# Patient Record
Sex: MALE | Race: WHITE | NOT HISPANIC OR LATINO | Employment: OTHER | URBAN - NONMETROPOLITAN AREA
[De-identification: names, ages, dates, MRNs, and addresses within clinical notes are randomized per-mention and may not be internally consistent; named-entity substitution may affect disease eponyms.]

---

## 2020-07-03 ENCOUNTER — APPOINTMENT (EMERGENCY)
Dept: CT IMAGING | Facility: HOSPITAL | Age: 86
End: 2020-07-03
Payer: COMMERCIAL

## 2020-07-03 ENCOUNTER — HOSPITAL ENCOUNTER (EMERGENCY)
Facility: HOSPITAL | Age: 86
Discharge: HOME | End: 2020-07-03
Attending: EMERGENCY MEDICINE | Admitting: EMERGENCY MEDICINE
Payer: COMMERCIAL

## 2020-07-03 VITALS
RESPIRATION RATE: 20 BRPM | DIASTOLIC BLOOD PRESSURE: 77 MMHG | TEMPERATURE: 98.2 F | WEIGHT: 198.85 LBS | SYSTOLIC BLOOD PRESSURE: 151 MMHG | HEART RATE: 68 BPM | OXYGEN SATURATION: 99 %

## 2020-07-03 DIAGNOSIS — J18.9 PNEUMONIA OF BOTH LOWER LOBES DUE TO INFECTIOUS ORGANISM: ICD-10-CM

## 2020-07-03 DIAGNOSIS — G51.0 BELL'S PALSY: Primary | ICD-10-CM

## 2020-07-03 LAB
ALBUMIN SERPL BCP-MCNC: 3.8 G/DL (ref 3.5–5)
ALP SERPL-CCNC: 129 U/L (ref 46–116)
ALT SERPL W P-5'-P-CCNC: 23 U/L (ref 12–78)
ANION GAP SERPL CALCULATED.3IONS-SCNC: 6 MMOL/L (ref 4–13)
APTT PPP: 24 SECONDS (ref 23–37)
AST SERPL W P-5'-P-CCNC: 16 U/L (ref 5–45)
BASOPHILS # BLD AUTO: 0.01 THOUSANDS/ΜL (ref 0–0.1)
BASOPHILS NFR BLD AUTO: 0 % (ref 0–1)
BILIRUB SERPL-MCNC: 0.58 MG/DL (ref 0.2–1)
BUN SERPL-MCNC: 18 MG/DL (ref 5–25)
CALCIUM SERPL-MCNC: 8.9 MG/DL (ref 8.3–10.1)
CHLORIDE SERPL-SCNC: 100 MMOL/L (ref 100–108)
CO2 SERPL-SCNC: 30 MMOL/L (ref 21–32)
CREAT SERPL-MCNC: 1.48 MG/DL (ref 0.6–1.3)
EOSINOPHIL # BLD AUTO: 0.12 THOUSAND/ΜL (ref 0–0.61)
EOSINOPHIL NFR BLD AUTO: 1 % (ref 0–6)
ERYTHROCYTE [DISTWIDTH] IN BLOOD BY AUTOMATED COUNT: 12.5 % (ref 11.6–15.1)
GFR SERPL CREATININE-BSD FRML MDRD: 41 ML/MIN/1.73SQ M
GLUCOSE SERPL-MCNC: 220 MG/DL (ref 65–140)
HCT VFR BLD AUTO: 43 % (ref 36.5–49.3)
HGB BLD-MCNC: 14.3 G/DL (ref 12–17)
IMM GRANULOCYTES # BLD AUTO: 0.02 THOUSAND/UL (ref 0–0.2)
IMM GRANULOCYTES NFR BLD AUTO: 0 % (ref 0–2)
INR PPP: 0.95 (ref 0.84–1.19)
LACTATE SERPL-SCNC: 1.9 MMOL/L (ref 0.5–2)
LIPASE SERPL-CCNC: 132 U/L (ref 73–393)
LYMPHOCYTES # BLD AUTO: 1.44 THOUSANDS/ΜL (ref 0.6–4.47)
LYMPHOCYTES NFR BLD AUTO: 16 % (ref 14–44)
MCH RBC QN AUTO: 33.3 PG (ref 26.8–34.3)
MCHC RBC AUTO-ENTMCNC: 33.3 G/DL (ref 31.4–37.4)
MCV RBC AUTO: 100 FL (ref 82–98)
MONOCYTES # BLD AUTO: 0.88 THOUSAND/ΜL (ref 0.17–1.22)
MONOCYTES NFR BLD AUTO: 10 % (ref 4–12)
NEUTROPHILS # BLD AUTO: 6.36 THOUSANDS/ΜL (ref 1.85–7.62)
NEUTS SEG NFR BLD AUTO: 73 % (ref 43–75)
NRBC BLD AUTO-RTO: 0 /100 WBCS
PLATELET # BLD AUTO: 248 THOUSANDS/UL (ref 149–390)
PMV BLD AUTO: 10.2 FL (ref 8.9–12.7)
POTASSIUM SERPL-SCNC: 4.7 MMOL/L (ref 3.5–5.3)
PROT SERPL-MCNC: 8.2 G/DL (ref 6.4–8.2)
PROTHROMBIN TIME: 12.7 SECONDS (ref 11.6–14.5)
RBC # BLD AUTO: 4.3 MILLION/UL (ref 3.88–5.62)
SARS-COV-2 RNA RESP QL NAA+PROBE: NEGATIVE
SODIUM SERPL-SCNC: 136 MMOL/L (ref 136–145)
TROPONIN I SERPL-MCNC: <0.02 NG/ML
WBC # BLD AUTO: 8.83 THOUSAND/UL (ref 4.31–10.16)

## 2020-07-03 PROCEDURE — 70450 CT HEAD/BRAIN W/O DYE: CPT

## 2020-07-03 PROCEDURE — 93005 ELECTROCARDIOGRAM TRACING: CPT

## 2020-07-03 PROCEDURE — 70490 CT SOFT TISSUE NECK W/O DYE: CPT

## 2020-07-03 PROCEDURE — 99285 EMERGENCY DEPT VISIT HI MDM: CPT | Performed by: EMERGENCY MEDICINE

## 2020-07-03 PROCEDURE — 86618 LYME DISEASE ANTIBODY: CPT | Performed by: EMERGENCY MEDICINE

## 2020-07-03 PROCEDURE — 85025 COMPLETE CBC W/AUTO DIFF WBC: CPT | Performed by: EMERGENCY MEDICINE

## 2020-07-03 PROCEDURE — 87635 SARS-COV-2 COVID-19 AMP PRB: CPT | Performed by: EMERGENCY MEDICINE

## 2020-07-03 PROCEDURE — 36415 COLL VENOUS BLD VENIPUNCTURE: CPT | Performed by: EMERGENCY MEDICINE

## 2020-07-03 PROCEDURE — 71250 CT THORAX DX C-: CPT

## 2020-07-03 PROCEDURE — 84484 ASSAY OF TROPONIN QUANT: CPT | Performed by: EMERGENCY MEDICINE

## 2020-07-03 PROCEDURE — 83605 ASSAY OF LACTIC ACID: CPT | Performed by: EMERGENCY MEDICINE

## 2020-07-03 PROCEDURE — 80053 COMPREHEN METABOLIC PANEL: CPT | Performed by: EMERGENCY MEDICINE

## 2020-07-03 PROCEDURE — 96365 THER/PROPH/DIAG IV INF INIT: CPT

## 2020-07-03 PROCEDURE — 85730 THROMBOPLASTIN TIME PARTIAL: CPT | Performed by: EMERGENCY MEDICINE

## 2020-07-03 PROCEDURE — 99285 EMERGENCY DEPT VISIT HI MDM: CPT

## 2020-07-03 PROCEDURE — 83690 ASSAY OF LIPASE: CPT | Performed by: EMERGENCY MEDICINE

## 2020-07-03 PROCEDURE — 85610 PROTHROMBIN TIME: CPT | Performed by: EMERGENCY MEDICINE

## 2020-07-03 RX ORDER — AMOXICILLIN AND CLAVULANATE POTASSIUM 875; 125 MG/1; MG/1
1 TABLET, FILM COATED ORAL EVERY 12 HOURS
Qty: 20 TABLET | Refills: 0 | Status: SHIPPED | OUTPATIENT
Start: 2020-07-03 | End: 2020-07-04

## 2020-07-03 RX ORDER — PREDNISONE 20 MG/1
40 TABLET ORAL DAILY
Qty: 10 TABLET | Refills: 0 | Status: SHIPPED | OUTPATIENT
Start: 2020-07-03 | End: 2020-07-08

## 2020-07-03 RX ORDER — PREDNISONE 20 MG/1
40 TABLET ORAL ONCE
Status: COMPLETED | OUTPATIENT
Start: 2020-07-03 | End: 2020-07-03

## 2020-07-03 RX ADMIN — PIPERACILLIN SODIUM AND TAZOBACTAM SODIUM 3.38 G: 36; 4.5 INJECTION, POWDER, FOR SOLUTION INTRAVENOUS at 14:11

## 2020-07-03 RX ADMIN — PREDNISONE 40 MG: 20 TABLET ORAL at 14:08

## 2020-07-03 NOTE — ED NOTES
Pt in no acute distress  Ambulates with a steady gait   Granddaughter to come pick pt and wife up     Bev Wooten RN  07/03/20 6964

## 2020-07-03 NOTE — ED PROVIDER NOTES
History  Chief Complaint   Patient presents with    Difficulty Swallowing     pt states ever since eating breakfast yesterday he has been having difficulty swallowing  wife states "lots of mucous yesterday" pt denies painful  voice hoarse per pt  pt denies SOB, CP  pt also reports numbness to L side of face      Patient states that ever since yesterday morning when he is eating breakfast he complains of difficulty swelling  Brings up a lot of mucus  No nausea  Tolerating sips of water  Complains of numbness to the left face  No change in speech  No chest pain  No recent cold symptoms  No fevers or chills  History provided by:  Patient   used: No    Medical Problem   Location:  Difficulty swelling and left facial numbness  Severity:  Mild  Onset quality:  Sudden  Duration:  1 day  Timing:  Constant  Progression:  Unchanged  Relieved by:  Nothing  Worsened by:  Nothing  Ineffective treatments:  Nothing  Associated symptoms: cough    Associated symptoms: no abdominal pain, no chest pain, no congestion, no diarrhea, no ear pain, no fever, no headaches, no loss of consciousness, no nausea, no rash, no rhinorrhea, no shortness of breath, no sore throat, no vomiting and no wheezing        Prior to Admission Medications   Prescriptions Last Dose Informant Patient Reported? Taking? Aspirin Buf,CaCarb-MgCarb-MgO, 81 MG TABS   Yes Yes   Sig: Take by mouth   Multiple Vitamin (MULTIVITAMINS PO)   Yes Yes   Sig: Take by mouth   metFORMIN (GLUCOPHAGE) 500 mg tablet   Yes Yes   Sig: TAKE 1 TABLET DAILY      Facility-Administered Medications: None       Past Medical History:   Diagnosis Date    Diabetes mellitus ([facility] [address] )        History reviewed  No pertinent surgical history  History reviewed  No pertinent family history  I have reviewed and agree with the history as documented      E-Cigarette/Vaping    E-Cigarette Use Never User      E-Cigarette/Vaping Substances     Social History     Tobacco Use    Smoking status: Former Smoker    Smokeless tobacco: Never Used   Substance Use Topics    Alcohol use: Never     Frequency: Never    Drug use: Never       Review of Systems   Constitutional: Negative for chills and fever  HENT: Negative for congestion, ear pain, hearing loss, rhinorrhea, sore throat, trouble swallowing and voice change  Eyes: Negative for pain and discharge  Respiratory: Positive for cough  Negative for shortness of breath and wheezing  Cardiovascular: Negative for chest pain and palpitations  Gastrointestinal: Negative for abdominal pain, blood in stool, constipation, diarrhea, nausea and vomiting  Genitourinary: Negative for dysuria, flank pain, frequency and hematuria  Musculoskeletal: Negative for joint swelling, neck pain and neck stiffness  Skin: Negative for rash and wound  Neurological: Positive for facial asymmetry  Negative for dizziness, seizures, loss of consciousness, syncope and headaches  Psychiatric/Behavioral: Negative for hallucinations, self-injury and suicidal ideas  All other systems reviewed and are negative  Physical Exam  Physical Exam   Constitutional: He appears well-developed and well-nourished  No distress  HENT:   Head: Normocephalic and atraumatic  Right Ear: External ear normal    Left Ear: External ear normal    Eyes: Pupils are equal, round, and reactive to light  Conjunctivae and EOM are normal    Neck: Normal range of motion  Neck supple  Cardiovascular: Normal rate, regular rhythm and normal heart sounds  No murmur heard  Pulmonary/Chest: Effort normal and breath sounds normal  He has no wheezes  He has no rales  Abdominal: Soft  Bowel sounds are normal  He exhibits no distension  There is no tenderness  There is no rebound and no guarding  Musculoskeletal: Normal range of motion  He exhibits no deformity  Neurological: He is alert  Decreased deferring of the left forehead  Left eyelid slightly droopy    Left facial droop but the nasal labial fold  Decreased strength in keeping the left eyelid close compared to the right  Skin: Skin is warm and dry  No rash noted  Psychiatric: He has a normal mood and affect  His behavior is normal    Nursing note and vitals reviewed  Vital Signs  ED Triage Vitals [07/03/20 1208]   Temperature Pulse Respirations Blood Pressure SpO2   98 2 °F (36 8 °C) 82 20 (!) 191/86 98 %      Temp Source Heart Rate Source Patient Position - Orthostatic VS BP Location FiO2 (%)   Temporal Monitor Lying Right arm --      Pain Score       --           Vitals:    07/03/20 1415 07/03/20 1430 07/03/20 1445 07/03/20 1500   BP:  147/73  151/77   Pulse: 73 66 65 68   Patient Position - Orthostatic VS:             Visual Acuity      ED Medications  Medications   iohexol (OMNIPAQUE) 240 MG/ML solution 20 mL (has no administration in time range)   piperacillin-tazobactam (ZOSYN) 3 375 g in sodium chloride 0 9 % 100 mL IVPB (0 g Intravenous Stopped 7/3/20 1449)   predniSONE tablet 40 mg (40 mg Oral Given 7/3/20 1408)       Diagnostic Studies  Results Reviewed     Procedure Component Value Units Date/Time    Novel Coronavirus [facility] [[id number]]  (Normal) Collected:  07/03/20 1409    Lab Status:  Final result Specimen:  Nares from Nose Updated:  07/03/20 1505     SARS-CoV-2 Negative    Narrative: The specimen collection materials, transport medium, and/or testing methodology utilized in the production of these test results have been proven to be reliable in a limited validation with an abbreviated program under the Emergency Utilization Authorization provided by the FDA  Testing reported as "Presumptive positive" will be confirmed with secondary testing with a reference laboratory to ensure result accuracy  Clinical caution and judgement should be used with the interpretation of these results with consideration of the clinical impression and other laboratory testing    Testing reported as "Positive" or "Negative" has been proven to be accurate according to standard laboratory validation requirements  All testing is performed with control materials showing appropriate reactivity at standard intervals  Lactic acid [[id number]]  (Normal) Collected:  07/03/20 1224    Lab Status:  Final result Specimen:  Blood from Arm, Right Updated:  07/03/20 1257     LACTIC ACID 1 9 mmol/L     Narrative:       Result may be elevated if tourniquet was used during collection      Troponin I [[id number]]  (Normal) Collected:  07/03/20 1224    Lab Status:  Final result Specimen:  Blood from Arm, Right Updated:  07/03/20 1256     Troponin I <0 02 ng/mL     Comprehensive metabolic panel [[id number]]  (Abnormal) Collected:  07/03/20 1224    Lab Status:  Final result Specimen:  Blood from Arm, Right Updated:  07/03/20 1253     Sodium 136 mmol/L      Potassium 4 7 mmol/L      Chloride 100 mmol/L      CO2 30 mmol/L      ANION GAP 6 mmol/L      BUN 18 mg/dL      Creatinine 1 48 mg/dL      Glucose 220 mg/dL      Calcium 8 9 mg/dL      AST 16 U/L      ALT 23 U/L      Alkaline Phosphatase 129 U/L      Total Protein 8 2 g/dL      Albumin 3 8 g/dL      Total Bilirubin 0 58 mg/dL      eGFR 41 ml/min/1 73sq m     Narrative:       [place] guidelines for Chronic Kidney Disease (CKD):     Stage 1 with normal or high GFR (GFR > 90 mL/min/1 73 square meters)    Stage 2 Mild CKD (GFR = 60-89 mL/min/1 73 square meters)    Stage 3A Moderate CKD (GFR = 45-59 mL/min/1 73 square meters)    Stage 3B Moderate CKD (GFR = 30-44 mL/min/1 73 square meters)    Stage 4 Severe CKD (GFR = 15-29 mL/min/1 73 square meters)    Stage 5 End Stage CKD (GFR <15 mL/min/1 73 square meters)  Note: GFR calculation is accurate only with a steady state creatinine    Lipase [[id number]]  (Normal) Collected:  07/03/20 1224    Lab Status:  Final result Specimen:  Blood from Arm, Right Updated:  07/03/20 1253     Lipase 132 u/L Protime-INR [[id number]]  (Normal) Collected:  07/03/20 1224    Lab Status:  Final result Specimen:  Blood from Arm, Right Updated:  07/03/20 1249     Protime 12 7 seconds      INR 0 95    APTT [[id number]]  (Normal) Collected:  07/03/20 1224    Lab Status:  Final result Specimen:  Blood from Arm, Right Updated:  07/03/20 1249     PTT 24 seconds     CBC and differential [[id number]]  (Abnormal) Collected:  07/03/20 1224    Lab Status:  Final result Specimen:  Blood from Arm, Right Updated:  07/03/20 1237     WBC 8 83 Thousand/uL      RBC 4 30 Million/uL      Hemoglobin 14 3 g/dL      Hematocrit 43 0 %       fL      MCH 33 3 pg      MCHC 33 3 g/dL      RDW 12 5 %      MPV 10 2 fL      Platelets 370 Thousands/uL      nRBC 0 /100 WBCs      Neutrophils Relative 73 %      Immat GRANS % 0 %      Lymphocytes Relative 16 %      Monocytes Relative 10 %      Eosinophils Relative 1 %      Basophils Relative 0 %      Neutrophils Absolute 6 36 Thousands/µL      Immature Grans Absolute 0 02 Thousand/uL      Lymphocytes Absolute 1 44 Thousands/µL      Monocytes Absolute 0 88 Thousand/µL      Eosinophils Absolute 0 12 Thousand/µL      Basophils Absolute 0 01 Thousands/µL     Lyme Antibody Profile with reflex to [facility] [[id number]] Collected:  07/03/20 1224    Lab Status: In process Specimen:  Blood from Arm, Right Updated:  07/03/20 1234                 CT head without contrast   Final Result by Sarika Prather MD (07/03 1349)      No acute intracranial abnormality  Workstation performed: [id number]         CT chest without contrast   Final Result by Sarika Prather MD (07/03 1339)         1  Few airspace opacities at the lung bases could represent mild aspiration pneumonitis or pneumonia  2   Limited evaluation of the esophagus and absence of complete distention and presence of motion artifact  No clear evidence of esophagitis    If there is persistent concern for stricture or reflux, recommend dedicated barium swallow exam or EGD  Workstation performed: [id number]         CT soft tissue neck wo contrast   Final Result by Clair Bright MD (07/03 1347)            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 on the proximal esophagus and residual contrast in the piriform sinuses, concerning for aspiration  Dedicated barium swallow study may be helpful  Workstation performed: [id number]                    Procedures  ECG 12 Lead Documentation Only  Date/Time: 7/3/2020 12:25 PM  Performed by: Mike Zelaya MD  Authorized by: Mike Zelaya MD     ECG reviewed by me, the ED Provider: yes    Patient location:  ED  Previous ECG:     Previous ECG:  Unavailable  Rate:     ECG rate:  80  Rhythm:     Rhythm: sinus rhythm    Ectopy:     Ectopy: none    Comments:      Right bundle branch block and left anterior fascicular block noted  ED Course  ED Course as of Jul 03 1515   Fri Jul 03, 2020   1258 Upon further questioning, patient states that when he does eat it feels like it gets stuck  The actual process of swelling is not difficult  Just returned from CAT scan and he swallowed the contrast that any difficulty  States that when he ate today healing took small spoon fulls and would bring stuff up  It would not be food but just saliva and mucus  1341 Patient seen  Has tolerated the p o  Contrast   No difficulty breathing  Wife states it just feels like there is a lot of mucus there  1355 Patient is afebrile within normal white count and lactic acid  Given that these symptoms started while he was eating breakfast this could be due to an aspiration pneumonia  However, given the normal white count and normal lactic acid and a normal pulse ox, there is no criteria for admission at this time  1513 Walking in the hallways without any difficulty  No shortness of breath            US AUDIT      Most Recent Value   Initial Alcohol Screen: US AUDIT-C    1  How often do you have a drink containing alcohol?  0 Filed at: 07/03/2020 1211   2  How many drinks containing alcohol do you have on a typical day you are drinking? 0 Filed at: 07/03/2020 1211   3a  Male UNDER 65: How often do you have five or more drinks on one occasion? 0 Filed at: 07/03/2020 1211   3b  FEMALE Any Age, or MALE 65+: How often do you have 4 or more drinks on one occassion? 0 Filed at: 07/03/2020 1211   Audit-C Score  0 Filed at: 07/03/2020 1211                  SHAUN/DAST-10      Most Recent Value   How many times in the past year have you    Used an illegal drug or used a prescription medication for non-medical reasons? Never Filed at: 07/03/2020 1211                                [facility]  Number of Diagnoses or Management Options     Amount and/or Complexity of Data Reviewed  Clinical lab tests: reviewed  Obtain history from someone other than the patient: yes          Disposition  Final diagnoses:   Bell's palsy   Pneumonia of both lower lobes due to infectious organism     Time reflects when diagnosis was documented in both MDM as applicable and the Disposition within this note     Time User Action Codes Description Comment    7/3/2020  1:52 PM Concha Bower Add [G51 0] Bell's palsy     7/3/2020  1:56 PM Concha Bower Add [J18 9] Pneumonia of both lower lobes due to infectious organism       ED Disposition     ED Disposition Condition Date/Time Comment    Discharge Stable Fri Jul 3, 2020  1:52 PM Clifm Purple discharge to home/self care              Follow-up Information     Follow up With Specialties Details Why Jolie Luna MD Family Medicine Call in 2 days  [id number] [place] [address]            Patient's Medications   Discharge Prescriptions    AMOXICILLIN-CLAVULANATE (AUGMENTIN) 875-125 MG PER TABLET    Take 1 tablet by mouth every 12 (twelve) hours for 10 days       Start Date: 7/3/2020  End Date: 7/13/2020 Order Dose: 1 tablet       Quantity: 20 tablet    Refills: 0    PREDNISONE 20 MG TABLET    Take 2 tablets (40 mg total) by mouth daily for 5 days       Start Date: 7/3/2020  End Date: 7/8/2020       Order Dose: 40 mg       Quantity: 10 tablet    Refills: 0     No discharge procedures on file      PDMP Review     None          ED Provider  Electronically Signed by           Kalyn Glass MD  07/03/20 2716

## 2020-07-04 ENCOUNTER — TELEPHONE (OUTPATIENT)
Dept: EMERGENCY DEPT | Facility: HOSPITAL | Age: 86
End: 2020-07-04

## 2020-07-04 DIAGNOSIS — G51.0 BELL'S PALSY: Primary | ICD-10-CM

## 2020-07-04 DIAGNOSIS — J18.9 PNEUMONIA OF BOTH LOWER LOBES DUE TO INFECTIOUS ORGANISM: ICD-10-CM

## 2020-07-04 RX ORDER — AMOXICILLIN AND CLAVULANATE POTASSIUM 400; 57 MG/5ML; MG/5ML
875 POWDER, FOR SUSPENSION ORAL 2 TIMES DAILY
Qty: 218 ML | Refills: 0 | Status: SHIPPED | OUTPATIENT
Start: 2020-07-04 | End: 2020-07-14

## 2020-07-04 RX ORDER — PREDNISOLONE SODIUM PHOSPHATE 15 MG/5ML
40 SOLUTION ORAL DAILY
Qty: 100 ML | Refills: 0 | Status: SHIPPED | OUTPATIENT
Start: 2020-07-04 | End: 2020-07-09

## 2020-07-04 NOTE — TELEPHONE ENCOUNTER
Patient's daughter Azael Elizabeth called requesting a prescription for oral solution medications for Augmentin and prednisone

## 2020-07-06 LAB — B BURGDOR IGG+IGM SER-ACNC: <0.91 ISR (ref 0–0.9)

## 2020-07-07 LAB
ATRIAL RATE: 75 BPM
P AXIS: 69 DEGREES
PR INTERVAL: 158 MS
QRS AXIS: -47 DEGREES
QRSD INTERVAL: 120 MS
QT INTERVAL: 408 MS
QTC INTERVAL: 455 MS
T WAVE AXIS: 60 DEGREES
VENTRICULAR RATE: 75 BPM

## 2020-07-07 PROCEDURE — 93010 ELECTROCARDIOGRAM REPORT: CPT | Performed by: INTERNAL MEDICINE

## 2020-07-16 ENCOUNTER — HOSPITAL ENCOUNTER (OUTPATIENT)
Dept: MRI IMAGING | Facility: HOSPITAL | Age: 86
Discharge: HOME | End: 2020-07-16
Payer: COMMERCIAL

## 2020-07-16 ENCOUNTER — TRANSCRIBE ORDERS (OUTPATIENT)
Dept: ADMINISTRATIVE | Facility: HOSPITAL | Age: 86
End: 2020-07-16

## 2020-07-16 DIAGNOSIS — R13.0 APHAGIA: ICD-10-CM

## 2020-07-16 DIAGNOSIS — H02.402 UNSPECIFIED PTOSIS OF LEFT EYELID: ICD-10-CM

## 2020-07-16 DIAGNOSIS — R13.0 APHAGIA: Primary | ICD-10-CM

## 2020-07-16 PROCEDURE — A9585 GADOBUTROL INJECTION: HCPCS | Performed by: RADIOLOGY

## 2020-07-16 PROCEDURE — 70553 MRI BRAIN STEM W/O & W/DYE: CPT

## 2020-07-16 RX ADMIN — GADOBUTROL 8 ML: 604.72 INJECTION INTRAVENOUS at 15:49

## 2020-07-17 ENCOUNTER — HOSPITAL ENCOUNTER (EMERGENCY)
Facility: HOSPITAL | Age: 86
Discharge: HOME | End: 2020-07-17
Attending: EMERGENCY MEDICINE | Admitting: EMERGENCY MEDICINE
Payer: COMMERCIAL

## 2020-07-17 ENCOUNTER — APPOINTMENT (EMERGENCY)
Dept: RADIOLOGY | Facility: HOSPITAL | Age: 85
DRG: 065 | End: 2020-07-17
Attending: EMERGENCY MEDICINE
Payer: COMMERCIAL

## 2020-07-17 ENCOUNTER — HOSPITAL ENCOUNTER (INPATIENT)
Facility: HOSPITAL | Age: 85
LOS: 7 days | Discharge: HOME HEALTH | DRG: 065 | End: 2020-07-24
Attending: EMERGENCY MEDICINE | Admitting: HOSPITALIST
Payer: COMMERCIAL

## 2020-07-17 ENCOUNTER — HOSPITAL ENCOUNTER (OUTPATIENT)
Dept: RADIOLOGY | Facility: HOSPITAL | Age: 86
Discharge: HOME | End: 2020-07-17
Payer: COMMERCIAL

## 2020-07-17 VITALS
TEMPERATURE: 97.3 F | RESPIRATION RATE: 18 BRPM | HEART RATE: 87 BPM | DIASTOLIC BLOOD PRESSURE: 72 MMHG | SYSTOLIC BLOOD PRESSURE: 147 MMHG | WEIGHT: 184.53 LBS

## 2020-07-17 DIAGNOSIS — T17.900A SILENT ASPIRATION, INITIAL ENCOUNTER: ICD-10-CM

## 2020-07-17 DIAGNOSIS — I63.9 STROKE (CEREBRUM) (HCC): Primary | ICD-10-CM

## 2020-07-17 DIAGNOSIS — I63.9 CEREBROVASCULAR ACCIDENT (CVA), UNSPECIFIED MECHANISM (CMS/HCC): Primary | ICD-10-CM

## 2020-07-17 DIAGNOSIS — R13.0 APHAGIA: ICD-10-CM

## 2020-07-17 DIAGNOSIS — I69.391 DYSPHAGIA AS LATE EFFECT OF CEREBROVASCULAR ACCIDENT (CVA): ICD-10-CM

## 2020-07-17 DIAGNOSIS — R13.10 DIFFICULTY IN SWALLOWING: ICD-10-CM

## 2020-07-17 PROBLEM — E11.9 TYPE 2 DIABETES MELLITUS (CMS/HCC): Status: ACTIVE | Noted: 2020-07-17

## 2020-07-17 LAB
ALBUMIN SERPL-MCNC: 4.2 G/DL (ref 3.4–5)
ALP SERPL-CCNC: 96 IU/L (ref 35–126)
ALT SERPL-CCNC: 20 IU/L (ref 16–63)
ANION GAP SERPL CALC-SCNC: 11 MEQ/L (ref 3–15)
AST SERPL-CCNC: 19 IU/L (ref 15–41)
BASOPHILS # BLD: 0.02 K/UL (ref 0.01–0.1)
BASOPHILS NFR BLD: 0.2 %
BILIRUB SERPL-MCNC: 1.1 MG/DL (ref 0.3–1.2)
BUN SERPL-MCNC: 33 MG/DL (ref 8–20)
CALCIUM SERPL-MCNC: 9.1 MG/DL (ref 8.9–10.3)
CHLORIDE SERPL-SCNC: 101 MEQ/L (ref 98–109)
CO2 SERPL-SCNC: 24 MEQ/L (ref 22–32)
CREAT SERPL-MCNC: 1.5 MG/DL (ref 0.8–1.3)
DIFFERENTIAL METHOD BLD: ABNORMAL
EOSINOPHIL # BLD: 0.15 K/UL (ref 0.04–0.54)
EOSINOPHIL NFR BLD: 1.7 %
ERYTHROCYTE [DISTWIDTH] IN BLOOD BY AUTOMATED COUNT: 12.6 % (ref 11.6–14.4)
GFR SERPL CREATININE-BSD FRML MDRD: 43.9 ML/MIN/1.73M*2
GLUCOSE SERPL-MCNC: 150 MG/DL (ref 70–99)
HCT VFR BLDCO AUTO: 44.3 % (ref 40.1–51)
HGB BLD-MCNC: 14.5 G/DL (ref 13.7–17.5)
IMM GRANULOCYTES # BLD AUTO: 0.02 K/UL (ref 0–0.08)
IMM GRANULOCYTES NFR BLD AUTO: 0.2 %
LYMPHOCYTES # BLD: 1.64 K/UL (ref 1.2–3.5)
LYMPHOCYTES NFR BLD: 18.7 %
MCH RBC QN AUTO: 32.8 PG (ref 28–33.2)
MCHC RBC AUTO-ENTMCNC: 32.7 G/DL (ref 32.2–36.5)
MCV RBC AUTO: 100.2 FL (ref 83–98)
MONOCYTES # BLD: 0.63 K/UL (ref 0.3–1)
MONOCYTES NFR BLD: 7.2 %
NEUTROPHILS # BLD: 6.32 K/UL (ref 1.7–7)
NEUTS SEG NFR BLD: 72 %
NRBC BLD-RTO: 0 %
PDW BLD AUTO: 10.6 FL (ref 9.4–12.4)
PLATELET # BLD AUTO: 246 K/UL (ref 150–350)
POTASSIUM SERPL-SCNC: 4.5 MEQ/L (ref 3.6–5.1)
PROT SERPL-MCNC: 7.7 G/DL (ref 6–8.2)
RBC # BLD AUTO: 4.42 M/UL (ref 4.5–5.8)
SARS-COV-2 RNA RESP QL NAA+PROBE: NEGATIVE
SODIUM SERPL-SCNC: 136 MEQ/L (ref 136–144)
WBC # BLD AUTO: 8.78 K/UL (ref 3.8–10.5)

## 2020-07-17 PROCEDURE — 74230 X-RAY XM SWLNG FUNCJ C+: CPT

## 2020-07-17 PROCEDURE — 99284 EMERGENCY DEPT VISIT MOD MDM: CPT | Performed by: EMERGENCY MEDICINE

## 2020-07-17 PROCEDURE — 71045 X-RAY EXAM CHEST 1 VIEW: CPT

## 2020-07-17 PROCEDURE — 99285 EMERGENCY DEPT VISIT HI MDM: CPT | Mod: 25

## 2020-07-17 PROCEDURE — 36415 COLL VENOUS BLD VENIPUNCTURE: CPT | Performed by: EMERGENCY MEDICINE

## 2020-07-17 PROCEDURE — 85025 COMPLETE CBC W/AUTO DIFF WBC: CPT | Performed by: PHYSICIAN ASSISTANT

## 2020-07-17 PROCEDURE — 82607 VITAMIN B-12: CPT | Performed by: PSYCHIATRY & NEUROLOGY

## 2020-07-17 PROCEDURE — 99223 1ST HOSP IP/OBS HIGH 75: CPT | Performed by: HOSPITALIST

## 2020-07-17 PROCEDURE — 80061 LIPID PANEL: CPT | Performed by: PSYCHIATRY & NEUROLOGY

## 2020-07-17 PROCEDURE — 80053 COMPREHEN METABOLIC PANEL: CPT | Performed by: PHYSICIAN ASSISTANT

## 2020-07-17 PROCEDURE — 93005 ELECTROCARDIOGRAM TRACING: CPT | Performed by: PHYSICIAN ASSISTANT

## 2020-07-17 PROCEDURE — 92611 MOTION FLUOROSCOPY/SWALLOW: CPT

## 2020-07-17 PROCEDURE — U0002 COVID-19 LAB TEST NON-CDC: HCPCS | Performed by: PHYSICIAN ASSISTANT

## 2020-07-17 PROCEDURE — 25800000 HC PHARMACY IV SOLUTIONS: Performed by: PHYSICIAN ASSISTANT

## 2020-07-17 PROCEDURE — 20600000 HC ROOM AND CARE INTERMEDIATE/TELEMETRY

## 2020-07-17 PROCEDURE — 99284 EMERGENCY DEPT VISIT MOD MDM: CPT

## 2020-07-17 RX ORDER — POTASSIUM CHLORIDE 750 MG/1
20 TABLET, FILM COATED, EXTENDED RELEASE ORAL AS NEEDED
Status: DISCONTINUED | OUTPATIENT
Start: 2020-07-17 | End: 2020-07-21 | Stop reason: SDUPTHER

## 2020-07-17 RX ORDER — METFORMIN HYDROCHLORIDE 500 MG/1
500 TABLET ORAL
COMMUNITY
Start: 2020-06-16

## 2020-07-17 RX ORDER — AMOXICILLIN AND CLAVULANATE POTASSIUM 875; 125 MG/1; MG/1
TABLET, FILM COATED ORAL
COMMUNITY
Start: 2020-07-03 | End: 2020-07-17 | Stop reason: ENTERED-IN-ERROR

## 2020-07-17 RX ORDER — ACETAMINOPHEN 325 MG/1
650 TABLET ORAL EVERY 4 HOURS PRN
Status: DISCONTINUED | OUTPATIENT
Start: 2020-07-17 | End: 2020-07-24 | Stop reason: HOSPADM

## 2020-07-17 RX ORDER — ASPIRIN 81 MG/1
81 TABLET ORAL EVERY MORNING
COMMUNITY

## 2020-07-17 RX ORDER — CLOPIDOGREL BISULFATE 75 MG/1
75 TABLET ORAL DAILY
COMMUNITY

## 2020-07-17 RX ORDER — POTASSIUM CHLORIDE 750 MG/1
40 TABLET, FILM COATED, EXTENDED RELEASE ORAL AS NEEDED
Status: DISCONTINUED | OUTPATIENT
Start: 2020-07-17 | End: 2020-07-21 | Stop reason: SDUPTHER

## 2020-07-17 RX ORDER — DEXTROSE 50 % IN WATER (D50W) INTRAVENOUS SYRINGE
25 AS NEEDED
Status: DISCONTINUED | OUTPATIENT
Start: 2020-07-17 | End: 2020-07-24 | Stop reason: HOSPADM

## 2020-07-17 RX ORDER — IBUPROFEN 200 MG
16-32 TABLET ORAL AS NEEDED
Status: DISCONTINUED | OUTPATIENT
Start: 2020-07-17 | End: 2020-07-24 | Stop reason: HOSPADM

## 2020-07-17 RX ORDER — ASPIRIN 81 MG/1
81 TABLET, CHEWABLE ORAL DAILY
COMMUNITY

## 2020-07-17 RX ORDER — POTASSIUM CHLORIDE 14.9 MG/ML
20 INJECTION INTRAVENOUS AS NEEDED
Status: DISCONTINUED | OUTPATIENT
Start: 2020-07-17 | End: 2020-07-21 | Stop reason: SDUPTHER

## 2020-07-17 RX ORDER — DEXTROSE 40 %
15-30 GEL (GRAM) ORAL AS NEEDED
Status: DISCONTINUED | OUTPATIENT
Start: 2020-07-17 | End: 2020-07-24 | Stop reason: HOSPADM

## 2020-07-17 RX ORDER — CLOPIDOGREL BISULFATE 75 MG/1
75 TABLET ORAL EVERY MORNING
COMMUNITY
Start: 2020-07-17 | End: 2020-07-24 | Stop reason: HOSPADM

## 2020-07-17 RX ORDER — SODIUM CHLORIDE 9 MG/ML
125 INJECTION, SOLUTION INTRAVENOUS CONTINUOUS
Status: DISCONTINUED | OUTPATIENT
Start: 2020-07-17 | End: 2020-07-17

## 2020-07-17 RX ADMIN — SODIUM CHLORIDE 500 ML: 9 INJECTION, SOLUTION INTRAVENOUS at 19:03

## 2020-07-17 SDOH — HEALTH STABILITY: MENTAL HEALTH: HOW OFTEN DO YOU HAVE A DRINK CONTAINING ALCOHOL?: NEVER

## 2020-07-17 ASSESSMENT — COGNITIVE AND FUNCTIONAL STATUS - GENERAL
DRESSING REGULAR LOWER BODY CLOTHING: 4 - NONE
EATING MEALS: 4 - NONE
CLIMB 3 TO 5 STEPS WITH RAILING: 3 - A LITTLE
STANDING UP FROM CHAIR USING ARMS: 4 - NONE
WALKING IN HOSPITAL ROOM: 4 - NONE
DRESSING REGULAR UPPER BODY CLOTHING: 4 - NONE
TOILETING: 4 - NONE
MOVING TO AND FROM BED TO CHAIR: 4 - NONE
HELP NEEDED FOR PERSONAL GROOMING: 4 - NONE
HELP NEEDED FOR BATHING: 4 - NONE

## 2020-07-17 NOTE — H&P
Hospital Medicine Service -  History & Physical        CHIEF COMPLAINT     Chief Complaint   Patient presents with   • Abnormal Imaging Scan        HISTORY OF PRESENT ILLNESS      Patient is a 89-year-old male with history of type 2 diabetes mellitus and former smoker presents for evaluation of difficulty swallowing x2 weeks as well an MRI imaging report that showed a CVA completed on 7/16/2020. Initially presented to his local emergency department on 7/3/2020 with trouble swallowing and L-sided facial droop and was diagnosed with his palsy and pneumonia.  He was instructed to follow-up with a neurologist outpatient.  They did have labs and a chest x-ray completed at emergency department as well as noncontrast head CT which were unremarkable.     He continued to have difficulty swallowing and was scheduled to have an MRI of his brain next week, but he followed up with his PCP on 7/16/2020 and had a swallow study as well as a chest x-ray and MRI of his brain. The chest x-ray completed yesterday showed no active disease, pneumonia, pneumothorax, or mediastinal widening.  The MRI showed a punctate focus of diffusion restriction in the posterior lateral aspect of the left medullary with associated enhancement  indicative of small, recent/acute lacunar infarction, likely a few days in age given the associated enhancement.  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  Patient failed swallow study and is aspirating at home. He admits to losing 14 lbs since the last 2 weeks.     Admitting for dysphagia s/p CVA for peg tube placement. NPO. Neuro consult. Stroke orders, in place. Denies chest pain, sob, lightheadedness, dizziness, n/v/d, fever, chills, abdominal pain, urinary symptoms.     PAST MEDICAL AND SURGICAL HISTORY      Past Medical History:   Diagnosis Date   • CVA (cerebral vascular accident) (CMS/HCC)    • Type 2 diabetes mellitus (CMS/HCC)        History reviewed. No pertinent surgical  history.    MEDICATIONS      Prior to Admission medications    Medication Sig Start Date End Date Taking? Authorizing Provider   aspirin-calcium carbonate 81 mg-300 mg calcium(777 mg) tablet Take by mouth. 6/27/05  Yes Christie Quintana MD   clopidogreL (PLAVIX) 75 mg tablet Take 75 mg by mouth. 7/17/20  Yes Christie Quintana MD   amoxicillin-pot clavulanate (AUGMENTIN) 875-125 mg per tablet TAKE 1 TABLET BY MOUTH EVERY 12 HOURS FOR 10 DAYS 7/3/20   Christie Quintana MD   metFORMIN (GLUCOPHAGE) 500 mg tablet  6/16/20   Christie Quintana MD       ALLERGIES      Sulfa (sulfonamide antibiotics) and Sulfamethoxazole-trimethoprim    FAMILY HISTORY      History reviewed. No pertinent family history.    SOCIAL HISTORY      Social History     Socioeconomic History   • Marital status:      Spouse name: None   • Number of children: None   • Years of education: None   • Highest education level: None   Occupational History   • None   Social Needs   • Financial resource strain: None   • Food insecurity:     Worry: None     Inability: None   • Transportation needs:     Medical: None     Non-medical: None   Tobacco Use   • Smoking status: Former Smoker   Substance and Sexual Activity   • Alcohol use: Never     Frequency: Never   • Drug use: Never   • Sexual activity: None   Lifestyle   • Physical activity:     Days per week: None     Minutes per session: None   • Stress: None   Relationships   • Social connections:     Talks on phone: None     Gets together: None     Attends Taoism service: None     Active member of club or organization: None     Attends meetings of clubs or organizations: None     Relationship status: None   • Intimate partner violence:     Fear of current or ex partner: None     Emotionally abused: None     Physically abused: None     Forced sexual activity: None   Other Topics Concern   • None   Social History Narrative   • None       REVIEW OF SYSTEMS        Review of Systems negative  w/exception of HPI     PHYSICAL EXAMINATION      Temp:  [36.4 °C (97.6 °F)-36.9 °C (98.4 °F)] 36.7 °C (98 °F)  Heart Rate:  [71-99] 73  Resp:  [16-18] 18  BP: (121-155)/(59-79) 121/59  Body mass index is 24.64 kg/m².    Physical Exam   Constitutional: He is oriented to person, place, and time. He appears well-developed and well-nourished. No distress.   HENT:   Head: Normocephalic and atraumatic.   Eyes: Pupils are equal, round, and reactive to light. EOM are normal.   Neck: Normal range of motion. Neck supple.   Cardiovascular: Normal rate, regular rhythm, normal heart sounds and intact distal pulses.   No murmur heard.  Pulmonary/Chest: Effort normal and breath sounds normal. No stridor. No respiratory distress. He has no wheezes. He has no rales. He exhibits no tenderness.   Abdominal: Soft. Bowel sounds are normal. He exhibits no distension and no mass. There is no tenderness. There is no rebound and no guarding. No hernia.   Musculoskeletal: Normal range of motion. He exhibits no edema, tenderness or deformity.   Neurological: He is alert and oriented to person, place, and time. He displays normal reflexes. No sensory deficit. He exhibits normal muscle tone. Coordination normal.   Left facial droop  Left eyelid droop  Dysphagia present    Skin: Skin is warm and dry. Capillary refill takes less than 2 seconds. He is not diaphoretic. No erythema.   Psychiatric: He has a normal mood and affect.         LABS / IMAGING / EKG        Labs  CBC Results       07/17/20                          1828           WBC 8.78           RBC 4.42           HGB 14.5           HCT 44.3           .2           MCH 32.8           MCHC 32.7                                    CMP Results       07/17/20                          1828                      K 4.5           Cl 101           CO2 24           Glucose 150           BUN 33           Creatinine 1.5           Calcium 9.1           Anion Gap 11           AST 19            ALT 20           Albumin 4.2           EGFR 43.9           Comment for K at 1828 on 07/17/20:    Results obtained on plasma. Plasma Potassium values may be up to 0.4 mEQ/L less than serum values. The differences may be greater for patients with high platelet or white cell counts.          Troponin I Results     No lab values to display.        Microbiology Results     ** No results found for the last 720 hours. **        UA Results     No lab values to display.          Imaging  ECG 12 lead   ED Interpretation   Reviewed with attending. RH      Rhythm: [NSR]   Rate: 82   P waves: [normal interval]   QRS: [normal QRS]   Axis: [left]   ST Segments: [no obvious ST elevation or ischemia]      Impression: Normal sinus rhythm; left anterior fascicular block; LVH with QRS widening; possible anteroseptal infarct, age undetermined; no prior ECG for compariosn            X-RAY CHEST 1 VIEW   Final Result   IMPRESSION:   No acute cardiopulmonary disease      COMMENT:  AP portable erect view chest was performed.  The lungs are clear.   There is no evidence of consolidation or pleural effusion. The heart and   mediastinal structures are normal in size and contour.               ECG/Telemetry  reviewed by me    ASSESSMENT AND PLAN           * Cerebrovascular accident (CVA) (CMS/[facility])  Assessment & Plan  Admit  -s/p recent CVA   -The MRI showed a punctate focus of diffusion restriction in the posterior lateral aspect of the left medullary with associated enhancement  indicative of small, recent/acute lacunar infarction, likely a few days in age given the associated enhancement.  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 (official MRI read will be brought in by daughter).   -residual left facial droop, left eyelid droop and dysphagia.     Plan: ED discussed w/on-call neuro, recommending admission for peg tube, no anticoagulation, and 2D echo  -2D echo ordered  -neurology consulted  -IR consult for PEG  tube placement  -NPO  -aspiration precautions  -speech consult  -neuro checks         Dysphagia as late effect of cerebrovascular accident (CVA)  Assessment & Plan  -dysphagia x 2 weeks s/p CVA  -lost 14 lbs due to inability to eat or drink    Plan:   -npo  -speech  -IR consult for peg tube  -nutrition consult  -aspiration precautions      Type 2 diabetes mellitus (CMS/HCC)  Assessment & Plan  -NPO  -Accuchecks q6hr  -SSI q 6hr  -hold metformin       VTE Assessment: [place] VTE Score: 2  VTE Prophylaxis Plan: SCD   Code Status: Full Code       WANDA Lara  7/18/2020

## 2020-07-17 NOTE — ED PROVIDER NOTES
"HPI     Chief Complaint   Patient presents with   • Abnormal Imaging Scan       HPI Patient is a 89-year-old male with history of type 2 diabetes mellitus and former smoker presents for evaluation of difficulty swallowing x2 weeks as well an MRI imaging report that showed a CVA completed on 7/16/2020.  The patient initially presented to his local emergency department on 7/3/2020 with trouble swallowing and L-sided facial droop and was diagnosed with his palsy and pneumonia.  He was instructed to follow-up with a neurologist outpatient.  They did have labs and a chest x-ray completed at emergency department as well as noncontrast head CT which were unremarkable.  The patient continued to have difficulty swallowing and was scheduled to have an MRI of his brain next week, but he followed up with his PCP on 7/16/2020 who recommended several tests including a swallow study as well as a chest x-ray and MRI of his brain.  The patient had an MRI and chest x-ray completed yesterday and had a swallow screening completed today.  The chest x-ray completed yesterday showed no active disease, pneumonia, pneumothorax, or mediastinal widening.  The MRI showed a punctate focus of diffusion restriction in the posterior lateral aspect of the left medullary with associated enhancement  indicative of small, recent/acute lacunar infarction, likely a few days in age given the associated enhancement.  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  The patient had his swallow study completed today, he failed the swallow study and was told he was \"aspirating everything.\" Patient admits he has been having difficulty eating and drinking because he feels everything go down the wrong pipe and has severe fits of coughing with p.o. intake.    The patient denies any chest pain, shortness of breath, palpitations, abdominal pain, nausea, vomiting, back pain, difficulty ambulating, visual disturbance, speech disturbance, any " other symptoms.     Patient History     Past Medical History:   Diagnosis Date   • CVA (cerebral vascular accident) (CMS/HCC)    • Type 2 diabetes mellitus (CMS/HCC)        History reviewed. No pertinent surgical history.    History reviewed. No pertinent family history.    Social History     Tobacco Use   • Smoking status: Former Smoker   Substance Use Topics   • Alcohol use: Never     Frequency: Never   • Drug use: Never       Systems Reviewed from Nursing Triage:          Review of Systems     Review of Systems     Physical Exam     ED Triage Vitals [07/17/20 1738]   Temp Heart Rate Resp BP SpO2   36.9 °C (98.4 °F) 99 16 (!) 155/79 96 %      Temp Source Heart Rate Source Patient Position BP Location FiO2 (%) (Set)   Temporal -- -- -- --                     Patient Vitals for the past 24 hrs:   BP Temp Temp src Pulse Resp SpO2 Height Weight   07/17/20 1738 (!) 155/79 36.9 °C (98.4 °F) Temporal 99 16 96 % 1.829 m (6') 83.5 kg (184 lb)                                          Physical Exam   Constitutional: He appears well-developed and well-nourished.   HENT:   Head: Normocephalic and atraumatic.   Eyes: Conjunctivae are normal.   Neck: Neck supple.   Cardiovascular: Normal rate and regular rhythm.   No murmur heard.  Pulmonary/Chest: Effort normal and breath sounds normal. No respiratory distress.   Abdominal: Soft. There is no tenderness.   Musculoskeletal: He exhibits no edema.   Neurological: He is alert.   Patient has a left-sided facial droop without forehead involvement  Mental status: A/Ox3  Sensation intact in all extremities.  Motor: Normal tone and bulk. No abnormal movements appreciated. No pronator drift. Strength tested and 5/5 in all four extremities.  Coordination: Finger to nose and heel to shin testing intact bilaterally.   Skin: Skin is warm and dry.   Psychiatric: He has a normal mood and affect.   Nursing note and vitals reviewed.           Procedures    Labs Reviewed   SARS-COV-2 (COVID 19),  PCR   CBC AND DIFF   COMPREHENSIVE METABOLIC PANEL   RAINBOW DRAW PANEL    Narrative:     The following orders were created for panel order [place] Draw Panel.  Procedure                               Abnormality         Status                     ---------                               -----------         ------                     RAINBOW RED[[id number]]                                                                 RAINBOW LT BLUE[[id number]]                                                             RAINBOW LT GREEN[[id number]]                                                            RAINBOW GOLD[[id number]]                                                                  Please view results for these tests on the individual orders.   RAINBOW RED   RAINBOW LT BLUE   RAINBOW LT GREEN   RAINBOW GOLD       ECG 12 lead    (Results Pending)   X-RAY CHEST 1 VIEW    (Results Pending)               ED Course & MDM     MDM         ED Course as of Jul 17 1902 Fri Jul 17, 2020 1825 Impression: CVA 2 weeks ago; patient did not bring imaging study  Plan: CBC, CMP, COVID test, cxr, ecg, reeval    [RH]   1846 Cbc unremarkable    [RH]   1853 Paged neurology    [RH]   1901 I discussed with  Friday. Swallow precautions. Probably needs PEG tube. Does not recommend additional anticoagulation at this time. Family is going to get copies of imaging to disc and will transport these to hospital by tomorrow AM, although reports are available in care everywhere. Paged [facility] [place] [facility]. Patient should have ECHO. Potentially MRA. Full Code.    [RH]      ED Course User Index  [RH] Michelle Pantoja PA C         Clinical Impressions as of Jul 17 1902   Cerebrovascular accident (CVA), unspecified mechanism (CMS/HCC)        Michelle Pantoja PA C  07/17/20 6365

## 2020-07-17 NOTE — ED ATTESTATION NOTE
The patient was evaluated and managed by the PA/NP.  I have discussed the case with the PA/NP and I have reviewed the labs and imaging results .  I am in agreement with the ED workup and treatment plan.  I will continue to be available for consultation as needed.     Godshall, Duane K, MD  07/17/20 1959

## 2020-07-17 NOTE — PROCEDURES
Speech Pathology Videofluoroscopic Swallow Study    Patient Name: Chano Diez    WGRQY'[id number] Date: 7/17/2020     Problem List  Active Problems:    * No active hospital problems  *      Past Medical History  Past Medical History:   Diagnosis Date    Diabetes mellitus ([address] )        Past Surgical History  No past surgical history on file  General Information;  Pt is a 81 y o  male with a PMH remarkable for Diabetes, chronic kidney disease, and HTN  Pt was seen in the ED 7/3/20 for L facial weakness which was initially diagnosed as bells palsy  Pt had an MRI yesterday which revealed a small brainstem CVA  He was referred for stat VBS due to inability to swallow, and coughing with all intake as well as significant weight loss over the past few weeks  Pt was viewed in lateral position and was given trials of pureed, honey (moderately thick), nectar (mildly thick), and thin liquid  ED note 7/3/2020:  CT chest   1  Few airspace opacities at the lung bases could represent mild aspiration pneumonitis or pneumonia        2   Limited evaluation of the esophagus and absence of complete distention and presence of motion artifact  No clear evidence of esophagitis  If there is persistent concern for stricture or reflux, recommend dedicated barium swallow exam or EGD  CT soft tissue of the neck   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 on the proximal esophagus and residual contrast in the piriform sinuses, concerning for aspiration  Dedicated barium swallow study may be helpful  MRI 7/16/2020   1  Punctate focus of diffusion restriction in the posterior lateral aspect of the left medulla with associated enhancement indicative of a small, recent/acute lacunar infarction, likely a few days in age given the associated enhancement  2   Mild to moderate chronic microangiopathy elsewhere        Oral stage:  Pt presented with mild oral stage dysphagia  Mastication could not be assessed   Only liquids and purees were provided during this study due to concerns for safety with pharyngeal swallow  Bolus formation and transfer were functional  Oral control appeared mildly reduced with premature spillage over the base of tongue  Pharyngeal stage:  Pt presented with severe pharyngeal dysphagia  Swallowing initiation was mildly delayed  Hyolaryngeal rise and anterior displacement were reduced  Airway closure/protection appeared incomplete, and epiglottis does not invert during swallow resulting in chronic penetration and aspiration with all consistencies  Tongue base retraction appeared to be significantly reduced, and pharyngeal stripping wave was diminished to completely absent  Bolus passage through the UES was significantly reduced, resulting in residual pooling in the pyriforms and spillover into the laryngeal vestibule with aspiration after swallow  Esophageal stage:  Esophageal screening was completed  UES opening is reduced resulting in incomplete bolus passage and aspiration of pyriform residual after the swallow  This is felt to be primarily due to impaired swallowing mechanics for adequate pressure to propel the bolus through the UES rather than stricture  Discussed with pt and daughter, EGD may be considered, however may not significantly improve swallowing due to underlying pharyngeal swallow impairment (reduced laryngeal elevation, incomplete airway closure, absent pharyngeal stripping wave)  Lower esophagus was screened with thin liquid trial, some retropulsion observed however pt was also coughing  Strategies and Efficacy: Chin tuck did improve UES relaxation for better bolus passage, however airway protection remains significantly compromised      Aspiration Response and Efficacy: Positive cough in response to aspiration, often able to expel aspirated material       Assessment Summary:    Pt presents with severe pharyngeal dysphagia dysphagia characterized by reduced laryngeal rise, incomplete laryngeal vestibular closure with absent epiglottic inversion, reduced base of tongue retraction and absent pharyngeal stripping wave (base of tongue often unable to make contact with posterior pharyngeal wall during swallow), and reduced relaxation/opening of the UES  This resulted in aspiration during swallow with all consistencies due to incomplete airway closure, as well as aspiration of residual spilling over from the pyriforms after the swallow  Due to the rapid onset of pt's dysphagia, suspect reduced UES opening to be caused by inadequate pressure during swallow to propel the bolus through the UES rather than stricture  EGD may be considered, however suspect severe dysphagia will persist based on significant pharyngeal impairment  Note: Images are available for review in PACS as desired  Recommendations:   Recommended Diet:  NPO with tube feeds   Recommended Form of Medications: non-oral if possible   Aspiration precautions and compensatory swallowing strategies: upright posture and TF precautions, PO trials with SLP only  Consider referral to: Consider inpatient admission for IV hydration and placement of a PEG tube  SLP Dysphagia therapy recommended: Yes  Pt is an otherwise healthy and active 67-year-old who lives independently with his wife and has good family support  Results Reviewed with: patient, MD, family and radiologist   Pt/Family Education: I educated pt and daughter at length re: pt's dysphagia caused by brainstem CVA  They are in agreement with plan for PEG tube and dysphagia therapy  Recommend dysphagia therapy through home care or as an outpatient  Pt may benefit from NMES/VitalStim treatments  He will likely need home health services for support in managing tube feeds initially, as well as nutrition involvement for tube feeding recs

## 2020-07-17 NOTE — ED PROVIDER NOTES
History  Chief Complaint   Patient presents with    Difficulty Swallowing     pt had a swallowing study done today, is unable to swallow without aspiration, was told by PCP that he had a stroke a few days ago, was told to come to ED to be admitted to have a PEG tube placed for feeding     Patient is a 58-year-old male with a history of diabetes recent stroke presents the emergency department due to referral by PCP for silent aspiration which was found on a swallow study today as well as difficulty swallowing for the past 16 days and recent diagnosis of a brainstem stroke found on brain MRI as an outpatient  Patient reports only difficulty swallowing and some dysarthria is noted as well  No other focal numbness or weakness no other symptoms at this time patient and family feel there here in the ER to be admitted and have a feeding tube placed as advised by the primary physician  Patient reports he has been tolerating some soft foods and liquids  No shortness of breath or trouble breathing does occasionally cough after eating  No fevers or chills  History provided by:  Patient      Prior to Admission Medications   Prescriptions Last Dose Informant Patient Reported? Taking? Multiple Vitamin (MULTIVITAMINS PO)   Yes Yes   Sig: Take by mouth   aspirin 81 mg chewable tablet   Yes Yes   Sig: Chew 81 mg daily   clopidogrel (PLAVIX) 75 mg tablet   Yes Yes   Sig: Take 75 mg by mouth daily   metFORMIN (GLUCOPHAGE) 500 mg tablet   Yes Yes   Sig: TAKE 1 TABLET DAILY      Facility-Administered Medications: None       Past Medical History:   Diagnosis Date    Diabetes mellitus ([address] )     Difficulty swallowing     Ptosis of left eyelid     Stroke [facility])        History reviewed  No pertinent surgical history  History reviewed  No pertinent family history  I have reviewed and agree with the history as documented      E-Cigarette/Vaping    E-Cigarette Use Never User      E-Cigarette/Vaping Substances     Social History     Tobacco Use    Smoking status: Former Smoker    Smokeless tobacco: Never Used   Substance Use Topics    Alcohol use: Never     Frequency: Never    Drug use: Never       Review of Systems   Constitutional: Negative for activity change, appetite change, chills, fatigue and fever  HENT: Positive for trouble swallowing  Negative for congestion, ear pain, rhinorrhea and sore throat  Eyes: Negative for discharge, redness and visual disturbance  Respiratory: Negative for cough, chest tightness, shortness of breath and wheezing  Cardiovascular: Negative for chest pain and palpitations  Gastrointestinal: Negative for abdominal pain, constipation, diarrhea, nausea and vomiting  Endocrine: Negative for polydipsia and polyuria  Genitourinary: Negative for difficulty urinating, dysuria, frequency, hematuria and urgency  Musculoskeletal: Negative for arthralgias and myalgias  Skin: Negative for color change, pallor and rash  Neurological: Positive for speech difficulty  Negative for dizziness, weakness, light-headedness, numbness and headaches  Hematological: Negative for adenopathy  Does not bruise/bleed easily  All other systems reviewed and are negative  Physical Exam  Physical Exam   Constitutional: He is oriented to person, place, and time  He appears well-developed and well-nourished  HENT:   Head: Normocephalic and atraumatic  Right Ear: External ear normal    Left Ear: External ear normal    Nose: Nose normal    Mouth/Throat: Oropharynx is clear and moist    Eyes: Pupils are equal, round, and reactive to light  Conjunctivae and EOM are normal    Neck: Normal range of motion  Neck supple  Cardiovascular: Normal rate, regular rhythm, normal heart sounds and intact distal pulses  Pulmonary/Chest: Effort normal and breath sounds normal  No respiratory distress  He has no wheezes  He has no rales  He exhibits no tenderness  Abdominal: Soft   Bowel sounds are normal  He exhibits no distension  There is no tenderness  There is no guarding  Musculoskeletal: Normal range of motion  Neurological: He is alert and oriented to person, place, and time  No sensory deficit  Skin: Skin is warm and dry  Psychiatric: He has a normal mood and affect  Nursing note and vitals reviewed  Vital Signs  ED Triage Vitals [07/17/20 1215]   Temperature Pulse Respirations Blood Pressure SpO2   (!) 97 3 °F (36 3 °C) 87 18 147/72 --      Temp Source Heart Rate Source Patient Position - Orthostatic VS BP Location FiO2 (%)   Temporal Monitor Lying Right arm --      Pain Score       --           Vitals:    07/17/20 1215   BP: 147/72   Pulse: 87   Patient Position - Orthostatic VS: Lying         Visual Acuity      ED Medications  Medications - No data to display    Diagnostic Studies  Results Reviewed     None                 No orders to display              Procedures  Procedures         ED Course  ED Course as of Jul 17 1243   Fri Jul 17, 2020   1234 Spoke with Dr Misael Downey on-call for Neurology reviewed case and findings in the ED she recommends admit for neurology consult and evaluation with speech and feeding tube placement  Spoke with Dr Alison Plummer on-call for general surgery to requested if she would be able to assist with feeding tube placement for the patient she states that she does not do this here and recommends checking with gastroenterology to see if they could do this procedure and if not recommends transferring patient  Spoke with Dr Cinthia Guerrero on-call for Gastroenterology reviewed case and findings as well she states that they also do not do feeding tube placements at this hospital currently and also recommends transferring patient to a facility with this capability        Spoke with patient and family reviewed specialist recommendations and fact that this hospital is not capable of providing definitive treatment with percutaneous endoscopic gastrostomy tube placement at this time the patient and family were offered the option of transfer via EMS and blood work and initial workup in the ED today however they state that it if the feeding tube cannot be done here they would prefer to drive By private vehicle to a facility which can accomplish this at this time the patient is clinically and hemodynamically stable the stroke is completed and there is no indication for tPA as this is an old stroke which occurred likely on July 1st and the patient is otherwise neurologically intact and appears adequately hydrated and clinically stable to Drive By private vehicle patient and family made aware of the risk of driving to outside hospital by private vehicle  They plan [place] for further stroke care and possible feeding tube placement  Patient and family very satisfied with care and arrangements made in the ED  Return precautions and anticipatory guidance discussed  US AUDIT      Most Recent Value   Initial Alcohol Screen: US AUDIT-C    1  How often do you have a drink containing alcohol?  0 Filed at: 07/17/2020 1218   2  How many drinks containing alcohol do you have on a typical day you are drinking? 0 Filed at: 07/17/2020 1218   3a  Male UNDER 65: How often do you have five or more drinks on one occasion? 0 Filed at: 07/17/2020 1218   3b  FEMALE Any Age, or MALE 65+: How often do you have 4 or more drinks on one occassion? 0 Filed at: 07/17/2020 1218   Audit-C Score  0 Filed at: 07/17/2020 1218                  SHAUN/DAST-10      Most Recent Value   How many times in the past year have you    Used an illegal drug or used a prescription medication for non-medical reasons?   Never Filed at: 07/17/2020 1219                                MDM  Number of Diagnoses or Management Options  Difficulty in swallowing: new and requires workup  Silent aspiration, initial encounter: new and requires workup  Stroke (cerebrum) ([facility] [address] ): new and requires workup  Diagnosis management comments: After discussion with neurology GI and surgery it is determined that the patient will likely require services that are not available at this hospital and therefore transfer has been recommended  This was reviewed with the patient and his daughter who wishes to taken by private vehicle to another hospital where they feel services such as percutaneous endoscopic gastrostomy tube placement would be available I advised to present that promptly for further evaluation and treatment  Patient and daughter understand and assume risks of travel and transport by private vehicle  Return precautions and anticipatory guidance discussed  Amount and/or Complexity of Data Reviewed  Decide to obtain previous medical records or to obtain history from someone other than the patient: yes  Review and summarize past medical records: yes    Risk of Complications, Morbidity, and/or Mortality  Presenting problems: moderate  Diagnostic procedures: low  Management options: moderate    Patient Progress  Patient progress: stable        Disposition  Final diagnoses:   St[facility] )   Difficulty in swallowing   Silent aspiration, initial encounter     Time reflects when diagnosis was documented in both MDM as applicable and the Disposition within this note     Time User Action Codes Description Comment    7/17/2020 12:30 PM Ulysess Gross Add [I63 9] Stroke (cerebrum) ([facility] [address] )     7/17/2020 12:30 PM Ulysess Gross Add [R13 10] Difficulty in swallowing     7/17/2020 12:30 PM Ulysess Gross Add [T17 900A] Silent aspiration, initial encounter       ED Disposition     ED Disposition Condition Date/Time Comment    Discharge Stable Fri Jul 17, 2020 12:30 PM Ozzie Linares discharge to home/self care              Follow-up Information     Follow up With Specialties Details Why Kyra Shanks MD Family Medicine Schedule an appointment as soon as possible for a visit in 1 day  [phone] [place] [id number]  [phone]            Patient's Medications   Discharge Prescriptions    No medications on file     No discharge procedures on file      PDMP Review     None          ED Provider  Electronically Signed by           Lorena Wells DO  07/17/20 1241

## 2020-07-18 ENCOUNTER — APPOINTMENT (INPATIENT)
Dept: RADIOLOGY | Facility: HOSPITAL | Age: 85
DRG: 065 | End: 2020-07-18
Attending: PSYCHIATRY & NEUROLOGY
Payer: COMMERCIAL

## 2020-07-18 PROBLEM — N17.9 ARF (ACUTE RENAL FAILURE) (CMS/HCC): Status: ACTIVE | Noted: 2020-07-18

## 2020-07-18 LAB
ATRIAL RATE: 82
CHOLEST SERPL-MCNC: 174 MG/DL
GLUCOSE BLD-MCNC: 128 MG/DL (ref 70–99)
GLUCOSE BLD-MCNC: 138 MG/DL (ref 70–99)
GLUCOSE BLD-MCNC: 140 MG/DL (ref 70–99)
GLUCOSE BLD-MCNC: 158 MG/DL (ref 70–99)
HDLC SERPL-MCNC: 44 MG/DL
HDLC SERPL: 4 {RATIO}
LDLC SERPL CALC-MCNC: 106 MG/DL
NONHDLC SERPL-MCNC: 130 MG/DL
P AXIS: 74
POCT TEST: ABNORMAL
PR INTERVAL: 156
QRS DURATION: 116
QT INTERVAL: 412
QTC CALCULATION(BAZETT): 481
R AXIS: -50
T WAVE AXIS: 76
TRIGL SERPL-MCNC: 122 MG/DL (ref 30–149)
VENTRICULAR RATE: 82
VIT B12 SERPL-MCNC: 198 PG/ML (ref 180–914)

## 2020-07-18 PROCEDURE — 25800000 HC PHARMACY IV SOLUTIONS: Performed by: NURSE PRACTITIONER

## 2020-07-18 PROCEDURE — 99233 SBSQ HOSP IP/OBS HIGH 50: CPT | Performed by: INTERNAL MEDICINE

## 2020-07-18 PROCEDURE — 92610 EVALUATE SWALLOWING FUNCTION: CPT | Mod: GN

## 2020-07-18 PROCEDURE — 63700000 HC SELF-ADMINISTRABLE DRUG: Performed by: NURSE PRACTITIONER

## 2020-07-18 PROCEDURE — 70547 MR ANGIOGRAPHY NECK W/O DYE: CPT

## 2020-07-18 PROCEDURE — 70544 MR ANGIOGRAPHY HEAD W/O DYE: CPT

## 2020-07-18 PROCEDURE — 63600000 HC DRUGS/DETAIL CODE: Performed by: INTERNAL MEDICINE

## 2020-07-18 PROCEDURE — 20600000 HC ROOM AND CARE INTERMEDIATE/TELEMETRY

## 2020-07-18 PROCEDURE — 92526 ORAL FUNCTION THERAPY: CPT | Mod: GN

## 2020-07-18 RX ORDER — INSULIN ASPART 100 [IU]/ML
3-5 INJECTION, SOLUTION INTRAVENOUS; SUBCUTANEOUS EVERY 6 HOURS
Status: DISCONTINUED | OUTPATIENT
Start: 2020-07-18 | End: 2020-07-24 | Stop reason: HOSPADM

## 2020-07-18 RX ORDER — DEXTROSE MONOHYDRATE AND SODIUM CHLORIDE 5; .45 G/100ML; G/100ML
INJECTION, SOLUTION INTRAVENOUS CONTINUOUS
Status: DISCONTINUED | OUTPATIENT
Start: 2020-07-18 | End: 2020-07-23

## 2020-07-18 RX ORDER — INSULIN ASPART 100 [IU]/ML
3-5 INJECTION, SOLUTION INTRAVENOUS; SUBCUTANEOUS
Status: DISCONTINUED | OUTPATIENT
Start: 2020-07-18 | End: 2020-07-18

## 2020-07-18 RX ORDER — HEPARIN SODIUM 5000 [USP'U]/ML
5000 INJECTION, SOLUTION INTRAVENOUS; SUBCUTANEOUS EVERY 8 HOURS
Status: DISCONTINUED | OUTPATIENT
Start: 2020-07-18 | End: 2020-07-24 | Stop reason: HOSPADM

## 2020-07-18 RX ORDER — ASPIRIN 300 MG/1
300 SUPPOSITORY RECTAL DAILY
Status: DISCONTINUED | OUTPATIENT
Start: 2020-07-18 | End: 2020-07-20

## 2020-07-18 RX ADMIN — HEPARIN SODIUM 5000 UNITS: 5000 INJECTION, SOLUTION INTRAVENOUS; SUBCUTANEOUS at 15:10

## 2020-07-18 RX ADMIN — DEXTROSE AND SODIUM CHLORIDE: 5; 450 INJECTION, SOLUTION INTRAVENOUS at 17:57

## 2020-07-18 RX ADMIN — HEPARIN SODIUM 5000 UNITS: 5000 INJECTION, SOLUTION INTRAVENOUS; SUBCUTANEOUS at 21:05

## 2020-07-18 RX ADMIN — DEXTROSE AND SODIUM CHLORIDE: 5; 450 INJECTION, SOLUTION INTRAVENOUS at 01:08

## 2020-07-18 RX ADMIN — ASPIRIN 300 MG: 300 SUPPOSITORY RECTAL at 09:25

## 2020-07-18 ASSESSMENT — COGNITIVE AND FUNCTIONAL STATUS - GENERAL
FOLLOWS FAMILIAR CONVERSATION: 4 - NONE
REMEMBERING 5 ERRANDS WITH NO LIST: 4 - NONE
REMEMBERING TO TAKE MEDICATION: 4 - NONE
UNDERSTANDING 10 TO 15 MIN SPEECH: 4 - NONE
TAKING CARE OF COMPLICATED TASKS: 4 - NONE
AFFECT: WFL
REMEMBERING WHERE THINGS ARE: 4 - NONE

## 2020-07-18 NOTE — ASSESSMENT & PLAN NOTE
-S/p recent CVA   -MRI 7/16 in Care Everywhere: Punctate focus of diffusion restriction post/lateral aspect of the left medulla with associated enhancement indicative of a small, recent/acute lacunar infarction, likely a few days. Mild to moderate chronic microangiopathy elsewhere.  -Residual left facial droop, left eyelid droop and dysphagia.     -Ongoing dysphagia  -Seen by Neuro  -Rectal ASA.  After PEG, start Plavix 75 mg daily and D/C aspirin after 3 days on Plavix.  -Lipid panel noted, start statin after PEG placed  -Speech, PT, OT  Aspiration precautions  -MRA head and neck.  -TTE, consider loop recorder per neuro. Cont tele.  Will consult Cards  -Await scan of EKG  -Check B12 level, high MCV.  -PT/OT  -F/u Dr. Perera after D/C

## 2020-07-18 NOTE — CONSULTS
REASON FOR CONSULT: Dysphasia, left facial droop, dysarthria, possible cardiac etiology for stroke    CONSULT FROM: Jazmyne Dominique MD    ------------------------------------------------------------------------------------------------------------------------------------------  HISTORY OF PRESENTING ILLNESS  ------------------------------------------------------------------------------------------------------------------------------------------  Karl Park is a 89 y.o. male who is admitted with a left facial droop    # cardiac risk factors: Diabetes mellitus  # no CAD/CHF  #    Echo: Ordered  Cath: None      The patient is now admitted with a left facial droop of about 2 weeks duration with difficulty swallowing and a 14 pound weight loss.  The patient denies any previous cardiac history having no chest pain or pressure and no symptoms of congestive failure.  He denies palpitations, syncope or presyncope.  There is no history of atrial fibrillation.  He has a history of type 2 diabetes mellitus..  He is not known to be hyperlipidemic.  Non-smoker and not hypertensive.    ------------------------------------------------------------------------------------------------------------------------------------------  PAST MEDICAL HISTORY  ------------------------------------------------------------------------------------------------------------------------------------------  Past Medical History:   Diagnosis Date   • CVA (cerebral vascular accident) (CMS/HCC)    • Type 2 diabetes mellitus (CMS/HCC)      History reviewed. No pertinent surgical history.    ------------------------------------------------------------------------------------------------------------------------------------------  MEDICATIONS  ------------------------------------------------------------------------------------------------------------------------------------------  Home medications    •  aspirin, Take 81 mg by mouth every morning.  •   clopidogreL, Take 75 mg by mouth every morning.    •  multivitamin-minerals-lutein, Take 1 tablet by mouth every morning.  •  metFORMIN, Take 500 mg by mouth daily with breakfast.      Inpatient Medications    •  acetaminophen, 650 mg, oral, q4h PRN  •  aspirin, 300 mg, rectal, Daily  •  D5 % and 0.45 % sodium chloride, , intravenous, Continuous  •  glucose, 16-32 g of dextrose, oral, PRN **OR** dextrose, 15-30 g of dextrose, oral, PRN **OR** glucagon, 1 mg, intramuscular, PRN **OR** dextrose in water, 25 mL, intravenous, PRN  •  heparin (porcine), 5,000 Units, subcutaneous, q8h KAITLIN  •  insulin aspart U-100, 3-5 Units, subcutaneous, q6h KAITLIN  •  magnesium sulfate, 1 g, intravenous, PRN **OR** magnesium sulfate, 2 g, intravenous, PRN  •  potassium chloride, 20 mEq, oral, PRN **OR** potassium chloride, 40 mEq, oral, PRN **OR** potassium chloride, 20 mEq, intravenous, PRN **OR** potassium chloride, 40 mEq, intravenous, PRN    ------------------------------------------------------------------------------------------------------------------------------------------  ALLERGIES  ------------------------------------------------------------------------------------------------------------------------------------------  Sulfa (sulfonamide antibiotics) and Sulfamethoxazole-trimethoprim    ------------------------------------------------------------------------------------------------------------------------------------------  SOCIAL HISTORY  ------------------------------------------------------------------------------------------------------------------------------------------  No smoking or alcohol    ------------------------------------------------------------------------------------------------------------------------------------------  FAMILY HISTORY  ------------------------------------------------------------------------------------------------------------------------------------------  No premature  CAD    ------------------------------------------------------------------------------------------------------------------------------------------  REVIEW OF SYSTEMS  ------------------------------------------------------------------------------------------------------------------------------------------  Constitutional: - fever, - chills, - weakness, - weight loss  HEENT: - blurred vision, - sore throat, - hoarseness  Respiratory: - dyspnea, - cough, - hemoptysis  Cardiovascular: - chest pain, - dyspnea, - orthopnea, - PND, - edema, - palpitations, - syncope  Gastrointestinal: +nausea, - vomiting, - diarrhea, - hematemesis, - melena  Genitourinary: - dysuria, - frequency  Integument: - rash, - itching  Hematologic/lymphatic:  - bruising, - petechiae  Musculoskeletal: - arthalgias, - myalgias  Neurological: - vertigo, - tremors, - headache, - speech deficit, - focal weakness  Behavioral/Psych: - anxiety, - depression  Endocrine: - cold intolerance, - heat intolerance, - weight change    ------------------------------------------------------------------------------------------------------------------------------------------  PHYSICAL EXAM  ------------------------------------------------------------------------------------------------------------------------------------------  VITAL SIGNS:  Temp:  [36.3 °C (97.3 °F)-36.9 °C (98.4 °F)] 36.3 °C (97.3 °F)  Heart Rate:  [60-99] 66  Resp:  [16-18] 18  BP: (108-155)/(57-79) 108/64  SaO2: 98%  No intake or output data in the 24 hours ending 07/18/20 1215    PHYSICAL EXAM:  General appearance: alert and cooperative  Head: without obvious abnormality  Eyes: PERRLA, extraocular movements intact  Neck: No JVD, carotid bruits, thyromegaly  Lungs: clear to auscultation bilaterally, no crackles or wheezing  Heart: regular rate and rhythm, S1-S2 normal, no murmurs, clicks, rubs or gallops  Abdomen: soft, non-tender, bowel sounds normal  Extremities: no edema, peripheral pulses  present  Skin: Skin color, texture, turgor normal. No rashes or lesions  Neurologic: Alert and oriented X 3, no focal deficits, left facial droop, mild dysarthria    ------------------------------------------------------------------------------------------------------------------------------------------  LABS / IMAGING / EKG / TELEMETRY  ------------------------------------------------------------------------------------------------------------------------------------------  LABS:  Results from last 7 days   Lab Units 07/17/20  1828   SODIUM mEQ/L 136   POTASSIUM mEQ/L 4.5   CHLORIDE mEQ/L 101   CO2 mEQ/L 24   BUN mg/dL 33*   CREATININE mg/dL 1.5*   AST IU/L 19   ALT IU/L 20     Results from last 7 days   Lab Units 07/17/20  1828   WBC K/uL 8.78   HEMOGLOBIN g/dL 14.5   HEMATOCRIT % 44.3   PLATELETS K/uL 246     No results found for: HGBA1C, TSH  Lab Results   Component Value Date    CHOL 174 07/17/2020    LDLCALC 106 (H) 07/17/2020    HDL 44 (L) 07/17/2020    TRIG 122 07/17/2020     No results found for: BNP    IMAGING: Chest x-ray-no acute cardiopulmonary disease  -MRI 7/16 in Care Everywhere: Punctate focus of diffusion restriction post/lateral aspect of the left medulla with associated enhancement indicative of a small, recent/acute lacunar infarction, likely a few days. Mild to moderate chronic microangiopathy elsewhere.  -Residual left facial droop, left eyelid droop and dysphagia.     MRI Angio IMPRESSION:  1.  Mild focal stenoses of the proximal internal carotid arteries bilaterally,  left slightly greater than right.  2.  Intermediate length segment mild stenosis of the anterior cavernous through  ophthalmic segment of the right internal carotid artery.  3.  No evidence of a hemodynamically significant intracranial stenosis or major  vessel occlusion.  No evidence of aneurysm.         ECG: EKG- sinus rhythm, LAFB, RBBB, LVH, possible anteroseptal infarct      TELEMETRY:  NSR      ------------------------------------------------------------------------------------------------------------------------------------------  ASSESSMENT AND PLAN  ------------------------------------------------------------------------------------------------------------------------------------------    Recent CVA.  Please see the above MRI and he subsequently got an MR angiogram showing anterior cavernous section RCA disease.  This alone does not appear critical enough to have crosses stroke however combined with some transient hypotension or an arrhythmia I suspect that might have caused transient hypoperfusion and his stroke.  I have not found him to have atrial fibrillation.  We will check a 2D echo.  Will discuss with  Friday.  We could consider a CHRISTIE.  Will comment further after I see his 2D echo on Monday.    Weight loss.  He has had difficulty swallowing.  I believe a feeding tube would be appropriate.    Diabetes mellitus-per [facility] [place], [place].        Talha Will MD  7/18/2020    Primary Care Doctor: Herb Dailey MD

## 2020-07-18 NOTE — ASSESSMENT & PLAN NOTE
-Dysphagia x 2 weeks s/p CVA.  Lost 14 lbs due to inability to eat or drink  -Barium swallow/Video swallow in Care Everywhere: Severe pharyngeal dysphagia, PEG recommended  -Speech rx  -GI consult for PEG  -Nutrition consult  -Aspiration precautions

## 2020-07-18 NOTE — PROGRESS NOTES
Hospital Medicine Service -  Daily Progress Note       SUBJECTIVE   Interval History: No complaints, acknowledges difficulty swallowing x 2 weeks.  L facial droop.  Denies other weakness.  No CP or SOB.  He thought he had a ministroke, surprised to hear it was a true stroke.  Understands plan for PEG.      OBJECTIVE      Vital signs in last 24 hours:  Temp:  [36.4 °C (97.5 °F)-36.9 °C (98.4 °F)] 36.4 °C (97.5 °F)  Heart Rate:  [60-99] 60  Resp:  [16-18] 18  BP: (115-155)/(57-79) 115/57  No intake or output data in the 24 hours ending 07/18/20 1110    PHYSICAL EXAMINATION      Physical Exam   Constitutional: He is oriented to person, place, and time. He appears well-developed and well-nourished.   HENT:   Head: Normocephalic and atraumatic.   L facial droop   Eyes: Pupils are equal, round, and reactive to light.   Cardiovascular: Normal rate, regular rhythm and normal heart sounds.   Pulmonary/Chest: Effort normal and breath sounds normal. No stridor. No respiratory distress. He has no wheezes.   Abdominal: Soft. Bowel sounds are normal.   Neurological: He is alert and oriented to person, place, and time.   5/5 upper, lower ext strength   Psychiatric: His behavior is normal.   Nursing note and vitals reviewed.        LABS / IMAGING / TELE      Labs  Lab Results   Component Value Date    WBC 8.78 07/17/2020    HGB 14.5 07/17/2020    HCT 44.3 07/17/2020    .2 (H) 07/17/2020     07/17/2020     Lab Results   Component Value Date    GLUCOSE 150 (H) 07/17/2020    CALCIUM 9.1 07/17/2020     07/17/2020    K 4.5 07/17/2020    CO2 24 07/17/2020     07/17/2020    BUN 33 (H) 07/17/2020    CREATININE 1.5 (H) 07/17/2020     Lab Results   Component Value Date    ALT 20 07/17/2020    AST 19 07/17/2020    ALKPHOS 96 07/17/2020    BILITOT 1.1 07/17/2020     No results found for: INR, PROTIME    Imaging  X-ray Chest 1 View    Result Date: 7/17/2020  IMPRESSION: No acute cardiopulmonary disease COMMENT:  AP  "portable erect view chest was performed.  The lungs are clear. There is no evidence of consolidation or pleural effusion. The heart and mediastinal structures are normal in size and contour.        ECG/Telemetry  \"Left anterior fascicular block  Left ventricular hypertrophy with QRS widening  Possible Anteroseptal infarct , age undetermined\"  Awaiting scan    ASSESSMENT AND PLAN      * Cerebrovascular accident (CVA) (CMS/[facility])  Assessment & Plan  -S/p recent CVA   -MRI 7/16 in Care Everywhere: Punctate focus of diffusion restriction post/lateral aspect of the left medulla with associated enhancement indicative of a small, recent/acute lacunar infarction, likely a few days. Mild to moderate chronic microangiopathy elsewhere.  -Residual left facial droop, left eyelid droop and dysphagia.     -Ongoing dysphagia  -Seen by Neuro  -Rectal ASA.  After PEG, start Plavix 75 mg daily and D/C aspirin after 3 days on Plavix.  -Lipid panel noted, start statin after PEG placed  -Speech, PT, OT  Aspiration precautions  -MRA head and neck.  -TTE, consider loop recorder per neuro. Cont tele.  Will consult Cards  -Await scan of EKG  -Check B12 level, high MCV.  -PT/OT  -F/u Dr. 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  Lost 14 lbs due to inability to eat or drink  -Barium swallow/Video swallow in Care Everywhere: Severe pharyngeal dysphagia, PEG recommended  -Speech rx  -GI consult for PEG  -Nutrition consult  -Aspiration precautions      ARF (acute renal failure) (CMS/[facility])  Assessment & Plan  -Prerenal  -Trend labs    Type 2 diabetes mellitus (CMS/[facility])  Assessment & Plan  -NPO  -FS q6hr, SSI  -Hold Metformin        VTE Assessment: Padua VTE Score: 2  VTE Prophylaxis Plan: Heparin  Code Status: Full Code  Estimated Discharge Date:  7/22/2020     Jazmyne Dominique MD  7/18/2020  11:10 AM         "

## 2020-07-18 NOTE — RESULT ENCOUNTER NOTE
Patient is currently an inpatient.  Per protocol, not necessary to call results for negative COVID-19 result.  Result routed to patient's PCP.

## 2020-07-18 NOTE — PLAN OF CARE
Problem: Adult Inpatient Plan of Care  Goal: Plan of Care Review  Outcome: Progressing  Flowsheets (Taken 7/18/2020 1940)  Plan of Care Reviewed With: patient; daughter  Outcome Summary: GI to eval PT tomorrow for peg placement. Pt remains strict NPO with IVF management.

## 2020-07-18 NOTE — CONSULTS
Neurology Consult Note    Subjective     Karl Park is a 89 y.o. man who was admitted 7/17/20 for stroke. Initially presented to his local emergency department on 7/3/2020 with trouble swallowing and L-sided facial droop and was diagnosed with Bell's palsy and pneumonia. MRI was done 7/16 which showed punctate focus of diffusion restriction in the posterior lateral aspect of the left medulla with associated enhancement indicative of a small, recent/acute lacunar infarction.  Patient failed swallow study and is aspirating at home. He admits to losing 14 lbs since the last 2 weeks    Review of Systems  No headache, no diplopia, slight wobbly gait, on EC aspirin 81 mg daily PTA    Allergies: Sulfa (sulfonamide antibiotics) and Sulfamethoxazole-trimethoprim    Current Inpatient Medications   Medication Dose Route Frequency Provider Last Rate Last Dose   • acetaminophen (TYLENOL) tablet 650 mg  650 mg oral q4h PRN Cristy Romero CRNP       • aspirin suppository 300 mg  300 mg rectal Daily Cristy Romero CRNP       • D5 % and 0.45 % sodium chloride infusion   intravenous Continuous Cristy Romero CRNP 80 mL/hr at 07/18/20 0108     • glucose chewable tablet 16-32 g of dextrose  16-32 g of dextrose oral PRN Cristy Romero CRNP        Or   • dextrose 40 % oral gel 15-30 g of dextrose  15-30 g of dextrose oral PRN Cristy Romero CRNP        Or   • glucagon (GLUCAGEN) injection 1 mg  1 mg intramuscular PRN Cristy Romero CRNP        Or   • dextrose in water injection 12.5 g  25 mL intravenous PRN Cristy Romero CRNP       • insulin aspart U-100 (NovoLOG) pen 3-5 Units  3-5 Units subcutaneous q6h KAITLIN Cristy Romero CRNP       • magnesium sulfate 1 g in sodium chloride 0.9 % 100 mL IVPB  1 g intravenous PRN Cristy Romero CRNP        Or   • magnesium sulfate IVPB 2g in 50 mL NSS/D5W/SWFI  2 g intravenous PRN  Cristy Romero CRNP       • potassium chloride (KLOR-CON) tablet extended release 20 mEq  20 mEq oral PRN Cristy Romero CRNP        Or   • potassium chloride (KLOR-CON) tablet extended release 40 mEq  40 mEq oral PRN Cristy Romeor CRNP        Or   • potassium chloride 20 mEq in 100 mL IVPB  (premix)  20 mEq intravenous PRN Cristy Romero CRNP        Or   • potassium chloride (KCL) 40 mEq/250 mL IVPB in NSS 40 mEq  40 mEq intravenous PRN Cristy Romero CRNP         Medical History:   Past Medical History:   Diagnosis Date   • CVA (cerebral vascular accident) (CMS/[facility])    • Type 2 diabetes mellitus (CMS/[facility])      Social History:   Social History     Socioeconomic History   • Marital status:      Spouse name: None   • Number of children: None   • Years of education: None   • Highest education level: None   Occupational History   • None   Social Needs   • Financial resource strain: None   • Food insecurity:     Worry: None     Inability: None   • Transportation needs:     Medical: None     Non-medical: None   Tobacco Use   • Smoking status: Former Smoker   Substance and Sexual Activity   • Alcohol use: Never     Frequency: Never   • Drug use: Never   • Sexual activity: None   Lifestyle   • Physical activity:     Days per week: None     Minutes per session: None   • Stress: None   Relationships   • Social connections:     Talks on phone: None     Gets together: None     Attends [employer] service: None     Active member of club or organization: None     Attends meetings of clubs or organizations: None     Relationship status: None   • Intimate partner violence:     Fear of current or ex partner: None     Emotionally abused: None     Physically abused: None     Forced sexual activity: None   Other Topics Concern   • None   Social History Narrative   • None     Objective   Visit Vitals  BP (!) 115/57 (BP Location: Left upper arm, Patient Position:  Sitting)   Pulse 60   Temp 36.4 °C (97.5 °F) (Oral)   Resp 18   Ht 1.829 m (6')   Wt 82.4 kg (181 lb 11.2 oz)   SpO2 99%   BMI 24.64 kg/m²     Physical Exam  No acute distress,  awake and alert, oriented ×3, speech fluent, follows commands. Cranial nerves-left pupil slightly smaller both reactive to light, visual fields full, extraocular movements full, facial sensation intact, no facial weakness, maybe slight left ptosis, hearing intact, tongue midline, uvula elevates in midline. Motor-5 over 5 strength arms and legs. Reflexes- increased right biceps, no Babinski sign. Sensory-intact pin sensation. Cerebellar-good finger to nose and heel to shin coordination.    Labs  CMP- glucose 150, creatinine 1.5, BUN 33. 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  Consider an aspirin failure possibly due to low dose EC.    Plan   Complete stroke W/U for etiology.  MRA head and neck.  Telemetry.  TTE, consider loop recorder as per cardiology.  Lipid panel.  Check B12 level, high MCV.  Continue aspirin 300 mg suppository.  When able to take PO or via PEG start Plavix 75 mg daily and D/C aspirin after 3 days on Plavix.  Now 2 weeks out from stroke so can control BP to usual goal.  PT evaluation.   F/U with Dr. Perera neurology after D/C.

## 2020-07-18 NOTE — PROGRESS NOTES
GI note:    Went to see patient for consultation for PEG but he is down at MRI. Will visit tomorrow in anticipation of procedure Monday.      Robert Frankel, MD  [facility]

## 2020-07-18 NOTE — PLAN OF CARE
Problem: Adult Inpatient Plan of Care  Goal: Plan of Care Review  Outcome: Progressing  Flowsheets (Taken 7/18/2020 1042)  Progress: no change  Plan of Care Reviewed With: patient  Outcome Summary: Seen for ST eval and tx. Recs for strict NPO and alternate long-term source of nutrition/hydration. Pt. will need ST f/u after discharge (outpatient).     Problem: Swallowing Impairment  Goal: Improved Swallowing Without Aspiration  Outcome: Progressing     Problem: Adult Inpatient Plan of Care  Goal: Patient-Specific Goal (Individualization)  Flowsheets (Taken 7/18/2020 1042)  Individualized Care Needs: aspiration precautions, frequent oral care with strict aspiration precautions

## 2020-07-18 NOTE — PROGRESS NOTES
Patient: Karl Park  Location: [facility] 3A 3025  MRN: [id number]  Today's date: 7/18/2020    SPEECH PATHOLOGY EVALUATION:     SLP Diagnosis: mild oral dysphagia and severe pharyngeal dysphagia per VFSS on 7/17 (at OSH). High aspiration risk and impaired secretion management.     Recommendations:  1. Agree with strict NPO and alternative long-term source of nutrition/hydration  2. Frequent oral care with strict aspiration precautions in place  3. OP speech therapy follow up for implementation and training of intensive pharyngeal/laryngeal strengthening program.  4. Will follow as needed while admitted.     Summary/Impressions:  Pt. seen for ST evaluation and treatment. PO trials were not attempted due to severity of swallow dysfunction noted on recent VFSS. Pt. had VFSS done at OSH on 7/17. This study revealed mild oral dysphagia and severe pharyngeal dysphagia with aspiration of trials. Today's informal assessments revealed minimal to incomplete hyolaryngeal excursion upon palpation and impaired secretion management. Pt. unable to swallow secretions due to severity of pharyngeal dysphagia. Pt. is felt to be at a high risk for aspiration of secretions therefore, thorough and frequent oral care is highly recommended to reduce bacterial load. Educated and trained pt on laryngeal elevation exercises. Pt. able to complete with fading min cues. Pt. instructed to complete these exercises daily. Pharyngeal strengthening exercise training to increase in intensity and frequency once pt receives adequate nutrition via feeding tube and w/ instruction provided by OP Speech Therapy.     Chief Complaint   Patient presents with   • Abnormal Imaging Scan     Clinical Course: This 89 y.o. male was admitted 7/17/2020 with Cerebrovascular accident (CVA), unspecified mechanism (CMS/HCC) [I63.9].     Reason for Consult:  Speech consulted to assess swallow function and safety due to recent L medulla CVA. Pt. Unable to swallow  "for 2-3 weeks. Pt. Had VFSS done at [facility] on 7/17 revealing severe pharyngeal dysphagia. Here was aspiration of all consistencies (see below). Recs included NPO and a feeding tube. Pt. Unable to receive feeding tube at [facility] facility. Pt's daughter lives near  therefore, came here for procedures. Pt. Plans to return to home and receive ST f/u.    Pertinent Radiology Results:   VFSS 7/17/20 (in care everywhere from [facility])  \"Assessment Summary:    Pt presents with severe pharyngeal dysphagia dysphagia   characterized by reduced laryngeal rise, incomplete laryngeal   vestibular closure with absent epiglottic inversion, reduced base   of tongue retraction and absent pharyngeal stripping wave (base   of tongue often unable to make contact with posterior pharyngeal   wall during swallow), and reduced relaxation/opening of the UES.   This resulted in aspiration during swallow with all consistencies   due to incomplete airway closure, as well as aspiration of   residual spilling over from the pyriforms after the swallow. Due   to the rapid onset of pt's dysphagia, suspect reduced UES opening   to be caused by inadequate pressure during swallow to propel the   bolus through the UES rather than stricture. EGD may be   considered, however suspect severe dysphagia will persist based   on significant pharyngeal impairment. Note: Images are available   for review in PACS as desired.    Recommendations:   Recommended Diet:  NPO with tube feeds   Recommended Form of Medications: non-oral if possible   Aspiration precautions and compensatory swallowing strategies:   upright posture and TF precautions, PO trials with SLP only  Consider referral to: Consider inpatient admission for IV   hydration and placement of a PEG tube  SLP Dysphagia therapy recommended: Yes. Pt is an otherwise   healthy and active 89-year-old who lives independently with his   wife and has good family support.     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 They are in agreement   with plan for PEG tube and dysphagia therapy. Recommend dysphagia   therapy through home care or as an outpatient. Pt may benefit   from NMES/VitalStim treatments. He will likely need home health   services for support in managing tube feeds initially, as well as   nutrition involvement for tube feeding recs. \"    MRI 7/3/20 (in care everywhere from [facility])  \"FINDINGS:    BRAIN PARENCHYMA:  There is a small punctate focus of diffusion restriction laterally in the left medulla image 8, series 2.  There is associated ovoid enhancement as demonstrated on image 8, series 5, correlating to finding on image 12, series 4.      Small scattered hyperintensities on T2/FLAIR imaging are noted in the periventricular and subcortical white matter demonstrating an appearance that is statistically most likely to represent moderate microangiopathic change.         No acute intracranial hemorrhage.  No extra-axial fluid collection.  Cerebellar tonsils normally positioned.\"      CXR 7/17:  IMPRESSION:  No acute cardiopulmonary disease     COMMENT:  AP portable erect view chest was performed.  The lungs are clear.  There is no evidence of consolidation or pleural effusion. The heart and  mediastinal structures are normal in size and contour.    Session Notes:  Pt. Seen for eval and treatment. Eval- 8 minutes and treatment- 30 minutes  Past Medical History:   Diagnosis Date   • CVA (cerebral vascular accident) (CMS/HCC)    • Type 2 diabetes mellitus (CMS/HCC)      History reviewed. No pertinent surgical history.    Allergies   Allergen Reactions   • Sulfa (Sulfonamide Antibiotics)    • Sulfamethoxazole-Trimethoprim          Results from last 7 days   Lab Units 07/17/20  1828   WBC K/uL 8.78   HEMOGLOBIN g/dL 14.5   HEMATOCRIT % 44.3   PLATELETS K/uL 246          Baseline " Diet/Method of Nutritional Intake: regular solids, thin liquids  Current Diet: (See below)       Dietary Orders   (From admission, onward)             Start     Ordered    07/17/20 1940  NPO Diet  Diet effective now      07/17/20 1940                Patient left with call bell in reach and alarms as found. D/w RN.  No notes on file     SLP Pain    Date/Time Pain Type Pref Pain Scale Rating: Rest Rating: Activity [facility]   07/18/20 0933 Pain Assessment word (verbal rating pain scale) 0 - no pain 0 - no pain GCP              Prior Level of Function      Most Recent Value   Communication  understands/communicates without difficulty   Swallowing  other (see comments)   Baseline Diet/Method of Nutritional Intake  regular solids, thin liquids   Past History of Dysphagia  difficulty swallowing 2-3 weeks s/p medulla CVA. VFSS from OSH on 7/17 with severe pharyngeal dysphagia and recs for PEG   Equipment Currently Used at Home  cane, straight          SLP Evaluation and Treatment - 07/18/20 0933        Time Calculation    Start Time  0933     Stop Time  1011     Time Calculation (min)  38 min        Session Details    Document Type  initial evaluation     Mode of Treatment  individual therapy;speech language pathology        General Information    Patient Profile Reviewed?  yes     General Observations of Patient  Pt. seen for ST eval and treatment session upright in bed. Pt. awake/alert, pleasant.      Existing Precautions/Restrictions  aspiration;fall;NPO        Cognition/Psychosocial    Affect/Mental Status (Cognitive)  WFL     Orientation Status (Cognition)  oriented x 4     Follows Commands (Cognition)  follows two step commands;WFL     Cognitive Function (Cognitive)  WNL        Oral Motor    Dentition (Oral Motor)  natural dentition        Facial Symmetry (Oral Motor)    Facial Symmetry (Oral Motor)  left side impairment     Left Side Facial Asymmetry  minimal impairment;moderate impairment    lip and eye droop       Lip  "Function (Oral Motor)    Lip Range of Motion (Oral Motor)  WNL        Tongue Function (Oral Motor)    Tongue ROM (Oral Motor)  WNL     Comment, Tongue Function (Oral Motor)  tongue protusion with subtle deviation to the right        Cough/Swallow/Gag Reflex (Oral Motor)    Soft Palate/Velum (Oral Motor)  right side deviated from midline at rest;other (see comments)    slight at rest    Volitional Throat Clear/Cough (Oral Motor)  WNL     Volitional Swallow (Oral Motor)  weak;effortful;other (see comments)    minimal/incomplete hyolaryngeal excursion upon palpation    Comment, Cough/Swallow/Gag Reflex (Oral Motor)  strong expectoration maneuver skills        Vocal Quality/Secretion Management (Oral Motor)    Vocal Quality (Oral Motor)  wet/gurgly;other (see comments)    frequently wet/gurgly 2/2 impaired secretion management    Secretion Management (Oral Motor)  difficulty swallowing secretions;other (see comments);awareness of wet vocal quality;wet/gurgly vocal quality    pt aware and self expectorating        Motor Speech    Speech Intelligibility (Motor Speech)  WFL;conversational level        Auditory Comprehension    Follows Commands (Auditory Comprehension)  2-step commands;WFL     2 Step, Follows Commands (Auditory Comprehension)  intact     Yes/No Questions (Auditory Comprehension)  WFL;complex questions        Verbal Expression    Conversational Speech (Verbal Expression)  connected speech;WFL        Functional Communication Measures    FCM: Swallowing  1-->Level 1        Non-Instrumental Swallowing Eval (NIS)    Unable to Perform/Complete Swallow Evaluation  unsafe for oral intake;other (see comments)    2/2 severity of swallow dysfunction as seen on recent VFSS       Swallowing Intervention    Dysphagia/Swallowing Interventions  pharyngeal therapeutic exercise program     Pharyngeal Therapeutic Exercise Program  other (see comments)    laryngeal elevation didier pitch \"eee\" and pitch glides    Comment, " "Swallowing Interventions  trained on laryngeal elevation exercises (high pitched \"eee\" x10 and pitch glides x10). Pt. able to complete reps with fading min cues and frequent rest breaks. Pt. unable to complete yvonne depsite max cues. Instructed pt to complete laryngeal elevation exercises x10 2-3 times/day until receiving adequate nutrition via feeding tube and provided further instruction provided by OP SLP. Educated and discussed importance of frequent oral hygiene and safe oral hygiene strategies/precautions to reduce aspiration risk. Pt. reported understanding, teach back, and all questions answered.         AM-PAC (TM) - Cognition (Current Function)    Following/understanding a 10-15 minute speech or presentation?  4 - None     Understanding familiar people during ordinary conversations?  4 - None     Remembering to take medications at the appropriate time?  4 - None     Remembering where things were placed or put away?  4 - None     Remembering a list of 3 or 4 errands without writing it down?  4 - None     Taking care of complicated tasks?  4 - None     AM-PAC (TM) Cognition Score  24        Therapy Assessment/Plan (SLP)    SLP Diagnosis  mild oral dysphagia and severe pharyngeal dysphagia per VFSS on 7/17 (at OSH). High aspiration risk and impaired secretion management.      Patient/Family Therapy Goal Statement (SLP)  to eat/drink eventually     Rehab Potential (SLP Eval)  good, to achieve stated therapy goals     Therapy Frequency (SLP)  2-3 times/wk     Problem List (SLP)  swallowing        Daily Progress Summary (SLP)    Daily Outcome Statement (SLP)  Pt. seen for ST evaluation and treatment. PO trials were not attempted due to severity of swallow dysfunction noted on recent VFSS. Pt. had VFSS done at OSH on 7/17. This study revealed mild oral dysphagia and severe pharyngeal dysphagia with aspiration of trials. Today's informal assessments revealed minimal to incomplete hyolaryngeal excursion upon " palpation and impaired secretion management. Pt. unable to swallow secretions due to severity of pharyngeal dysphagia. Pt. is felt to be at a high risk for aspiration of secretions therefore, thorough and frequent oral care is highly recommended to reduce bacterial load. Educated and trained pt on laryngeal elevation exercises. Pt. able to complete with fading min cues. Pt. instructed to complete these exercises daily. Pharyngeal strengthening exercise training to increase in intensity and frequency once pt receives adequate nutrition via feeding tube and w/ OP Speech Therapy. Agree with strict NPO and alternative long-term source of nutrition/hydration. Frequent oral care with strict aspiration precautions in place. OP speech therapy follow up for implementation and intensive pharyngeal/laryngeal strengthening program. Will follow as needed while admitted.         Therapy Plan Review/Discharge Plan (SLP)    Therapy Plan Review (SLP)  evaluation/treatment results reviewed;care plan/treatment goals reviewed;risks/benefits reviewed;current/potential barriers reviewed;patient;participants voiced agreement with care plan;participants included                  Education provided this session. See the Patient Education summary report for full details.    SLP Goals      Most Recent Value   Pharyngeal Exercise Goal 1   Activity  laryngeal elevation/vocal fold adduction, 5-10 times per session at 07/18/2020 0933   [place]  independently (over 90% accuracy) at 07/18/2020 0933   Time Frame  by discharge at 07/18/2020 0933   Time Frame  goal ongoing at 07/18/2020 0933

## 2020-07-19 PROBLEM — E53.8 B12 DEFICIENCY: Status: ACTIVE | Noted: 2020-07-19

## 2020-07-19 LAB
ANION GAP SERPL CALC-SCNC: 9 MEQ/L (ref 3–15)
BUN SERPL-MCNC: 20 MG/DL (ref 8–20)
CALCIUM SERPL-MCNC: 8.7 MG/DL (ref 8.9–10.3)
CHLORIDE SERPL-SCNC: 104 MEQ/L (ref 98–109)
CO2 SERPL-SCNC: 25 MEQ/L (ref 22–32)
CREAT SERPL-MCNC: 1.1 MG/DL (ref 0.8–1.3)
ERYTHROCYTE [DISTWIDTH] IN BLOOD BY AUTOMATED COUNT: 12.5 % (ref 11.6–14.4)
GFR SERPL CREATININE-BSD FRML MDRD: >60 ML/MIN/1.73M*2
GLUCOSE BLD-MCNC: 123 MG/DL (ref 70–99)
GLUCOSE BLD-MCNC: 135 MG/DL (ref 70–99)
GLUCOSE BLD-MCNC: 145 MG/DL (ref 70–99)
GLUCOSE SERPL-MCNC: 143 MG/DL (ref 70–99)
HCT VFR BLDCO AUTO: 43.9 % (ref 40.1–51)
HGB BLD-MCNC: 14.7 G/DL (ref 13.7–17.5)
MCH RBC QN AUTO: 33.6 PG (ref 28–33.2)
MCHC RBC AUTO-ENTMCNC: 33.5 G/DL (ref 32.2–36.5)
MCV RBC AUTO: 100.5 FL (ref 83–98)
PDW BLD AUTO: 10.5 FL (ref 9.4–12.4)
PLATELET # BLD AUTO: 213 K/UL (ref 150–350)
POCT TEST: ABNORMAL
POTASSIUM SERPL-SCNC: 3.8 MEQ/L (ref 3.6–5.1)
RBC # BLD AUTO: 4.37 M/UL (ref 4.5–5.8)
SODIUM SERPL-SCNC: 138 MEQ/L (ref 136–144)
VIT B12 SERPL-MCNC: 210 PG/ML (ref 180–914)
WBC # BLD AUTO: 8.02 K/UL (ref 3.8–10.5)

## 2020-07-19 PROCEDURE — 63700000 HC SELF-ADMINISTRABLE DRUG: Performed by: NURSE PRACTITIONER

## 2020-07-19 PROCEDURE — 25800000 HC PHARMACY IV SOLUTIONS: Performed by: NURSE PRACTITIONER

## 2020-07-19 PROCEDURE — 63600000 HC DRUGS/DETAIL CODE: Performed by: INTERNAL MEDICINE

## 2020-07-19 PROCEDURE — 99232 SBSQ HOSP IP/OBS MODERATE 35: CPT | Performed by: INTERNAL MEDICINE

## 2020-07-19 PROCEDURE — 36415 COLL VENOUS BLD VENIPUNCTURE: CPT | Performed by: NURSE PRACTITIONER

## 2020-07-19 PROCEDURE — 80048 BASIC METABOLIC PNL TOTAL CA: CPT | Performed by: NURSE PRACTITIONER

## 2020-07-19 PROCEDURE — 20600000 HC ROOM AND CARE INTERMEDIATE/TELEMETRY

## 2020-07-19 PROCEDURE — 25800000 HC PHARMACY IV SOLUTIONS: Performed by: INTERNAL MEDICINE

## 2020-07-19 PROCEDURE — 85027 COMPLETE CBC AUTOMATED: CPT | Performed by: NURSE PRACTITIONER

## 2020-07-19 PROCEDURE — 63600000 HC DRUGS/DETAIL CODE: Performed by: NURSE PRACTITIONER

## 2020-07-19 PROCEDURE — 97166 OT EVAL MOD COMPLEX 45 MIN: CPT | Mod: GO

## 2020-07-19 PROCEDURE — 82607 VITAMIN B-12: CPT | Performed by: INTERNAL MEDICINE

## 2020-07-19 RX ORDER — CYANOCOBALAMIN 1000 UG/ML
1000 INJECTION, SOLUTION INTRAMUSCULAR; SUBCUTANEOUS ONCE
Status: COMPLETED | OUTPATIENT
Start: 2020-07-19 | End: 2020-07-19

## 2020-07-19 RX ADMIN — HEPARIN SODIUM 5000 UNITS: 5000 INJECTION, SOLUTION INTRAVENOUS; SUBCUTANEOUS at 14:24

## 2020-07-19 RX ADMIN — HEPARIN SODIUM 5000 UNITS: 5000 INJECTION, SOLUTION INTRAVENOUS; SUBCUTANEOUS at 06:09

## 2020-07-19 RX ADMIN — POTASSIUM CHLORIDE 20 MEQ: 14.9 INJECTION, SOLUTION INTRAVENOUS at 12:25

## 2020-07-19 RX ADMIN — DEXTROSE AND SODIUM CHLORIDE: 5; 450 INJECTION, SOLUTION INTRAVENOUS at 11:43

## 2020-07-19 RX ADMIN — CYANOCOBALAMIN 1000 MCG: 1000 INJECTION, SOLUTION INTRAMUSCULAR; SUBCUTANEOUS at 12:25

## 2020-07-19 RX ADMIN — SODIUM CHLORIDE 500 ML: 9 INJECTION, SOLUTION INTRAVENOUS at 13:49

## 2020-07-19 RX ADMIN — HEPARIN SODIUM 5000 UNITS: 5000 INJECTION, SOLUTION INTRAVENOUS; SUBCUTANEOUS at 21:13

## 2020-07-19 RX ADMIN — ASPIRIN 300 MG: 300 SUPPOSITORY RECTAL at 08:37

## 2020-07-19 ASSESSMENT — COGNITIVE AND FUNCTIONAL STATUS - GENERAL
EATING MEALS: 4 - NONE
AFFECT: WFL
DRESSING REGULAR LOWER BODY CLOTHING: 3 - A LITTLE
DRESSING REGULAR UPPER BODY CLOTHING: 4 - NONE
HELP NEEDED FOR PERSONAL GROOMING: 3 - A LITTLE
HELP NEEDED FOR BATHING: 3 - A LITTLE
TOILETING: 3 - A LITTLE

## 2020-07-19 NOTE — PROGRESS NOTES
Patient: Karl Park  Location: [facility] 3A 3025  MRN: [id number]  Today's date: 7/19/2020     RN cleared for OT session.  End of session pt seated in recliner with chair alarm on/incontinence pad and call bell/needs within reach.    Karl is a 89 y.o. male admitted on 7/17/2020 with Cerebrovascular accident (CVA), unspecified mechanism (CMS/HCC). Principal problem is Cerebrovascular accident (CVA) (CMS/HCC).    Past Medical History  Karl has a past medical history of CVA (cerebral vascular accident) (CMS/HCC) and Type 2 diabetes mellitus (CMS/[facility]).    History of Present Illness  Pt presents for PEG placement and completion of CVA work-up.  MRI was done 7/16 which showed punctate focus of diffusion restriction in the posterior lateral aspect of the left medulla with associated enhancement indicative of a small, recent/acute lacunar infarction.  Patient failed swallow study and is aspirating at home.      OT Vitals    Date/Time Pulse HR Source Resp SpO2 Pt Activity O2 Therapy BP BP Location BP Method Pt Position [facility]   07/19/20 1143 70 -- -- 98 % At rest None (Room air) 116/79 Right upper arm Automatic Sitting KM   07/19/20 1144 65 Right Brachial 18 98 % At rest None (Room air) 116/76 Right upper arm Automatic Lying JMM          Prior Living Environment      Most Recent Value   Living Arrangements  house   Living Environment Comment  Lives in a 1 story house with wife 1+1 DAYA through front door or FF from garage to main level with bilateral handrails.            Prior Level of Function      Most Recent Value   Dominant Hand  right   Ambulation  independent   Transferring  independent   Toileting  independent   Bathing  independent   Dressing  independent   Communication  understands/communicates without difficulty   Swallowing  other (see comments)   Baseline Diet/Method of Nutritional Intake  regular solids, thin liquids   Past History of Dysphagia  difficulty swallowing 2-3 weeks s/p medulla CVA. VFSS  from OSH on 7/17 with severe pharyngeal dysphagia and recs for PEG   Prior Level of Function Comment  Pt reports no AD at baseline,  however SPC in room from home   Equipment Currently Used at Home  cane, straight          OT Evaluation and Treatment - 07/19/20 1143        Time Calculation    Start Time  1143     Stop Time  1201     Time Calculation (min)  18 min        Session Details    Document Type  initial evaluation     Mode of Treatment  occupational therapy        General Information    Patient Profile Reviewed?  yes     General Observations of Patient  rec'd seated in bed     Existing Precautions/Restrictions  aspiration;fall        Occupational Profile    Reason for Services/Referral (Occupational Profile)  Diminished ADL s/p CVA     Successful Occupations (Occupational Profile)  Retired      Occupational History/Life Experiences (Occupational Profile)          Cognition/Psychosocial    Affect/Mental Status (Cognitive)  WFL     Orientation Status (Cognition)  oriented x 3     Follows Commands (Cognition)  follows one step commands     Cognitive Function (Cognitive)  safety deficit     Safety Deficit (Cognitive)  minimal deficit;insight into deficits/self awareness;judgment        Sensory Assessment (Somatosensory)    Sensory Assessment (Somatosensory)  UE sensation intact        Range of Motion (ROM)    Range of Motion  bilateral upper extremities;ROM is WFL        Strength (Manual Muscle Testing)    Strength (Manual Muscle Testing)  strength is WFL;bilateral upper extremities        Bed Mobility    [place], Supine to Sit  close supervision     Comment (Bed Mobility)  flat bed; exit to the L        Transfers    Transfers  toilet transfer        Sit to Stand Transfer    [place], Sit to Stand Transfer  close supervision     Assistive Device  cane, straight        Stand to Sit Transfer    [place], Stand to Sit Transfer  close supervision     Assistive Device  cane,  straight        Toilet Transfer    Transfer Technique  sit-stand;stand-sit;stand pivot     [place], Toilet Transfer  minimum assist (75% or more patient effort)     Assistive Device  cane, straight;grab bars/safety frame     Comment  pt reaching for objects in environment        Safety Issues, Functional Mobility    Impairments Affecting Function (Mobility)  balance;endurance/activity tolerance        Balance    Balance Assessment  sitting static balance;standing static balance     Static Sitting Balance  WFL;unsupported     Static Standing Balance  mild impairment;unsupported     Comment, Balance  min A in bathroom with SPC.  Slight LOB at sink during grooming tasks        Motor Skills    Motor Skills  functional endurance;coordination     Coordination  WFL;bilateral     Functional Endurance  Fair  +        Lower Body Dressing    Self-Performance  don;dons/doffs right sock;dons/doffs left sock     [place]  supervision     Position  supported sitting     Adaptive Equipment  none        Grooming    Self-Performance  oral care (brushing teeth, cleaning dentures)     [place]  minimum assist (75% or more patient effort);touching/steadying assist     Position  unsupported standing     Adaptive Equipment  none     Comment  slight LOB requiring incidential steadying        Toileting    [place]  adjust/manage clothing;perform bladder hygiene;supervision     Position  supported sitting     Adaptive Equipment  none        AM-PAC (TM) - ADL (Current Function)    Putting on and taking off regular lower body clothing?  3 - A Little     Bathing?  3 - A Little     Toileting?  3 - A Little     Putting on/taking off regular upper body clothing?  4 - None     How much help for taking care of personal grooming?  3 - A Little     Eating meals?  4 - None     AM-PAC (TM) ADL Score  20        Therapy Assessment/Plan (OT)    Rehab Potential (OT)  good, to achieve stated therapy goals     Therapy Frequency (OT)  3-5  times/wk        Progress Summary (OT)    Daily Outcome Statement (OT)  OT eval completed.  Pt presents with deficits in balance, endurance and safety awareness limiting return to PLOF.  Pt is S-min A for functional transfers and for ADL tasks.  Pt was a  prior to CVA may benefit from driving assessment prior to return to task.  skilled OT servicves to follow     Symptoms Noted During/After Treatment  none        Therapy Plan Review/Discharge Plan (OT)    OT Recommended Discharge Disposition  home;home with assist     Anticipated Equipment Needs At Discharge (OT)  none                   Education provided this session. See the Patient Education summary report for full details.         OT Goals      Most Recent Value   Bed Mobility Goal 1   Activity/Assistive Device  bed mobility activities, all at 07/19/2020 1143   [place]  independent at 07/19/2020 1143   Time Frame  by discharge at 07/19/2020 1143   Progress/Outcome  goal ongoing at 07/19/2020 1143   Transfer Goal 1   Activity/Assistive Device  toilet at 07/19/2020 1143   [place]  independent at 07/19/2020 1143   Time Frame  by discharge at 07/19/2020 1143   Progress/Outcome  goal ongoing at 07/19/2020 1143   Dressing Goal 1   Activity/Adaptive Equipment  dressing skills, all at 07/19/2020 1143   [place]  independent at 07/19/2020 1143   Time Frame  by discharge at 07/19/2020 1143   Progress/Outcome  goal ongoing at 07/19/2020 1143   Toileting Goal 1   Activity/Assistive Device  toileting skills, all at 07/19/2020 1143   [place]  independent at 07/19/2020 1143   Time Frame  by discharge at 07/19/2020 1143   Progress/Outcome  goal ongoing at 07/19/2020 1143

## 2020-07-19 NOTE — PLAN OF CARE
Problem: Adult Inpatient Plan of Care  Goal: Readiness for Transition of Care  Intervention: Mutually Develop Transition Plan  Flowsheets (Taken 7/19/2020 1904)  Equipment Needed After Discharge: glucometer  Equipment Currently Used at Home: cane, straight  Anticipated Changes Related to Illness: none  Outpatient/Agency/Support Group Needs: homecare agency  Transportation Concerns: car, none  Readmission Within the Last 30 Days: no previous admission in last 30 days  Patient/Family Anticipated Services at Transition: home health care  Patient/Family Anticipates Transition to: home with family  Transportation Anticipated: family or friend will provide  Concerns to be Addressed: adjustment to diagnosis/illness; care coordination/care conferences; discharge planning  Offered/Gave Vendor List: no   Met with pt and explained my role as a cc,pt lives with wife in house with 2 steps to enter,his bed and bath are on first floor,he amb I but has a st cane,he has no poa,wife herminio is  [phone],dr isauro harris is his pcp and he gets is meds from at [facility] in [facility].pt is adm with inability to swallow following a stroke 2 wks ago.he was tx from [facility] to [place] for peg tube placement.I sent a referral to [facility] in [facility] [phone] for a vn.per intake dept they would be able to see pt same day of dc. Their fax is [phone].[facility] may be able to supply his tube feedings and supplies.pt wants to return home.plan is for placement of peg tube on Monday.

## 2020-07-19 NOTE — NURSING NOTE
Called into patients room. Reports pain in L arm, IV K infusing. Infusion slowed.    Called back into patients room. Reports feeling lightheaded and dizzy. Patient appears to be very pale in color. Patient reclined, BP 88/49. IV K stopped. [facility] p[facility]. Orders provided for 500cc NS bolus. Patient BP recovering, now 112/58, HR 58.    Pt lying flat in recliner, reports improvement. Color returned. Continuing to monitor.

## 2020-07-19 NOTE — PROGRESS NOTES
Neurology Daily Progress Note    Subjective    No headache.    Objective    Vital signs in last 24 hours:  Temp:  [35.6 °C (96 °F)-37.1 °C (98.7 °F)] 36.5 °C (97.7 °F)  Heart Rate:  [61-72] 65  Resp:  [18] 18  BP: (116-153)/(65-79) 116/76    Physical Exam:  No acute distress,  awake and alert, speech fluent, follows commands. Cranial nerves-left pupil slightly smaller both reactive to light, visual fields full, no facial weakness, left ptosis, tongue midline. Motor-5 over 5 strength arms and legs.    Labs  7/17 B12- 198. 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  Neurologically stable with left Rajeev's syndrome, dysphagia, report of decreased gait.  Consider an aspirin failure possibly due to low dose EC.  B12 deficiency.    Continue aspirin 300 mg suppository.  When able to take PO or via PEG start Plavix 75 mg daily and D/C aspirin after 3 days on Plavix.  Cardiology following, TTE pending.  B12 replacement started.  Now 2 weeks out from stroke so can control BP to usual goal.  PT evaluation pending.   F/U with Dr. Perera neurology after D/C.      Gelacio Naidu MD  7/19/2020  1:16 PM

## 2020-07-19 NOTE — PLAN OF CARE
Problem: Adult Inpatient Plan of Care  Goal: Plan of Care Review  Outcome: Progressing  Flowsheets (Taken 7/19/2020 1602)  Progress: no change  Plan of Care Reviewed With: patient  Outcome Summary: Plan for PEG placement tomorrow 7/20 and 2D echo. Pts Neuro checks stable and unchanged.

## 2020-07-19 NOTE — CONSULTS
Nutrition Note         Clinical Course: Patient is a 89 y.o. male who was admitted on 7/17/2020 with a diagnosis of Cerebrovascular accident (CVA), unspecified mechanism (CMS/[facility]) [I63.9].   Past Medical History:   Diagnosis Date   • CVA (cerebral vascular accident) (CMS/[facility])    • Type 2 diabetes mellitus (CMS/[facility])      History reviewed. No pertinent surgical history.    Nutrition Interventions/ Recommendations:   1. S/p PEG tube placement, start Diabetisource at 20ml/hr x 24 hours. Advance 20-25ml/hr q 4 hours to goal 73ml/hr x 24 hours. Will provide: 1752mlTV, 2102kcals (25/kg), 105 g PRO (1.2/kg), 1429ml free water. Add 120ml free water flushes q 4 hours to meet hydration needs  -HOB 30 degrees  2. Prior to d/c to home, pt should start bolus feed regimen.   -Each can is 250ml, pt will need 7 cans Diabetisource daily to meet needs  -Sample regimen: 2 cans 8am, 1 can 11 am noon, 1 can 2p, 1 can 5p, 2 cans 7:30pm  -flush with 50ml flushes before and after each feed to meet hydration needs  -pt should not lay flat for 1 hour after each feed  2. B12 low end of normal, recommend add supplement  3. Add folic acid, thiamine supplement   4. Check mg, phos levels   5. Continue accucks with current insulin regimen as BG levels well controlled           Dietary Orders   (From admission, onward)             Start     Ordered    07/17/20 1940  NPO Diet  Diet effective now      07/17/20 1940              Reason for Assessment  Reason For Assessment: identified at risk by screening criteria, per organizational policy  Diagnosis: cardiac disease    MST Nutrition Screen Tool  Has patient lost weight without trying?: 0-->Yes  If yes,how much weight has been lost?: 1-->2-13 pounds  Has patient been eating poorly due to decreased appetite?: 1-->Yes(pt states he cannot swallow )  MST Nutrition Screen Score: 2    Nutrition/Diet History  Typical Food/Fluid Intake: very little in past 3 weeks   Diet Prior to Admission: carb controlled    Appetite Prior to Admission: Fair-25-50%  Intake (%): 0%  Functional Status: ambulatory  Food Allergies: (NKFA)  Factors Affecting Nutritional Intake: difficulty/impaired swallowing    Physical Findings  Overall Physical Appearance: overweight(no sign malnutrition)  Gastrointestinal: (wdl)  Last Bowel Movement: 07/18/20  Skin: other (see comments)(no active wounds )    Last Bowel Movement: 07/18/20    Nutrition Order  Nutrition Order Review: does not meet nutritional requirements  Nutrition Order Comments: NPO    Anthropometrics  Height: 182.9 cm (6')    Weights (last 7 days)     Date/Time   Weight    07/17/20 2100   82.4 kg (181 lb 11.2 oz)    07/17/20 1738   83.5 kg (184 lb)            Current Weight  Weight Method: Bed scale  Weight: 82.4 kg (181 lb 11.2 oz)    Ideal Body Weight (IBW)  Ideal Body Weight (IBW) (kg): 82.07  % Ideal Body Weight: 101.7    Usual Body Weight (UBW)  Usual Body Weight: 90.3 kg (199 lb)  % of Usual Body Weight Assessment: 85-95% - mild deficit  Weight Loss: unintentional(7.5%)  Time Frame: 1 Month    Body Mass Index (BMI)  BMI (Calculated): 24.6  BMI (kg/m2): 25.01  BMI Assessment: BMI 25-29.9: overweight       Labs/Procedures/Meds  Lab Results Reviewed: reviewed, pertinent      Results from last 7 days   Lab Units 07/19/20  0348 07/17/20  1828   SODIUM mEQ/L 138 136   POTASSIUM mEQ/L 3.8 4.5   CHLORIDE mEQ/L 104 101   CO2 mEQ/L 25 24   BUN mg/dL 20 33*   CREATININE mg/dL 1.1 1.5*   GLUCOSE mg/dL 143* 150*   CALCIUM mg/dL 8.7* 9.1      Results from last 7 days   Lab Units 07/17/20  1828   ALK PHOS IU/L 96   BILIRUBIN TOTAL mg/dL 1.1   ALBUMIN g/dL 4.2   ALT IU/L 20   AST IU/L 19          No results found for: HGBA1C  Lab Results   Component Value Date    VAMYMYHR80 198 07/17/2020     Lab Results   Component Value Date    CALCIUM 8.7 (L) 07/19/2020     Results from last 7 days   Lab Units 07/19/20  0348 07/17/20  1828   WBC K/uL 8.02 8.78   HEMOGLOBIN g/dL 14.7 14.5   HEMATOCRIT % 43.9  44.3   PLATELETS K/uL 213 246           Results from last 7 days   Lab Units 07/17/20  1828   CHOLESTEROL mg/dL 174   TRIGLYCERIDES mg/dL 122   HDL mg/dL 44*   LDL CALC mg/dL 106*   Results for SOLO HOLDEN (MRN [id number]) as of 7/19/2020 11:59   Ref. Range 7/18/2020 11:31 7/18/2020 18:26 7/18/2020 23:48 7/19/2020 06:53   POCT Bedside Glucose Latest Ref Range: 70 - 99 mg/dL 138 (H) 140 (H) 128 (H) 145 (H)          Diagnostic Tests/Procedures  Diagnostic Test/Procedure Reviewed: reviewed, pertinent    Medications  Pertinent Medications Reviewed: reviewed, pertinent  • aspirin  300 mg rectal Daily   • cyanocobalamin  1,000 mcg intramuscular Once   • heparin (porcine)  5,000 Units subcutaneous q8h KAITLIN   • insulin aspart U-100  3-5 Units subcutaneous q6h KAITLIN     • D5 % and 0.45 % sodium chloride   80 mL/hr at 07/19/20 1143     Calorie Requirements  Estimated kCal Needs: Actual Body Weight  Estimated Calorie Need Method: kcal/kg  Calorie/kg Recommended: 22-25  Calorie Recommendations: 5616-2712    Protein Requirements  Recommended Dosing Weight (Estimated Protein Needs): Actual Body Weight  Est Protein Requirement Amount (gms/kg): (1.0-1.2)  Protein Recommendations: 84-101g/day     Fluid Requirements  Fluid Recommendation (mL): Other (comments)(1ml/kcals )  Recommended Fluid Needs Dosing Weight: Actual Body Weight  Fluid Requirements (mL/day): (7428-8645)     PES  Statement: PES Statement  Nutrition Diagnosis: Swallowing Difficulty  Related To:: CVA, dysphagia  As Evidenced By:: NPO with need for nutrition support to meet 100% nutrition needs   Nutritional Needs Met?: No  Nutrition Status Classification: Moderate nutritional compromise                                 Clinical Comments:   Pt seen for md consult, planned PEG tube Monday, 7/20. Pt is s/p stroke with residual dysphagia, failed swallow study. Will need nutrition support to meet 100% nutrition needs. Pt visited, seen doing puzzles, good spirits, good  mentation. Discussed tube feeding, what to expect, bolus feeds at home likely. See recommendations above for bolus feeds. If pt to go home on bolus feeds, will need instruction here with demonstrated ability to feed self bolus via PEG. Labs noted, B12 on low side normal. Consider replacing, add thiamine, folic acid supplements. Pt reports poor po intake for past 3 weeks, wt loss 7.5% wt loss d/t new stroke, has been at another hospital during this time. Currently NPO with D5, 1/2 NS infusing. BMI is 25, overwt with no sign malnutrition. Nutrition services following       Goals:  Pt will meet >50% nutiriton needs via nutiriton support within 48 hours      Monitor and Evaluation:   1. Labs/lytes  2. Weights  3. Tube feed initiation, tolerance      Discussed with: patient, nurse     Date: 07/19/20  Signature: JOAQUIN Tom

## 2020-07-19 NOTE — PLAN OF CARE
Problem: Adult Inpatient Plan of Care  Goal: Plan of Care Review  Outcome: Progressing  Flowsheets (Taken 7/19/2020 2221)  Progress: improving  Plan of Care Reviewed With: patient  Outcome Summary: OT eval completed.  Pt is S-min A for functional transfers and self care tasks.  dec safety awareness.  skilled OT services to follow

## 2020-07-19 NOTE — CONSULTS
GI Consult Note          Karl Park was admitted for Cerebrovascular accident (CVA), unspecified mechanism (CMS/HCC) [I63.9]. We have been consulted by Dr. Dominique for PEG tube    Subjective     Karl Park is a 89 y.o. male who developed a stroke in early July and has had resultant dysphagia. He has failed swallow study and needs enteral nutrition access. They tried to get a PEG at [facility] but apparently they did not have the capabilities to do that so he has come here for evaluation and treatment. He is apparently undergoing evaluation to complete his stroke workup as well.    Receiving rectal aspirin here. Says his last dose of plavix was Friday.    No history of surgery on his abdomen. Never had EGD or colonoscopy. No abdominal pain.    Past Medical History:   Diagnosis Date   • CVA (cerebral vascular accident) (CMS/HCC)    • Type 2 diabetes mellitus (CMS/HCC)      History reviewed. No pertinent surgical history.  Social History     Social History Narrative   • Not on file     History reviewed. No pertinent family history.  Allergies   Allergen Reactions   • Sulfa (Sulfonamide Antibiotics)    • Sulfamethoxazole-Trimethoprim        Home Medications:  •  aspirin, Take 81 mg by mouth every morning.  •  clopidogreL, Take 75 mg by mouth every morning.    •  multivitamin-minerals-lutein, Take 1 tablet by mouth every morning.  •  metFORMIN, Take 500 mg by mouth daily with breakfast.    Current Medications:  •  acetaminophen, 650 mg, oral, q4h PRN  •  aspirin, 300 mg, rectal, Daily  •  cyanocobalamin, 1,000 mcg, intramuscular, Once  •  D5 % and 0.45 % sodium chloride, , intravenous, Continuous  •  glucose, 16-32 g of dextrose, oral, PRN **OR** dextrose, 15-30 g of dextrose, oral, PRN **OR** glucagon, 1 mg, intramuscular, PRN **OR** dextrose in water, 25 mL, intravenous, PRN  •  heparin (porcine), 5,000 Units, subcutaneous, q8h KAITLIN  •  insulin aspart U-100, 3-5 Units, subcutaneous, q6h KAITLIN  •  magnesium  sulfate, 1 g, intravenous, PRN **OR** magnesium sulfate, 2 g, intravenous, PRN  •  potassium chloride, 20 mEq, oral, PRN **OR** potassium chloride, 40 mEq, oral, PRN **OR** potassium chloride, 20 mEq, intravenous, PRN **OR** potassium chloride, 40 mEq, intravenous, PRN      Review of Systems  All other systems (10 point) were reviewed and are negative other than as stated in the HPI    Physical Exam  Visit Vitals  /71 (BP Location: Right upper arm, Patient Position: Lying)   Pulse 72   Temp 36.6 °C (97.9 °F) (Oral)   Resp 18   Ht 1.829 m (6')   Wt 82.4 kg (181 lb 11.2 oz)   SpO2 98%   BMI 24.64 kg/m²     Gen: AAOx3  HEENT: NC/AT  Cv: RRR  Pulm: CTAB/L  Abd: s/nt/nd +BS  Ext: No c/c/e  Psych: appropriate     Labs  CBC Results       07/19/20 07/17/20                       0348 1828          WBC 8.02 8.78          RBC 4.37 4.42          HGB 14.7 14.5          HCT 43.9 44.3          .5 100.2          MCH 33.6 32.8          MCHC 33.5 32.7           246                       CMP Results       07/19/20 07/17/20                       0348 1828           136          K 3.8 4.5          Cl 104 101          CO2 25 24          Glucose 143 150          BUN 20 33          Creatinine 1.1 1.5          Calcium 8.7 9.1          Anion Gap 9 11          AST -- 19          ALT -- 20          Albumin -- 4.2          EGFR >60.0 43.9          Comment for K at 1828 on 07/17/20:    Results obtained on plasma. Plasma Potassium values may be up to 0.4 mEQ/L less than serum values. The differences may be greater for patients with high platelet or white cell counts.        PT PTT Results     No lab values to display.          Imaging  Reviewed   Assessment   89M with new stroke and oropharyngeal dysphagia as a result.     -Plan for PEG tomorrow  -Risks/benefits discussed with patient.  -Could be discharged after PEG if he has no other evaluation required afterwards and tolerates procedure well. He will need nutrition  evaluation and follow up prior to discharge.      PEG placement.         Robert A. Frankel, MD  7/19/2020  9:48 AM

## 2020-07-19 NOTE — PLAN OF CARE
Problem: Adult Inpatient Plan of Care  Goal: Plan of Care Review  Outcome: Progressing  Flowsheets (Taken 7/19/2020 1233)  Plan of Care Reviewed With: patient; caregiver   Nutrition Interventions/ Recommendations:   1. S/p PEG tube placement, start Diabetisource at 20ml/hr x 24 hours. Advance 20-25ml/hr q 4 hours to goal 73ml/hr x 24 hours. Will provide: 1752mlTV, 2102kcals (25/kg), 105 g PRO (1.2/kg), 1429ml free water. Add 120ml free water flushes q 4 hours to meet hydration needs in absence of IVF  -HOB 30 degrees  2. Prior to d/c to home, pt should start bolus feed regimen.   -Each can is 250ml, pt will need 7 cans Diabetisource daily to meet needs  -Sample regimen: 2 cans 8am, 1 can 11 am noon, 1 can 2p, 1 can 5p, 2 cans 7:30pm  -flush with 50ml flushes before and after each feed to meet hydration needs  -pt should not lay flat for 1 hour after each feed  2. B12 low end of normal, recommend add supplement  3. Add folic acid, thiamine supplement   4. Check mg, phos levels   5. Continue accucks with current insulin regimen as BG levels well controlled

## 2020-07-19 NOTE — PLAN OF CARE
Problem: Adult Inpatient Plan of Care  Goal: Plan of Care Review  Outcome: Progressing  Flowsheets (Taken 7/19/2020 0410)  Progress: no change  Plan of Care Reviewed With: patient  Outcome Summary: Pt remain NPO, no s/s of hypo/hyperglycemia, continue on IV fluid

## 2020-07-19 NOTE — NURSING NOTE
Called and spoke with pts daughter Corrina to provide her with an update/ plan for tomorrow.  Answered all her questions.

## 2020-07-19 NOTE — PROGRESS NOTES
Hospital Medicine Service -  Daily Progress Note       SUBJECTIVE   Interval History: No complaints.  Says he wants to eat but understands he can't.  No CP or SOB   OBJECTIVE      Vital signs in last 24 hours:  Temp:  [35.6 °C (96 °F)-37.1 °C (98.7 °F)] 36.6 °C (97.9 °F)  Heart Rate:  [61-72] 72  Resp:  [18] 18  BP: (108-153)/(64-73) 131/71  No intake or output data in the 24 hours ending 07/19/20 0944    PHYSICAL EXAMINATION      Physical Exam   Constitutional: He is oriented to person, place, and time. He appears well-developed and well-nourished.   HENT:   Head: Normocephalic and atraumatic.   L facial droop   Eyes: Pupils are equal, round, and reactive to light.   Cardiovascular: Normal rate, regular rhythm and normal heart sounds.   Pulmonary/Chest: Effort normal and breath sounds normal. No stridor. No respiratory distress. He has no wheezes.   Abdominal: Soft. Bowel sounds are normal. He exhibits no distension. There is no tenderness. There is no guarding.   Musculoskeletal: He exhibits no edema.   Neurological: He is alert and oriented to person, place, and time.   Skin: Skin is warm.   Psychiatric: He has a normal mood and affect. His behavior is normal. Judgment and thought content normal.   Nursing note and vitals reviewed.        LABS / IMAGING / TELE      Labs  Lab Results   Component Value Date    WBC 8.02 07/19/2020    HGB 14.7 07/19/2020    HCT 43.9 07/19/2020    .5 (H) 07/19/2020     07/19/2020     Lab Results   Component Value Date    GLUCOSE 143 (H) 07/19/2020    CALCIUM 8.7 (L) 07/19/2020     07/19/2020    K 3.8 07/19/2020    CO2 25 07/19/2020     07/19/2020    BUN 20 07/19/2020    CREATININE 1.1 07/19/2020     Lab Results   Component Value Date    ALT 20 07/17/2020    AST 19 07/17/2020    ALKPHOS 96 07/17/2020    BILITOT 1.1 07/17/2020     No results found for: INR, PROTIME    Imaging  Mri Angiogram Head Without Contrast    Result Date: 7/18/2020  IMPRESSION: 1.   Mild focal stenoses of the proximal internal carotid arteries bilaterally, left slightly greater than right. 2.  Intermediate length segment mild stenosis of the anterior cavernous through ophthalmic segment of the right internal carotid artery. 3.  No evidence of a hemodynamically significant intracranial stenosis or major vessel occlusion.  No evidence of aneurysm.     Mri Angiogram Neck Without Contrast    Result Date: 7/18/2020  IMPRESSION: 1.  Mild focal stenoses of the proximal internal carotid arteries bilaterally, left slightly greater than right. 2.  Intermediate length segment mild stenosis of the anterior cavernous through ophthalmic segment of the right internal carotid artery. 3.  No evidence of a hemodynamically significant intracranial stenosis or major vessel occlusion.  No evidence of aneurysm.     X-ray Chest 1 View    Result Date: 7/17/2020  IMPRESSION: No acute cardiopulmonary disease COMMENT:  AP portable erect view chest was performed.  The lungs are clear. There is no evidence of consolidation or pleural effusion. The heart and mediastinal structures are normal in size and contour.        ECG/Telemetry  LAFB,TWI aVL, V1-V2, ind review  ASSESSMENT AND PLAN      * Cerebrovascular accident (CVA) (CMS/[facility])  Assessment & Plan  -S/p recent CVA with residual L facial droop and dysphagia  -MRI 7/16 in Care Everywhere: Punctate focus of diffusion restriction post/lateral L medulla w/ enhancement, c/w small, recent/acute lacunar infarction, likely a few days. Mild-mod chronic microangiopathy elsewhere.    -Rectal ASA.  After PEG, start Plavix 75 mg daily and D/C aspirin after 3 days on Plavix.  -Lipid panel noted, start statin after PEG placed  -Speech, PT, OT  Aspiration precautions  -MRA head and neck- Mild focal stenoses b/l prox internal carotid arteries, L>R.  Intermediate length segment mild stenosis of the ant cavernous-ophthalmic segment R ICA  -Seen by Cards who will make further recs after  echo  -Neuro following  -Repleting B12  -PT/OT  -F/u Dr. 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  Lost 14 lbs due to inability to eat or drink  -Barium swallow/Video swallow in Care Everywhere: Severe pharyngeal dysphagia, PEG recommended  -Speech rx  -GI consult for PEG  -Nutrition consult  -Aspiration precautions      B12 deficiency  Assessment & Plan  -Replete    ARF (acute renal failure) (CMS/[facility])  Assessment & Plan  -Prerenal, better after IVF      Type 2 diabetes mellitus (CMS/[facility])  Assessment & Plan  -NPO  -FS q6hr, SSI  -Hold Metformin   -Sugars controlled       VTE Assessment: Padua VTE Score: 2  VTE Prophylaxis Plan: Heparin  Code Status: Full Code  Estimated Discharge Date:  7/22/2020     Jazmyne Dominique MD  7/19/2020  9:44 AM

## 2020-07-19 NOTE — PROGRESS NOTES
SUBJECTIVE      Uneventful night.  No atrial fibrillation noted.  Getting PEG tube tomorrow.  Also for echo tomorrow.    OBJECTIVE     VITAL SIGNS:  Temp:  [35.6 °C (96 °F)-37.1 °C (98.7 °F)] 36.6 °C (97.9 °F)  Heart Rate:  [61-72] 72  Resp:  [18] 18  BP: (108-153)/(64-73) 131/71  SPO2 98%  No intake or output data in the 24 hours ending 07/19/20 1003    PHYSICAL EXAM:  General appearance: alert and cooperative  Head: without obvious abnormality  Eyes: PERRLA, extraocular movements intact  Neck: No JVD, carotid bruits, thyromegaly  Lungs: clear to auscultation bilaterally, no crackles or wheezing  Heart: regular rate and rhythm, S1-S2 normal, no murmurs, clicks, rubs or gallops  Abdomen: soft, non-tender, bowel sounds normal  Extremities: no edema, peripheral pulses present  Skin: Skin color, texture, turgor normal. No rashes or lesions  Neurologic: Alert and oriented X 3, no focal deficits, left facial droop appears better, mild dysarthria    LABS / IMAGING / EKG / TELEMETRY     LABS:  Results from last 7 days   Lab Units 07/19/20  0348 07/17/20  1828   SODIUM mEQ/L 138 136   POTASSIUM mEQ/L 3.8 4.5   CHLORIDE mEQ/L 104 101   CO2 mEQ/L 25 24   BUN mg/dL 20 33*   CREATININE mg/dL 1.1 1.5*   AST IU/L  --  19   ALT IU/L  --  20     Results from last 7 days   Lab Units 07/19/20  0348 07/17/20  1828   WBC K/uL 8.02 8.78   HEMOGLOBIN g/dL 14.7 14.5   HEMATOCRIT % 43.9 44.3   PLATELETS K/uL 213 246     No results found for: HGBA1C, TSH  Lab Results   Component Value Date    CHOL 174 07/17/2020    LDLCALC 106 (H) 07/17/2020    HDL 44 (L) 07/17/2020    TRIG 122 07/17/2020     No results found for: BNP    IMAGING: Chest x-ray-no acute cardiopulmonary disease  -MRI 7/16 in Care Everywhere: Punctate focus of diffusion restriction post/lateral aspect of the left medulla with associated enhancement indicative of a small, recent/acute lacunar infarction, likely a few days. Mild to moderate chronic microangiopathy  elsewhere.  -Residual left facial droop, left eyelid droop and dysphagia.      MRI Angio IMPRESSION:  1.  Mild focal stenoses of the proximal internal carotid arteries bilaterally,  left slightly greater than right.  2.  Intermediate length segment mild stenosis of the anterior cavernous through  ophthalmic segment of the right internal carotid artery.  3.  No evidence of a hemodynamically significant intracranial stenosis or major  vessel occlusion.  No evidence of aneurysm.           ECG: EKG- sinus rhythm, LAFB, RBBB, LVH, possible anteroseptal infarct        TELEMETRY: NSR         MEDICATIONS        • aspirin  300 mg rectal Daily   • cyanocobalamin  1,000 mcg intramuscular Once   • heparin (porcine)  5,000 Units subcutaneous q8h KAITLIN   • insulin aspart U-100  3-5 Units subcutaneous q6h KAITLIN       ASSESSMENT AND PLAN     Recent CVA.  His left facial droop appears better.  No new neurologic symptoms.  We have not noted any atrial fibrillation.  He is for a 2D echo tomorrow.  Depending on those results could consider a CHRISTIE though defer for now.  Blood pressure is controlled    Weight loss-feeding tube tomorrow    Diabetes mellitus-per Dr. Dominique    Acute renal failure-resolved with creatinine down to 1.1    Talha Will MD  7/19/2020    Primary Care Doctor: Herb Dailey MD

## 2020-07-19 NOTE — ASSESSMENT & PLAN NOTE
-S/p recent CVA with residual L facial droop and dysphagia  -MRI 7/16 in Care Everywhere: Punctate focus of diffusion restriction post/lateral L medulla w/ enhancement, c/w small, recent/acute lacunar infarction, likely a few days. Mild-mod chronic microangiopathy elsewhere.    -Rectal ASA.  After PEG, start Plavix 75 mg daily and D/C aspirin after 3 days on Plavix.  -Lipid panel noted, start statin after PEG placed  -Speech, PT, OT  Aspiration precautions  -MRA head and neck- Mild focal stenoses b/l prox internal carotid arteries, L>R.  Intermediate length segment mild stenosis of the ant cavernous-ophthalmic segment R ICA  -Seen by Cards who will make further recs after echo  -Neuro following  -Repleting B12  -PT/OT  -F/u Dr. Perera after D/C

## 2020-07-20 ENCOUNTER — ANESTHESIA EVENT (INPATIENT)
Dept: ENDOSCOPY | Facility: HOSPITAL | Age: 85
DRG: 065 | End: 2020-07-20
Payer: COMMERCIAL

## 2020-07-20 ENCOUNTER — ANESTHESIA (INPATIENT)
Dept: ENDOSCOPY | Facility: HOSPITAL | Age: 85
DRG: 065 | End: 2020-07-20
Payer: COMMERCIAL

## 2020-07-20 ENCOUNTER — APPOINTMENT (INPATIENT)
Dept: CARDIOLOGY | Facility: HOSPITAL | Age: 85
DRG: 065 | End: 2020-07-20
Attending: INTERNAL MEDICINE
Payer: COMMERCIAL

## 2020-07-20 PROBLEM — N17.9 ARF (ACUTE RENAL FAILURE) (CMS/HCC): Status: RESOLVED | Noted: 2020-07-18 | Resolved: 2020-07-20

## 2020-07-20 LAB
AORTIC ROOT ANNULUS: 3.1 CM
AORTIC VALVE MEAN VELOCITY: 0.93 M/S
AORTIC VALVE VELOCITY TIME INTEGRAL: 30 CM
ASCENDING AORTA: 3.3 CM
AV MEAN GRADIENT: 4 MMHG
AV PEAK GRADIENT: 6 MMHG
AV PEAK VELOCITY-S: 1.22 M/S
AV VALVE AREA: 1.72 CM2
AV VALVE AREA: 2.24 CM2
BSA FOR ECHO PROCEDURE: 2.04 M2
DOP CALC LVOT STROKE VOLUME: 51.58 ML
E WAVE DECELERATION TIME: 268 MS
E/A RATIO: 0.7
E/E' RATIO: 9.9
FRACTIONAL SHORTENING: 32.4 %
GLUCOSE BLD-MCNC: 100 MG/DL (ref 70–99)
GLUCOSE BLD-MCNC: 116 MG/DL (ref 70–99)
GLUCOSE BLD-MCNC: 123 MG/DL (ref 70–99)
GLUCOSE BLD-MCNC: 131 MG/DL (ref 70–99)
INTERVENTRICULAR SEPTUM: 1.31 CM
LA ESV (BP): 34.6 CM3
LA ESV INDEX (A2C): 16.23 CM3/M2
LA ESV INDEX (BP): 16.96 CM3/M2
LA/AORTA RATIO: 0.87
LAAS-AP2: 13.6 CM2
LAAS-AP4: 14.1 CM2
LAD 2D: 2.7 CM
LALD A4C: 4.28 CM
LALD A4C: 4.54 CM
LAV-S: 33.1 CM3
LEFT ATRIUM VOLUME INDEX: 17.06 CM3/M2
LEFT ATRIUM VOLUME: 34.8 CM3
LEFT INTERNAL DIMENSION IN SYSTOLE: 2.55 CM (ref 3.04–4.6)
LEFT VENTRICULAR INTERNAL DIMENSION IN DIASTOLE: 3.77 CM (ref 5.17–7.18)
LEFT VENTRICULAR POSTERIOR WALL IN END DIASTOLE: 1.18 CM (ref 0.66–1.24)
LVOT 2D: 1.9 CM
LVOT A: 2.84 CM2
LVOT MG: 2 MMHG
LVOT MV: 0.62 M/S
LVOT PEAK VELOCITY: 0.96 M/S
LVOT PG: 4 MMHG
LVOT VTI: 18.2 CM
MV E'TISSUE VEL-MED: 0.05 M/S
MV PEAK A VEL: 0.7 M/S
MV PEAK E VEL: 0.46 M/S
POCT TEST: ABNORMAL
POSTERIOR WALL: 1.15 CM
POSTERIOR WALL: 1.21 CM
TAPSE: 2.03 CM
Z-SCORE OF LEFT VENTRICULAR DIMENSION IN END DIASTOLE: -4.8
Z-SCORE OF LEFT VENTRICULAR DIMENSION IN END SYSTOLE: -3.02
Z-SCORE OF LEFT VENTRICULAR POSTERIOR WALL IN END DIASTOLE: 1.38

## 2020-07-20 PROCEDURE — 25800000 HC PHARMACY IV SOLUTIONS: Performed by: INTERNAL MEDICINE

## 2020-07-20 PROCEDURE — 88305 TISSUE EXAM BY PATHOLOGIST: CPT | Performed by: INTERNAL MEDICINE

## 2020-07-20 PROCEDURE — 0DB68ZX EXCISION OF STOMACH, VIA NATURAL OR ARTIFICIAL OPENING ENDOSCOPIC, DIAGNOSTIC: ICD-10-PCS | Performed by: INTERNAL MEDICINE

## 2020-07-20 PROCEDURE — 63700000 HC SELF-ADMINISTRABLE DRUG: Performed by: NURSE PRACTITIONER

## 2020-07-20 PROCEDURE — 25000000 HC PHARMACY GENERAL: Performed by: ANESTHESIOLOGY

## 2020-07-20 PROCEDURE — 63700000 HC SELF-ADMINISTRABLE DRUG: Performed by: INTERNAL MEDICINE

## 2020-07-20 PROCEDURE — 63600000 HC DRUGS/DETAIL CODE: Performed by: INTERNAL MEDICINE

## 2020-07-20 PROCEDURE — 75000071 HC EGD PEG TUBE PLACE: Performed by: INTERNAL MEDICINE

## 2020-07-20 PROCEDURE — 27800000 HC SUPPLY/IMPLANTS: Performed by: INTERNAL MEDICINE

## 2020-07-20 PROCEDURE — 20600000 HC ROOM AND CARE INTERMEDIATE/TELEMETRY

## 2020-07-20 PROCEDURE — 25800000 HC PHARMACY IV SOLUTIONS: Performed by: NURSE PRACTITIONER

## 2020-07-20 PROCEDURE — 63600000 HC DRUGS/DETAIL CODE: Mod: JW | Performed by: ANESTHESIOLOGY

## 2020-07-20 PROCEDURE — C8929 TTE W OR WO FOL WCON,DOPPLER: HCPCS

## 2020-07-20 PROCEDURE — 25000000 HC PHARMACY GENERAL: Performed by: INTERNAL MEDICINE

## 2020-07-20 PROCEDURE — 37000001 HC ANESTHESIA GENERAL: Performed by: INTERNAL MEDICINE

## 2020-07-20 PROCEDURE — 25500000 HC DRUGS/INCIDENT RAD: Performed by: INTERNAL MEDICINE

## 2020-07-20 PROCEDURE — 0DH63UZ INSERTION OF FEEDING DEVICE INTO STOMACH, PERCUTANEOUS APPROACH: ICD-10-PCS | Performed by: INTERNAL MEDICINE

## 2020-07-20 PROCEDURE — 99232 SBSQ HOSP IP/OBS MODERATE 35: CPT | Performed by: INTERNAL MEDICINE

## 2020-07-20 DEVICE — PERCUTANEOUS ENDOSCOPIC GASTROSTOMY KIT
Type: IMPLANTABLE DEVICE | Site: ESOPHAGUS | Status: FUNCTIONAL
Brand: ENDOVIVE SAFETY PEG KIT

## 2020-07-20 RX ORDER — ASPIRIN 81 MG/1
81 TABLET ORAL DAILY
Status: DISCONTINUED | OUTPATIENT
Start: 2020-07-20 | End: 2020-07-20

## 2020-07-20 RX ORDER — SODIUM CHLORIDE 9 MG/ML
INJECTION, SOLUTION INTRAVENOUS CONTINUOUS
Status: ACTIVE | OUTPATIENT
Start: 2020-07-20 | End: 2020-07-20

## 2020-07-20 RX ORDER — LIDOCAINE HYDROCHLORIDE 10 MG/ML
INJECTION, SOLUTION INFILTRATION; PERINEURAL AS NEEDED
Status: DISCONTINUED | OUTPATIENT
Start: 2020-07-20 | End: 2020-07-20 | Stop reason: SURG

## 2020-07-20 RX ORDER — ATORVASTATIN CALCIUM 80 MG/1
80 TABLET, FILM COATED ORAL
Status: DISCONTINUED | OUTPATIENT
Start: 2020-07-21 | End: 2020-07-20

## 2020-07-20 RX ORDER — CEFAZOLIN SODIUM 1 G/50ML
1 SOLUTION INTRAVENOUS
Status: COMPLETED | OUTPATIENT
Start: 2020-07-20 | End: 2020-07-20

## 2020-07-20 RX ORDER — CLOPIDOGREL BISULFATE 75 MG/1
75 TABLET ORAL DAILY
Status: DISCONTINUED | OUTPATIENT
Start: 2020-07-21 | End: 2020-07-20

## 2020-07-20 RX ORDER — ATORVASTATIN CALCIUM 40 MG/1
40 TABLET, FILM COATED ORAL
Status: DISCONTINUED | OUTPATIENT
Start: 2020-07-21 | End: 2020-07-24 | Stop reason: HOSPADM

## 2020-07-20 RX ORDER — NAPROXEN SODIUM 220 MG/1
81 TABLET, FILM COATED ORAL DAILY
Status: DISCONTINUED | OUTPATIENT
Start: 2020-07-20 | End: 2020-07-24 | Stop reason: HOSPADM

## 2020-07-20 RX ORDER — LANOLIN ALCOHOL/MO/W.PET/CERES
1000 CREAM (GRAM) TOPICAL DAILY
Status: DISCONTINUED | OUTPATIENT
Start: 2020-07-20 | End: 2020-07-20

## 2020-07-20 RX ORDER — LANOLIN ALCOHOL/MO/W.PET/CERES
1000 CREAM (GRAM) TOPICAL DAILY
Status: DISCONTINUED | OUTPATIENT
Start: 2020-07-20 | End: 2020-07-24 | Stop reason: HOSPADM

## 2020-07-20 RX ORDER — PROPOFOL 10 MG/ML
INJECTION, EMULSION INTRAVENOUS CONTINUOUS PRN
Status: DISCONTINUED | OUTPATIENT
Start: 2020-07-20 | End: 2020-07-20 | Stop reason: SURG

## 2020-07-20 RX ORDER — PROPOFOL 10 MG/ML
INJECTION, EMULSION INTRAVENOUS AS NEEDED
Status: DISCONTINUED | OUTPATIENT
Start: 2020-07-20 | End: 2020-07-20 | Stop reason: SURG

## 2020-07-20 RX ADMIN — DEXTROSE AND SODIUM CHLORIDE: 5; 450 INJECTION, SOLUTION INTRAVENOUS at 20:06

## 2020-07-20 RX ADMIN — PROPOFOL 60 MG: 10 INJECTION, EMULSION INTRAVENOUS at 10:56

## 2020-07-20 RX ADMIN — CYANOCOBALAMIN TAB 1000 MCG 1000 MCG: 1000 TAB at 20:11

## 2020-07-20 RX ADMIN — SODIUM CHLORIDE: 9 INJECTION INTRAMUSCULAR; INTRAVENOUS; SUBCUTANEOUS at 11:15

## 2020-07-20 RX ADMIN — SODIUM CHLORIDE: 9 INJECTION, SOLUTION INTRAVENOUS at 14:01

## 2020-07-20 RX ADMIN — ASPIRIN 81 MG 81 MG: 81 TABLET ORAL at 20:10

## 2020-07-20 RX ADMIN — HEPARIN SODIUM 5000 UNITS: 5000 INJECTION, SOLUTION INTRAVENOUS; SUBCUTANEOUS at 21:21

## 2020-07-20 RX ADMIN — HEPARIN SODIUM 5000 UNITS: 5000 INJECTION, SOLUTION INTRAVENOUS; SUBCUTANEOUS at 13:59

## 2020-07-20 RX ADMIN — LIDOCAINE HYDROCHLORIDE 5 ML: 10 INJECTION, SOLUTION INFILTRATION; PERINEURAL at 10:56

## 2020-07-20 RX ADMIN — DEXTROSE AND SODIUM CHLORIDE: 5; 450 INJECTION, SOLUTION INTRAVENOUS at 05:37

## 2020-07-20 RX ADMIN — SODIUM CHLORIDE: 9 INJECTION, SOLUTION INTRAVENOUS at 10:28

## 2020-07-20 RX ADMIN — PROPOFOL 100 MCG/KG/MIN: 10 INJECTION, EMULSION INTRAVENOUS at 10:56

## 2020-07-20 RX ADMIN — CEFAZOLIN SODIUM 1 G: 1 SOLUTION INTRAVENOUS at 10:45

## 2020-07-20 RX ADMIN — ASPIRIN 300 MG: 300 SUPPOSITORY RECTAL at 08:51

## 2020-07-20 RX ADMIN — HEPARIN SODIUM 5000 UNITS: 5000 INJECTION, SOLUTION INTRAVENOUS; SUBCUTANEOUS at 05:35

## 2020-07-20 NOTE — PROGRESS NOTES
Patient: [facility]  Location: [facility] 3A 3025  MRN: [id number]  Today's date: 7/20/2020    Attempted to see patient for therapy. Unable due to patient at test or procedure(ECHO during attempt. Will f/u.)

## 2020-07-20 NOTE — ANESTHESIA POSTPROCEDURE EVALUATION
Patient: Karl Park    Procedure Summary     Date:  07/20/20 Room / Location:   GI 1 / [facility]    Anesthesia Start:  1052 Anesthesia Stop:  1118    Procedure:  UPPER GASTROINTESTINAL ENDOSCOPY WITH DIRECTED PLACEMENT OF PERCUTANEOUS GASTROSTOMY TUBE (N/A Esophagus) Diagnosis:       Cerebrovascular accident (CVA), unspecified mechanism (CMS/HCC)      Dysphagia as late effect of cerebrovascular accident (CVA)      (Cerebrovascular accident (CVA), unspecified mechanism (CMS/HCC) [I63.9])      (Dysphagia as late effect of cerebrovascular accident (CVA) [I69.391])    Provider:  Frankel, Robert A, MD Responsible Provider:  Vitor Tillman MD    Anesthesia Type:  general ASA Status:  3          Anesthesia Type: general  PACU Vitals     No data found in the last 10 encounters.            Anesthesia Post Evaluation    Pain management: adequate  Patient location during evaluation: PACU  Patient participation: complete - patient participated  Level of consciousness: awake and alert  Cardiovascular status: acceptable  Airway Patency: adequate  Respiratory status: acceptable  Hydration status: acceptable  Anesthetic complications: no

## 2020-07-20 NOTE — PROGRESS NOTES
Hospital Medicine Service -  Daily Progress Note       SUBJECTIVE   Interval History: No acute events overnight. S/p PEG tube earlier today. Patient seen and examined. Notes mild abd pain, otherwise no concerns or complaints. Denies any cp, sob, n/v. Denies any new upper or lower extremity weakness, numbness or paraesthesias.      OBJECTIVE      Vital signs in last 24 hours:  Temp:  [36.1 °C (97 °F)-37 °C (98.6 °F)] 37 °C (98.6 °F)  Heart Rate:  [61-89] 68  Resp:  [15-20] 20  BP: (101-160)/(56-87) 160/87    Intake/Output Summary (Last 24 hours) at 7/20/2020 1918  Last data filed at 7/20/2020 1143  Gross per 24 hour   Intake 300 ml   Output --   Net 300 ml       PHYSICAL EXAMINATION      GEN: well-developed and well-nourished; not in acute distress  HEENT: normocephalic; atraumatic  NECK: no JVD; no bruits  CARDIO: regular rate and rhythm; no murmurs, rubs or gallops  RESP: clear to auscultation bilaterally; no rales, rhonchi, or wheezes  ABD: soft, non-distended, non-tender, dec bowel sounds, PEG tube in place  EXT: no cyanosis, clubbing, or edema  SKIN: clean, dry, warm, and intact  MUSCULOSKELETAL: no injury or deformity  NEURO: alert and oriented x 3; left facial droop noted otherwise no major focal deficits noted  BEHAVIOR/EMOTIONAL: appropriate; cooperative     LABS / IMAGING / TELE      Labs  Lab Results   Component Value Date    GLUCOSE 143 (H) 07/19/2020    CALCIUM 8.7 (L) 07/19/2020     07/19/2020    K 3.8 07/19/2020    CO2 25 07/19/2020     07/19/2020    BUN 20 07/19/2020    CREATININE 1.1 07/19/2020     Lab Results   Component Value Date    WBC 8.02 07/19/2020    HGB 14.7 07/19/2020    HCT 43.9 07/19/2020    .5 (H) 07/19/2020     07/19/2020     Lab Results   Component Value Date    ALBUMIN 4.2 07/17/2020    BILITOT 1.1 07/17/2020    ALKPHOS 96 07/17/2020    AST 19 07/17/2020    ALT 20 07/17/2020    PROTEIN 7.7 07/17/2020       Imaging  Mri Angiogram Head Without  Contrast    Result Date: 7/18/2020  IMPRESSION: 1.  Mild focal stenoses of the proximal internal carotid arteries bilaterally, left slightly greater than right. 2.  Intermediate length segment mild stenosis of the anterior cavernous through ophthalmic segment of the right internal carotid artery. 3.  No evidence of a hemodynamically significant intracranial stenosis or major vessel occlusion.  No evidence of aneurysm.     Mri Angiogram Neck Without Contrast    Result Date: 7/18/2020  IMPRESSION: 1.  Mild focal stenoses of the proximal internal carotid arteries bilaterally, left slightly greater than right. 2.  Intermediate length segment mild stenosis of the anterior cavernous through ophthalmic segment of the right internal carotid artery. 3.  No evidence of a hemodynamically significant intracranial stenosis or major vessel occlusion.  No evidence of aneurysm.     X-ray Chest 1 View    Result Date: 7/17/2020  IMPRESSION: No acute cardiopulmonary disease COMMENT:  AP portable erect view chest was performed.  The lungs are clear. There is no evidence of consolidation or pleural effusion. The heart and mediastinal structures are normal in size and contour.       ECG/Telemetry  I have independently reviewed the telemetry. No events for the last 24 hours.    ASSESSMENT AND PLAN      * Cerebrovascular accident (CVA) (CMS/[facility])  Assessment & Plan  -S/p recent CVA with residual L facial droop and dysphagia  -MRI 7/16 in Care Everywhere: Punctate focus of diffusion restriction post/lateral L medulla w/ enhancement, c/w small, recent/acute lacunar infarction, likely a few days. Mild-mod chronic microangiopathy elsewhere.  -Head/neck MRA w/Mild focal stenoses of the proximal internal carotid arteries bilaterally, left slightly greater than right.  Intermediate length segment mild stenosis of the anterior cavernous through ophthalmic segment of the right internal carotid artery. No evidence of a hemodynamically significant  intracranial stenosis or major vessel occlusion.  No evidence of aneurysm.  - TTE w/No obvious cardiac source of embolism identified    Plan:  Cont to monitor on telemetry  Will transition to ASA through PEG tube. [facility] can initiate plavix 75mg daily tomorrow. Per Neuorlogy D/C aspirin after 3 days on Plavix.  -will initiate lipitor 80mg qhs  -Cardiology consulted - will f/u recs re: need for CHRISTIE  -Neuro following - will f/u recs  -Speech, PT, OT  Aspiration precautions  -PT/OT. Family requesting PMR eval - will consult per request    B12 deficiency  Assessment & Plan  B12 low end of normal at 210    Plan:  Will initiate vitamin b12, 1000mcg daily    Type 2 diabetes mellitus (CMS/HCC)  Assessment & Plan  BGs remain stable while NPO    Plan:  -Hold Metformin, cont w/SSI  - NPO for now, can hopefully begin tube feeds in AM  - monitor w/routine accuchecks    Dysphagia as late effect of cerebrovascular accident (CVA)  Assessment & Plan  -Dysphagia x 2 weeks s/p CVA.  Lost 14 lbs due to inability to eat or drink  -Barium swallow/Video swallow in Care Everywhere: Severe pharyngeal dysphagia, PEG recommended  -s/p PEG tube placement on 7/20, Antral erythema and duodenal erosions. H.Pylori biopsies obtained.    Plan:  Per GI  - Can use PEG for flushes and medications today. Can start tube feeding tomorrow. Can resume plavix tomorrow. Continue ASA but switch from rectal to tube feeds  Cont w/PPI  Nutrition consulted for TEN recs  F/u biopsy results  Aspiration precautions, NPO  Speech consulted - cont w/therapy             VTE Assessment: Padua VTE Score: 2    Estimated discharge date: 7/22/2020  Code Status: Full Code     Edel MNadine Keller, DO  7/20/2020

## 2020-07-20 NOTE — OP NOTE
_______________________________________________________________________________  Patient Name: Karl Park          Procedure Date: 7/20/2020 10:39 AM  MRN: [id number]                     Account Number: [id number]  YOB: 1928               Age: 89  Gender: Male                          Note Status: Finalized  Attending MD: ROBERT A FRANKEL , MD~FRANKEL  _______________________________________________________________________________  Procedure:            Upper GI endoscopy  Indications:          Oropharyngeal phase dysphagia  Providers:            ROBERT A. FRANKEL, MD~FRANKEL (Doctor), BENY GODOY DO~JAYNE (Doctor)  Referring MD:  Requesting Provider:  Medicines:            Monitored Anesthesia Care  Complications:        No immediate complications.  _______________________________________________________________________________  Procedure:            After obtaining informed consent, the endoscope was  passed under direct vision. Throughout the procedure,  the patient's blood pressure, pulse, and oxygen  saturations were monitored continuously. The Endoscope  was introduced through the mouth, and advanced to the  second part of duodenum. The upper GI endoscopy was  accomplished without difficulty.  Estimated Blood Loss: Estimated blood loss: none.  Findings:  The examined esophagus was normal.  Diffuse mildly erythematous mucosa was found in the gastric antrum.  Biopsies were taken with a cold forceps for Helicobacter pylori testing.  Placement of an externally removable PEG with no T-fasteners was  successfully completed. The external bumper was at the 3.0 cm marking on  the tube.  A few erosions without bleeding were found in the duodenal bulb and in  the second portion of the duodenum.  Impression:           - Normal esophagus.  - Erythematous mucosa in the antrum. Biopsied.  - Duodenal erosions without bleeding.  - An externally removable PEG placement was  successfully  completed.  Recommendation:       - Patient has a contact number available for  emergencies. The signs and symptoms of potential  delayed complications were discussed with the patient.  Return to normal activities tomorrow. Written discharge  instructions were provided to the patient.  - Resume previous diet.  - Continue present medications.  - Start once daily PPI through PEG. Can use a  preparation with granules that can be inserted via PEG.  - Consider treating H.Pylori if positive although could  simply use daily PPI as there may be cardiovascular  risk with H.Pylori treatment.  - Await pathology results.  - No repeat upper endoscopy.  - Can start medications and flushes in 4 hours, can  start tube feeding in 12 hours.  - Do not adjust bumper.  Procedure Code(s):    --- Professional ---  37199, Esophagogastroduodenoscopy, flexible, transoral;  with directed placement of percutaneous gastrostomy tube  92771, Esophagogastroduodenoscopy, flexible, transoral;  with biopsy, single or multiple  Diagnosis Code(s):    --- Professional ---  K31.89, Other diseases of stomach and duodenum  K26.9, Duodenal ulcer, unspecified as acute or chronic,  without hemorrhage or perforation  R13.12, Dysphagia, oropharyngeal phase  CPT copyright 2018 [employer] Association. All rights reserved.  The codes documented in this report are preliminary and upon  review may  be revised to meet current compliance requirements.  ____________________________  ROBERT A FRANKEL, MD~FRANKEL  7/20/2020 11:23:59 AM  This report has been signed electronically.  ___________________________  BENY GODOY DO~JAYNE  Number of Addenda: 0  Note Initiated On: 7/20/2020 10:39 AM

## 2020-07-20 NOTE — ANESTHESIOLOGIST PRE-PROCEDURE ATTESTATION
Pre-Procedure Patient Identification:  I am the Primary Anesthesiologist and have identified the patient on 07/20/20 at 10:34 AM.   I have confirmed the following procedure(s) UPPER GASTROINTESTINAL ENDOSCOPY WITH DIRECTED PLACEMENT OF PERCUTANEOUS GASTROSTOMY TUBE will be performed by the following surgeon/proceduralist Frankel, Robert A, MD.

## 2020-07-20 NOTE — ANESTHESIA PREPROCEDURE EVALUATION
Relevant Problems   GASTROINTESTINAL   (+) Dysphagia as late effect of cerebrovascular accident (CVA)      NEUROLOGY   (+) Cerebrovascular accident (CVA) (CMS/HCC)      URINARY SYSTEM   (+) ARF (acute renal failure) (CMS/HCC)      Other   (+) Type 2 diabetes mellitus (CMS/HCC)       Anesthesia ROS/MED HX    Anesthesia History - neg  Neuro/Psych    CVA  Cardiovascular   Echocardiogram reviewed and ECG reviewed  Endo/Other   Diabetes  ROS/MED HX Comments:    ROS/Hx: UPPER GASTROINTESTINAL ENDOSCOPY WITH DIRECTED PLACEMENT OF PERCUTANEOUS GASTROSTOMY TUBE (N/A Esophagus)         History reviewed. No pertinent surgical history.    Physical Exam    Airway   Mallampati: II   TM distance: >3 FB   Neck ROM: full  Cardiovascular - normal   Rhythm: regular   Rate: normalPulmonary - normal   clear to auscultation  Dental - normal        Anesthesia Plan    Plan: general    Technique: total IV anesthesia     Airway: natural airway / supplemental oxygen   ASA 3  Anesthetic plan and risks discussed with: patient  Induction:    intravenous

## 2020-07-20 NOTE — PROGRESS NOTES
GI note:    Successful PEG placement. Antral erythema and duodenal erosions. H.Pylori biopsies obtained.    Can use PEG for flushes and medications in 4 hours.   Can start tube feeding in 12 hours (or can simply start tomorrow)  Can resume plavix tomorrow. Continue ASA but switch from rectal to tube feeds  Once daily 40 mg PPI - can use a PPI with granules so that they can be flushed through the PEG  Do not adjust bumper.      Robert Frankel, MD  [facility]

## 2020-07-20 NOTE — PLAN OF CARE
Problem: Adult Inpatient Plan of Care  Goal: Plan of Care Review  Outcome: Progressing  Flowsheets (Taken 7/20/2020 0300)  Progress: no change  Plan of Care Reviewed With: patient  Outcome Summary: PT remain safe pleasant, continue on IV fluid, NPO mantained

## 2020-07-20 NOTE — NURSING NOTE
Yvon gonzalezon [facility] pt's dtr Amanda who is interested in [facility].  Also would like cards and neuro followup regarding cholesterol elevaiton and source or cause of stroke. Message sent to dr Chan to call in am with update and plan.

## 2020-07-20 NOTE — ASSESSMENT & PLAN NOTE
-Dysphagia x 2 weeks s/p CVA.  Lost 14 lbs due to inability to eat or drink  -Barium swallow/Video swallow in Care Everywhere: Severe pharyngeal dysphagia, PEG recommended  -s/p PEG tube placement on 7/20, Antral erythema and duodenal erosions. H.Pylori biopsies obtained. Biopsy results with fragments of antral and oxyntic mucosa with no specific pathological change. no intestinal metaplasia or features of   Helicobacter pylori gastritis seen.  - currently tolerating TEN bolus feeding    Plan:  Cont w/TEN bolus feeds Will require tube feeds greater than 90 days.   Cont w/PPI  Nutrition consulted - will f/u recs  Monitor electrolytes to include K, Mg, Phos and replete as required   F/u biopsy results  Aspiration precautions, NPO for now  Speech consulted - pt to remain NPO, cont w/therapy. Video swallow to be done as outpt  GI signed off.

## 2020-07-20 NOTE — ASSESSMENT & PLAN NOTE
-S/p recent CVA with residual L facial droop and dysphagia  -MRI 7/16 in Care Everywhere: Punctate focus of diffusion restriction post/lateral L medulla w/ enhancement, c/w small, recent/acute lacunar infarction, likely a few days. Mild-mod chronic microangiopathy elsewhere.  -Head/neck MRA w/Mild focal stenoses of the proximal internal carotid arteries bilaterally, left slightly greater than right.  Intermediate length segment mild stenosis of the anterior cavernous through ophthalmic segment of the right internal carotid artery. No evidence of a hemodynamically significant intracranial stenosis or major vessel occlusion.  No evidence of aneurysm.  - TTE w/No obvious cardiac source of embolism identified    Plan:  Cont to monitor on telemetry  Cont w//ASA through PEG tube. Per Neurology plavix not required  -per neurology cont w/lipitor 40mg qhs  - per neurology evaluation - stroke likely lacunar infarct. Low suspicion for embolic infarct noted  -Cardiology consulted - given low suspicion for embolic etiology, no further evaluation felt to be required at this time  -Neuro following - will f/u recs  -Speech, PT, OT  Aspiration precautions. Speech to repeat video swallow tomorrow  -PT/OT. PMR consulted - recommend outpt speech therapy at [facility] for continued management.  Given toleration of TEN will plan to d/c to home later today. 59 mins spent on d/c planning/paperwork.

## 2020-07-20 NOTE — PROGRESS NOTES
GI note:    Discussed risks and benefits of PEG tube with patient. He is agreeable to move forward. Consent form signed. Pre-procedure cefazolin given for skin infection prophylaxis..      Robert Frankel, MD  [facility]

## 2020-07-20 NOTE — PROGRESS NOTES
[facility]  Location: [facility] 3A 3025  MRN: [id number]  Today's date: 7/20/2020    Attempted to see patient for therapy. Unable due to medical hold.   NNO post procedure, MD paged.

## 2020-07-20 NOTE — PROGRESS NOTES
7/20/2020  [facility] can provide skilled nursing for this patient - soc 7/23 pending signed face to face. Please fax clinical information, orders, f2f and progress note or discharge summary to [phone].   It does not appear that the tube feeding and supplies have been ordered from [facility] just yet. ABBY Mckeon Call to Eric [phone] or jason [phone] vs [facility] (can accept) vs [facility] (does not do tube feeds).     Referral for tube feeds and supplies is being worked in by Abbi FOREMAN/[facility].  will fax updated records to [facility] on DOD. ABBY Mckeon'

## 2020-07-20 NOTE — CONSULTS
Clinical Nutrition Note    Clinical Comments:  MD consult received for tube feeding recommendations. Nutrition services already following, please see 7/19 nutrition note for full nutrition assessment. Will see for reassessment per policy. Current TF recs when ready to use PEG are as follows:    Nutrition Interventions/ Recommendations:   1. Diabetisource AC at 20ml/hr x 24 hours.   - after 1st 24 hrs, advance 20-25ml/hr q 4 hours to goal 73ml/hr x 24 hours. Will provide: 1752mlTV, 2102kcals (25/kg), 105 g PRO (1.2/kg), 1429ml free water.   - Add 120ml free water flushes q 4 hours to meet hydration needs in absence of IVF  -keep HOB elevated greater than 30 degrees  2. Prior to d/c to home, pt should start bolus feed regimen.   -Each can is 250ml, pt will need 7 cans Diabetisource AC daily to meet needs  -Sample regimen: 2 cans 8am, 1 can 11 am noon, 1 can 2p, 1 can 5p, 2 cans 7:30pm  -flush with 50ml flushes before and after each feed to meet hydration needs  -pt should not lay flat for 1 hour after each feed          Date: 07/20/20  Signature: Elsa Albarado RD

## 2020-07-20 NOTE — ASSESSMENT & PLAN NOTE
BGs remain stable while on TEN    Plan:  -Hold Metformin, cont w/SSI  - NPO for now, with TEN  - monitor w/routine accuchecks

## 2020-07-21 LAB
ANION GAP SERPL CALC-SCNC: 8 MEQ/L (ref 3–15)
BUN SERPL-MCNC: 13 MG/DL (ref 8–20)
CALCIUM SERPL-MCNC: 8.3 MG/DL (ref 8.9–10.3)
CASE RPRT: NORMAL
CHLORIDE SERPL-SCNC: 107 MEQ/L (ref 98–109)
CLINICAL INFO: NORMAL
CO2 SERPL-SCNC: 23 MEQ/L (ref 22–32)
CREAT SERPL-MCNC: 1.1 MG/DL (ref 0.8–1.3)
ERYTHROCYTE [DISTWIDTH] IN BLOOD BY AUTOMATED COUNT: 12.8 % (ref 11.6–14.4)
GFR SERPL CREATININE-BSD FRML MDRD: >60 ML/MIN/1.73M*2
GLUCOSE BLD-MCNC: 113 MG/DL (ref 70–99)
GLUCOSE BLD-MCNC: 116 MG/DL (ref 70–99)
GLUCOSE BLD-MCNC: 131 MG/DL (ref 70–99)
GLUCOSE BLD-MCNC: 133 MG/DL (ref 70–99)
GLUCOSE BLD-MCNC: 161 MG/DL (ref 70–99)
GLUCOSE SERPL-MCNC: 139 MG/DL (ref 70–99)
HCT VFR BLDCO AUTO: 39.4 % (ref 40.1–51)
HGB BLD-MCNC: 13.1 G/DL (ref 13.7–17.5)
MCH RBC QN AUTO: 33.5 PG (ref 28–33.2)
MCHC RBC AUTO-ENTMCNC: 33.2 G/DL (ref 32.2–36.5)
MCV RBC AUTO: 100.8 FL (ref 83–98)
PATH REPORT.FINAL DX SPEC: NORMAL
PATH REPORT.GROSS SPEC: NORMAL
PDW BLD AUTO: 10.6 FL (ref 9.4–12.4)
PLATELET # BLD AUTO: 186 K/UL (ref 150–350)
POCT TEST: ABNORMAL
POTASSIUM SERPL-SCNC: 3.7 MEQ/L (ref 3.6–5.1)
RBC # BLD AUTO: 3.91 M/UL (ref 4.5–5.8)
SODIUM SERPL-SCNC: 138 MEQ/L (ref 136–144)
WBC # BLD AUTO: 7.02 K/UL (ref 3.8–10.5)

## 2020-07-21 PROCEDURE — 97116 GAIT TRAINING THERAPY: CPT | Mod: GP

## 2020-07-21 PROCEDURE — 97535 SELF CARE MNGMENT TRAINING: CPT | Mod: GO

## 2020-07-21 PROCEDURE — 85027 COMPLETE CBC AUTOMATED: CPT | Performed by: INTERNAL MEDICINE

## 2020-07-21 PROCEDURE — 36415 COLL VENOUS BLD VENIPUNCTURE: CPT | Performed by: INTERNAL MEDICINE

## 2020-07-21 PROCEDURE — 63700000 HC SELF-ADMINISTRABLE DRUG: Performed by: INTERNAL MEDICINE

## 2020-07-21 PROCEDURE — 25800000 HC PHARMACY IV SOLUTIONS: Performed by: NURSE PRACTITIONER

## 2020-07-21 PROCEDURE — 80048 BASIC METABOLIC PNL TOTAL CA: CPT | Performed by: INTERNAL MEDICINE

## 2020-07-21 PROCEDURE — 99232 SBSQ HOSP IP/OBS MODERATE 35: CPT | Performed by: INTERNAL MEDICINE

## 2020-07-21 PROCEDURE — 63700000 HC SELF-ADMINISTRABLE DRUG: Performed by: PSYCHIATRY & NEUROLOGY

## 2020-07-21 PROCEDURE — 20600000 HC ROOM AND CARE INTERMEDIATE/TELEMETRY

## 2020-07-21 PROCEDURE — 63600000 HC DRUGS/DETAIL CODE: Performed by: INTERNAL MEDICINE

## 2020-07-21 PROCEDURE — 92526 ORAL FUNCTION THERAPY: CPT | Mod: GN

## 2020-07-21 PROCEDURE — 97162 PT EVAL MOD COMPLEX 30 MIN: CPT | Mod: GP

## 2020-07-21 RX ORDER — POTASSIUM CHLORIDE 14.9 MG/ML
20 INJECTION INTRAVENOUS AS NEEDED
Status: DISCONTINUED | OUTPATIENT
Start: 2020-07-21 | End: 2020-07-24 | Stop reason: HOSPADM

## 2020-07-21 RX ORDER — RAMIPRIL 2.5 MG/1
2.5 CAPSULE ORAL DAILY
Status: DISCONTINUED | OUTPATIENT
Start: 2020-07-21 | End: 2020-07-24

## 2020-07-21 RX ORDER — POTASSIUM CHLORIDE 750 MG/1
40 TABLET, FILM COATED, EXTENDED RELEASE ORAL AS NEEDED
Status: DISCONTINUED | OUTPATIENT
Start: 2020-07-21 | End: 2020-07-24 | Stop reason: HOSPADM

## 2020-07-21 RX ORDER — POTASSIUM CHLORIDE 750 MG/1
20 TABLET, FILM COATED, EXTENDED RELEASE ORAL AS NEEDED
Status: DISCONTINUED | OUTPATIENT
Start: 2020-07-21 | End: 2020-07-24 | Stop reason: HOSPADM

## 2020-07-21 RX ADMIN — POTASSIUM BICARBONATE 40 MEQ: 782 TABLET, EFFERVESCENT ORAL at 09:45

## 2020-07-21 RX ADMIN — ATORVASTATIN CALCIUM 40 MG: 40 TABLET, FILM COATED ORAL at 18:24

## 2020-07-21 RX ADMIN — CYANOCOBALAMIN TAB 1000 MCG 1000 MCG: 1000 TAB at 09:46

## 2020-07-21 RX ADMIN — HEPARIN SODIUM 5000 UNITS: 5000 INJECTION, SOLUTION INTRAVENOUS; SUBCUTANEOUS at 14:26

## 2020-07-21 RX ADMIN — ASPIRIN 81 MG 81 MG: 81 TABLET ORAL at 09:46

## 2020-07-21 RX ADMIN — LANSOPRAZOLE 30 MG: KIT at 06:44

## 2020-07-21 RX ADMIN — DEXTROSE AND SODIUM CHLORIDE: 5; 450 INJECTION, SOLUTION INTRAVENOUS at 06:52

## 2020-07-21 RX ADMIN — HEPARIN SODIUM 5000 UNITS: 5000 INJECTION, SOLUTION INTRAVENOUS; SUBCUTANEOUS at 22:01

## 2020-07-21 RX ADMIN — HEPARIN SODIUM 5000 UNITS: 5000 INJECTION, SOLUTION INTRAVENOUS; SUBCUTANEOUS at 06:43

## 2020-07-21 RX ADMIN — RAMIPRIL 2.5 MG: 2.5 CAPSULE ORAL at 09:46

## 2020-07-21 ASSESSMENT — COGNITIVE AND FUNCTIONAL STATUS - GENERAL
REMEMBERING TO TAKE MEDICATION: 4 - NONE
AFFECT: WFL
HELP NEEDED FOR BATHING: 3 - A LITTLE
TAKING CARE OF COMPLICATED TASKS: 3 - A LITTLE
REMEMBERING WHERE THINGS ARE: 4 - NONE
CLIMB 3 TO 5 STEPS WITH RAILING: 3 - A LITTLE
HELP NEEDED FOR PERSONAL GROOMING: 4 - NONE
UNDERSTANDING 10 TO 15 MIN SPEECH: 4 - NONE
TOILETING: 3 - A LITTLE
MOVING TO AND FROM BED TO CHAIR: 3 - A LITTLE
DRESSING REGULAR UPPER BODY CLOTHING: 4 - NONE
FOLLOWS FAMILIAR CONVERSATION: 4 - NONE
REMEMBERING 5 ERRANDS WITH NO LIST: 4 - NONE
STANDING UP FROM CHAIR USING ARMS: 3 - A LITTLE
EATING MEALS: 4 - NONE
DRESSING REGULAR LOWER BODY CLOTHING: 3 - A LITTLE
WALKING IN HOSPITAL ROOM: 3 - A LITTLE
AFFECT: WFL

## 2020-07-21 NOTE — PROGRESS NOTES
GI note:    PEG evaluated - looks good. Small amount of laxity to bumper. Do not adjust. Okay for tube feeds. Call if questions.      Robert Frankel, MD  [facility]

## 2020-07-21 NOTE — PLAN OF CARE
Problem: Adult Inpatient Plan of Care  Goal: Plan of Care Review  Outcome: Progressing  Flowsheets  Taken 7/21/2020 1422 by Ciara Velarde, PT  Progress: improving  Plan of Care Reviewed With: patient  Taken 7/21/2020 1534 by Chyna Brower CCC-SLP  Outcome Summary: Ongoing swallow tx; cont  npo w/ peg tf; oral care w/ toothbrush BID including to tongue     Problem: Swallowing Impairment  Goal: Improved Swallowing Without Aspiration  Intervention: Optimize Eating and Swallowing  Flowsheets (Taken 7/21/2020 1534)  Aspiration Precautions: NPO pending swallow screening/evaluation

## 2020-07-21 NOTE — PLAN OF CARE
Problem: Adult Inpatient Plan of Care  Goal: Plan of Care Review  Outcome: Progressing  Flowsheets (Taken 7/21/2020 0314)  Progress: improving  Plan of Care Reviewed With: patient  Outcome Summary: Pt tolerated PEG tube placement. Meds given through PEG and tolerated. Pt to start tube feeding thru PEG today. On D5 1/2 NSS. Denies pain.

## 2020-07-21 NOTE — PROGRESS NOTES
Patient: Karl Park  Location: [facility] 3A 3025  MRN: [id number]  Today's date: 7/21/2020  Speech Pathology: Therapy session    SLP Diagnosis: mild oral dysphagia and severe pharyngeal dysphagia per VFSS on 7/17 (at OSH). High aspiration risk and impaired secretion management s/p CVA    Recommendations:  1. Npo w/ peg tf; meds via peg  2. Oral care BID w/ toothbrush including to tongue  3. Ongoing SLP tx for dysphagia      Summary/Handoff:  Daily Outcome Statement (SLP): Pt seen for swallow tx followup. Educated on aspiration precautions and need for oral care BID to teeth and tongue given NPO status. Worked on exercises and head positioning strategies      Session Notes:            Dietary Orders   (From admission, onward)             Start     Ordered    07/21/20 0928  Tube Feed with NPO Diabetisource AC; Continuous; 20; 73; after 1st 24 hrs, advance 20-25ml/hr q 4 hours to goal 73ml/hr x 24 hours.; Water; 120; Every 4 hours; RD/LDN may adjust order; AM Snack  (Tube Feed with NPO Diet Orders with insertion and maintenance panels)  Diet effective now     Question Answer Comment   Tube Feeding Formula (PH): Diabetisource AC    Administration Type Continuous    Tube Feeding Start rate (mL/hr): 20    Tube Feeding Goal rate (mL/hr): 73    Nursing Instruction after 1st 24 hrs, advance 20-25ml/hr q 4 hours to goal 73ml/hr x 24 hours.    Flush type: Water    Flush amount (mL): 120    Flush frequency: Every 4 hours    Delegation of Authority. Diet orders written by PA/Felecia may not be adjusted by RD/LDNs. RD/LDN may adjust order    Meal period (AM Snack required for CBORD, do not remove: Not clinically relevant) AM Snack        07/21/20 0929                Results from last 7 days   Lab Units 07/21/20  0421 07/19/20  0348 07/17/20  1828   WBC K/uL 7.02 8.02 8.78   HEMOGLOBIN g/dL 13.1* 14.7 14.5   HEMATOCRIT % 39.4* 43.9 44.3   PLATELETS K/uL 186 213 246          Patient left with call bell in reach and alarms  as found.      Karl is a 89 y.o. male admitted on 7/17/2020 with Cerebrovascular accident (CVA), unspecified mechanism (CMS/HCC). Principal problem is Cerebrovascular accident (CVA) (CMS/HCC).    Past Medical History  Karl has a past medical history of CVA (cerebral vascular accident) (CMS/HCC) and Type 2 diabetes mellitus (CMS/HCC).    History of Present Illness  Pt presents for PEG placement and completion of CVA work-up.  MRI was done 7/16 which showed punctate focus of diffusion restriction in the posterior lateral aspect of the left medulla with associated enhancement indicative of a small, recent/acute lacunar infarction.  Patient failed swallow study and is aspirating at home.      Pt underwent PEG placement 7/20/2020, received new PT orders 7/21/2020    SLP Pain    Date/Time Pain Type Pref Pain Scale Rating: Rest [facility]   07/21/20 1134 Pain Assessment word (verbal rating pain scale) 0 - no pain EVM          Prior Living Environment      Most Recent Value   Living Arrangements  house   Living Environment Comment  Pt lives with his wife in a 1 story home 1+1 DAYA without rail through the front door, full flight from garage with BHR          Prior Level of Function      Most Recent Value   Dominant Hand  right   Ambulation  independent   Transferring  independent   Toileting  independent   Bathing  independent   Dressing  independent   Communication  understands/communicates without difficulty   Swallowing  other (see comments)   Baseline Diet/Method of Nutritional Intake  regular solids, thin liquids   Past History of Dysphagia  difficulty swallowing 2-3 weeks s/p medulla CVA. VFSS from OSH on 7/17 with severe pharyngeal dysphagia and recs for PEG   Prior Level of Function Comment  Pt reports being independent with mobility PTA. Pt states his daughter bought him a SPC but has not used it. Independent with ADLs, (+) driving   Equipment Currently Used at Home  none [owns SPC, RW at home]          SLP Evaluation  and Treatment - 07/21/20 1134        Time Calculation    Start Time  1134     Stop Time  1154     Time Calculation (min)  20 min        Session Details    Document Type  daily treatment/progress note     Mode of Treatment  individual therapy;speech language pathology        General Information    Patient Profile Reviewed?  yes     General Observations of Patient  in chair; seen briefly earlier and then after RAML w/ pt     Existing Precautions/Restrictions  aspiration;fall;NPO     Limitations/Impairments  safety/cognitive;swallowing        Cognition/Psychosocial    Comment, Cognition  slight dec insight; reduced recall of details at time        Functional Communication Measures    FCM: Swallowing  2-->Level 2        General Swallowing Observations    Current Diet/Method of Nutritional Intake (General Swallowing Observations, NIS)  NPO;gastrostomy tube (PEG)     Signs/Symptoms of Aspiration (Current Diet)  none     Comment, General Swallowing Observations  pt spitting secretions out on arrival and t/o tx        Food and Liquid Trials (NIS)    Patient Positioning  upright in chair     Liquid Consistencies Evaluated  thin liquids     Comment, Thin Liquids  tiny ice chips. Attempted different head positions w/ effortful swallow as strategy. Pt w/ cough and spit after all trials except for R head turn w/ neck extension. Pt note head positions were not attempted during vfss     Comment  also worked on effortful swallow, and generating double swallow as future strategy w/ po        Swallowing Recommendations    Diet Consistency Recommendations  NPO     Medication Administration Recommendations  via peg     Comment, Swallowing Recommendations  would recommend repeat VFSS w/ strategies as pt progresses through tx        Swallowing Intervention    Dysphagia/Swallowing Interventions  pharyngeal therapeutic exercise program;advanced diet/liquid texture trials        AM-PAC (TM) - Cognition (Current Function)     Following/understanding a 10-15 minute speech or presentation?  4 - None     Understanding familiar people during ordinary conversations?  4 - None     Remembering to take medications at the appropriate time?  4 - None     Remembering where things were placed or put away?  4 - None     Remembering a list of 3 or 4 errands without writing it down?  4 - None     Taking care of complicated tasks?  3 - A little     AM-PAC (TM) Cognition Score  23        Therapy Assessment/Plan (SLP)    SLP Diagnosis  mild oral dysphagia and severe pharyngeal dysphagia per VFSS on 7/17 (at OSH). High aspiration risk and impaired secretion management s/p CVA     Rehab Potential (SLP Eval)  good, to achieve stated therapy goals     Therapy Frequency (SLP)  5 times/wk     Criteria for Skilled Therapeutic Interventions Met (SLP Eval)  skilled criteria for speech language intervention met     Problem List (SLP)  dysphagia     Functional Level at Time of Evaluation (SLP)  alert, oob in chair, spitting secretions     Planned Therapy Interventions (SLP)  education, therex, potential po trials        Daily Progress Summary (SLP)    Daily Outcome Statement (SLP)  Pt seen for swallow tx followup. Educated on aspiration precautions and need for oral care BID to teeth and tongue given NPO status. Worked on exercises and head positioning strategies     Symptoms Noted During/After Treatment  none     Barriers to Overall Progress (SLP)  na        Therapy Plan Review/Discharge Plan (SLP)    Therapy Plan Review (SLP)  care plan/treatment goals reviewed;participants included;patient                  Education provided this session. See the Patient Education summary report for full details.    SLP Goals      Most Recent Value   Pharyngeal Exercise Goal 1   Activity  laryngeal elevation/vocal fold adduction, 5-10 times per session at 07/18/2020 0933   [place]  independently (over 90% accuracy) at 07/18/2020 0933   Time Frame  by discharge at 07/18/2020  0933   Time Frame  goal ongoing at 07/21/2020 1137

## 2020-07-21 NOTE — PATIENT CARE CONFERENCE
Care Progression Rounds Note  Date: 7/21/2020  Time: 10:41 AM     Patient Name: Karl Park     Medical Record Number: [id number]   YOB: 1928  Sex: Male      Room/Bed: 3025    Admitting Diagnosis: Cerebrovascular accident (CVA), unspecified mechanism (CMS/HCC) [I63.9]   Admit Date/Time: 7/17/2020  5:32 PM    Primary Diagnosis: Cerebrovascular accident (CVA) (CMS/HCC)  Principal Problem: Cerebrovascular accident (CVA) (CMS/HCC)    GMLOS: 3  Anticipated Discharge Date: 7/22/2020    AM-PAC  Mobility Score: 23    Discharge Planning:  Living Arrangements: house    Barriers to Discharge:  Barriers to Discharge: Medical issues not resolved    Participants:  social work/services, , nursing, physical therapy

## 2020-07-21 NOTE — PROGRESS NOTES
CARDIOLOGY PROGRESS NOTE          ASSESSMENT AND PLAN:    He is a very pleasant 89 y m c mild HL, DM II reported, HTN p/w cerebellar infarct.    1) CVA: lacunar in nature. Appreciate Neuro input.               ASA, statin. BP persistently high.               I would add ramipril 2.5 mg daily.    No AF noted. No new CV recs.    MDS        PROBLEM LIST:  Principal Problem:    Cerebrovascular accident (CVA) (CMS/[facility])  Active Problems:    Dysphagia as late effect of cerebrovascular accident (CVA)    Type 2 diabetes mellitus (CMS/[facility])    B12 deficiency       SUBJECTIVE:  +dysarthria. NO CP    OBJECTIVE:  Vital signs in last 24 hours:  Temp:  [36.4 °C (97.5 °F)-37 °C (98.6 °F)] 36.7 °C (98.1 °F)  Heart Rate:  [61-89] 68  Resp:  [15-20] 18  BP: (110-160)/(56-87) 137/67    Intake/Output Summary (Last 24 hours) at 7/21/2020 0750  Last data filed at 7/21/2020 0136  Gross per 24 hour   Intake 300 ml   Output 50 ml   Net 250 ml       PHYSICAL EXAMINATION:  General appearance: alert, appears stated age and cooperative  Head: without obvious abnormality  Eyes: PERRLA, extraocular movements intact  Neck: No JVD, carotid bruits, thyromegaly  Lungs: clear to auscultation bilaterally, no crackles or wheezing  Heart: NO M  Abdomen: soft, non-tender; bowel sounds normal; no masses  Extremities: no edema, peripheral pulses present  Skin: Skin color, texture, turgor normal. No rashes or lesions  Neurologic: L facial droop    LABS:  Results from last 7 days   Lab Units 07/21/20  0421 07/19/20  0348 07/17/20  1828   SODIUM mEQ/L 138 138 136   POTASSIUM mEQ/L 3.7 3.8 4.5   CHLORIDE mEQ/L 107 104 101   CO2 mEQ/L 23 25 24   BUN mg/dL 13 20 33*   CREATININE mg/dL 1.1 1.1 1.5*   AST IU/L  --   --  19   ALT IU/L  --   --  20     Results from last 7 days   Lab Units 07/21/20  0421 07/19/20  0348 07/17/20  1828   WBC K/uL 7.02 8.02 8.78   HEMOGLOBIN g/dL 13.1* 14.7 14.5   HEMATOCRIT % 39.4* 43.9 44.3   PLATELETS K/uL 186 213 246     No results  found for: HGBA1C, TSH  Lab Results   Component Value Date    CHOL 174 07/17/2020    HDL 44 (L) 07/17/2020    TRIG 122 07/17/2020     @No results found for: PROBNP    IMAGING:  Echo : no CSE    TELEMETRY:  No AF.    Stephan Bauman MD [facility]  7/21/2020    Primary Care Doctor: Herb Dailey MD

## 2020-07-21 NOTE — CONSULTS
Physical Medicine and Rehabilitation Consult Note    Subjective     Karl Park is a 89 y.o. male who was admitted for Cerebrovascular accident (CVA), unspecified mechanism (CMS/HCC) [I63.9]. Patient was referred by Dr Keller for evaluate rehab needs. Patient is a 89-year-old male with a history of type 2 diabetes who initially presented to his local  with trouble swallowing and a left facial droop.  He was diagnosed with Bell's palsy and pneumonia and was instructed to follow-up with his neurologist as an outpatient.  He underwent a chest x-ray as well as CT scan of the brain which were unremarkable.  Over the next 2 weeks he continued to have difficulty swallowing and then followed up with his PCP.  He underwent a video swallow as well as a chest x-ray and an MRI of the brain.  Chest x-ray did not show any disease.  The MRI demonstrated punctate focus of diffusion restriction in the posterior lateral aspect of the left medulla with enhancement.  This was indicative of a recent/acute lacunar infarct.  He failed a video swallow and was advised to have a PEG tube placed.  From what the patient indicates he was unable to have this performed where he lives and was referred to [facility] to have PEG tube placed.  Apparently his daughter lives locally.  He is now status post PEG tube placement.  There was no significant post procedure complications.  He is started on tube feeds currently at 20 cc/h.  He offers no other complaints at this time and denies any headache or dizziness, chest pain or shortness of breath.  He was seen by neurology who felt that he had an acute to subacute ischemic stroke involving the left medulla oblongata as well as a left-sided Rajeev syndrome.  PT and OT evaluations are pending.  Speech is currently working with the patient.    Pertinent radiology results reviewed. Pertinent lab results reviewed..    Medical History:   Past Medical History:   Diagnosis Date   • CVA (cerebral  vascular accident) (CMS/[facility])    • Type 2 diabetes mellitus (CMS/[facility])        Surgical History:   Past Surgical History:   Procedure Laterality Date   • TESTICLE SURGERY         Social History:   Social History   He lives with his wife in a 1 level home.  Prior to this he was completely independent with ambulation and ADLs.  He was able to drive.  He is still able to ambulate.  Social History Narrative   • Not on file     Lives with:    Prior Function Level: Prior Level of Function  Dominant Hand: right  Ambulation: independent  Transferring: independent  Toileting: independent  Bathing: independent  Dressing: independent  Communication: understands/communicates without difficulty  Swallowing: other (see comments)  Baseline Diet/Method of Nutritional Intake: regular solids, thin liquids  Past History of Dysphagia: difficulty swallowing 2-3 weeks s/p medulla CVA. VFSS from OSH on 7/17 with severe pharyngeal dysphagia and recs for PEG  Prior Level of Function Comment: Pt reports no AD at baseline; however SPC in room from home  Family History: History reviewed. No pertinent family history.  History also provided by:   Allergies: Sulfa (sulfonamide antibiotics) and Sulfamethoxazole-trimethoprim    Current Inpatient Medications   Medication Dose Route Frequency Provider Last Rate Last Dose   • acetaminophen (TYLENOL) tablet 650 mg  650 mg oral q4h PRN Cristy Romero CRNP       • aspirin chewable tablet 81 mg  81 mg peg tube Daily Edel Keller DO   81 mg at 07/21/20 0946   • atorvastatin (LIPITOR) tablet 40 mg  40 mg peg tube Daily (6p) Gerardo Tubbs MD       • cyanocobalamin (VITAMIN B12) tablet 1,000 mcg  1,000 mcg peg tube Daily Edel Keller DO   1,000 mcg at 07/21/20 0946   • D5 % and 0.45 % sodium chloride infusion   intravenous Continuous Cristy Romero CRNP 80 mL/hr at 07/21/20 0652     • glucose chewable tablet 16-32 g of dextrose  16-32 g of dextrose oral PRN  Cristy Romero CRNP        Or   • dextrose 40 % oral gel 15-30 g of dextrose  15-30 g of dextrose oral PRN Cristy Romero CRNP        Or   • glucagon (GLUCAGEN) injection 1 mg  1 mg intramuscular PRN Cristy Romero CRNP        Or   • dextrose in water injection 12.5 g  25 mL intravenous PRN Cristy Romero CRNP       • heparin (porcine) 5,000 unit/mL injection 5,000 Units  5,000 Units subcutaneous q8h [facility] Jazmyne Dominique MD   5,000 Units at 07/21/20 0643   • insulin aspart U-100 (NovoLOG) pen 3-5 Units  3-5 Units subcutaneous q6h [facility] Cristy Romero CRNP       • lansoprazole (PREVACID) 3 mg/mL oral suspension 30 mg  30 mg peg tube Daily before breakfast Edel Keller DO   30 mg at 07/21/20 0644   • magnesium sulfate 1 g in sodium chloride 0.9 % 100 mL IVPB  1 g intravenous PRN Cristy Romero CRNP        Or   • magnesium sulfate IVPB 2g in 50 mL NSS/D5W/SWFI  2 g intravenous PRN Cristy Romero CRNP       • potassium chloride (KLOR-CON) tablet extended release 20 mEq  20 mEq oral PRN Edel Keller DO        Or   • potassium chloride (KLOR-CON) tablet extended release 40 mEq  40 mEq oral PRN Edel Keller, DO        Or   • potassium chloride 20 mEq in 100 mL IVPB  (premix)  20 mEq intravenous PRN Edel Keller, DO        Or   • potassium chloride (KCL) 40 mEq/250 mL IVPB in NSS 40 mEq  40 mEq intravenous PRN Edel Keller, DO        Or   • potassium bicarbonate (EFFER-K) disintegrating tablet 20 mEq  20 mEq oral PRN Edel Keller DO        Or   • potassium bicarbonate (EFFER-K) disintegrating tablet 40 mEq  40 mEq oral PRN Edel Keller DO   40 mEq at 07/21/20 0945   • ramipriL (ALTACE) capsule 2.5 mg  2.5 mg peg tube Daily Stephan Bauman MD   2.5 mg at 07/21/20 0946        Medication List      ASK your doctor about these medications    aspirin 81 mg enteric coated tablet  Take 81 mg by  mouth every morning.  Dose:  81 mg     clopidogreL 75 mg tablet  Commonly known as:  PLAVIX  Take 75 mg by mouth every morning.  Dose:  75 mg     metFORMIN 500 mg tablet  Commonly known as:  GLUCOPHAGE  Take 500 mg by mouth daily with breakfast.  Dose:  500 mg     MULTIVITAMIN 50 PLUS tablet  Take 1 tablet by mouth every morning.  Dose:  1 tablet  Generic drug:  multivitamin-minerals-lutein          Review of Systems  Pertinent items are noted in HPI.    Objective   Labs  I have reviewed the patient's labs.  Current labs are within normal limits.    Imaging  I have reviewed the Imaging from the last 24 hrs.    Telemetry:   I have independently reviewed the patient's telemtry. All telemetry are within normal limits.    Physical Exam  General appearance: well-developed, well-nourished and cooperative  Eyes: negative findings: conjunctivae and sclerae normal and pupils equal, round, reactive to light and accomodation, Slight left eye ptosis  Neck: supple, no carotid bruit and no adenopathy  Lungs: clear to auscultation bilaterally  Heart: regular rate and rhythm, S1, S2 normal, no murmur, click, rub or gallop  Abdomen: soft, non-tender, bowel sounds normal and no masses/no organomegaly  Extremities: extremities normal, warm and well-perfused; no cyanosis, clubbing, or edema  Neurologic: alert, oriented with intact speech and cognition  sensation intact to touch  motor:no focal weakness no ankle clonus present no Babinski reflex elicited.  Sitting balance head and trunk control was good.          Assessment   89 y.o. male being consulted for evaluate rehab needs  Plan of care was discussed with patient and Attending           Plan     Mr. Park is a 89-year-old male with dysphasia secondary to a medullary lacunar infarct.  Fortunately has no other significant neurologic deficits.  He remains ambulatory.  I discussed the plan of care with the patient as well as his attending recommendation will be once medically stable  for discharge that he could discharge home and will be referred to outpatient speech pathology for further swallow therapy, hopefully he will be able to be placed on a modified diet in the near future.  He and his family will also need to be instructed in PEG tube management.   will assist with discharge planning.

## 2020-07-21 NOTE — PLAN OF CARE
Problem: Adult Inpatient Plan of Care  Goal: Plan of Care Review  Outcome: Progressing  Flowsheets (Taken 7/21/2020 2333)  Progress: improving  Plan of Care Reviewed With: patient  Outcome Summary: Pt seen for PT initial eval, requires CS STS transfers, CS-min A for ambulation. Pt trialed ambulation with RW, demonstrates improvements in dynamic balance as opposed to SPC. Recommend continued skilled PT services during acute care stay.

## 2020-07-21 NOTE — PROGRESS NOTES
Patient: Karl Park  Location: [facility] 3A 3025  MRN: [id number]  Today's date: 7/21/2020     RN cleared for OT session.  End of session pt seated in recliner with chair alarm on/incontinence pad and call bell/needs within reach.    Karl is a 89 y.o. male admitted on 7/17/2020 with Cerebrovascular accident (CVA), unspecified mechanism (CMS/HCC). Principal problem is Cerebrovascular accident (CVA) (CMS/HCC).    Past Medical History  Karl has a past medical history of CVA (cerebral vascular accident) (CMS/HCC) and Type 2 diabetes mellitus (CMS/HCC).    History of Present Illness  Pt presents for PEG placement and completion of CVA work-up.  MRI was done 7/16 which showed punctate focus of diffusion restriction in the posterior lateral aspect of the left medulla with associated enhancement indicative of a small, recent/acute lacunar infarction.  Patient failed swallow study and is aspirating at home.      OT Vitals    Date/Time Pulse BP BP Location BP Method Pt Position [facility]   07/21/20 1332 68 131/63 Right upper arm Automatic Sitting KM   07/21/20 1350 67 127/62 Right upper arm Automatic Sitting KM      OT Pain    Date/Time Pain Type Pref Pain Scale Rating: Activity Rating: Rest [facility]   07/21/20 1332 Pain Assessment word (verbal rating pain scale) 0 - no pain 0-->no hurt KM          Prior Living Environment      Most Recent Value   Living Arrangements  house   Living Environment Comment  Lives in a 1 story house with wife 1+1 DAYA through front door or FF from garage to main level with bilateral handrails.            Prior Level of Function      Most Recent Value   Dominant Hand  right   Ambulation  independent   Transferring  independent   Toileting  independent   Bathing  independent   Dressing  independent   Communication  understands/communicates without difficulty   Swallowing  other (see comments)   Baseline Diet/Method of Nutritional Intake  regular solids, thin liquids   Past History of Dysphagia   difficulty swallowing 2-3 weeks s/p medulla CVA. VFSS from OSH on 7/17 with severe pharyngeal dysphagia and recs for PEG   Prior Level of Function Comment  Pt reports no AD at baseline,  however SPC in room from home   Equipment Currently Used at Home  cane, straight          OT Evaluation and Treatment - 07/21/20 1332        Time Calculation    Start Time  1332     Stop Time  1355     Time Calculation (min)  23 min        Session Details    Document Type  re-evaluation     Mode of Treatment  occupational therapy        General Information    Patient Profile Reviewed?  yes     General Observations of Patient  rec'd seated in recliner     Existing Precautions/Restrictions  aspiration;fall;NPO        Occupational Profile    Reason for Services/Referral (Occupational Profile)  Diminished ADL status        Cognition/Psychosocial    Affect/Mental Status (Cognitive)  WFL     Orientation Status (Cognition)  oriented x 3     Follows Commands (Cognition)  follows one step commands     Cognitive Function (Cognitive)  safety deficit     Safety Deficit (Cognitive)  minimal deficit;insight into deficits/self awareness;judgment        Bed Mobility    Comment (Bed Mobility)  rec'd OOB        Transfers    Transfers  toilet transfer        Sit to Stand Transfer    [place], Sit to Stand Transfer  close supervision     Assistive Device  none     Comment  from recliner        Stand to Sit Transfer    [place], Stand to Sit Transfer  close supervision     Assistive Device  none     Comment  to recliner        Toilet Transfer    Transfer Technique  sit-stand;stand-sit;stand pivot     [place], Toilet Transfer  minimum assist (75% or more patient effort);close supervision     Assistive Device  cane, straight;walker, front-wheeled     Comment  initally trialed with SPC requiried min A to toilet and utilized RW from toilet to recliner at a Cl S level        Safety Issues, Functional Mobility    Impairments Affecting Function  (Mobility)  balance;endurance/activity tolerance        Balance    Balance Assessment  standing static balance     Static Standing Balance  mild impairment;unsupported     Comment, Balance  min A to maintain balance during self care tasks        Motor Skills    Motor Skills  functional endurance     Functional Endurance  Fair +        Lower Body Dressing    Self-Performance  dons/doffs right sock;dons/doffs left sock     [place]  supervision     Position  supported sitting     Adaptive Equipment  none        Grooming    Self-Performance  oral care (brushing teeth, cleaning dentures);washes, rinses and dries hands     [place]  close supervision     Position  unsupported standing     Adaptive Equipment  none        Toileting    [place]  adjust/manage clothing;perform bladder hygiene     Position  supported sitting     Adaptive Equipment  none        BADL Safety/Performance    Impairments, BADL Safety/Performance  balance;endurance/activity tolerance        AM-PAC (TM) - ADL (Current Function)    Putting on and taking off regular lower body clothing?  3 - A Little     Bathing?  3 - A Little     Toileting?  3 - A Little     Putting on/taking off regular upper body clothing?  4 - None     How much help for taking care of personal grooming?  4 - None     Eating meals?  4 - None     AM-PAC (TM) ADL Score  21        Therapy Assessment/Plan (OT)    Rehab Potential (OT)  good, to achieve stated therapy goals     Therapy Frequency (OT)  3-5 times/wk        Progress Summary (OT)    Daily Outcome Statement (OT)  OT re-eval completed as pt s/p PEG tube placement.  Goals reviewed and remain appropriate.  Pt presents with dec balance and slight dec endurance and safety awareness limiting return to PLOF.  Pt is Cl S for ADL tasks.  skilled OT services will continue to follow     Symptoms Noted During/After Treatment  none        Therapy Plan Review/Discharge Plan (OT)    OT Recommended Discharge Disposition  home      Anticipated Equipment Needs At Discharge (OT)  none                   Education provided this session. See the Patient Education summary report for full details.         OT Goals      Most Recent Value   Bed Mobility Goal 1   Activity/Assistive Device  bed mobility activities, all at 07/19/2020 1143   [place]  independent at 07/19/2020 1143   Time Frame  by discharge at 07/19/2020 1143   Progress/Outcome  goal ongoing at 07/19/2020 1143   Transfer Goal 1   Activity/Assistive Device  toilet at 07/19/2020 1143   [place]  independent at 07/19/2020 1143   Time Frame  by discharge at 07/19/2020 1143   Progress/Outcome  goal ongoing at 07/19/2020 1143   Dressing Goal 1   Activity/Adaptive Equipment  dressing skills, all at 07/19/2020 1143   [place]  independent at 07/19/2020 1143   Time Frame  by discharge at 07/19/2020 1143   Progress/Outcome  goal ongoing at 07/19/2020 1143   Toileting Goal 1   Activity/Assistive Device  toileting skills, all at 07/19/2020 1143   [place]  independent at 07/19/2020 1143   Time Frame  by discharge at 07/19/2020 1143   Progress/Outcome  goal ongoing at 07/19/2020 1143

## 2020-07-21 NOTE — PROGRESS NOTES
Patient: Karl Vivian  Location: [facility] 3A 3025  MRN: [id number]  Today's date: 7/21/2020    Pt cleared for PT by RN. Pt left in bedside chair, chair alarm on, needs within reach. RN notified post-session. FRANCES Barajas is a 89 y.o. male admitted on 7/17/2020 with Cerebrovascular accident (CVA), unspecified mechanism (CMS/[facility]). Principal problem is Cerebrovascular accident (CVA) (CMS/HCC).    Past Medical History  Karl has a past medical history of CVA (cerebral vascular accident) (CMS/HCC) and Type 2 diabetes mellitus (CMS/[facility]).    History of Present Illness  Pt presents for PEG placement and completion of CVA work-up.  MRI was done 7/16 which showed punctate focus of diffusion restriction in the posterior lateral aspect of the left medulla with associated enhancement indicative of a small, recent/acute lacunar infarction.  Patient failed swallow study and is aspirating at home.      Pt underwent PEG placement 7/20/2020, received new PT orders 7/21/2020    PT Vitals    Date/Time Pulse HR Source SpO2 Pt Activity O2 Therapy BP BP Location BP Method Pt Position Observations [facility]   07/21/20 1332 -- Monitor -- -- -- -- -- -- -- PT/OT [facility]   07/21/20 1332 68 -- -- -- -- 131/63 Right upper arm Automatic Sitting -- KM   07/21/20 1350 -- Monitor 97 % At rest None (Room air) -- -- -- -- PT/OT [facility]   07/21/20 1350 67 -- -- -- -- 127/62 Right upper arm Automatic Sitting -- KM      PT Pain    Date/Time Pain Type Pref Pain Scale Rating: Rest Rating: Activity Rating: Rest [facility]   07/21/20 1332 -- -- 0 - no pain -- -- [facility]   07/21/20 1332 Pain Assessment word (verbal rating pain scale) -- 0 - no pain 0-->no hurt KM          Prior Living Environment      Most Recent Value   Living Arrangements  house   Living Environment Comment  Pt lives with his wife in a 1 story home 1+1 DAYA without rail through the front door, full flight from garage with BHR          Prior Level of Function      Most Recent Value   Dominant Hand  right    Ambulation  independent   Transferring  independent   Toileting  independent   Bathing  independent   Dressing  independent   Communication  understands/communicates without difficulty   Swallowing  other (see comments)   Baseline Diet/Method of Nutritional Intake  regular solids, thin liquids   Past History of Dysphagia  difficulty swallowing 2-3 weeks s/p medulla CVA. VFSS from OSH on 7/17 with severe pharyngeal dysphagia and recs for PEG   Prior Level of Function Comment  Pt reports being independent with mobility PTA. Pt states his daughter bought him a SPC but has not used it. Independent with ADLs, (+) driving   Equipment Currently Used at Home  none [owns SPC, RW at home]          PT Evaluation and Treatment - 07/21/20 1328        Time Calculation    Start Time  1328     Stop Time  1353     Time Calculation (min)  25 min        Session Details    Document Type  initial evaluation     Mode of Treatment  physical therapy        General Information    Patient Profile Reviewed?  yes     Onset of Illness/Injury or Date of Surgery  07/17/20     Referring Physician  Krishna     General Observations of Patient  Pt received in bedside chair, agreeable to therapy, L eye slightly shut     Existing Precautions/Restrictions  aspiration;fall;NPO        Cognition/Psychosocial    Affect/Mental Status (Cognitive)  WFL     Orientation Status (Cognition)  oriented x 3     Follows Commands (Cognition)  follows one step commands;75-90% accuracy     Cognitive Function (Cognitive)  safety deficit     Safety Deficit (Cognitive)  minimal deficit;ability to follow commands;impulsivity;insight into deficits/self awareness;problem solving;safety precautions awareness;safety precautions follow-through/compliance     Comment, Cognition  slight decreased insight into deficits, safety with AD        Sensory    Hearing Status  WFL        Vision Assessment/Intervention    Visual Impairment/Limitations  corrective lenses full time         Sensory Assessment (Somatosensory)    Sensory Assessment (Somatosensory)  bilateral LE;sensation intact     Bilateral LE Sensory Assessment  intact     Sensory Subjective Reports  --    states slight facial numbness on L forehead from prior       Range of Motion (ROM)    Range of Motion  ROM is WFL;bilateral lower extremities        Strength (Manual Muscle Testing)    Strength (Manual Muscle Testing)  strength is WFL;bilateral lower extremities        Bed Mobility    Comment (Bed Mobility)  NT, patient received OOB in chair        Transfers    Transfers  toilet transfer        Sit to Stand Transfer    [place], Sit to Stand Transfer  close supervision     Assistive Device  cane, straight;walker, front-wheeled     Comment  from bedside chair, toilet        Stand to Sit Transfer    [place], Stand to Sit Transfer  close supervision     Assistive Device  cane, straight;walker, front-wheeled     Comment  to bedside chair, toilet        Toilet Transfer    Transfer Technique  sit-stand;stand-sit     [place], Toilet Transfer  minimum assist (75% or more patient effort);close supervision     Assistive Device  cane, straight;walker, front-wheeled     Comment  Min A for stand to sit for safe eccentric controlled decent        Gait Training    [place], Gait  close supervision     Assistive Device  walker, front-wheeled     Distance in Feet  100 feet     Gait Pattern Utilized  step-through     Comment  Pt initially ambulated with SPC for 15 ft, min A for balance. Provided pt with RW for further ambulation trial, progressed to  level. VCs for RW safety throughout session, however noted improvement in balance. VCs for upright posture        Safety Issues, Functional Mobility    Impairments Affecting Function (Mobility)  balance;endurance/activity tolerance        Balance    Balance Assessment  sitting static balance;sitting dynamic balance;sit to stand dynamic balance;standing static balance;standing dynamic  balance     Static Sitting Balance  WFL;supported;sitting in chair     Dynamic Sitting Balance  WFL;supported;sitting in chair     Sit to Stand Dynamic Balance  mild impairment;supported;unsupported;standing     Static Standing Balance  mild impairment;supported;unsupported;standing     Dynamic Standing Balance  mild impairment;supported;unsupported     Comment, Balance  CS for unsupported standing at sink for complete ADLs. Min A with SPC, CS with RW        AM-PAC (TM) - Mobility (Current Function)    Turning from your back to your side while in a flat bed without using bedrails?  4 - None     Moving from lying on your back to sitting on the side of a flat bed without using bedrails?  4 - None     Moving to and from a bed to a chair?  3 - A Little     Standing up from a chair using your arms?  3 - A Little     To walk in a hospital room?  3 - A Little     Climbing 3-5 steps with a railing?  3 - A Little     AM-PAC (TM) Mobility Score  20        Therapy Assessment/Plan (PT)    Rehab Potential (PT)  good, to achieve stated therapy goals     Therapy Frequency (PT)  3-5 times/wk     Problem List  problems related to;balance        Progress Summary (PT)    Daily Outcome Statement (PT)  Pt seen for PT initial eval. Pt requires min A for ambulation with SPC, however progressed to CS with use of RW. Pt required VCs for RW safety, demonstrating appropriate technique with further use. Recommending home with home health, assist from wife, and RW for mobility     Symptoms Noted During/After Treatment  none        Therapy Plan Review/Discharge Plan (PT)    PT Recommended Discharge Disposition  home with assist;home with home health     Anticipated Equipment Needs at Discharge (PT Eval)  --    pt reports owning RW       Plan of Care Review    Plan of Care Reviewed With  patient                       Education provided this session. See the Patient Education summary report for full details.    PT Goals      Most Recent Value    Bed Mobility Goal 1   Activity/Assistive Device  rolling to left, rolling to right, sit to supine/supine to sit at 07/21/2020 1328   [place]  independent at 07/21/2020 1328   Time Frame  by discharge at 07/21/2020 1328   Progress/Outcome  goal ongoing at 07/21/2020 1328   Transfer Goal 1   Activity/Assistive Device  sit-to-stand/stand-to-sit, bed-to-chair/chair-to-bed, walker, front-wheeled at 07/21/2020 1328   [place]  modified independence at 07/21/2020 1328   Time Frame  by discharge at 07/21/2020 1328   Progress/Outcome  goal ongoing at 07/21/2020 1328   Gait Training Goal 1   Activity/Assistive Device  gait (walking locomotion), assistive device use, walker, front-wheeled at 07/21/2020 1328   [place]  modified independence at 07/21/2020 1328   Distance  150 at 07/21/2020 1328   Time Frame  by discharge at 07/21/2020 1328   Progress/Outcome  goal ongoing at 07/21/2020 1328   Stairs Goal 1   Activity/Assistive Device  ascending stairs, descending stairs, using handrail, left, using handrail, right at 07/21/2020 1328   [place]  modified independence at 07/21/2020 1328   Number of Stairs  12 at 07/21/2020 1328   Time Frame  by discharge at 07/21/2020 1328   Progress/Outcome  goal ongoing at 07/21/2020 1328

## 2020-07-21 NOTE — PROGRESS NOTES
Patient seen and examined.  He reports doing better after the PEG tube placement earlier today.  He denies any focal weakness in his extremities, but does complain of residual numbness in the left side of his face.  He reports continued droopiness of the left eye, but denies any difficulty speaking.  He continues to have difficulty swallowing.  He denies blurry vision, headache or dizziness.  He denies any other neurologic complaints today.    I have reviewed his vital signs, labs and medication list in the chart.    Neurologic examination:  The patient is awake, alert and interactive.  He is able to follow commands and answer questions appropriately.  There is no aphasia or dysarthria.  On cranial nerve examination, pupils are 3 mm on the right, but 2 mm on the left, bilateral round and reactive to light.  Extraocular movements are intact, facial sensations are diminished on the left side of the forehead, but are otherwise intact and bilaterally symmetrical. There is no facial asymmetry, hearing is intact to normal conversation volume, tongue protrudes midline and shoulder shrugs are full bilaterally.  Motor strengths are 5/5 in bilateral upper and lower extremities on medical research [place] scale.  There is no drift or involuntary movement noted.  Deep tendon reflexes are 1+ bilateral upper and lower extremities.  Sensation of touch is intact and bilaterally symmetrical.  Coordination is intact by finger-to-nose testing bilaterally.    Neuroimaging:  I have reviewed the images from MRA studies of head and neck performed during this hospitalization.  I have also reviewed the report from an MRI of the brain performed July 16, 2020 at [facility] which was remarkable for a tiny acute stroke in the left medulla.    Impression:  1.  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  3.  Hypertension  4.  Hyperlipidemia  5.  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  Please continue with aspirin 81 mg p.o. daily.  Since the patient has only had a small lacunar infarct, and there is no significant stenosis of intracranial or extracranial arteries, dual antiplatelet therapy is not indicated or recommended.  Therefore discontinue Plavix.  2.  Please continue atorvastatin for hyperlipidemia, but reduce the dose to 40 mg p.o. nightly.  The evidence does not support administration of such high doses of statins to patients older than 75 years of age.  3.  The blood pressure management can continue per the primary team.  The goal blood pressure for this patient would be normotensive.  4.  Please continue with his home medications for management of diabetes mellitus.  5.  Please follow OT, PT, speech-language pathology and rehab recommendations for disposition.  6.  DVT prophylaxis with Lovenox subcutaneous  7.  Rest of the management can continue per the primary team.  8.  He will benefit from outpatient follow-up with neurology (Gerardo Tubbs MD, PhD, [facility], office phone  No. [phone]) 1 month after discharge from the hospital.    Discussed with the patient.    We shall follow the patient as needed.

## 2020-07-21 NOTE — PLAN OF CARE
Problem: Adult Inpatient Plan of Care  Goal: Plan of Care Review  Outcome: Progressing  Flowsheets (Taken 7/21/2020 1400)  Progress: improving  Plan of Care Reviewed With: patient  Outcome Summary: OT re-eval completed.  Pt required Cl S-min A for functional transfers and Cl S for self care tasks.  skilled OT services to follow

## 2020-07-22 LAB
ANION GAP SERPL CALC-SCNC: 8 MEQ/L (ref 3–15)
BUN SERPL-MCNC: 12 MG/DL (ref 8–20)
CALCIUM SERPL-MCNC: 8.7 MG/DL (ref 8.9–10.3)
CHLORIDE SERPL-SCNC: 106 MEQ/L (ref 98–109)
CO2 SERPL-SCNC: 24 MEQ/L (ref 22–32)
CREAT SERPL-MCNC: 1.1 MG/DL (ref 0.8–1.3)
ERYTHROCYTE [DISTWIDTH] IN BLOOD BY AUTOMATED COUNT: 12.8 % (ref 11.6–14.4)
GFR SERPL CREATININE-BSD FRML MDRD: >60 ML/MIN/1.73M*2
GLUCOSE BLD-MCNC: 116 MG/DL (ref 70–99)
GLUCOSE BLD-MCNC: 123 MG/DL (ref 70–99)
GLUCOSE BLD-MCNC: 124 MG/DL (ref 70–99)
GLUCOSE BLD-MCNC: 124 MG/DL (ref 70–99)
GLUCOSE BLD-MCNC: 158 MG/DL (ref 70–99)
GLUCOSE SERPL-MCNC: 128 MG/DL (ref 70–99)
HCT VFR BLDCO AUTO: 40.4 % (ref 40.1–51)
HGB BLD-MCNC: 13.3 G/DL (ref 13.7–17.5)
MAGNESIUM SERPL-MCNC: 1.9 MG/DL (ref 1.8–2.5)
MCH RBC QN AUTO: 33.4 PG (ref 28–33.2)
MCHC RBC AUTO-ENTMCNC: 32.9 G/DL (ref 32.2–36.5)
MCV RBC AUTO: 101.5 FL (ref 83–98)
PDW BLD AUTO: 11 FL (ref 9.4–12.4)
PHOSPHATE SERPL-MCNC: 2.2 MG/DL (ref 2.4–4.7)
PLATELET # BLD AUTO: 189 K/UL (ref 150–350)
POCT TEST: ABNORMAL
POTASSIUM SERPL-SCNC: 4 MEQ/L (ref 3.6–5.1)
RBC # BLD AUTO: 3.98 M/UL (ref 4.5–5.8)
SODIUM SERPL-SCNC: 138 MEQ/L (ref 136–144)
WBC # BLD AUTO: 6.96 K/UL (ref 3.8–10.5)

## 2020-07-22 PROCEDURE — 63700000 HC SELF-ADMINISTRABLE DRUG: Performed by: PSYCHIATRY & NEUROLOGY

## 2020-07-22 PROCEDURE — 80048 BASIC METABOLIC PNL TOTAL CA: CPT | Performed by: INTERNAL MEDICINE

## 2020-07-22 PROCEDURE — 25800000 HC PHARMACY IV SOLUTIONS: Performed by: INTERNAL MEDICINE

## 2020-07-22 PROCEDURE — 84100 ASSAY OF PHOSPHORUS: CPT | Performed by: INTERNAL MEDICINE

## 2020-07-22 PROCEDURE — 25800000 HC PHARMACY IV SOLUTIONS: Performed by: NURSE PRACTITIONER

## 2020-07-22 PROCEDURE — 36415 COLL VENOUS BLD VENIPUNCTURE: CPT | Performed by: INTERNAL MEDICINE

## 2020-07-22 PROCEDURE — 63700000 HC SELF-ADMINISTRABLE DRUG: Performed by: INTERNAL MEDICINE

## 2020-07-22 PROCEDURE — 20600000 HC ROOM AND CARE INTERMEDIATE/TELEMETRY

## 2020-07-22 PROCEDURE — 97116 GAIT TRAINING THERAPY: CPT | Mod: GP

## 2020-07-22 PROCEDURE — 92526 ORAL FUNCTION THERAPY: CPT | Mod: GN

## 2020-07-22 PROCEDURE — 63600000 HC DRUGS/DETAIL CODE: Performed by: INTERNAL MEDICINE

## 2020-07-22 PROCEDURE — 99232 SBSQ HOSP IP/OBS MODERATE 35: CPT | Performed by: INTERNAL MEDICINE

## 2020-07-22 PROCEDURE — 83735 ASSAY OF MAGNESIUM: CPT | Performed by: INTERNAL MEDICINE

## 2020-07-22 PROCEDURE — 63600000 HC DRUGS/DETAIL CODE: Performed by: NURSE PRACTITIONER

## 2020-07-22 PROCEDURE — 25000000 HC PHARMACY GENERAL: Performed by: INTERNAL MEDICINE

## 2020-07-22 PROCEDURE — 85027 COMPLETE CBC AUTOMATED: CPT | Performed by: INTERNAL MEDICINE

## 2020-07-22 RX ADMIN — CYANOCOBALAMIN TAB 1000 MCG 1000 MCG: 1000 TAB at 09:27

## 2020-07-22 RX ADMIN — HEPARIN SODIUM 5000 UNITS: 5000 INJECTION, SOLUTION INTRAVENOUS; SUBCUTANEOUS at 14:26

## 2020-07-22 RX ADMIN — HEPARIN SODIUM 5000 UNITS: 5000 INJECTION, SOLUTION INTRAVENOUS; SUBCUTANEOUS at 06:37

## 2020-07-22 RX ADMIN — ATORVASTATIN CALCIUM 40 MG: 40 TABLET, FILM COATED ORAL at 17:11

## 2020-07-22 RX ADMIN — POTASSIUM PHOSPHATE, MONOBASIC AND POTASSIUM PHOSPHATE, DIBASIC 15 MMOL: 224; 236 INJECTION, SOLUTION INTRAVENOUS at 12:25

## 2020-07-22 RX ADMIN — HEPARIN SODIUM 5000 UNITS: 5000 INJECTION, SOLUTION INTRAVENOUS; SUBCUTANEOUS at 22:53

## 2020-07-22 RX ADMIN — DEXTROSE AND SODIUM CHLORIDE: 5; 450 INJECTION, SOLUTION INTRAVENOUS at 16:13

## 2020-07-22 RX ADMIN — MAGNESIUM SULFATE HEPTAHYDRATE 1 G: 500 INJECTION, SOLUTION INTRAMUSCULAR; INTRAVENOUS at 12:21

## 2020-07-22 RX ADMIN — LANSOPRAZOLE 30 MG: KIT at 09:27

## 2020-07-22 RX ADMIN — RAMIPRIL 2.5 MG: 2.5 CAPSULE ORAL at 09:27

## 2020-07-22 RX ADMIN — ASPIRIN 81 MG 81 MG: 81 TABLET ORAL at 09:27

## 2020-07-22 ASSESSMENT — COGNITIVE AND FUNCTIONAL STATUS - GENERAL
REMEMBERING TO TAKE MEDICATION: 4 - NONE
REMEMBERING 5 ERRANDS WITH NO LIST: 4 - NONE
FOLLOWS FAMILIAR CONVERSATION: 4 - NONE
UNDERSTANDING 10 TO 15 MIN SPEECH: 4 - NONE
REMEMBERING WHERE THINGS ARE: 4 - NONE
TAKING CARE OF COMPLICATED TASKS: 4 - NONE

## 2020-07-22 NOTE — PROGRESS NOTES
[facility] attempted to reach pt's daughter to discuss d/c plan and confirm where patient will be staying at d/c.  [facility] will update [facility] care and home health.  D/c anticipated tomorrow.

## 2020-07-22 NOTE — PLAN OF CARE
Problem: Adult Inpatient Plan of Care  Goal: Plan of Care Review  Outcome: Progressing  Flowsheets (Taken 7/22/2020 0651)  Plan of Care Reviewed With: patient  Outcome Summary: cont w/ npo w/ peg and oral care t/o the day; encourage use of indepedent swallow exercises     Problem: Swallowing Impairment  Goal: Improved Swallowing Without Aspiration  Intervention: Optimize Eating and Swallowing  Flowsheets (Taken 7/22/2020 5733)  Aspiration Precautions: NPO pending swallow screening/evaluation

## 2020-07-22 NOTE — PLAN OF CARE
Problem: Adult Inpatient Plan of Care  Goal: Plan of Care Review  Flowsheets (Taken 7/22/2020 0576)  Progress: improving  Plan of Care Reviewed With: daughter; spouse  Outcome Summary: Discharge anticipated tomorrow.  Pt's daughter and wife considered pt discharging home vs staying with daughter and son-in-law locally for additional support.  Daughter wishes to explore having patient stay with her at [address] [place] [address] in [facility].  She would like her father to benefit from outpatient services at [facility].  Reviewed options and she would like to explore having [facility] at d/c with plan to transition to outpatient rehab at [facility].  [facility] [place] and notified [facility] liaison of referral.  [facility] notified providers of anticipated d/c tomorrow, daughter is also aware.  Patient will need a walker per pt's daughter, she states patient does not have one presently. Support offered.

## 2020-07-22 NOTE — NURSING NOTE
Ambulated around nurses station with walker and myself. Several stops but gait steady. Tolerating  Increase of tube feed to 60cc/hr w/o nausea, vomiting or pain. Only 5cc residual. Instructed on s/s of infection at peg tube insertiion site, no use of ointments or lotions. In addition, taught via return demonstration how to clamp peg tube and insertion sterile water using 60cc syringe. Tolerated well and demonstrated back correctly.

## 2020-07-22 NOTE — NURSING NOTE
Patient pleasant and cooperative. 2cc residual obtained via peg tube. Denied any nausea, vomiting or abdominal pain. Tube feed increased to 40cc/hr from 20cc. Remains NPO. Continue to monitor and observe.

## 2020-07-22 NOTE — PLAN OF CARE
Problem: Adult Inpatient Plan of Care  Goal: Plan of Care Review  Outcome: Progressing  Flowsheets (Taken 7/22/2020 2674)  Progress: improving  Plan of Care Reviewed With: patient  Outcome Summary: Pt AAOx3, co complaints of pain, Pt with 2 BMs overnight, PEG tube intact, tolerating continuous feed

## 2020-07-22 NOTE — PATIENT CARE CONFERENCE
Care Progression Rounds Note  Date: 7/22/2020  Time: 10:34 AM     Patient Name: Karl Park     Medical Record Number: [id number]   YOB: 1928  Sex: Male      Room/Bed: 3025    Admitting Diagnosis: Cerebrovascular accident (CVA), unspecified mechanism (CMS/HCC) [I63.9]   Admit Date/Time: 7/17/2020  5:32 PM    Primary Diagnosis: Cerebrovascular accident (CVA) (CMS/HCC)  Principal Problem: Cerebrovascular accident (CVA) (CMS/HCC)    GMLOS: 3  Anticipated Discharge Date: 7/22/2020    AM-PAC  Mobility Score: 20    Discharge Planning:  Living Arrangements: house    Barriers to Discharge:  Barriers to Discharge: Medical issues not resolved    Participants:  social work/services, , nursing, physical therapy

## 2020-07-22 NOTE — PROGRESS NOTES
Hospital Medicine Service -  Daily Progress Note       SUBJECTIVE   Interval History: No acute events overnight. Patient seen and examined. No new concerns or complaints. Left facial weakness, dysphagia remains unchanged     OBJECTIVE      Vital signs in last 24 hours:  Temp:  [36.7 °C (98 °F)-36.8 °C (98.2 °F)] 36.7 °C (98 °F)  Heart Rate:  [62-77] 70  Resp:  [18] 18  BP: (115-146)/(56-67) 138/65    Intake/Output Summary (Last 24 hours) at 7/21/2020 9789  Last data filed at 7/21/2020 1330  Gross per 24 hour   Intake 100 ml   Output 50 ml   Net 50 ml       PHYSICAL EXAMINATION      GEN: well-developed and well-nourished; not in acute distress  HEENT: normocephalic; atraumatic  NECK: no JVD; no bruits  CARDIO: regular rate and rhythm; no murmurs, rubs or gallops  RESP: clear to auscultation bilaterally; no rales, rhonchi, or wheezes  ABD: soft, non-distended, non-tender, dec bowel sounds, PEG tube in place, c.d.i  EXT: no cyanosis, clubbing, or edema  SKIN: clean, dry, warm, and intact  MUSCULOSKELETAL: no injury or deformity  NEURO: alert and oriented x 3; left facial droop noted otherwise no major focal deficits noted  BEHAVIOR/EMOTIONAL: appropriate; cooperative     LABS / IMAGING / TELE      Labs  Lab Results   Component Value Date    GLUCOSE 139 (H) 07/21/2020    CALCIUM 8.3 (L) 07/21/2020     07/21/2020    K 3.7 07/21/2020    CO2 23 07/21/2020     07/21/2020    BUN 13 07/21/2020    CREATININE 1.1 07/21/2020     Lab Results   Component Value Date    WBC 7.02 07/21/2020    HGB 13.1 (L) 07/21/2020    HCT 39.4 (L) 07/21/2020    .8 (H) 07/21/2020     07/21/2020     Lab Results   Component Value Date    ALBUMIN 4.2 07/17/2020    BILITOT 1.1 07/17/2020    ALKPHOS 96 07/17/2020    AST 19 07/17/2020    ALT 20 07/17/2020    PROTEIN 7.7 07/17/2020       Imaging  Mri Angiogram Head Without Contrast    Result Date: 7/18/2020  IMPRESSION: 1.  Mild focal stenoses of the proximal internal carotid  arteries bilaterally, left slightly greater than right. 2.  Intermediate length segment mild stenosis of the anterior cavernous through ophthalmic segment of the right internal carotid artery. 3.  No evidence of a hemodynamically significant intracranial stenosis or major vessel occlusion.  No evidence of aneurysm.     Mri Angiogram Neck Without Contrast    Result Date: 7/18/2020  IMPRESSION: 1.  Mild focal stenoses of the proximal internal carotid arteries bilaterally, left slightly greater than right. 2.  Intermediate length segment mild stenosis of the anterior cavernous through ophthalmic segment of the right internal carotid artery. 3.  No evidence of a hemodynamically significant intracranial stenosis or major vessel occlusion.  No evidence of aneurysm.     X-ray Chest 1 View    Result Date: 7/17/2020  IMPRESSION: No acute cardiopulmonary disease COMMENT:  AP portable erect view chest was performed.  The lungs are clear. There is no evidence of consolidation or pleural effusion. The heart and mediastinal structures are normal in size and contour.         ASSESSMENT AND PLAN      * Cerebrovascular accident (CVA) (CMS/[facility])  Assessment & Plan  -S/p recent CVA with residual L facial droop and dysphagia  -MRI 7/16 in Care Everywhere: Punctate focus of diffusion restriction post/lateral L medulla w/ enhancement, c/w small, recent/acute lacunar infarction, likely a few days. Mild-mod chronic microangiopathy elsewhere.  -Head/neck MRA w/Mild focal stenoses of the proximal internal carotid arteries bilaterally, left slightly greater than right.  Intermediate length segment mild stenosis of the anterior cavernous through ophthalmic segment of the right internal carotid artery. No evidence of a hemodynamically significant intracranial stenosis or major vessel occlusion.  No evidence of aneurysm.  - TTE w/No obvious cardiac source of embolism identified    Plan:  Cont to monitor on telemetry  Will transition to ASA  through PEG tube. Per Neurology plavix not required  -per neurology cont w/lipitor 40mg qhs  -Cardiology consulted - will f/u recs re: need for CHRISTIE  -Neuro following - will f/u recs  -Speech, PT, OT  Aspiration precautions  -PT/OT. Family requesting PMR eval - will consult per request    B12 deficiency  Assessment & Plan  B12 low end of normal at 210    Plan:  Will initiate vitamin b12, 1000mcg daily    Type 2 diabetes mellitus (CMS/HCC)  Assessment & Plan  BGs remain stable while NPO    Plan:  -Hold Metformin, cont w/SSI  - NPO for now, can hopefully begin tube feeds in AM  - monitor w/routine accuchecks    Dysphagia as late effect of cerebrovascular accident (CVA)  Assessment & Plan  -Dysphagia x 2 weeks s/p CVA.  Lost 14 lbs due to inability to eat or drink  -Barium swallow/Video swallow in Care Everywhere: Severe pharyngeal dysphagia, PEG recommended  -s/p PEG tube placement on 7/20, Antral erythema and duodenal erosions. H.Pylori biopsies obtained.    Plan:  Per GI  - will initiate TEN today  Cont w/PPI  Nutrition consulted for TEN recs  F/u biopsy results  Aspiration precautions, NPO  Speech consulted - cont w/therapy             VTE Assessment: Padua VTE Score: 2    Estimated discharge date: 7/22/2020  Code Status: Full Code     Edel Keller,   7/21/2020

## 2020-07-22 NOTE — PLAN OF CARE
Problem: Adult Inpatient Plan of Care  Goal: Plan of Care Review  Outcome: Progressing  Flowsheets (Taken 7/22/2020 7970)  Progress: improving  Plan of Care Reviewed With: patient  Outcome Summary: Tolerating advancement of tubefeeds via peg tube. Remains NPO

## 2020-07-22 NOTE — PROGRESS NOTES
[facility]….Met w/ pt who is agreeable to home RN, PT , Shana.      Explained that a Homecare RN will be out to visit on same day as d/c and will teach his family the [employer] TF.    Called John from [facility] to notify that [facility] MD reports no D/C planned until Friday if pt is stable then and can tolerate the TF amts.    [facility] will deliver the TF supplies either tomorrow to home or Friday am prior to pt's arrival home.    HC referral to be completed upon d/c.

## 2020-07-22 NOTE — NURSING NOTE
Attempted to call Corrina, Patients daughter .  Message and my contact information left as a message for daughter to contact me re: Tube feeding information.  Corrina cell [phone]

## 2020-07-22 NOTE — NURSING NOTE
"Callback received from pts daughter Corrina.  Reviewed tube feeding instructions and emailed education to daughter.  Corrina stated they \"watched you tube videos and feel like they are going to be fine doing the feedings\".  We agreed to talk tomorrow and if pt is d/c home with her avs will be reviewed with her prior to d/c.    Vikki Rosales aware of above.  Assisted to BR with one, gait steady with walker pt reported a \"Liquid BM\".      "

## 2020-07-22 NOTE — PROGRESS NOTES
Hospital Medicine Service -  Daily Progress Note       SUBJECTIVE   Interval History: No acute events overnight. Patient seen and examined. No new concerns or complaints. Denies any cp, sob, abd pain, n/v. Have loose stools since initiation of tube feeds. Left facial weakness, dysphagia remains unchanged.      OBJECTIVE      Vital signs in last 24 hours:  Temp:  [36.4 °C (97.5 °F)-37.1 °C (98.7 °F)] 36.4 °C (97.6 °F)  Heart Rate:  [65-77] 70  Resp:  [15-18] 16  BP: (112-149)/(54-72) 119/54    Intake/Output Summary (Last 24 hours) at 7/22/2020 1855  Last data filed at 7/22/2020 1613  Gross per 24 hour   Intake 105 ml   Output 1 ml   Net 104 ml       PHYSICAL EXAMINATION      GEN: well-developed and well-nourished; not in acute distress  HEENT: normocephalic; atraumatic  NECK: no JVD; no bruits  CARDIO: regular rate and rhythm; no murmurs, rubs or gallops  RESP: clear to auscultation bilaterally; no rales, rhonchi, or wheezes  ABD: soft, non-distended, non-tender, dec bowel sounds, PEG tube in place, c.d.i  EXT: no cyanosis, clubbing, or edema  SKIN: clean, dry, warm, and intact  MUSCULOSKELETAL: no injury or deformity  NEURO: alert and oriented x 3; left facial droop noted otherwise no major focal deficits noted  BEHAVIOR/EMOTIONAL: appropriate; cooperative     LABS / IMAGING / TELE      Labs  Lab Results   Component Value Date    GLUCOSE 128 (H) 07/22/2020    CALCIUM 8.7 (L) 07/22/2020     07/22/2020    K 4.0 07/22/2020    CO2 24 07/22/2020     07/22/2020    BUN 12 07/22/2020    CREATININE 1.1 07/22/2020     Lab Results   Component Value Date    WBC 6.96 07/22/2020    HGB 13.3 (L) 07/22/2020    HCT 40.4 07/22/2020    .5 (H) 07/22/2020     07/22/2020     Lab Results   Component Value Date    ALBUMIN 4.2 07/17/2020    BILITOT 1.1 07/17/2020    ALKPHOS 96 07/17/2020    AST 19 07/17/2020    ALT 20 07/17/2020    PROTEIN 7.7 07/17/2020       Imaging  Mri Angiogram Head Without  Contrast    Result Date: 7/18/2020  IMPRESSION: 1.  Mild focal stenoses of the proximal internal carotid arteries bilaterally, left slightly greater than right. 2.  Intermediate length segment mild stenosis of the anterior cavernous through ophthalmic segment of the right internal carotid artery. 3.  No evidence of a hemodynamically significant intracranial stenosis or major vessel occlusion.  No evidence of aneurysm.     Mri Angiogram Neck Without Contrast    Result Date: 7/18/2020  IMPRESSION: 1.  Mild focal stenoses of the proximal internal carotid arteries bilaterally, left slightly greater than right. 2.  Intermediate length segment mild stenosis of the anterior cavernous through ophthalmic segment of the right internal carotid artery. 3.  No evidence of a hemodynamically significant intracranial stenosis or major vessel occlusion.  No evidence of aneurysm.     X-ray Chest 1 View    Result Date: 7/17/2020  IMPRESSION: No acute cardiopulmonary disease COMMENT:  AP portable erect view chest was performed.  The lungs are clear. There is no evidence of consolidation or pleural effusion. The heart and mediastinal structures are normal in size and contour.       ECG/Telemetry  I have independently reviewed the telemetry. No events for the last 24 hours.    ASSESSMENT AND PLAN      * Cerebrovascular accident (CVA) (CMS/[facility])  Assessment & Plan  -S/p recent CVA with residual L facial droop and dysphagia  -MRI 7/16 in Care Everywhere: Punctate focus of diffusion restriction post/lateral L medulla w/ enhancement, c/w small, recent/acute lacunar infarction, likely a few days. Mild-mod chronic microangiopathy elsewhere.  -Head/neck MRA w/Mild focal stenoses of the proximal internal carotid arteries bilaterally, left slightly greater than right.  Intermediate length segment mild stenosis of the anterior cavernous through ophthalmic segment of the right internal carotid artery. No evidence of a hemodynamically significant  intracranial stenosis or major vessel occlusion.  No evidence of aneurysm.  - TTE w/No obvious cardiac source of embolism identified    Plan:  Cont to monitor on telemetry  Will transition to ASA through PEG tube. Per Neurology plavix not required  -per neurology cont w/lipitor 40mg qhs  - per neurology evaluation - stroke likely lacunar infarct. Low suspicion for embolic infarct noted  -Cardiology consulted - given low suspicion for embolic etiology, no further evaluation felt to be required at this time  -Neuro following - will f/u recs  -Speech, PT, OT  Aspiration precautions. Speech to repeat video swallow tomorrow  -PT/OT. PMR consulted - recommend outpt speech therapy at [facility] for continued management.    B12 deficiency  Assessment & Plan  B12 low end of normal at 210    Plan:  Cont w/vitamin b12, 1000mcg daily    Type 2 diabetes mellitus (CMS/HCC)  Assessment & Plan  BGs remain stable while on TEN    Plan:  -Hold Metformin, cont w/SSI  - NPO for now, with TEN  - monitor w/routine accuchecks    Dysphagia as late effect of cerebrovascular accident (CVA)  Assessment & Plan  -Dysphagia x 2 weeks s/p CVA.  Lost 14 lbs due to inability to eat or drink  -Barium swallow/Video swallow in Care Everywhere: Severe pharyngeal dysphagia, PEG recommended  -s/p PEG tube placement on 7/20, Antral erythema and duodenal erosions. H.Pylori biopsies obtained.    Plan:  Cont w/gradual advance of TEN today - pending stability t/c switching to bolus regimen tomorrow  Cont w/PPI  Nutrition consulted - will f/u recs  Monitor electrolytes to include K, Mg, Phos and replete as required   F/u biopsy results  Aspiration precautions, NPO for now  Speech consulted - cont w/therapy, plan for video swallow in am  GI signed off.             VTE Assessment: Padua VTE Score: 2  Estimated discharge date: 7/22/2020  Code Status: Full Code     Edel Keller,   7/22/2020

## 2020-07-22 NOTE — PROGRESS NOTES
CARDIOLOGY PROGRESS NOTE          ASSESSMENT AND PLAN:    He is a very pleasant 89 y m c mild HL, DM II reported, HTN p/w cerebellar infarct.     1) CVA: Lacunar in nature. Appreciate Neuro input.               ASA, statin. BP persistently high.               On ramipril.     No AF noted. No new CV recs. Will follow     MDS          PROBLEM LIST:  Principal Problem:    Cerebrovascular accident (CVA) (CMS/HCC)  Active Problems:    Dysphagia as late effect of cerebrovascular accident (CVA)    Type 2 diabetes mellitus (CMS/HCC)    B12 deficiency       SUBJECTIVE: No CP    OBJECTIVE:  Vital signs in last 24 hours:  Temp:  [36.6 °C (97.9 °F)-37.1 °C (98.7 °F)] 36.8 °C (98.2 °F)  Heart Rate:  [62-77] 68  Resp:  [15-18] 16  BP: (115-149)/(56-72) 134/60    Intake/Output Summary (Last 24 hours) at 7/22/2020 0742  Last data filed at 7/21/2020 1330  Gross per 24 hour   Intake 100 ml   Output --   Net 100 ml       PHYSICAL EXAMINATION:  General appearance: alert, appears stated age and cooperative  Head: without obvious abnormality  Eyes: PERRLA, extraocular movements intact  Neck: No JVD, carotid bruits, thyromegaly  Lungs: dec bs  Heart: no m r g  Abdomen: soft, non-tender; bowel sounds normal; no masses  Extremities: no edema, peripheral pulses present  Skin: Skin color, texture, turgor normal. No rashes or lesions  Neurologic: mild dysarthria.  LABS:  Results from last 7 days   Lab Units 07/22/20  0530 07/21/20  0421 07/19/20  0348 07/17/20  1828   SODIUM mEQ/L  --  138 138 136   POTASSIUM mEQ/L  --  3.7 3.8 4.5   MAGNESIUM mg/dL 1.9  --   --   --    CHLORIDE mEQ/L  --  107 104 101   CO2 mEQ/L  --  23 25 24   BUN mg/dL  --  13 20 33*   CREATININE mg/dL  --  1.1 1.1 1.5*   AST IU/L  --   --   --  19   ALT IU/L  --   --   --  20     Results from last 7 days   Lab Units 07/22/20  0631 07/21/20  0421 07/19/20  0348   WBC K/uL 6.96 7.02 8.02   HEMOGLOBIN g/dL 13.3* 13.1* 14.7   HEMATOCRIT % 40.4 39.4* 43.9   PLATELETS K/uL 189  186 213     No results found for: HGBA1C, TSH  Lab Results   Component Value Date    CHOL 174 07/17/2020    HDL 44 (L) 07/17/2020    TRIG 122 07/17/2020     @No results found for: PROBNP    IMAGING:      TELEMETRY:  ST only.    Stephan Bauman MD FAC  7/22/2020    Primary Care Doctor: Herb Dailey MD

## 2020-07-23 LAB
ANION GAP SERPL CALC-SCNC: 8 MEQ/L (ref 3–15)
BUN SERPL-MCNC: 21 MG/DL (ref 8–20)
CALCIUM SERPL-MCNC: 8.2 MG/DL (ref 8.9–10.3)
CHLORIDE SERPL-SCNC: 107 MEQ/L (ref 98–109)
CO2 SERPL-SCNC: 23 MEQ/L (ref 22–32)
CREAT SERPL-MCNC: 1.1 MG/DL (ref 0.8–1.3)
ERYTHROCYTE [DISTWIDTH] IN BLOOD BY AUTOMATED COUNT: 13 % (ref 11.6–14.4)
GFR SERPL CREATININE-BSD FRML MDRD: >60 ML/MIN/1.73M*2
GLUCOSE BLD-MCNC: 137 MG/DL (ref 70–99)
GLUCOSE BLD-MCNC: 145 MG/DL (ref 70–99)
GLUCOSE BLD-MCNC: 159 MG/DL (ref 70–99)
GLUCOSE BLD-MCNC: 164 MG/DL (ref 70–99)
GLUCOSE SERPL-MCNC: 165 MG/DL (ref 70–99)
HCT VFR BLDCO AUTO: 38.8 % (ref 40.1–51)
HGB BLD-MCNC: 12.9 G/DL (ref 13.7–17.5)
MAGNESIUM SERPL-MCNC: 2.1 MG/DL (ref 1.8–2.5)
MCH RBC QN AUTO: 33.6 PG (ref 28–33.2)
MCHC RBC AUTO-ENTMCNC: 33.2 G/DL (ref 32.2–36.5)
MCV RBC AUTO: 101 FL (ref 83–98)
PDW BLD AUTO: 10.9 FL (ref 9.4–12.4)
PHOSPHATE SERPL-MCNC: 2.3 MG/DL (ref 2.4–4.7)
PLATELET # BLD AUTO: 178 K/UL (ref 150–350)
POCT TEST: ABNORMAL
POTASSIUM SERPL-SCNC: 3.5 MEQ/L (ref 3.6–5.1)
RBC # BLD AUTO: 3.84 M/UL (ref 4.5–5.8)
SODIUM SERPL-SCNC: 138 MEQ/L (ref 136–144)
WBC # BLD AUTO: 5.27 K/UL (ref 3.8–10.5)

## 2020-07-23 PROCEDURE — 20600000 HC ROOM AND CARE INTERMEDIATE/TELEMETRY

## 2020-07-23 PROCEDURE — 97110 THERAPEUTIC EXERCISES: CPT | Mod: GO

## 2020-07-23 PROCEDURE — 85027 COMPLETE CBC AUTOMATED: CPT | Performed by: INTERNAL MEDICINE

## 2020-07-23 PROCEDURE — 25800000 HC PHARMACY IV SOLUTIONS: Performed by: NURSE PRACTITIONER

## 2020-07-23 PROCEDURE — 84100 ASSAY OF PHOSPHORUS: CPT | Performed by: INTERNAL MEDICINE

## 2020-07-23 PROCEDURE — 92526 ORAL FUNCTION THERAPY: CPT | Mod: GN

## 2020-07-23 PROCEDURE — 97530 THERAPEUTIC ACTIVITIES: CPT | Mod: GP

## 2020-07-23 PROCEDURE — 80048 BASIC METABOLIC PNL TOTAL CA: CPT | Performed by: INTERNAL MEDICINE

## 2020-07-23 PROCEDURE — 63600000 HC DRUGS/DETAIL CODE: Performed by: INTERNAL MEDICINE

## 2020-07-23 PROCEDURE — 83735 ASSAY OF MAGNESIUM: CPT | Performed by: INTERNAL MEDICINE

## 2020-07-23 PROCEDURE — 63700000 HC SELF-ADMINISTRABLE DRUG: Performed by: PSYCHIATRY & NEUROLOGY

## 2020-07-23 PROCEDURE — 97535 SELF CARE MNGMENT TRAINING: CPT | Mod: GO

## 2020-07-23 PROCEDURE — 99232 SBSQ HOSP IP/OBS MODERATE 35: CPT | Performed by: INTERNAL MEDICINE

## 2020-07-23 PROCEDURE — 63700000 HC SELF-ADMINISTRABLE DRUG: Performed by: INTERNAL MEDICINE

## 2020-07-23 RX ADMIN — LANSOPRAZOLE 30 MG: KIT at 06:30

## 2020-07-23 RX ADMIN — RAMIPRIL 2.5 MG: 2.5 CAPSULE ORAL at 08:31

## 2020-07-23 RX ADMIN — HEPARIN SODIUM 5000 UNITS: 5000 INJECTION, SOLUTION INTRAVENOUS; SUBCUTANEOUS at 06:20

## 2020-07-23 RX ADMIN — ASPIRIN 81 MG 81 MG: 81 TABLET ORAL at 08:31

## 2020-07-23 RX ADMIN — ATORVASTATIN CALCIUM 40 MG: 40 TABLET, FILM COATED ORAL at 17:40

## 2020-07-23 RX ADMIN — DEXTROSE AND SODIUM CHLORIDE: 5; 450 INJECTION, SOLUTION INTRAVENOUS at 07:48

## 2020-07-23 RX ADMIN — CYANOCOBALAMIN TAB 1000 MCG 1000 MCG: 1000 TAB at 08:31

## 2020-07-23 RX ADMIN — POTASSIUM BICARBONATE 20 MEQ: 782 TABLET, EFFERVESCENT ORAL at 06:20

## 2020-07-23 RX ADMIN — HEPARIN SODIUM 5000 UNITS: 5000 INJECTION, SOLUTION INTRAVENOUS; SUBCUTANEOUS at 13:16

## 2020-07-23 RX ADMIN — HEPARIN SODIUM 5000 UNITS: 5000 INJECTION, SOLUTION INTRAVENOUS; SUBCUTANEOUS at 21:04

## 2020-07-23 ASSESSMENT — COGNITIVE AND FUNCTIONAL STATUS - GENERAL
TOILETING: 4 - NONE
DRESSING REGULAR UPPER BODY CLOTHING: 4 - NONE
REMEMBERING 5 ERRANDS WITH NO LIST: 4 - NONE
AFFECT: WFL
REMEMBERING TO TAKE MEDICATION: 4 - NONE
DRESSING REGULAR LOWER BODY CLOTHING: 3 - A LITTLE
FOLLOWS FAMILIAR CONVERSATION: 4 - NONE
UNDERSTANDING 10 TO 15 MIN SPEECH: 4 - NONE
WALKING IN HOSPITAL ROOM: 3 - A LITTLE
REMEMBERING WHERE THINGS ARE: 4 - NONE
MOVING TO AND FROM BED TO CHAIR: 3 - A LITTLE
HELP NEEDED FOR PERSONAL GROOMING: 4 - NONE
EATING MEALS: 4 - NONE
STANDING UP FROM CHAIR USING ARMS: 3 - A LITTLE
TAKING CARE OF COMPLICATED TASKS: 3 - A LITTLE
HELP NEEDED FOR BATHING: 3 - A LITTLE
AFFECT: WFL
CLIMB 3 TO 5 STEPS WITH RAILING: 3 - A LITTLE

## 2020-07-23 NOTE — PLAN OF CARE
Problem: Adult Inpatient Plan of Care  Goal: Plan of Care Review  Outcome: Progressing  Flowsheets (Taken 7/23/2020 1710)  Progress: improving  Plan of Care Reviewed With: patient  Outcome Summary: cont w/ SLP tx     Problem: Swallowing Impairment  Goal: Improved Swallowing Without Aspiration  Intervention: Optimize Eating and Swallowing  Flowsheets (Taken 7/23/2020 0800 by Nicki Hemphill RN)  Aspiration Precautions: NPO pending swallow screening/evaluation

## 2020-07-23 NOTE — PATIENT CARE CONFERENCE
Care Progression Rounds Note  Date: 7/23/2020  Time: 10:39 AM     Patient Name: Karl Park     Medical Record Number: [id number]   YOB: 1928  Sex: Male      Room/Bed: 3025    Admitting Diagnosis: Cerebrovascular accident (CVA), unspecified mechanism (CMS/HCC) [I63.9]   Admit Date/Time: 7/17/2020  5:32 PM    Primary Diagnosis: Cerebrovascular accident (CVA) (CMS/HCC)  Principal Problem: Cerebrovascular accident (CVA) (CMS/HCC)    GMLOS: 3.0  Anticipated Discharge Date: 7/22/2020    AM-PAC  Mobility Score: 20    Discharge Planning:  Living Arrangements: house    Barriers to Discharge:  Barriers to Discharge: Medical issues not resolved  Comment: d/c michael    Participants:  social work/services, , nursing, physical therapy

## 2020-07-23 NOTE — PLAN OF CARE
Problem: Adult Inpatient Plan of Care  Goal: Plan of Care Review  Outcome: Progressing  Flowsheets (Taken 7/23/2020 1041)  Progress: improving  Plan of Care Reviewed With: patient  Outcome Summary: OT treatment completed.

## 2020-07-23 NOTE — PROGRESS NOTES
Patient: Karl Park  Location: [facility] 3A 3025  MRN: [id number]  Today's date: 7/23/2020  Speech Pathology: Therapy session    SLP Diagnosis: mild oral dysphagia and severe pharyngeal dysphagia per VFSS on 7/17 (at OSH). High aspiration risk and impaired secretion management    Recommendations:  1. cont w/ npo w/ peg and followup w/ homecare or OP and then OP vfss when appropriate  2.  3.      Summary/Handoff:  Daily Outcome Statement (SLP): Pt seen for followup. Has no recollection of meeting me and vague recall of exercises. Tolerates exercises for increased pharyngeal strengthening, double swallows for future use w/ po, and scant trials of nectars w/ R head turn. Rec cont w/ npo w/ peg and followup w/ homecare or OP and then OP vfss when appropriate      Session Notes:     Messaged to MD       Dietary Orders   (From admission, onward)             Start     Ordered    07/23/20 1241  Tube Feed with NPO Diabetisource AC; Bolus; 250; 5; Water; 100; With each bolus; RD/LDN may adjust order; AM Snack  (Tube Feed with NPO Diet Orders with insertion and maintenance panels)  Diet effective now     Comments:  Bolus Feed Regimen: 2 cans 8am, 1 can 11 am noon, 1 can 2p, 1 can 5p, 2 cans 7:30pm  -flush with 50ml flushes before and after each feed to meet hydration needs  -pt should not lay flat for 1 hour after each feed   Question Answer Comment   Tube Feeding Formula (PH): Diabetisource AC    Administration Type Bolus    Tube Feeding Bolus (mL): 250    Tube Feeding Bolus (Times/Day): 5    Flush type: Water    Flush amount (mL): 100    Flush frequency: With each bolus    Delegation of Authority. Diet orders written by PA/Felecia may not be adjusted by RD/LDNs. RD/LDN may adjust order    Meal period (AM Snack required for CBORD, do not remove: Not clinically relevant) AM Snack        07/23/20 1240                Results from last 7 days   Lab Units 07/23/20  0418 07/22/20  0631 07/21/20  0421   WBC K/uL 5.27 6.96  7.02   HEMOGLOBIN g/dL 12.9* 13.3* 13.1*   HEMATOCRIT % 38.8* 40.4 39.4*   PLATELETS K/uL 178 189 186          Patient left with call bell in reach and alarms as found.      Karl is a 89 y.o. male admitted on 7/17/2020 with Cerebrovascular accident (CVA), unspecified mechanism (CMS/[facility]). Principal problem is Cerebrovascular accident (CVA) (CMS/HCC).    Past Medical History  Karl has a past medical history of CVA (cerebral vascular accident) (CMS/HCC) and Type 2 diabetes mellitus (CMS/[facility]).    History of Present Illness  Pt presents for PEG placement and completion of CVA work-up.  MRI was done 7/16 which showed punctate focus of diffusion restriction in the posterior lateral aspect of the left medulla with associated enhancement indicative of a small, recent/acute lacunar infarction.  Patient failed swallow study and is aspirating at home.      Pt underwent PEG placement 7/20/2020, received new PT orders 7/21/2020    SLP Pain    Date/Time Pain Type Pref Pain Scale Rating: Rest [facility]   07/23/20 1048 Pain Assessment word (verbal rating pain scale) 0 - no pain EVM          Prior Living Environment      Most Recent Value   Living Arrangements  house   Living Environment Comment  Pt lives with his wife in a 1 story home 1+1 DAYA without rail through the front door, full flight from garage with BHR          Prior Level of Function      Most Recent Value   Dominant Hand  right   Ambulation  independent   Transferring  independent   Toileting  independent   Bathing  independent   Dressing  independent   Communication  understands/communicates without difficulty   Swallowing  other (see comments)   Baseline Diet/Method of Nutritional Intake  regular solids, thin liquids   Past History of Dysphagia  difficulty swallowing 2-3 weeks s/p medulla CVA. VFSS from OSH on 7/17 with severe pharyngeal dysphagia and recs for PEG   Prior Level of Function Comment  Pt reports being independent with mobility PTA. Pt states his daughter  "bought him a SPC but has not used it. Independent with ADLs, (+) driving   Equipment Currently Used at Home  none [owns SPC, RW at home]          SLP Evaluation and Treatment - 07/23/20 1048        Time Calculation    Start Time  1048     Stop Time  1108     Time Calculation (min)  20 min        Session Details    Document Type  daily treatment/progress note     Mode of Treatment  individual therapy;speech language pathology        General Information    Patient Profile Reviewed?  yes     General Observations of Patient  in chair     Existing Precautions/Restrictions  aspiration;fall     Limitations/Impairments  swallowing;safety/cognitive        Cognition/Psychosocial    Follows Commands (Cognition)  WFL     Cognitive Function (Cognitive)  memory deficit;safety deficit     Comment, Cognition  does not recall meeting me. When asked about swallow therex, he shows me PT therex. When I clarify, he says \"she was giving me tiny ice chips and I turn my head\" which is correct but again, does not recall that was me and seems surprised when i tell him. When asked about swabs and double swallows, etc he says he doesnt know what swabs are (there is one in cup on table by him) and when I point it out, he states he didnt realize it was there.         Functional Communication Measures    FCM: Memory  5-->Level 5     FCM: Swallowing  2-->Level 2        Food and Liquid Trials (NIS)    Patient Positioning  upright in chair     Comment, Nectar Thick Liquids  nectar dipped spoon w/ R HT, neck extension, pt w/o s/s of aspiration w/ dipped spoons, 1/8 and 1/4 tsp. In between presentations, we do double swallows; effortful swallows and [place] all of which he requires instruction     Comment  not spitting any saliva during session        Swallowing Recommendations    Diet Consistency Recommendations  NPO     Medication Administration Recommendations  via peg        Swallowing Intervention    Dysphagia/Swallowing Interventions  pharyngeal " therapeutic exercise program     Pharyngeal Therapeutic Exercise Program  Teresa maneuver;effortful swallow;tongue-base retraction        AM-PAC (TM) - Cognition (Current Function)    Following/understanding a 10-15 minute speech or presentation?  4 - None     Understanding familiar people during ordinary conversations?  4 - None     Remembering to take medications at the appropriate time?  4 - None     Remembering where things were placed or put away?  4 - None     Remembering a list of 3 or 4 errands without writing it down?  4 - None     Taking care of complicated tasks?  3 - A little     AM-PAC (TM) Cognition Score  23        Therapy Assessment/Plan (SLP)    SLP Diagnosis  mild oral dysphagia and severe pharyngeal dysphagia per VFSS on 7/17 (at OSH). High aspiration risk and impaired secretion management     Rehab Potential (SLP Eval)  good, to achieve stated therapy goals     Therapy Frequency (SLP)  5 times/wk     Criteria for Skilled Therapeutic Interventions Met (SLP Eval)  skilled criteria for speech language intervention met     Problem List (SLP)  dysphagia     Functional Level at Time of Evaluation (SLP)  in chair     Planned Therapy Interventions (SLP)  education, therex, potential po trials        Daily Progress Summary (SLP)    Daily Outcome Statement (SLP)  Pt seen for followup. Has no recollection of meeting me and vague recall of exercises. Tolerates exercises for increased pharyngeal strengthening, double swallows for future use w/ po, and scant trials of nectars w/ R head turn. Rec cont w/ npo w/ peg and followup w/ homecare or OP and then OP vfss when appropriate     Symptoms Noted During/After Treatment  none        Therapy Plan Review/Discharge Plan (SLP)    Therapy Plan Review (SLP)  care plan/treatment goals reviewed;participants included;patient                  Education provided this session. See the Patient Education summary report for full details.    SLP Goals      Most Recent Value    Pharyngeal Exercise Goal 1   Activity  laryngeal elevation/vocal fold adduction, 5-10 times per session at 07/18/2020 0933   [place]  independently (over 90% accuracy) at 07/18/2020 0933   Time Frame  by discharge at 07/18/2020 0933   Time Frame  goal ongoing at 07/22/2020 1112

## 2020-07-23 NOTE — PROGRESS NOTES
Homecare....Patient must be D/C'd by 11:30 Am on Day of D/C ,  So that the Homecare RN can visit at 1 pm for the NEW Tube Feeding Teaching in home with Family present also. Thank you so much

## 2020-07-23 NOTE — PROGRESS NOTES
Patient: Karl [facility]  Location: [facility] 3A 3025  MRN: [id number]  Today's date: 7/23/2020    Pt returned to upright recliner, fall alarm set, call bell/phone in reach. RN informed.    Karl is a 89 y.o. male admitted on 7/17/2020 with Cerebrovascular accident (CVA), unspecified mechanism (CMS/[facility]). Principal problem is Cerebrovascular accident (CVA) (CMS/HCC).    Past Medical History  Karl has a past medical history of CVA (cerebral vascular accident) (CMS/HCC) and Type 2 diabetes mellitus (CMS/[facility]).    History of Present Illness  Pt presents for PEG placement and completion of CVA work-up.  MRI was done 7/16 which showed punctate focus of diffusion restriction in the posterior lateral aspect of the left medulla with associated enhancement indicative of a small, recent/acute lacunar infarction.  Patient failed swallow study and is aspirating at home.      Pt underwent PEG placement 7/20/2020, received new PT orders 7/21/2020    PT Vitals    Date/Time Pulse HR Source SpO2 Pt Activity O2 Therapy BP BP Location BP Method Pt Position Observations [facility]   07/23/20 1345 76 Monitor 96 % At rest None (Room air) 92/55 Left upper arm Automatic Sitting PT ESL   07/23/20 1355 80 Monitor 97 % At rest None (Room air) 104/56 Left upper arm Automatic Sitting PT following therex ESL   07/23/20 1400 88 Monitor 9 % At rest None (Room air) 119/58 Left upper arm Automatic Sitting PT following ambulation ESL      PT Pain    Date/Time Pain Type Pref Pain Scale Location Rating: Rest Rating: Activity [facility]   07/23/20 1345 Pain Assessment word (verbal rating pain scale) abdomen 2 - mild pain 2 - mild pain ESL          Prior Living Environment      Most Recent Value   Living Arrangements  house   Living Environment Comment  Pt lives with his wife in a 1 story home 1+1 DAYA without rail through the front door, full flight from garage with BHR          Prior Level of Function      Most Recent Value   Dominant Hand  right   Ambulation   independent   Transferring  independent   Toileting  independent   Bathing  independent   Dressing  independent   Communication  understands/communicates without difficulty   Swallowing  other (see comments)   Baseline Diet/Method of Nutritional Intake  regular solids, thin liquids   Past History of Dysphagia  difficulty swallowing 2-3 weeks s/p medulla CVA. VFSS from OSH on 7/17 with severe pharyngeal dysphagia and recs for PEG   Prior Level of Function Comment  Pt reports being independent with mobility PTA. Pt states his daughter bought him a SPC but has not used it. Independent with ADLs, (+) driving   Equipment Currently Used at Home  none [owns SPC, RW at home]          PT Evaluation and Treatment - 07/23/20 1345        Time Calculation    Start Time  1345     Stop Time  1405     Time Calculation (min)  20 min        Session Details    Document Type  daily treatment/progress note     Mode of Treatment  physical therapy        General Information    Patient Profile Reviewed?  yes     Onset of Illness/Injury or Date of Surgery  07/17/20     Referring Physician  Krishna     General Observations of Patient  Pt completing puzzle seated in recliner, willing to participate     Existing Precautions/Restrictions  aspiration;fall     Limitations/Impairments  swallowing        Cognition/Psychosocial    Affect/Mental Status (Cognitive)  WFL     Orientation Status (Cognition)  oriented x 3     Follows Commands (Cognition)  WFL     Cognitive Function (Cognitive)  safety deficit     Safety Deficit (Cognitive)  minimal deficit;awareness of need for assistance;insight into deficits/self awareness     Comment, Cognition  very pleasant, cooperative        Sensory    Hearing Status  WFL        Vision Assessment/Intervention    Visual Impairment/Limitations  corrective lenses full time        Sensory Assessment (Somatosensory)    Sensory Assessment (Somatosensory)  sensation intact;bilateral LE     Bilateral LE Sensory  Assessment  intact        Range of Motion (ROM)    Range of Motion  ROM is WFL;bilateral lower extremities        Strength (Manual Muscle Testing)    Strength (Manual Muscle Testing)  strength is WFL;bilateral lower extremities        Bed Mobility    Comment (Bed Mobility)  Rec'd OOB        Sit to Stand Transfer    [place], Sit to Stand Transfer  supervision;verbal cues     Verbal Cues  hand placement;proper use of assistive device;technique     Assistive Device  walker, front-wheeled     Comment  from recliner        Stand to Sit Transfer    [place], Stand to Sit Transfer  supervision;verbal cues     Verbal Cues  hand placement;proper use of assistive device;technique     Assistive Device  walker, front-wheeled     Comment  to recliner        Gait Training    [place], Gait  close supervision     Assistive Device  walker, front-wheeled     Distance in Feet  125 feet     Gait Pattern Utilized  step-through     Comment  cueing to reduce sima, proximity to AD. Pt with exceptionally wide turn and poor management of AD around obstacles on L side.         Stairs Training    Comment  TBD. Pt has 1+1 DAYA        Safety Issues, Functional Mobility    Safety Issues Affecting Function (Mobility)  impulsivity;insight into deficits/self awareness     Impairments Affecting Function (Mobility)  balance;endurance/activity tolerance        Balance    Balance Assessment  sitting static balance;sitting dynamic balance;sit to stand dynamic balance;standing static balance;standing dynamic balance     Static Sitting Balance  WFL;sitting in chair     Dynamic Sitting Balance  sitting in chair;WFL     Sit to Stand Dynamic Balance  WFL;supported     Static Standing Balance  WFL;supported     Dynamic Standing Balance  mild impairment;supported     Comment, Balance  Unsteadiness on feet noted with turns and unable to negotiate around obstacles on L side seconday to visual deficits        Motor Skills    Motor Skills   functional endurance     Functional Endurance  fair +, VSS        Therapeutic Exercise    Therapeutic Exercise  lower extremity        Lower Extremity (Therapeutic Exercise)    Exercise Position/Type (LE Therapeutic Exercise)  seated     General Exercise (LE Therapeutic Exercise)  bilateral;LAQ (long arc quad);marching while seated    Heel raise/toe Raise    Range of Motion Exercises (LE Therapeutic Exercise)  bilateral     Reps and Sets (LE Therapeutic Exercise)  x10 each leg        Coping    Observed Emotional State  accepting     Verbalized Emotional State  acceptance        AM-PAC (TM) - Mobility (Current Function)    Turning from your back to your side while in a flat bed without using bedrails?  4 - None     Moving from lying on your back to sitting on the side of a flat bed without using bedrails?  4 - None     Moving to and from a bed to a chair?  3 - A Little     Standing up from a chair using your arms?  3 - A Little     To walk in a hospital room?  3 - A Little     Climbing 3-5 steps with a railing?  3 - A Little     AM-PAC (TM) Mobility Score  20        Therapy Assessment/Plan (PT)    Rehab Potential (PT)  good, to achieve stated therapy goals     Therapy Frequency (PT)  3-5 times/wk     Problem List  problems related to;balance    safety awareness       Progress Summary (PT)    Daily Outcome Statement (PT)  PT treatment complete. Tolerated therex well. ClS with cueing for safety and negotiaton of RW around obstacles with ambulation trial. Remains limited by safety awareness, balance deficits and safety with use of AD. [facility] 20     Symptoms Noted During/After Treatment  none        Therapy Plan Review/Discharge Plan (PT)    PT Recommended Discharge Disposition  home with assist;home with home health        Plan of Care Review    Plan of Care Reviewed With  patient                       Education provided this session. See the Patient Education summary report for full details.    PT Goals      Most Recent  Value   Bed Mobility Goal 1   Activity/Assistive Device  rolling to left, rolling to right, sit to supine/supine to sit at 07/21/2020 1328   [place]  independent at 07/21/2020 1328   Time Frame  by discharge at 07/21/2020 1328   Progress/Outcome  goal ongoing at 07/23/2020 1345   Transfer Goal 1   Activity/Assistive Device  sit-to-stand/stand-to-sit, bed-to-chair/chair-to-bed, walker, front-wheeled at 07/21/2020 1328   [place]  modified independence at 07/21/2020 1328   Time Frame  by discharge at 07/21/2020 1328   Progress/Outcome  goal ongoing at 07/23/2020 1345   Gait Training Goal 1   Activity/Assistive Device  gait (walking locomotion), assistive device use, walker, front-wheeled at 07/21/2020 1328   [place]  modified independence at 07/21/2020 1328   Distance  150 at 07/21/2020 1328   Time Frame  by discharge at 07/21/2020 1328   Progress/Outcome  goal ongoing at 07/23/2020 1345   Stairs Goal 1   Activity/Assistive Device  ascending stairs, descending stairs, using handrail, left, using handrail, right at 07/21/2020 1328   [place]  modified independence at 07/21/2020 1328   Number of Stairs  12 at 07/21/2020 1328   Time Frame  by discharge at 07/21/2020 1328   Progress/Outcome  goal ongoing at 07/23/2020 1345

## 2020-07-23 NOTE — PROGRESS NOTES
Patient discussed this morning during care progression rounds, pt is not ready for d/c today.  PCF noted pt's daughter had a question regarding outpatient rehab.  [facility] attempted to reach pt's daughter and left a message.  [facility] is working with [facility] for pt's home care and enteral feeding needs, pt will need to leave the hospital tomorrow morning to be home in time for HH RN visit.

## 2020-07-23 NOTE — PROGRESS NOTES
Hospital Medicine Service -  Daily Progress Note       SUBJECTIVE   Interval History: No acute events overnight. TEN inc to goal rate. Patient seen and examined. Currently no concerns or complaints. Denies any abd pain, n/v      OBJECTIVE      Vital signs in last 24 hours:  Temp:  [36.6 °C (97.8 °F)-36.8 °C (98.2 °F)] 36.6 °C (97.8 °F)  Heart Rate:  [65-88] 84  Resp:  [16-18] 18  BP: ()/(53-73) 108/53    Intake/Output Summary (Last 24 hours) at 7/23/2020 1830  Last data filed at 7/23/2020 0841  Gross per 24 hour   Intake 230 ml   Output --   Net 230 ml       PHYSICAL EXAMINATION      GEN: well-developed and well-nourished; not in acute distress  HEENT: normocephalic; atraumatic  NECK: no JVD; no bruits  CARDIO: regular rate and rhythm; no murmurs, rubs or gallops  RESP: clear to auscultation bilaterally; no rales, rhonchi, or wheezes  ABD: soft, non-distended, non-tender, dec bowel sounds, PEG tube in place, c.d.i  EXT: no cyanosis, clubbing, or edema  SKIN: clean, dry, warm, and intact  MUSCULOSKELETAL: no injury or deformity  NEURO: alert and oriented x 3; left facial droop noted otherwise no major focal deficits noted  BEHAVIOR/EMOTIONAL: appropriate; cooperative     LABS / IMAGING / TELE      Labs  Lab Results   Component Value Date    GLUCOSE 165 (H) 07/23/2020    CALCIUM 8.2 (L) 07/23/2020     07/23/2020    K 3.5 (L) 07/23/2020    CO2 23 07/23/2020     07/23/2020    BUN 21 (H) 07/23/2020    CREATININE 1.1 07/23/2020     Lab Results   Component Value Date    WBC 5.27 07/23/2020    HGB 12.9 (L) 07/23/2020    HCT 38.8 (L) 07/23/2020    .0 (H) 07/23/2020     07/23/2020     Lab Results   Component Value Date    ALBUMIN 4.2 07/17/2020    BILITOT 1.1 07/17/2020    ALKPHOS 96 07/17/2020    AST 19 07/17/2020    ALT 20 07/17/2020    PROTEIN 7.7 07/17/2020       Imaging  Mri Angiogram Head Without Contrast    Result Date: 7/18/2020  IMPRESSION: 1.  Mild focal stenoses of the proximal  internal carotid arteries bilaterally, left slightly greater than right. 2.  Intermediate length segment mild stenosis of the anterior cavernous through ophthalmic segment of the right internal carotid artery. 3.  No evidence of a hemodynamically significant intracranial stenosis or major vessel occlusion.  No evidence of aneurysm.     Mri Angiogram Neck Without Contrast    Result Date: 7/18/2020  IMPRESSION: 1.  Mild focal stenoses of the proximal internal carotid arteries bilaterally, left slightly greater than right. 2.  Intermediate length segment mild stenosis of the anterior cavernous through ophthalmic segment of the right internal carotid artery. 3.  No evidence of a hemodynamically significant intracranial stenosis or major vessel occlusion.  No evidence of aneurysm.     X-ray Chest 1 View    Result Date: 7/17/2020  IMPRESSION: No acute cardiopulmonary disease COMMENT:  AP portable erect view chest was performed.  The lungs are clear. There is no evidence of consolidation or pleural effusion. The heart and mediastinal structures are normal in size and contour.       ECG/Telemetry  I have independently reviewed the telemetry. No events for the last 24 hours.    ASSESSMENT AND PLAN      * Cerebrovascular accident (CVA) (CMS/[facility])  Assessment & Plan  -S/p recent CVA with residual L facial droop and dysphagia  -MRI 7/16 in Care Everywhere: Punctate focus of diffusion restriction post/lateral L medulla w/ enhancement, c/w small, recent/acute lacunar infarction, likely a few days. Mild-mod chronic microangiopathy elsewhere.  -Head/neck MRA w/Mild focal stenoses of the proximal internal carotid arteries bilaterally, left slightly greater than right.  Intermediate length segment mild stenosis of the anterior cavernous through ophthalmic segment of the right internal carotid artery. No evidence of a hemodynamically significant intracranial stenosis or major vessel occlusion.  No evidence of aneurysm.  - TTE w/No  obvious cardiac source of embolism identified    Plan:  Cont to monitor on telemetry  Cont w//ASA through PEG tube. Per Neurology plavix not required  -per neurology cont w/lipitor 40mg qhs  - per neurology evaluation - stroke likely lacunar infarct. Low suspicion for embolic infarct noted  -Cardiology consulted - given low suspicion for embolic etiology, no further evaluation felt to be required at this time  -Neuro following - will f/u recs  -Speech, PT, OT  Aspiration precautions. Speech to repeat video swallow tomorrow  -PT/OT. PMR consulted - recommend outpt speech therapy at [facility] for continued management.  Pending toleration of bolus TEN can hopefully d/c to home tomorrow.     B12 deficiency  Assessment & Plan  B12 low end of normal at 210    Plan:  Cont w/vitamin b12, 1000mcg daily    Type 2 diabetes mellitus (CMS/HCC)  Assessment & Plan  BGs remain stable while on TEN    Plan:  -Hold Metformin, cont w/SSI  - NPO for now, with TEN  - monitor w/routine accuchecks    Dysphagia as late effect of cerebrovascular accident (CVA)  Assessment & Plan  -Dysphagia x 2 weeks s/p CVA.  Lost 14 lbs due to inability to eat or drink  -Barium swallow/Video swallow in Care Everywhere: Severe pharyngeal dysphagia, PEG recommended  -s/p PEG tube placement on 7/20, Antral erythema and duodenal erosions. H.Pylori biopsies obtained. Biopsy results with fragments of antral and oxyntic mucosa with no specific pathological change. no intestinal metaplasia or features of   Helicobacter pylori gastritis seen.  - currently tolerating TEN at goal rate    Plan:  Will transition to bolus feeding today. Will require tube feeds greater than 90 days.   Cont w/PPI  Nutrition consulted - will f/u recs  Monitor electrolytes to include K, Mg, Phos and replete as required   F/u biopsy results  Aspiration precautions, NPO for now  Speech consulted - pt to remain NPO, cont w/therapy. Video swallow to be done as outpt  GI signed off.             VTE  Assessment: [place] VTE Score: 2  Estimated discharge date: 7/22/2020  Code Status: Full Code     Edel Keller,   7/23/2020

## 2020-07-23 NOTE — PLAN OF CARE
Problem: Adult Inpatient Plan of Care  Goal: Plan of Care Review  Outcome: Progressing  Flowsheets (Taken 7/23/2020 0420)  Plan of Care Reviewed With: patient  Outcome Summary: Tubefeed at goal of 73 ml/hr tolerating well. Pt with multiple BMs overnight. No complaint of pain

## 2020-07-23 NOTE — PROGRESS NOTES
Patient: Karl Vivian  Location: [facility] 3A 3025  MRN: [id number]  Today's date: 7/23/2020     Session ended c pt seated in bedside chair, (+) alarmed, all immediate needs within reach, NAD/VSS.    Karl is a 89 y.o. male admitted on 7/17/2020 with Cerebrovascular accident (CVA), unspecified mechanism (CMS/HCC). Principal problem is Cerebrovascular accident (CVA) (CMS/HCC).    Past Medical History  Karl has a past medical history of CVA (cerebral vascular accident) (CMS/HCC) and Type 2 diabetes mellitus (CMS/HCC).    History of Present Illness  Pt presents for PEG placement and completion of CVA work-up.  MRI was done 7/16 which showed punctate focus of diffusion restriction in the posterior lateral aspect of the left medulla with associated enhancement indicative of a small, recent/acute lacunar infarction.  Patient failed swallow study and is aspirating at home.      Pt underwent PEG placement 7/20/2020, received new PT orders 7/21/2020    OT Vitals    Date/Time Pulse HR Source SpO2 Pt Activity O2 Therapy BP BP Location BP Method Pt Position Observations [facility]   07/23/20 1010 81 Monitor 96 % At rest None (Room air) 151/73 Right upper arm Automatic Lying OT NE      OT Pain    Date/Time Pain Type Pref Pain Scale Rating: Rest Rating: Activity [facility]   07/23/20 1010 Pain Assessment word (verbal rating pain scale) 0 - no pain 0 - no pain NE          Prior Living Environment      Most Recent Value   Living Arrangements  house   Living Environment Comment  Pt lives with his wife in a 1 story home 1+1 DAYA without rail through the front door, full flight from garage with BHR          Prior Level of Function      Most Recent Value   Dominant Hand  right   Ambulation  independent   Transferring  independent   Toileting  independent   Bathing  independent   Dressing  independent   Communication  understands/communicates without difficulty   Swallowing  other (see comments)   Baseline Diet/Method of Nutritional Intake   regular solids, thin liquids   Past History of Dysphagia  difficulty swallowing 2-3 weeks s/p medulla CVA. VFSS from OSH on 7/17 with severe pharyngeal dysphagia and recs for PEG   Prior Level of Function Comment  Pt reports being independent with mobility PTA. Pt states his daughter bought him a SPC but has not used it. Independent with ADLs, (+) driving   Equipment Currently Used at Home  none [owns SPC, RW at home]          OT Evaluation and Treatment - 07/23/20 1010        Time Calculation    Start Time  1010     Stop Time  1034     Time Calculation (min)  24 min        Session Details    Document Type  daily treatment/progress note     Mode of Treatment  occupational therapy        General Information    Patient Profile Reviewed?  yes     Onset of Illness/Injury or Date of Surgery  07/17/20     Referring Physician  Krishna     General Observations of Patient  Pt rec'd resting supine in bed     Existing Precautions/Restrictions  aspiration;fall     Limitations/Impairments  swallowing        Occupational Profile    Performance Patterns (Occupational Profile)  Pt enjoys being active      Patient Goals (Occupational Profile)  To go home; to walk more         Cognition/Psychosocial    Affect/Mental Status (Cognitive)  WFL     Orientation Status (Cognition)  oriented x 3     Follows Commands (Cognition)  WFL     Cognitive Function (Cognitive)  safety deficit     Safety Deficit (Cognitive)  minimal deficit;awareness of need for assistance;insight into deficits/self awareness     Comment, Cognition  Very pleasant, cooperative and motivated; impaired insight into balance deficits/fall risk        Bed Mobility    [place], Supine to Sit  modified independence     Assistive Device (Bed Mobility)  bed rails;head of bed elevated     Comment (Bed Mobility)  HOB at 15*        Transfers    Transfers  toilet transfer     Comment  Supervision for functional transfers + mobility without AD in room; CLS SPV for ambulation  in hallway with SPC. Initial RLE buckling without incident        Bed to Chair Transfer    [place], Bed to Chair  close supervision     Assistive Device  cane, straight        Sit to Stand Transfer    [place], Sit to Stand Transfer  supervision     Assistive Device  none     Comment  From EOB + toilet        Stand to Sit Transfer    [place], Stand to Sit Transfer  supervision     Assistive Device  none     Comment  To toilet + recliner        Toilet Transfer    Transfer Technique  stand-sit;sit-stand     [place], Toilet Transfer  modified independence     Assistive Device  grab bars/safety frame;raised toilet seat     Comment  Pt reports GB by toilet at home         Balance    Balance Assessment  sitting static balance;sit to stand dynamic balance;standing static balance;standing dynamic balance     Static Sitting Balance  WFL     Sit to Stand Dynamic Balance  WFL     Static Standing Balance  WFL     Dynamic Standing Balance  mild impairment     Comment, Balance  Unsteady especially with turns in hallway; requires cues for pacing/safety. No overt LOB noted         Therapeutic Exercise    Therapeutic Exercise  upper extremity        Upper Extremity (Therapeutic Exercise)    Exercise Position/Type (UE Therapeutic Exercise)  seated;resistive exercises     General Exercise (Upper Extremity Therapeutic Exercise)  wand exercises     Range of Motion Exercises (Upper Extremity Therapeutic Exercise)  bilateral;shoulder flexion/extension;elbow flexion/extension;other (see comments)    forward chest presses    Weight/Resistance (Upper Extremity Therapeutic Exercise)  manual resistance     Reps and Sets (Upper Extremity Therapeutic Exercise)  1x10     Comment (UE Therapeutic Exercise)  OT provided mod manual resistance; pt used SPC for wand exercises        Lower Body Dressing    Self-Performance  dons/doffs left sock;dons/doffs right sock     [place]  modified independence     Position  edge of bed  sitting     Adaptive Equipment  none        Grooming    Self-Performance  washes, rinses and dries hands     [place]  independent     Position  unsupported standing;sink side     Adaptive Equipment  none        Toileting    [place]  modified independence     Position  unsupported sitting;unsupported standing     Adaptive Equipment  raised toilet seat        BADL Safety/Performance    Impairments, BADL Safety/Performance  balance;cognition     Cognitive Impairments, BADL Safety/Performance  awareness, need for assistance;insight into deficits/self awareness     Skilled BADL Treatment/Intervention  BADL process/adaptation training     Progress in BADL Status  improvement noted        AM-PAC (TM) - ADL (Current Function)    Putting on and taking off regular lower body clothing?  3 - A Little     Bathing?  3 - A Little     Toileting?  4 - None     Putting on/taking off regular upper body clothing?  4 - None     How much help for taking care of personal grooming?  4 - None     Eating meals?  4 - None     AM-PAC (TM) ADL Score  22        Therapy Assessment/Plan (OT)    Rehab Potential (OT)  good, to achieve stated therapy goals     Therapy Frequency (OT)  3-5 times/wk        Progress Summary (OT)    Daily Outcome Statement (OT)  Pt seen for OT follow-up treatment. Progressing well with OT POC; continues to be limited by impaired balance + insight into deficits. Requires mod I for bed mobility; supervision for functional transfers + supervision-CLS SPV for functional mobility in room/hallway with SPC; mod I for LBD; mod I for toileting + grooming. Recommend 1 additional OT session for safety during ADLs prior to d/c home with family + home OT.      Symptoms Noted During/After Treatment  none        Therapy Plan Review/Discharge Plan (OT)    OT Recommended Discharge Disposition  home with home health     Anticipated Equipment Needs At Discharge (OT)  none                   Education provided this session. See the  Patient Education summary report for full details.         OT Goals      Most Recent Value   Bed Mobility Goal 1   Activity/Assistive Device  bed mobility activities, all at 07/19/2020 1143   [place]  independent at 07/19/2020 1143   Time Frame  by discharge at 07/19/2020 1143   Progress/Outcome  goal ongoing at 07/19/2020 1143   Transfer Goal 1   Activity/Assistive Device  toilet at 07/19/2020 1143   [place]  modified independence at 07/23/2020 1010   Time Frame  by discharge at 07/19/2020 1143   Progress/Outcome  goal met, goal revised this date at 07/23/2020 1010   Dressing Goal 1   Activity/Adaptive Equipment  dressing skills, all at 07/19/2020 1143   [place]  independent at 07/19/2020 1143   Time Frame  by discharge at 07/19/2020 1143   Progress/Outcome  goal ongoing at 07/19/2020 1143   Toileting Goal 1   Activity/Assistive Device  toileting skills, all at 07/19/2020 1143   [place]  modified independence at 07/23/2020 1010   Time Frame  by discharge at 07/19/2020 1143   Progress/Outcome  goal met, goal revised this date at 07/23/2020 1010

## 2020-07-23 NOTE — PROGRESS NOTES
CARDIOLOGY PROGRESS NOTE          ASSESSMENT AND PLAN:      He is a very pleasant 89 y m c mild HL, DM II reported, HTN p/w cerebellar infarct.     1) CVA: Lacunar in nature. Appreciate Neuro input.               ASA, statin. Echo without CSE. No sustained AF.    2) HTN: mild. Well controlled on ramipril.    3) HL: moderately intense statin. Reasonable.     25 beats of AT noted. Not clinically significant.  No new CV recs. Will follow     MDS      PROBLEM LIST:  Principal Problem:    Cerebrovascular accident (CVA) (CMS/HCC)  Active Problems:    Dysphagia as late effect of cerebrovascular accident (CVA)    Type 2 diabetes mellitus (CMS/HCC)    B12 deficiency       SUBJECTIVE: NO CP      OBJECTIVE:  Vital signs in last 24 hours:  Temp:  [36.4 °C (97.5 °F)-36.7 °C (98.1 °F)] 36.7 °C (98.1 °F)  Heart Rate:  [67-85] 67  Resp:  [16] 16  BP: (112-119)/(54-58) 112/58    Intake/Output Summary (Last 24 hours) at 7/23/2020 0733  Last data filed at 7/22/2020 2034  Gross per 24 hour   Intake 205 ml   Output 1 ml   Net 204 ml       PHYSICAL EXAMINATION:  General appearance: alert, appears stated age and cooperative  Head: without obvious abnormality  Eyes: PERRLA, extraocular movements intact  Neck: No JVD, carotid bruits, thyromegaly  Lungs: clear to auscultation bilaterally, no crackles or wheezing  Heart: soft m  Abdomen: soft, non-tender; bowel sounds normal; no masses  Extremities: no edema, peripheral pulses present  Skin: Skin color, texture, turgor normal. No rashes or lesions  Neurologic: mild dysarthriua    LABS:  Results from last 7 days   Lab Units 07/23/20  0418 07/22/20  0704 07/22/20  0530 07/21/20  0421  07/17/20  1828   SODIUM mEQ/L 138 138  --  138   < > 136   POTASSIUM mEQ/L 3.5* 4.0  --  3.7   < > 4.5   MAGNESIUM mg/dL 2.1  --  1.9  --   --   --    CHLORIDE mEQ/L 107 106  --  107   < > 101   CO2 mEQ/L 23 24  --  23   < > 24   BUN mg/dL 21* 12  --  13   < > 33*   CREATININE mg/dL 1.1 1.1  --  1.1   < > 1.5*    AST IU/L  --   --   --   --   --  19   ALT IU/L  --   --   --   --   --  20    < > = values in this interval not displayed.     Results from last 7 days   Lab Units 07/23/20  0418 07/22/20  0631 07/21/20  0421   WBC K/uL 5.27 6.96 7.02   HEMOGLOBIN g/dL 12.9* 13.3* 13.1*   HEMATOCRIT % 38.8* 40.4 39.4*   PLATELETS K/uL 178 189 186     No results found for: HGBA1C, TSH  Lab Results   Component Value Date    CHOL 174 07/17/2020    HDL 44 (L) 07/17/2020    TRIG 122 07/17/2020     @No results found for: PROBNP    IMAGING: None new.      TELEMETRY: 25 beats AT, no sustained AF.      Stephan Bauman MD [facility]  7/23/2020    Primary Care Doctor: Herb Dailey MD

## 2020-07-24 ENCOUNTER — TRANSCRIBE ORDERS (OUTPATIENT)
Dept: SCHEDULING | Facility: REHABILITATION | Age: 85
End: 2020-07-24

## 2020-07-24 VITALS
OXYGEN SATURATION: 97 % | DIASTOLIC BLOOD PRESSURE: 52 MMHG | RESPIRATION RATE: 18 BRPM | HEIGHT: 72 IN | WEIGHT: 181 LBS | HEART RATE: 80 BPM | BODY MASS INDEX: 24.52 KG/M2 | SYSTOLIC BLOOD PRESSURE: 98 MMHG | TEMPERATURE: 97.4 F

## 2020-07-24 DIAGNOSIS — I69.321 DYSPHASIA FOLLOWING CEREBROVASCULAR ACCIDENT (CVA): ICD-10-CM

## 2020-07-24 DIAGNOSIS — I63.9 CEREBROVASCULAR ACCIDENT (CVA), UNSPECIFIED MECHANISM (CMS/HCC): Primary | ICD-10-CM

## 2020-07-24 LAB
ANION GAP SERPL CALC-SCNC: 9 MEQ/L (ref 3–15)
BUN SERPL-MCNC: 24 MG/DL (ref 8–20)
CALCIUM SERPL-MCNC: 8.2 MG/DL (ref 8.9–10.3)
CHLORIDE SERPL-SCNC: 101 MEQ/L (ref 98–109)
CO2 SERPL-SCNC: 27 MEQ/L (ref 22–32)
CREAT SERPL-MCNC: 1 MG/DL (ref 0.8–1.3)
GFR SERPL CREATININE-BSD FRML MDRD: >60 ML/MIN/1.73M*2
GLUCOSE BLD-MCNC: 147 MG/DL (ref 70–99)
GLUCOSE BLD-MCNC: 158 MG/DL (ref 70–99)
GLUCOSE BLD-MCNC: 175 MG/DL (ref 70–99)
GLUCOSE SERPL-MCNC: 160 MG/DL (ref 70–99)
MAGNESIUM SERPL-MCNC: 2 MG/DL (ref 1.8–2.5)
PHOSPHATE SERPL-MCNC: 1.7 MG/DL (ref 2.4–4.7)
PHOSPHATE SERPL-MCNC: 2.1 MG/DL (ref 2.4–4.7)
POCT TEST: ABNORMAL
POTASSIUM SERPL-SCNC: 3.7 MEQ/L (ref 3.6–5.1)
SODIUM SERPL-SCNC: 137 MEQ/L (ref 136–144)

## 2020-07-24 PROCEDURE — 83735 ASSAY OF MAGNESIUM: CPT | Performed by: INTERNAL MEDICINE

## 2020-07-24 PROCEDURE — 25000000 HC PHARMACY GENERAL: Performed by: INTERNAL MEDICINE

## 2020-07-24 PROCEDURE — 25800000 HC PHARMACY IV SOLUTIONS: Performed by: INTERNAL MEDICINE

## 2020-07-24 PROCEDURE — 63700000 HC SELF-ADMINISTRABLE DRUG: Performed by: INTERNAL MEDICINE

## 2020-07-24 PROCEDURE — 63600000 HC DRUGS/DETAIL CODE: Performed by: INTERNAL MEDICINE

## 2020-07-24 PROCEDURE — 84100 ASSAY OF PHOSPHORUS: CPT | Performed by: INTERNAL MEDICINE

## 2020-07-24 PROCEDURE — 36415 COLL VENOUS BLD VENIPUNCTURE: CPT | Performed by: INTERNAL MEDICINE

## 2020-07-24 PROCEDURE — 97116 GAIT TRAINING THERAPY: CPT | Mod: GP,CQ

## 2020-07-24 PROCEDURE — 99239 HOSP IP/OBS DSCHRG MGMT >30: CPT | Performed by: INTERNAL MEDICINE

## 2020-07-24 PROCEDURE — 80048 BASIC METABOLIC PNL TOTAL CA: CPT | Performed by: INTERNAL MEDICINE

## 2020-07-24 RX ORDER — PANTOPRAZOLE SODIUM 40 MG/1
40 FOR SUSPENSION ORAL DAILY
Qty: 30 EACH | Refills: 0 | Status: SHIPPED | OUTPATIENT
Start: 2020-07-24 | End: 2020-07-24 | Stop reason: SDUPTHER

## 2020-07-24 RX ORDER — LANOLIN ALCOHOL/MO/W.PET/CERES
1000 CREAM (GRAM) TOPICAL DAILY
Qty: 30 TABLET | Refills: 0 | Status: SHIPPED | OUTPATIENT
Start: 2020-07-25 | End: 2020-08-24

## 2020-07-24 RX ORDER — SODIUM,POTASSIUM PHOSPHATES 280-250MG
1 POWDER IN PACKET (EA) ORAL DAILY
Qty: 3 PACKET | Refills: 0 | Status: SHIPPED | OUTPATIENT
Start: 2020-07-24 | End: 2020-07-24 | Stop reason: SDUPTHER

## 2020-07-24 RX ORDER — RAMIPRIL 1.25 MG/1
1.25 CAPSULE ORAL DAILY
Qty: 30 CAPSULE | Refills: 0 | Status: SHIPPED | OUTPATIENT
Start: 2020-07-25 | End: 2020-07-24

## 2020-07-24 RX ORDER — SODIUM,POTASSIUM PHOSPHATES 280-250MG
1 POWDER IN PACKET (EA) ORAL DAILY
Qty: 3 PACKET | Refills: 0 | Status: SHIPPED | OUTPATIENT
Start: 2020-07-24 | End: 2020-07-27

## 2020-07-24 RX ORDER — ATORVASTATIN CALCIUM 40 MG/1
40 TABLET, FILM COATED ORAL
Qty: 30 TABLET | Refills: 0 | Status: SHIPPED | OUTPATIENT
Start: 2020-07-24 | End: 2020-07-24

## 2020-07-24 RX ORDER — RAMIPRIL 1.25 MG/1
1.25 CAPSULE ORAL DAILY
Status: DISCONTINUED | OUTPATIENT
Start: 2020-07-24 | End: 2020-07-24 | Stop reason: HOSPADM

## 2020-07-24 RX ORDER — ATORVASTATIN CALCIUM 40 MG/1
40 TABLET, FILM COATED ORAL
Qty: 30 TABLET | Refills: 0 | Status: SHIPPED | OUTPATIENT
Start: 2020-07-24 | End: 2020-08-23

## 2020-07-24 RX ORDER — RAMIPRIL 1.25 MG/1
1.25 CAPSULE ORAL DAILY
Qty: 30 CAPSULE | Refills: 0 | Status: SHIPPED | OUTPATIENT
Start: 2020-07-25 | End: 2020-08-24

## 2020-07-24 RX ORDER — PANTOPRAZOLE SODIUM 40 MG/1
40 FOR SUSPENSION ORAL DAILY
Qty: 30 EACH | Refills: 0
Start: 2020-07-24 | End: 2020-07-24 | Stop reason: SDUPTHER

## 2020-07-24 RX ORDER — PANTOPRAZOLE SODIUM 40 MG/1
40 FOR SUSPENSION ORAL DAILY
Qty: 30 EACH | Refills: 0 | Status: SHIPPED | OUTPATIENT
Start: 2020-07-24 | End: 2020-08-23

## 2020-07-24 RX ORDER — LANOLIN ALCOHOL/MO/W.PET/CERES
1000 CREAM (GRAM) TOPICAL DAILY
Qty: 30 TABLET | Refills: 0 | Status: SHIPPED | OUTPATIENT
Start: 2020-07-25 | End: 2020-07-24

## 2020-07-24 RX ADMIN — POTASSIUM PHOSPHATE, MONOBASIC POTASSIUM PHOSPHATE, DIBASIC 10 MMOL: 224; 236 INJECTION, SOLUTION, CONCENTRATE INTRAVENOUS at 09:25

## 2020-07-24 RX ADMIN — ASPIRIN 81 MG 81 MG: 81 TABLET ORAL at 08:46

## 2020-07-24 RX ADMIN — CYANOCOBALAMIN TAB 1000 MCG 1000 MCG: 1000 TAB at 08:46

## 2020-07-24 RX ADMIN — RAMIPRIL 1.25 MG: 1.25 CAPSULE ORAL at 08:46

## 2020-07-24 RX ADMIN — HEPARIN SODIUM 5000 UNITS: 5000 INJECTION, SOLUTION INTRAVENOUS; SUBCUTANEOUS at 06:30

## 2020-07-24 RX ADMIN — LANSOPRAZOLE 30 MG: KIT at 08:46

## 2020-07-24 ASSESSMENT — COGNITIVE AND FUNCTIONAL STATUS - GENERAL
CLIMB 3 TO 5 STEPS WITH RAILING: 3 - A LITTLE
AFFECT: WFL
WALKING IN HOSPITAL ROOM: 3 - A LITTLE
MOVING TO AND FROM BED TO CHAIR: 3 - A LITTLE
STANDING UP FROM CHAIR USING ARMS: 3 - A LITTLE

## 2020-07-24 NOTE — PROGRESS NOTES
Message received from pt's daughter expressing concern re: home care being arranged for a week and potentially delaying start of outpatient rehab at [facility].  [facility] attempted to reach pt's daughter Corrina and left her a message noting pt is not committed to a certain number of visits and he can discontinue HH visits when desired.

## 2020-07-24 NOTE — PROGRESS NOTES
Patient: Karl Park  Location: [facility] 3A 3025  MRN: [id number]  Today's date: 7/24/2020     Pt OOB in chair; alarm set; personal items and call cord in reach; RN notified for hand off.    Karl is a 89 y.o. male admitted on 7/17/2020 with Cerebrovascular accident (CVA), unspecified mechanism (CMS/[facility]). Principal problem is Cerebrovascular accident (CVA) (CMS/HCC).    Past Medical History  Karl has a past medical history of CVA (cerebral vascular accident) (CMS/HCC) and Type 2 diabetes mellitus (CMS/[facility]).    History of Present Illness  Pt presents for PEG placement and completion of CVA work-up.  MRI was done 7/16 which showed punctate focus of diffusion restriction in the posterior lateral aspect of the left medulla with associated enhancement indicative of a small, recent/acute lacunar infarction.  Patient failed swallow study and is aspirating at home.      Pt underwent PEG placement 7/20/2020, received new PT orders 7/21/2020    PT Vitals    Date/Time Pulse HR Source BP BP Location BP Method Pt Position [facility]   07/24/20 1110 72 75 Monitor 112/58 Right upper arm Manual Lying HOB elevated MM      PT Pain    Date/Time Pain Type Pref Pain Scale Rating: Rest Rating: Activity [facility]   07/24/20 1110 Pain Assessment;Post Activity word (verbal rating pain scale) 0 - no pain 0 - no pain MM          Prior Living Environment      Most Recent Value   Living Arrangements  house   Living Environment Comment  Pt lives with his wife in a 1 story home 1+1 DAYA without rail through the front door, full flight from garage with BHR          Prior Level of Function      Most Recent Value   Dominant Hand  right   Ambulation  independent   Transferring  independent   Toileting  independent   Bathing  independent   Dressing  independent   Communication  understands/communicates without difficulty   Swallowing  other (see comments)   Baseline Diet/Method of Nutritional Intake  regular solids, thin liquids   Past History of  Dysphagia  difficulty swallowing 2-3 weeks s/p medulla CVA. VFSS from OSH on 7/17 with severe pharyngeal dysphagia and recs for PEG   Prior Level of Function Comment  Pt reports being independent with mobility PTA. Pt states his daughter bought him a SPC but has not used it. Independent with ADLs, (+) driving   Equipment Currently Used at Home  none [owns SPC, RW at home]          PT Evaluation and Treatment - 07/24/20 1110        Time Calculation    Start Time  1110     Stop Time  1139     Time Calculation (min)  29 min        Session Details    Document Type  daily treatment/progress note     Mode of Treatment  physical therapy        General Information    Patient Profile Reviewed?  yes     Patient/Family/Caregiver Comments/Observations  RN present     General Observations of Patient  rec'd in bed, IV, HOB elevated     Existing Precautions/Restrictions  aspiration;fall     Limitations/Impairments  safety/cognitive;swallowing        Cognition/Psychosocial    Affect/Mental Status (Cognitive)  WFL     Orientation Status (Cognition)  oriented x 3     Follows Commands (Cognition)  follows multi-step commands;over 90% accuracy;verbal cues/prompting required     Comment, Cognition  receptive, appropriate, timely        Bed Mobility    [place], Roll Right  modified independence     [place], Supine to Sit  supervision     [place], Sit to Supine  supervision     Assistive Device (Bed Mobility)  head of bed elevated;other (see comments)    mattress firmned    Comment (Bed Mobility)  OOB/RTB (R); HOB 31deg        Transfers    Transfers  stand pivot transfer     Comment  cue hand plcmt        Sit to Stand Transfer    [place], Sit to Stand Transfer  supervision;verbal cues     Verbal Cues  hand placement     Assistive Device  walker, front-wheeled     Comment  bed and chair        Stand to Sit Transfer    [place], Stand to Sit Transfer  supervision;verbal cues     Verbal Cues  hand placement      Assistive Device  walker, front-wheeled     Comment  bed and chair        Stand Pivot Transfer    [place], Stand Pivot/Stand Step Transfer  supervision     Assistive Device  walker, front-wheeled     Comment  L and R        Gait Training    [place], Gait  supervision     Assistive Device  walker, front-wheeled     Distance in Feet  155 feet     Gait Pattern Utilized  step-through     Deviations/Abnormal Patterns (Gait)  other (see comments)    fwd presented shoulders    Comment  wide turns, caution for IV line        Stairs Training    [place], Stairs  close supervision;verbal cues     Assistive Device  other (see comments)    RW    Handrail Location  none     Number of Stairs  2    1+1    Comment  training step utilised; performed w/ R up, L down and L up, R down; pt is safer leading up w/ R and down w/ L, and acknowledges so.        Safety Issues, Functional Mobility    Safety Issues Affecting Function (Mobility)  insight into deficits/self awareness;positioning of assistive device     Impairments Affecting Function (Mobility)  cognition;balance;strength     Comment, Safety Issues/Impairments (Mobility)  receptive, appropriate, attentive        Balance    Sit to Stand Dynamic Balance  mild impairment;unsupported;WFL;supported     Static Standing Balance  mild impairment;unsupported;WFL;supported     Dynamic Standing Balance  mild impairment;supported     Comment, Balance  RW sppt required for imcr'd balance and safety; pt acknowledges need        AM-PAC (TM) - Mobility (Current Function)    Turning from your back to your side while in a flat bed without using bedrails?  4 - None     Moving from lying on your back to sitting on the side of a flat bed without using bedrails?  3 - A Little     Moving to and from a bed to a chair?  3 - A Little     Standing up from a chair using your arms?  3 - A Little     To walk in a hospital room?  3 - A Little     Climbing 3-5 steps with a railing?  3 - A Little      AM-PAC (TM) Mobility Score  19        Therapy Assessment/Plan (PT)    Rehab Potential (PT)  good, to achieve stated therapy goals     Therapy Frequency (PT)  3-5 times/wk     Problem List  balance    safety       Progress Summary (PT)    Daily Outcome Statement (PT)  PT session completed.  [facility] mob: 19.  Pt requires RW and (S) for trxfs and gait; CS step entry w/ RW sppt; reviewed stairs and use of rail and cane as available.  Progressing w/ PT; he presents w/ deficits in balance, and safety.  Cont PT.       Symptoms Noted During/After Treatment  none     Progress Toward Functional Goals (PT)  progressing toward functional goals as expected        Therapy Plan Review/Discharge Plan (PT)    PT Recommended Discharge Disposition  home with assist;home with home health     Anticipated Equipment Needs at Discharge (PT Eval)  --    RW to be issued       Plan of Care Review    Plan of Care Reviewed With  patient                  Equipment Provided: Walker, with Wheels, Adult   Vendor: [employer]    Education provided this session. See the Patient Education summary report for full details.    PT Goals      Most Recent Value   Bed Mobility Goal 1   Activity/Assistive Device  rolling to left, rolling to right, sit to supine/supine to sit at 07/21/2020 1328   [place]  independent at 07/21/2020 1328   Time Frame  by discharge at 07/21/2020 1328   Progress/Outcome  goal ongoing at 07/23/2020 1345   Transfer Goal 1   Activity/Assistive Device  sit-to-stand/stand-to-sit, bed-to-chair/chair-to-bed, walker, front-wheeled at 07/21/2020 1328   [place]  modified independence at 07/21/2020 1328   Time Frame  by discharge at 07/21/2020 1328   Progress/Outcome  goal ongoing at 07/23/2020 1345   Gait Training Goal 1   Activity/Assistive Device  gait (walking locomotion), assistive device use, walker, front-wheeled at 07/21/2020 1328   [place]  modified independence at 07/21/2020 1328   Distance   150 at 07/21/2020 1328   Time Frame  by discharge at 07/21/2020 1328   Progress/Outcome  goal ongoing at 07/23/2020 1345   Stairs Goal 1   Activity/Assistive Device  ascending stairs, descending stairs, using handrail, left, using handrail, right at 07/21/2020 1328   [place]  modified independence at 07/21/2020 1328   Number of Stairs  12 at 07/21/2020 1328   Time Frame  by discharge at 07/21/2020 1328   Progress/Outcome  goal ongoing at 07/23/2020 1345

## 2020-07-24 NOTE — NURSING NOTE
Reviewed discharge instructions w/patient at bedside. Reviewed new medications, how to take medications and possible side effects. Pt reports that the [facility] pharmacy in [place] is much closer for him, requesting rx be sent there. Dr. Keller made aware. Reinforced follow up appointments and that labs need to be repeated on 7/27. Patient was provided rx for labs and speech therapy. Patient verbalizes understanding of f/u appointments. Patient reports having all belongings, refer to patient belongings flow sheet. Patient in stable condition.

## 2020-07-24 NOTE — DISCHARGE SUMMARY
Hospital Medicine Service -  Inpatient Discharge Summary        BRIEF OVERVIEW   Admission Date: 7/17/2020  Discharge Date: 7/24/2020    Admitting Provider: Rebeca Moore MD  Attending Provider: No att. providers found Attending phys phone: N/A    PCP: Herb Dailey [phone]    Consults During Admission:  IP CONSULT TO NEUROLOGY  IP CONSULT TO CARDIOLOGY  IP CONSULT TO GASTROENTEROLOGY  IP CONSULT TO NUTRITION SERVICES  IP CONSULT TO NUTRITION SERVICES  IP CONSULT TO PHYSICAL MEDICINE REHAB     DISCHARGE DIAGNOSES      Primary Discharge Diagnosis  Cerebrovascular accident (CVA) (CMS/[facility])    Secondary Discharge Diagnoses  Active Hospital Problems    Diagnosis Date Noted   • Cerebrovascular accident (CVA) (CMS/[facility]) 07/17/2020     Priority: High   • B12 deficiency 07/19/2020   • Dysphagia as late effect of cerebrovascular accident (CVA) 07/17/2020   • Type 2 diabetes mellitus (CMS/[facility]) 07/17/2020      Resolved Hospital Problems    Diagnosis Date Noted Date Resolved   • ARF (acute renal failure) (CMS/[facility]) 07/18/2020 07/20/2020       Problem List on Day of Discharge  * Cerebrovascular accident (CVA) (CMS/[facility])  Assessment & Plan  -S/p recent CVA with residual L facial droop and dysphagia  -MRI 7/16 in Care Everywhere: Punctate focus of diffusion restriction post/lateral L medulla w/ enhancement, c/w small, recent/acute lacunar infarction, likely a few days. Mild-mod chronic microangiopathy elsewhere.  -Head/neck MRA w/Mild focal stenoses of the proximal internal carotid arteries bilaterally, left slightly greater than right.  Intermediate length segment mild stenosis of the anterior cavernous through ophthalmic segment of the right internal carotid artery. No evidence of a hemodynamically significant intracranial stenosis or major vessel occlusion.  No evidence of aneurysm.  - TTE w/No obvious cardiac source of embolism identified    Plan:  Cont to monitor on telemetry  Cont w//ASA through PEG  tube. Per Neurology plavix not required  -per neurology cont w/lipitor 40mg qhs  - per neurology evaluation - stroke likely lacunar infarct. Low suspicion for embolic infarct noted  -Cardiology consulted - given low suspicion for embolic etiology, no further evaluation felt to be required at this time  -Neuro following - will f/u recs  -Speech, PT, OT  Aspiration precautions. Speech to repeat video swallow tomorrow  -PT/OT. PMR consulted - recommend outpt speech therapy at [facility] for continued management.  Given toleration of TEN will plan to d/c to home later today. 59 mins spent on d/c planning/paperwork.     B12 deficiency  Assessment & Plan  B12 low end of normal at 210    Plan:  Cont w/vitamin b12, 1000mcg daily    Type 2 diabetes mellitus (CMS/HCC)  Assessment & Plan  BGs remain stable while on TEN    Plan:  -Hold Metformin, cont w/SSI  - NPO for now, with TEN  - monitor w/routine accuchecks    Dysphagia as late effect of cerebrovascular accident (CVA)  Assessment & Plan  -Dysphagia x 2 weeks s/p CVA.  Lost 14 lbs due to inability to eat or drink  -Barium swallow/Video swallow in Care Everywhere: Severe pharyngeal dysphagia, PEG recommended  -s/p PEG tube placement on 7/20, Antral erythema and duodenal erosions. H.Pylori biopsies obtained. Biopsy results with fragments of antral and oxyntic mucosa with no specific pathological change. no intestinal metaplasia or features of   Helicobacter pylori gastritis seen.  - currently tolerating TEN bolus feeding    Plan:  Cont w/TEN bolus feeds Will require tube feeds greater than 90 days.   Cont w/PPI  Nutrition consulted - will f/u recs  Monitor electrolytes to include K, Mg, Phos and replete as required   F/u biopsy results  Aspiration precautions, NPO for now  Speech consulted - pt to remain NPO, cont w/therapy. Video swallow to be done as outpt  GI signed off.        SUMMARY OF HOSPITALIZATION      Presenting Problem/History of Present Illness  89 y.o. year-old male  with past medical history of diabetes mellitus type II, former tobacco use presented to [facility] ER on 7/17/2020 with cereberovascular accident that was discovered on outpatient Brain MRI performed for further evaluation of two week history of dysphagia.  Please see history and physical for complete details on presentation. Upon arrival to the ER labs were notable for BUN 33, Cr 1.5. COVID 19 was negative. EKG with normal sinus rhythm, left anterior fascicular block, and right bundle branch block with left ventricular hypertrophy. Possible Anteroseptal infarct , age undetermined. Chest x-ray with no acute cardiopulmonary disease.     Hospital Course  Patient was ultimately admitted to the telemetry unit to the medicine service for further evaluation and management. The remainder of hospitalization is addressed via problem:    # Cerebrovascular Accident  Upon admission, Neurology and Cardiology were both consulted. Review of brain MRI performed 7/16/20 from outside network revealed punctate focus of diffusion restriction in the posterior lateral aspect of the left medulla with associated enhancement indicative of a small, recent/acute lacunar infarction, likely a few days in age given the associated enhancement. Mild to moderate chronic microangiopathy elsewhere. Per Neurology recommendations Head and Neck MRA were performed results of which revealed mild focal stenoses of the proximal internal carotid arteries bilaterally, left slightly greater than right. Intermediate length segment mild stenosis of the anterior cavernous through ophthalmic segment of the right internal carotid artery. no evidence of a hemodynamically significant intracranial stenosis or major vessel occlusion.  No evidence of aneurysm. TTE was also performed results of which were negative for any acute cardioembolic source and telemetry did not reveal any major events. Per specialists' evaluation acute CVA was most likely secondary to  lacunar infarct. Overall there was low suspicion for an embolic etiology. As a result, no further evaluation to include CHRISTIE was felt to be required. Patient was initiated on aspirin and high intensity statin for further stroke prevention. Ramipril was also eventually initiated given intermittently high blood pressures noted throughout hospitalization. Patient's left sided facial weakness and dysphagia persisted throughout hospitalization with limited improvement, however no additional episodes were noted.       # Oropharyngeal dysphagia  Regarding patient's oropharyngeal dysphagia, symptoms were felt most likely to be secondary to cerebrovascular accident and upon admission speech therapy was consulted. Upon review of outpatient Videofluoroscopic Swallow Study performed on 7/17/20, patient was noted to have mild oral dysphagia and severe pharyngeal dysphagia. NPO diet and alternative long-term source of nutrition/hydration was recommended. As a result, Gastroenterology was consulted and patient ultimately underwent EGD with PEG tube placement for further management. EGD was notable for erythematous mucosa in the antrum and duodenal erosions without bleeding. Biopsies were performed results of which showed no specific pathological change, intestinal metaplasia or features of Helicobacter pylori gastritis. Per Gastroenterology recommendations patient was initiated on Protonix for further management duodenal ulcers. Following placement of PEG tube, TEN was initiated and gradually advanced throughout hospitalization. Electrolytes were monitored given potential for refeeding syndrome and repleted as necessary. Speech therapy also continued to work with patient throughout hospitalization. Limited improvement was noted in dysphagia, however patient tolerated TEN without significant complications. Patient was ultimately transitioned to bolus feeding and no further intervention was required.     The remainder of patient's  hospitalization was uneventful and patient was deemed stable for discharge to home with home health care. Patient is to be continued on aspirin and lipitor 40mg qhs for further stroke prevention. Patient was also continued on ramipril. Protonix is to be continued given duodenal ulcers noted on EGD. Lastly, Vitamin b12 was initiated given low values incidentally noted on lab work. Script was provided for outpatient speech therapy. Patient is to remain NPO and continue with TEN until otherwise instructed by speech therapy and specialists. Patient is to follow-up Neurology within 4 weeks for continued management of stroke. Patient is to follow-up with Cardiology within 2-4 weeks for continued management of hypertension and stroke. Patient is to follow-up with Gastroenterology within two weeks for PEG tube reassessment and management of duodenal ulcer. Lastly patient is to call and schedule an appointment with primary care physician within 5-7 days for hospital follow-up. Repeat labs (CBC, BMP, Mg, Phos) are recommended to be performed on Monday, 7/27/20 for continued monitoring of blood counts and electrolytes. Labs to be faxed to primary care physician for review.     Exam on Day of Discharge  Physical Exam  GEN: well-developed and well-nourished; not in acute distress  HEENT: normocephalic; atraumatic  NECK: no JVD; no bruits  CARDIO: regular rate and rhythm; no murmurs, rubs or gallops  RESP: clear to auscultation bilaterally; no rales, rhonchi, or wheezes  ABD: soft, non-distended, non-tender, dec bowel sounds, PEG tube in place, c.d.i  EXT: no cyanosis, clubbing, or edema  SKIN: clean, dry, warm, and intact  MUSCULOSKELETAL: no injury or deformity  NEURO: alert and oriented x 3; left facial droop noted otherwise no major focal deficits noted  BEHAVIOR/EMOTIONAL: appropriate; cooperative  DISCHARGE MEDICATIONS      Medication List      START taking these medications    atorvastatin 40 mg tablet  Commonly known as:   LIPITOR  1 tablet (40 mg total) by peg tube route daily.  Dose:  40 mg     cyanocobalamin 1,000 mcg tablet  Commonly known as:  VITAMIN B12  1 tablet (1,000 mcg total) by peg tube route daily.  Dose:  1,000 mcg     pantoprazole 40 mg granules DR for susp in packet  40 mg by g-tube route daily.  Dose:  40 mg     potassium, sodium phosphates 280-160-250 mg powder in packet  Commonly known as:  PHOS-NAK  1 packet by peg tube route daily for 3 days.  Dose:  1 packet     ramipriL 1.25 mg capsule  Commonly known as:  ALTACE  1 capsule (1.25 mg total) by peg tube route daily.  Dose:  1.25 mg        CONTINUE taking these medications    aspirin 81 mg enteric coated tablet  Take 81 mg by mouth every morning.  Dose:  81 mg     metFORMIN 500 mg tablet  Commonly known as:  GLUCOPHAGE  Take 500 mg by mouth daily with breakfast.  Dose:  500 mg     MULTIVITAMIN 50 PLUS tablet  Take 1 tablet by mouth every morning.  Dose:  1 tablet  Generic drug:  multivitamin-minerals-lutein        STOP taking these medications    clopidogreL 75 mg tablet  Commonly known as:  PLAVIX           Karl Park   Home Medication Instructions AVTAR:[id number]    Printed on:07/24/20 1929   Medication Information                      aspirin 81 mg enteric coated tablet  Take 81 mg by mouth every morning.             atorvastatin (LIPITOR) 40 mg tablet  1 tablet (40 mg total) by peg tube route daily.             cyanocobalamin (VITAMIN B12) 1,000 mcg tablet  1 tablet (1,000 mcg total) by peg tube route daily.             metFORMIN (GLUCOPHAGE) 500 mg tablet  Take 500 mg by mouth daily with breakfast.               multivitamin-minerals-lutein (MULTIVITAMIN 50 PLUS) tablet  Take 1 tablet by mouth every morning.             pantoprazole 40 mg granules DR for susp in packet  40 mg by g-tube route daily.             potassium, sodium phosphates (PHOS-NAK) 280-160-250 mg powder in packet  1 packet by peg tube route daily for 3 days.             ramipriL (ALTACE)  1.25 mg capsule  1 capsule (1.25 mg total) by peg tube route daily.                    PROCEDURES / LABS / IMAGING      Operative Procedures  None    Other Procedures  EGD with PEG tube placement performed on 7/20/20 by Dr. Robert Frankel    Pertinent Labs  As above.    Pertinent Imaging  Mri Angiogram Head Without Contrast    Result Date: 7/18/2020  IMPRESSION: 1.  Mild focal stenoses of the proximal internal carotid arteries bilaterally, left slightly greater than right. 2.  Intermediate length segment mild stenosis of the anterior cavernous through ophthalmic segment of the right internal carotid artery. 3.  No evidence of a hemodynamically significant intracranial stenosis or major vessel occlusion.  No evidence of aneurysm.     Mri Angiogram Neck Without Contrast    Result Date: 7/18/2020  IMPRESSION: 1.  Mild focal stenoses of the proximal internal carotid arteries bilaterally, left slightly greater than right. 2.  Intermediate length segment mild stenosis of the anterior cavernous through ophthalmic segment of the right internal carotid artery. 3.  No evidence of a hemodynamically significant intracranial stenosis or major vessel occlusion.  No evidence of aneurysm.     X-ray Chest 1 View    Result Date: 7/17/2020  IMPRESSION: No acute cardiopulmonary disease COMMENT:  AP portable erect view chest was performed.  The lungs are clear. There is no evidence of consolidation or pleural effusion. The heart and mediastinal structures are normal in size and contour.       OUTPATIENT  FOLLOW-UP / REFERRALS / PENDING TESTS      Outpatient Follow-Up Appointments            In 4 days [facility]        Please call and schedule an appointment with primary care physician, Dr. Herb Dailey within 5-7 days for hospital follow-up    Please call and schedule follow-up with Cardiology, Dr. Stephan Bauman within 2-4 weeks for continued management of hypertension and stroke.      Please call and schedule follow-up with Gastroenterology, Dr. Robert Frankel within two weeks for PEG tube reassessment and management of duodenal ulcer.    Please follow-up with Neurology, Dr. Gerardo Tubbs within 4 weeks for continued management of stroke.      Test Results Pending at Discharge  Unresulted Labs (From admission, onward)     Start     Ordered    07/24/20 0000  CBC and Differential     Question:  Which Provider would you like to Cc?  Answer:  ZYOA SMITH    07/24/20 0955    07/24/20 0000  Basic metabolic panel     Question:  Which Provider would you like to Cc?  Answer:  ZOYA SMITH    07/24/20 0955    07/24/20 0000  Magnesium     Question:  Which Provider would you like to Cc?  Answer:  ZOYA SMITH    07/24/20 0955    07/24/20 0000  Phosphorus     Question:  Which Provider would you like to Cc?  Answer:  ZOYA SMITH    07/24/20 0955                Important Issues to Address in Follow-Up  Repeat labs (CBC, BMP, Mg, Phos) are recommended to be performed on Monday, 7/27/20 for continued monitoring of blood counts and electrolytes. Labs to be faxed to Dr. Zoya Smith for review.   DISCHARGE DISPOSITION      Disposition: Home     Code Status At Discharge: Prior    Physician Order for Life-Sustaining Treatment Document Status      No documents found

## 2020-07-24 NOTE — PATIENT CARE CONFERENCE
Care Progression Rounds Note  Date: 7/24/2020  Time: 10:28 AM     Patient Name: Karl Park     Medical Record Number: [id number]   YOB: 1928  Sex: Male      Room/Bed: 3025    Admitting Diagnosis: Cerebrovascular accident (CVA), unspecified mechanism (CMS/HCC) [I63.9]   Admit Date/Time: 7/17/2020  5:32 PM    Primary Diagnosis: Cerebrovascular accident (CVA) (CMS/HCC)  Principal Problem: Cerebrovascular accident (CVA) (CMS/HCC)    GMLOS: 3.0  Anticipated Discharge Date: 7/24/2020    AM-PAC  Mobility Score: 20    Discharge Planning:  Living Arrangements: house    Barriers to Discharge:  Barriers to Discharge: None  Comment: d/c    Participants:  social work/services, , nursing, physical therapy

## 2020-07-24 NOTE — PROGRESS NOTES
BP running low at night. Getting PEG tube feed.  Let's decrease ramipril to 1.25 daily.  No AF.  No CP, dizziness.  Comfortable. No edema.    Signing off. Please call with questions.      Jaiden Bauman MD FACC

## 2020-07-24 NOTE — DISCHARGE INSTRUCTIONS
ADDITIONAL INSTRUCTIONS:  Repeat labs (CBC, BMP, Mg, Phos) are recommended to be performed on Monday, 7/27/20 for continued monitoring of blood counts and electrolytes. Labs to be faxed to Dr. Herb Dailey for review.     ACTIVITY:  Activity as tolerated.    MEDICATIONS:  Take all medications as prescribed.    Call your doctor or return to the Emergency Room of you develop any of the following:  Return of your original symptoms  Persistent nausea and vomiting   Chest Pain  Shortness of Breath  Fever above 100.4 degrees or chills  Difficulty or inability to urinate  Change in mental status from baseline          Referral sent to [facility] in [facility] for a visiting nurse.someone from the home care agency will call to schedule a home visit.their phone number is [phone].

## 2020-07-24 NOTE — NURSING NOTE
Received phone call from pts daughter. Answered multiple questions relating to discharge planning. All questions answered to her satisfaction. Daughter requesting to talk to Michelle Pacheco and Dr. Keller, both where made aware.

## 2020-07-24 NOTE — PLAN OF CARE
Problem: Adult Inpatient Plan of Care  Goal: Plan of Care Review  Outcome: Progressing  Flowsheets (Taken 7/24/2020 6755)  Progress: improving  Plan of Care Reviewed With: patient  Outcome Summary: cont with bolus feedings via PEG tube. PEG site cdi, PEG flushed with ease, no residual noted. verbalized understanding of POC. fall and safety precautions maintained

## 2020-07-24 NOTE — PLAN OF CARE
Problem: Adult Inpatient Plan of Care  Goal: Plan of Care Review  7/24/2020 1232 by Shabbir Garcia PTA  Outcome: Progressing  Flowsheets (Taken 7/24/2020 1232)  Progress: improving  Plan of Care Reviewed With: patient  Outcome Summary: PT session completed.  [facility] mob: 19.  Pt requires RW and (S) for trxfs and gait; CS step entry w/ RW sppt; reviewed stairs and use of rail and cane as available.  Progressing w/ PT; he presents w/ deficits in balance, and safety.  Cont PT.

## 2020-07-28 ENCOUNTER — HOSPITAL ENCOUNTER (OUTPATIENT)
Dept: SPEECH THERAPY | Facility: REHABILITATION | Age: 85
Setting detail: THERAPIES SERIES
Discharge: HOME | End: 2020-07-28
Attending: INTERNAL MEDICINE
Payer: COMMERCIAL

## 2020-07-28 DIAGNOSIS — I69.321 DYSPHASIA FOLLOWING CEREBROVASCULAR ACCIDENT (CVA): ICD-10-CM

## 2020-07-28 DIAGNOSIS — I63.9 CEREBROVASCULAR ACCIDENT (CVA), UNSPECIFIED MECHANISM (CMS/HCC): ICD-10-CM

## 2020-07-28 PROCEDURE — 92610 EVALUATE SWALLOWING FUNCTION: CPT | Mod: GN

## 2020-07-28 RX ORDER — OMEPRAZOLE 20 MG/1
20 CAPSULE, DELAYED RELEASE ORAL
COMMUNITY

## 2020-07-28 NOTE — PROGRESS NOTES
Referring Provider: By co-signing this Plan of Care (POC), you agree with the planned services and interventions recommended by the therapist.     NAME: __________________________________ DATE: ___________________    [facility] Fax: [phone]      SLP EVALUATION FOR OUTPATIENT THERAPY    Patient: Karl Park   MRN: [id number]  : 1928 89 y.o.  Referring Physician: Edel Keller DO  Date of Visit: 2020      Certification Dates:  20 through 20    Recommended Frequency & Duration:  2 times/week for up to 8 weeks     Diagnosis:   1. Cerebrovascular accident (CVA), unspecified mechanism (CMS/HCC)    2. Dysphasia following cerebrovascular accident (CVA)        Chief Complaints:   Chief Complaint   Patient presents with   • Swallow/feeding Problem       Precautions:      Past Medical History:   Past Medical History:   Diagnosis Date   • CVA (cerebral vascular accident) (CMS/HCC)    • Type 2 diabetes mellitus (CMS/HCC)        Past Surgical History:   Past Surgical History:   Procedure Laterality Date   • TESTICLE SURGERY           LEARNING ASSESSMENT    Assessment completed: Yes    Learner name:  Karl    Relationship: Patient    Learning Barriers:  Learning barriers: No Barriers    Preferred Language: English     Needed: No    Learning New Concepts: Listening, Reading and Demonstration      CO-LEARNER ASSESSMENT:    Completed: Yes:   Co-Learner name:  Corrina    Relationship: Family    Learning Barriers:  Learning barriers: No Barriers    Preferred Language: English     Needed: No    Learning New Concepts: Listening, Reading and Demonstration            OBJECTIVE MEASUREMENTS/DATA:    Eval Assessment   Evaluation Assessment and Plan - 20 1413        Evaluation Assessment and Plan    Plan of Care reviewed and patient/family in agreement  Yes     Comments  Discussed POC with patient and his daughter. Pt was inquiring about Telehealth options. Need to  discuss that Telehealth is not available for dysphagia.     Speech Pathology Potential/Prognosis  adequate, monitor progress closely;re-evaluate goals as necessary     Problem List  Impaired swallowing;Motor control impaired     Demonstrates Need for Referral to Another Service  physical therapist     Actions taken  Discussed recommendation for PT with patient and his daughter Corrina. They were interested in pursuing a script for physical therapy.     Clinical Assessment  Pt presents with severe pharyngeal dysphagia consistent with the diagnosis provided by [facility] on 7/17/20 and secondary to brainstem stroke. Pt was seen on 7/17/20 to have no epiglottic inversion, reduced laryngeal excursion, delayed swallow initiation, and aspiration on all consistencies administered (honey, nectar, and thin). Pt is currently receiving all medication and nutrition via PEG tube. Pt would likely benefit from therapy targeting laryngeal excursion, pharyngeal strength, and base of tongue strength.      Planned Services  CPT 56841 Swallowing dysfunction and/or Oral Func Feeding (Treatment of swallowing and/or oral feeding function)         General Information   General Information - 07/28/20 1114        Session Details    Document Type  initial evaluation     Mode of Treatment  individual therapy;speech language pathology     Patient/Family/Caregiver Comments/Observations  Pt's daughter Corrina was present for therapy.        Time Calculation    Start Time  1102     Stop Time  1200     Time Calculation (min)  58 min        General Information    Onset of Illness/Injury or Date of Surgery  07/01/20     Pertinent History of Current Functional Problem  Pt is attending speech therapy for help with swallowing. Pt's daughter stated that the onset of the stroke may have been 7/1 or 7/2. Pt went to the ER on 7/3 and he was diagnosed with pneumonia and bells palsy. Pt was given medication for that, which he couldn't swallow. Pt went to see his PCP on  7/7/20 who referred him to a neurologist. Pt saw the PA and he was tested for myasthenia gravis. Pt would cough throughout the whole week every time he ate or drank. Pt was seen by neurologist 7/16 for the MRI and swallow study.  MRI showed that he had a brainstem stroke. Pt had a swallow study which showed asp on all consistancies and absent epiglottic inversion. Pt had a peg tube placed on 7/20. Pt was also admitted overnight for IV fluids.          Pain and Vitals   Pain and Vitals - 07/28/20 1107        Pain Assessment    Currently in pain  No/Denies        Pain Interventions    Intervention  none     Post Intervention Comments  none         Type and Frequency:   SLP - 07/28/20 1348        SLP Frequency and Duration    Frequency of treatment  2 times/week     Speech Duration  8 weeks     SLP Cert From  07/28/20     SLP Cert To  09/26/20     Date SLP POC was sent to provider  07/28/20     Signed SLP Plan of Care received?   No         PLOF   Prior Level of Function - 07/28/20 1349        OTHER    Previous level of function  Pt was living ind with wife, regular/thin diet.         Falls Assessment   Falls Assessment - 07/28/20 1107        Initial Falls Assessment    One or more falls in the last year  No         NIS   NIS Swallowing - 07/28/20 1350        General Swallowing Observations    Current Diet/Method of Nutritional Intake (General Swallowing Observations, NIS)  gastrostomy tube (PEG);other (see comments)    No PO trials except with SLP recommended from [facility] on 7/17/2020    Signs/Symptoms of Aspiration (Current Diet)  cough     Respiratory Support  none        Non-Instrumental Swallowing Eval (NIS)    Non-Instrumental Swallow Eval  NIS Performed (Group)        Food and Liquid Trials (NIS)    Patient Positioning  upright in chair     Oral Intake/Feeding Performance  independent/appropriate self-feeding skills     Thin Liquids  impaired;patient-controlled amounts;use of teaspoon     Comment, Thin Liquids  1  "ice chip presented. Pt with difficulties initiating a swallow, lingual pumping noted. Pt was asked to masticate, but did not. Pt with decreased laryngeal elevation during swallow and delayed cough noted. Pt reported that he spits out his saliva/mucous occasionally.      Oral Preparatory Phase of Swallow  unable/difficult to assess    Only provided trial with ice-chips secondary to results from [facility]    Oral Phase of Swallow  lingual pumping     Comment, Oral Phase  Mastication wasn't assessed at [facility] secondary to only trials of liquids administered for safety purposes. Bolus formation and transfter were stated as \"functional\". Report states, \"Oral control appeared mildly reduced with premature spillage over the base of tongue.\"     Pharyngeal Phase of Swallow  coughing after swallow;decreased laryngeal elevation;uncoordinated swallow;delayed swallow reflex initiation;multiple swallows per bolus     Comment, Pharyngeal Phase  [facility] reports states \"severe pharyngeal dysphagia\", Mild delay with initiation, reduced laryngeal excursion superior and anteriorly, airway closure incomplete, \"epiglottis does not invert during swallow resulting in chronic penetration and aspiration with all consistencies\", \"bolus passage through the UES was significantly reduced resulting in residual pooling in the pyriforms and spillover into the laryngeal vestibule with aspiration after swallow\".     Esophageal Phase of Swallow  --    [facility] stated UES opening is reduced resulting in incomplete bolus passage and aspiration of pyriform residual after the swallow.        OTHER    Liquid Consistencies Evaluated  thin liquids         Functional Comm Measures   Functional Communication Measures - 07/28/20 1400        Functional Communication Measures    FCM: Swallowing  1-->Level 1           GOALS:    Goals     • swallowing      Short Term Goals Time Frame  Result  Comment/Progress    ?Pt. will tolerate trace PO trials without s/s of aspiration and " adequate oral management with min cues over 2 sessions.  4 weeks   New     Pt. will complete pharyngeal strengthening, laryngeal excursion, and base of tongue strengthening exercises to improve airway protection and bolus propulsion with min cues for accurate production with 90% accuracy  4 weeks   New     Pt. will verbalize understanding of current swallow status and risks of aspiration/choking with min cues.  2 weeks   New       Long Term Goals   Time Frame  Result  Comment/Progress    Pt. will tolerate the least restrictive diet level without complications or respiratory compromise with min cues/supervision.    NOMS swallowing goal: 4  8 weeks   New    Baseline swallowing NOMS: 1               EVALUATION AND ASSESSMENT:    Evaluation Assessment and Plan - 07/28/20 1413        Evaluation Assessment and Plan    Plan of Care reviewed and patient/family in agreement  Yes     Comments  Discussed POC with patient and his daughter. Pt was inquiring about Telehealth options. Need to discuss that Telehealth is not available for dysphagia.     Speech Pathology Potential/Prognosis  adequate, monitor progress closely;re-evaluate goals as necessary     Problem List  Impaired swallowing;Motor control impaired     Demonstrates Need for Referral to Another Service  physical therapist     Actions taken  Discussed recommendation for PT with patient and his daughter Corrina. They were interested in pursuing a script for physical therapy.     Clinical Assessment  Pt presents with severe pharyngeal dysphagia consistent with the diagnosis provided by [facility] on 7/17/20 and secondary to brainstem stroke. Pt was seen on 7/17/20 to have no epiglottic inversion, reduced laryngeal excursion, delayed swallow initiation, and aspiration on all consistencies administered (honey, nectar, and thin). Pt is currently receiving all medication and nutrition via PEG tube. Pt would likely benefit from therapy targeting laryngeal excursion, pharyngeal  strength, and base of tongue strength.      Planned Services  CPT 11495 Swallowing dysfunction and/or Oral Func Feeding (Treatment of swallowing and/or oral feeding function)         This 89 y.o. year old male presents to  with above stated diagnosis. Speech Therapy evaluation reveals Impaired swallowing, Motor control impaired resulting in nutrition/dietary limitations. Karl Park will benefit from skilled  services to address limitation, work towards rehab and patient goals and maximize PLOF of chosen ADLs.     Planned Services:  Speech therapies will focus on CPT 73171 Swallowing dysfunction and/or Oral Func Feeding (Treatment of swallowing and/or oral feeding function),

## 2020-07-28 NOTE — OP SLP TREATMENT LOG
Short Term Goals Progress on Goal Current Session   ?Pt. will tolerate trace PO trials without s/s of aspiration and adequate oral management with min cues over 2 sessions.      Pt. will complete pharyngeal strengthening, laryngeal excursion, and base of tongue strengthening exercises to improve airway protection and bolus propulsion with min cues for accurate production with 90% accuracy      Pt. will verbalize understanding of current swallow status and risks of aspiration/choking with min cues.      EDUCATION     Other

## 2020-07-30 ENCOUNTER — TRANSCRIBE ORDERS (OUTPATIENT)
Dept: SCHEDULING | Facility: REHABILITATION | Age: 85
End: 2020-07-30

## 2020-07-30 DIAGNOSIS — I69.320 APHASIA FOLLOWING CEREBRAL INFARCTION: Primary | ICD-10-CM

## 2020-07-30 DIAGNOSIS — I69.393 ATAXIA FOLLOWING CEREBRAL INFARCTION: ICD-10-CM

## 2020-07-31 ENCOUNTER — HOSPITAL ENCOUNTER (OUTPATIENT)
Dept: SPEECH THERAPY | Facility: REHABILITATION | Age: 85
Setting detail: THERAPIES SERIES
Discharge: HOME | End: 2020-07-31
Attending: INTERNAL MEDICINE
Payer: COMMERCIAL

## 2020-07-31 ENCOUNTER — HOSPITAL ENCOUNTER (OUTPATIENT)
Dept: PHYSICAL THERAPY | Facility: REHABILITATION | Age: 85
Setting detail: THERAPIES SERIES
Discharge: HOME | End: 2020-07-31
Attending: FAMILY MEDICINE
Payer: COMMERCIAL

## 2020-07-31 DIAGNOSIS — I69.321 DYSPHASIA FOLLOWING CEREBROVASCULAR ACCIDENT (CVA): ICD-10-CM

## 2020-07-31 DIAGNOSIS — I69.393 ATAXIA FOLLOWING CEREBRAL INFARCTION: Primary | ICD-10-CM

## 2020-07-31 DIAGNOSIS — I63.9 CEREBROVASCULAR ACCIDENT (CVA), UNSPECIFIED MECHANISM (CMS/HCC): Primary | ICD-10-CM

## 2020-07-31 DIAGNOSIS — I69.320 APHASIA FOLLOWING CEREBRAL INFARCTION: ICD-10-CM

## 2020-07-31 PROCEDURE — 97162 PT EVAL MOD COMPLEX 30 MIN: CPT | Mod: GP

## 2020-07-31 PROCEDURE — 92526 ORAL FUNCTION THERAPY: CPT | Mod: GN

## 2020-07-31 ASSESSMENT — BALANCE ASSESSMENTS: TOTAL SCORE: 42

## 2020-07-31 NOTE — Clinical Note
[address] [place] [address]  [phone]      Dear DR. Dailey,      Thank you for this referral. Please review the attached notes and plan of care for your approval.  Please contact our department with any questions.     Sincerely,     Ana Rosa Winston, PT      Referring Provider: By co-signing this Plan of Care (POC), you agree with the planned services and interventions recommended by the therapist.       NAME: __________________________________ DATE: ___________________        [facility] Fax: [phone]        PT EVALUATION FOR OUTPATIENT THERAPY    Patient: Karl Park    MRN: [id number]  : 1928 89 y.o.   Referring Physician: Herb Dailey MD  Date of Visit: 2020      Certification Dates:   20 through 10/29/20    Recommended Frequency & Duration:  2 times/week for up to 3 months     Diagnosis:   1. Ataxia following cerebral infarction    2. Aphasia following cerebral infarction        Chief Complaints:   Chief Complaint   Patient presents with   • Difficulty Walking   • Balance Deficits   • Abnormality Of Gait     Requires a walker since his stroke, when previously amb without AD   • Dec Strength     RLE with evidence of dec ROM and arthritic changes R knee   • Decreased Endurance       Precautions: NPO, fall, other (see comments)(PEG tube)    Past Medical History:   Past Medical History:   Diagnosis Date   • CVA (cerebral vascular accident) (CMS/HCC)    • Type 2 diabetes mellitus (CMS/[facility])        Past Surgical History:   Past Surgical History:   Procedure Laterality Date   • TESTICLE SURGERY           LEARNING ASSESSMENT    Assessment completed: Yes    Learner name:  Karl    Relationship: Patient    Learning Barriers:  Learning barriers: Physical    Preferred Language: English     Needed: No    Learning New Concepts: Listening, Reading, Demonstration and Pictures/Video      CO-LEARNER ASSESSMENT:    Completed: Yes:   Co-Learner name:  Corrina    Relationship:  Family    Learning Barriers:  Learning barriers: No Barriers    Preferred Language: English     Needed: No    Learning New Concepts: Listening, Reading, Demonstration and Pictures/Video            OBJECTIVE MEASUREMENTS/DATA:    Eval Assessment    Evaluation Assessment and Plan - 07/31/20 1766        Evaluation Assessment and Plan    Plan of Care reviewed and patient/family in agreement  Yes     System Pathology/Pathophysiology Noted  musculoskeletal;neuromuscular     Functional Limitations in Following Categories (PT Eval)  home management;community/leisure     Rehab Potential/Prognosis  good, to achieve stated therapy goals     Problem List  decreased endurance;decreased ROM;decreased strength;impaired balance;impaired sensation;other (see comments)    gait dysfunction    Clinical Assessment  This pleasant 89 yo male present today deconditioned s/p recent CVA, temporarily living with daughter locally for support and proximity to therapy.  Impairments also include mild RLE weakness, decreased R knee ext ROM, mild sensory changes anterior R knee and distal thigh (? r/t CVA vs. radicular sx),  all affecting standing posture, gait mechanics, and balance reactions.  JIMENEZ 42/56, gait speed .79 m/s, TUG 17 sec, all reflecting mild to moderate falls risk.  Currently using a rolling walker for both home and community ambulation at mod I level.  Pt denies knee pain o diagnosis of R knee arthritis, however knee shows evidence of arthritic changes that may be contributing to ROM deficits.  Pt will benefit from skilled PT to address these limitations in an effort to restore prior level of function (driving, independent amb without AD, house and yard maintenance, return to living I with wife in [place]).  Pt also receiving speech therapy for dysphagia and is currently NPO with PEG tube.     Planned Services  CPT 52335 Neuromuscular Reeducation;CPT 03157 Gait training;CPT 97929 Manual therapy;CPT 77806 Therapeutic  Massage;CPT 19866 Electrical stimulation ATTENDED;CPT 85511 Electrical stimulation UNATTENDED;CPT 38881 Therapeutic activities;CPT 49027 Therapeutic exercises;CPT 17051 Hot/Cold Packs therapy         General Info   General Information - 07/31/20 1738        Session Details    Document Type  initial evaluation     Mode of Treatment  individual therapy;physical therapy     Patient/Family/Caregiver Comments/Observations  Pt's daughter Corrina present for evaluation.  Pt ambulated in with RW.  No complaints of pain.       OP Specialty  Neuro        Time Calculation    Start Time  1500     Stop Time  1600     Time Calculation (min)  60 min        General Information    Onset of Illness/Injury or Date of Surgery  07/16/20    dx with CVA on 7/16 but daughter suspects initially happened on 7/2-3 (see HPI)    Referring Physician  Herb Dailey     Pertinent History of Current Functional Problem  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  HPI:  Pt's daughter stated that the onset of the stroke may have been 7/1 or 7/2. Pt went to the ER on 7/3 and he was diagnosed with pneumonia and bells palsy. Pt was given medication for that, which he couldn't swallow. Pt went to see his PCP on 7/7/20 who referred him to a neurologist. Pt saw the PA and he was tested for myasthenia gravis. Pt would cough throughout the whole week every time he ate or drank. Pt was seen by neurologist 7/16 for the MRI and swallow study.  MRI showed that he had a brainstem stroke. Pt had a swallow study which showed asp on all consistencies and absent epiglottic inversion. Pt had a peg tube placed on 7/20. Pt was also admitted overnight for IV fluids.       Limitations/Impairments  swallowing     Existing Precautions/Restrictions  NPO;fall;other (see comments)    PEG tube        Pain and Vitals   Pain/Vitals - 07/31/20 1740        Pain Assessment    Currently in pain  No/Denies    no pain, but reports a  "tingling sensation in anterior knee/thigh, onset approx 4 days into hospital stay.  Pt feels it was related to the bed he slept in.  PCP suggests possible sciatica.         Pre Activity Vital Signs    BP  126/66     BP Location  Left upper arm     BP Method  Manual     Patient Position  Sitting        Pain Intervention    Intervention   monitored for any sign of R knee discomfort, which pt denies.  Daughter seems to remember that pt had complained about R knee pain limiting walking tolerance prior to recent CVA  (pt has no recollection of this)         Falls Assessment    Falls - 07/31/20 1747        Initial Falls Assessment    One or more falls in the last year  No         Living Environment    Living Environment - 07/31/20 1745        Living Environment    Living Environment Comment  Lives with wife in [place], but currently stayiing indefinitely with daughter, Corrina, who is local, to be close to therapy services.  Karl and his wife are living in their daughter's in law suite, 1 arvind, 1 step up/down into living area.       Transportation Concerns  car, none         PLOF:   Prior Level of Function - 07/31/20 1746        OTHER    Previous level of function  Living with wife in [place], I without AD, + , actively taking care of home/property;  enjoys [employer], retired from job with [employer] company.           Type and Frequency:   PT - 07/31/20 1743        Physical Therapy    Physical Therapy  Neuro        PT Plan    Frequency of treatment  2 times/week     PT Duration  3 months     PT Cert From  07/31/20     PT Cert To  10/29/20     Date PT POC was sent to provider  07/31/20     Signed PT Plan of Care received?   No         Special Tests    Special Tests - 07/31/20 1756        Outcome measures tracking    Outcome measure used:  ABC confidence scale:  1390/1600 (Pt reports \"100%\" confidence with all mobility except standing on chair to reach for something overhead 0%, and 90% with escalator without UE " support, 0% confidence on icy surfaces.   This may change as pt works in therapy without AD - appears less confident with overall mobility without AD         Sensory Tests   Sensory Testing - 07/31/20 1759        Sensory Assessment (Somatosensory)    Sensory Subjective Reports  (S) tingling;other (see comments)    Sensation intact except tingling anterior distal thigh and knee.          ROM:  R knee ext -15a, -10p;  SLR approx 50-60 B;  Dec hip ext with B bridge, approx +5-10 in sidelying;  Ankles approx +5 B per visual    Edema:  Mild R ankle     MMT   Manual Muscle Tests - 07/31/20 1747        RIGHT: Upper Extremity Manual Muscle Test Assessment    Right UE MMT comments:  grossly WNL 5/5 t/o BUE per quick screen        RIGHT: Lower Extremity Manual Muscle Test Assessment    Right LE MMT comments:  overall slightly weaker RLE vs L.       Hip Flexion gross movement  (4+/5) good plus     Hip Extension gross movement  --    I B bridge 3/4 range limited by tight hip flexers    Hip Abduction gross movement  (4-/5) good minus     Knee Flexion strength  (4/5) good     Knee Extension strength  (4/5) good     Ankle Dorsiflexion gross movement  (5/5) normal     Ankle Inversion gross movement  (5/5) normal     Ankle Eversion gross movement  (5/5) normal        LEFT: Lower Extremity Manual Muscle Test Assessment    Left LE MMT comments:  grossly 5/5 t/o         Transfers   Transfers - 07/31/20 1749        Bed Mobility    Comment (Bed Mobility)  mod I supine to/from sit on mat with extra time.  Reports mod I at home in queen sized bed        Transfers    Comment  STS and SPT with RW mod I;  S without AD.  Uses UEs for pushoff         Gait and Mobility   Gait and Mobility - 07/31/20 1750        Gait Training    [place], Gait  modified independence     Variable surfaces  Flat surface     Gait surface comments  tiled indoor     Assistive Device  walker, front-wheeled;none    Primarily using RW at this time;  assessed without  AD for short distances    Distance in Feet  200 feet     Comment  stooped posture;  wide LEI; dec TKE R at IC/heel strike and t/o stance phase, dec R stance time and apparent dec confidence in stance control R.    Looks antalgic, though pt denies R knee pain, only reports tingly feeling anterior knee and thigh.     6 Minute Walk Test  TBA     Gait Speed (m/sec)  0.79 m/sec    .79 m/s with RW mod I CGS;  .95 m/s without AD CGS with S       Stairs Assessment    Comment  reports mod I with RW up/down 1 step in in law suite.           Neuromuscular/Motor   Neuromuscular/Motor - 07/31/20 1800        Motor Skills    Motor Skills  coordination;muscle tone     Coordination  WNL;finger to nose;heel to shin     Muscle Tone  WNL         Balance/Posture   Balance and Posture - 07/31/20 1542        Balance Assessment    Balance Test Results (Other)  Timed Up and Go Test (TUG)        Postural Deviations    Comment, Postural Deviations  Standing with R knee slightly flexed (lacking terminal ext active and passive        Juarez Balance Scale    Sitting to Standing  Able to stand without using hands and stabilize independently     Standing Unsupported  Able to stand safely for 2 minutes     Sitting with Back Unsupported but Feet Supported on Floor or on a Stool  Able to sit safely and securely for 2 minutes     Standing to Sitting  Sits safely with minimal use of hands     Transfers  Able to transfer safely definite need of hands     Standing Unsupported with Eyes Closed  Able to stand 10 seconds safely     Standing Unsupported with Feet Together  Able to place feet together independently and stand 1 minute safely     Reach Forward with Outstretched Arm While Standing  Can reach forward confidently 25 cm (10 inches)      Object from Floor from a Standing Position  Able to  slipper but needs supervision     Turning to Look Behind Over Left and Right Shoulders While Standing  Turns sideways only but maintains balance     harder to R    Turn 360 Degrees  Needs close supervision or verbal cueing    6 sec B, 10 steps    Place Alternate Foot on Step or Stool While Standing Unsupported  Able to complete greater than 2 steps needs minimal assist    8 steps Rogers     Standing Unsupported One Foot in Front  Able to place foot ahead independently and hold 30 seconds    R front    Standing on One Leg  Tries to lift leg unable to hold 3 seconds but remains standing independently     Jimenez Balance Score  42        Posture    Posture  postural deviations        Timed Up and Go Test    Results, Timed Up and Go Test (Balance)  17 sec with or without RW.  (mod I with RW,  S without AD)             Goals     • PT goals      Short Term Goals Time Frame Result Comment/Progress   Pt will amb mod I in household setting with SPC or no AD, including 1 step up/down in his in-law suite at daughter's home 4 weeks     Improve JIMENEZ score by at least 3 points (45/56) reflecting dec falls risk 4 weeks     TUG </= 14 sec reflecting lower falls risk 4 weeks     Improve CGS by at least .1 m/s without decline in gait quality 4 weeks     FGA >/= 15/30 with least restrictive AD 4 weeks     Consistent STS and SPT transfers without AD mod I 4 weeks         Long Term Goals Time Frame Result Comment/Progress   Pt will amb I at home without AD, in community without AD or with SPC as needed for more challenging terrain/more distracting environments 8-12 weeks       JIMENEZ >/= 50/56 reflecting lower falls risk 8-12 weeks     TUG completed safely in </= 12 sec with no decline in gait quality, balance with pivot turns 8-12 weeks     Gait speed 1.0 m/s maintaining gait quality 8-12 weeks     FGA >/= 20/30 8-12 weeks     Mod I reciprocal stairs with 1 HR, amb up/down curb step and ADA ramps without AD 8-12 weeks     I finalized HEP 8-12 weeks                         TREATMENT PLAN:  PT Neuro Exercises Current Session Time   THER ACT  TOTAL TIME FOR SESSION Not performed    Precautions NPO/PEG tube.  Pt is I with PEG tube feedings.  Working on swallow in speech.  L facial droop and slight L eye ptosis post stroke.   Transfer training Mod I with RW   Patient Education Reviewed POC and intention for 2x/week for at least 8 weeks.    THER EX TOTAL TIME FOR SESSION Not performed   STRETCHING    Stretching by patient GS, HS (lacking TKE R knee approx 10-15 deg ? OA); mod Héctor if needed.   CARDIOVASCULAR    Nu Step Warmup and inc as kaylee   Stationary Bike    Treadmill    Standing Ther-Ex    Supine Ther-Ex    NEURO RE-ED TOTAL TIME FOR SESSION Not performed   COORDINATION    POSTURAL RE-ED    PRE-GAIT ACTIVITIES  STS from mat, chairs, without UE support   BALANCE TRAINING     Sitting balance    Standing balance - static    Standing balance - dynamic Floor, progress to compliant surfaces  With pivot turns without AD (S)   Standing balance - varied surface airex  rocker   GAIT TRAINING TOTAL TIME FOR SESSION Not performed   Ambulation with AD  Complete 2 MWT without AD  Complete FGA    Goal:  Return to amb without AD    Dynamic gait     Stair negotiation Assess curb/ramp stairs.   Curb negotiation    Ramp negotiation    Outdoor ambulation    BWS/vector training    MANUAL TOTAL TIME FOR SESSION Not performed   Stretching by therapist/PROM    Mobilization     MODALITIES TOTAL TIME FOR SESSION Not performed   Ice    Heat    ATTENDED E-STIM TOTAL TIME FOR SESSION Not performed   Attended E-Stim                ASSESSMENT:    This 89 y.o. year old male presents to PT with above stated diagnosis. Physical Therapy evaluation reveals decreased endurance, decreased ROM, decreased strength, impaired balance, impaired sensation, other (see comments)(gait dysfunction) resulting in home management, community/leisure limitations. Karl Park will benefit from skilled PT services to address limitation, work towards rehab and patient goals and maximize PLOF of chosen ADLs.     Planned Services: The  patient's treatment will include CPT 43014 Neuromuscular Reeducation, CPT 69162 Gait training, CPT 51839 Manual therapy, CPT 77880 Therapeutic Massage, CPT 90623 Electrical stimulation ATTENDED, CPT 07867 Electrical stimulation UNATTENDED, CPT 45758 Therapeutic activities, CPT 21667 Therapeutic exercises, CPT 82780 Hot/Cold Packs therapy, .

## 2020-07-31 NOTE — PROGRESS NOTES
SLP DAILY NOTE FOR OUTPATIENT THERAPY    Patient: Karl Park   MRN: [id number]  : 1928 89 y.o.  Referring Physician: Edel Keller DO  Date of Visit: 2020      Certification Dates: 20 through 20    Diagnosis:   1. Cerebrovascular accident (CVA), unspecified mechanism (CMS/HCC)    2. Dysphasia following cerebrovascular accident (CVA)        TODAY'S VISIT:    History/Vitals/Pain/Encounter Info - 20 1405        Injury History/Precautions/Daily Required Info    Document Type  daily treatment     Patient/Family/Caregiver Comments/Observations  Pt's daughter Corrina present for first half of the session (prior to demonstration of exercises). Pt stated that he is having difficulties with the swallowing exercises.      Start Time  1404     Stop Time  1500     Time Calculation (min)  56 min     Patient reported fall since last visit  No        Pain Assessment    Currently in pain  Yes     Preferred Pain Scale  number (Numeric Rating Pain Scale)     Pain: Body location  Leg     Pain Rating (0-10): Pre Activity  1     Pain Rating (0-10): Activity  2     Pain Rating (0-10): Post Activity  1        Pain Interventions    Intervention  none     Post Intervention Comments  none         Daily Treatment Assessment and Plan - 20 1518        Daily Treatment Assessment and Plan    Progress toward goals  Progressing     Daily Outcome Summary  Completed instruction on initial swallowing exercises for improved pharyngeal and base of tongue strength.      Plan and Recommendations  Instruction on Shaker exercises.           Today's Treatment:      Short Term Goals Progress on Goal Current Session   ?Pt. will tolerate trace PO trials without s/s of aspiration and adequate oral management with min cues over 2 sessions.      Pt. will complete pharyngeal strengthening, laryngeal excursion, and base of tongue strengthening exercises to improve airway protection and bolus propulsion with min cues for  accurate production with 90% accuracy   SLP provided instruction on Mendelsohn maneuver, Teresa, high vocal glides up, effortful swallow, and tongue retraction exercises.     Teresa x5 (pt with significant difficulties triggering swallows)    High pitch glides x 10- pt cued for louder production    Mendelsohn x 3 (pt with difficulties holding for 3 seconds)    Effortful Swallow x 5 (lingual pumping noted)    Lingual retraction x 10 -min A after instruction   Pt. will verbalize understanding of current swallow status and risks of aspiration/choking with min cues.      EDUCATION  Discussed plan of care and goals. New exercises-instruction provided and handout provided.   Other  Pt's daughter present for intake and education on exercises, she left the room for completion of swallowing exercises unmasked.

## 2020-07-31 NOTE — OP PT TREATMENT LOG
PT Neuro Exercises Current Session Time   THER ACT  TOTAL TIME FOR SESSION Not performed   Precautions NPO/PEG tube.  Pt is I with PEG tube feedings.  Working on swallow in speech.  L facial droop and slight L eye ptosis post stroke.   Transfer training Mod I with RW   Patient Education Reviewed POC and intention for 2x/week for at least 8 weeks.    THER EX TOTAL TIME FOR SESSION Not performed   STRETCHING    Stretching by patient GS, HS (lacking TKE R knee approx 10-15 deg ? OA); mod Héctor if needed.   CARDIOVASCULAR    Nu Step Warmup and inc as kaylee   Stationary Bike    Treadmill    Standing Ther-Ex    Supine Ther-Ex    NEURO RE-ED TOTAL TIME FOR SESSION Not performed   COORDINATION    POSTURAL RE-ED    PRE-GAIT ACTIVITIES  STS from mat, chairs, without UE support   BALANCE TRAINING     Sitting balance    Standing balance - static    Standing balance - dynamic Floor, progress to compliant surfaces  With pivot turns without AD (S)   Standing balance - varied surface airex  rocker   GAIT TRAINING TOTAL TIME FOR SESSION Not performed   Ambulation with AD  Complete 2 MWT without AD  Complete FGA    Goal:  Return to amb without AD    Dynamic gait     Stair negotiation Assess curb/ramp stairs.   Curb negotiation    Ramp negotiation    Outdoor ambulation    BWS/vector training    MANUAL TOTAL TIME FOR SESSION Not performed   Stretching by therapist/PROM    Mobilization     MODALITIES TOTAL TIME FOR SESSION Not performed   Ice    Heat    ATTENDED E-STIM TOTAL TIME FOR SESSION Not performed   Attended E-Stim

## 2020-07-31 NOTE — PROGRESS NOTES
Referring Provider: By co-signing this Plan of Care (POC), you agree with the planned services and interventions recommended by the therapist.       NAME: __________________________________ DATE: ___________________        [facility] Fax: [phone]        PT EVALUATION FOR OUTPATIENT THERAPY    Patient: Karl Park    MRN: [id number]  : 1928 89 y.o.   Referring Physician: Herb Dailey MD  Date of Visit: 2020      Certification Dates:   20 through 10/29/20    Recommended Frequency & Duration:  2 times/week for up to 3 months     Diagnosis:   1. Ataxia following cerebral infarction    2. Aphasia following cerebral infarction        Chief Complaints:   Chief Complaint   Patient presents with   • Difficulty Walking   • Balance Deficits   • Abnormality Of Gait     Requires a walker since his stroke, when previously amb without AD   • Dec Strength     RLE with evidence of dec ROM and arthritic changes R knee   • Decreased Endurance       Precautions: NPO, fall, other (see comments)(PEG tube)    Past Medical History:   Past Medical History:   Diagnosis Date   • CVA (cerebral vascular accident) (CMS/HCC)    • Type 2 diabetes mellitus (CMS/HCC)        Past Surgical History:   Past Surgical History:   Procedure Laterality Date   • TESTICLE SURGERY           LEARNING ASSESSMENT    Assessment completed: Yes    Learner name:  Karl    Relationship: Patient    Learning Barriers:  Learning barriers: Physical    Preferred Language: English     Needed: No    Learning New Concepts: Listening, Reading, Demonstration and Pictures/Video      CO-LEARNER ASSESSMENT:    Completed: Yes:   Co-Learner name:  Corrina    Relationship: Family    Learning Barriers:  Learning barriers: No Barriers    Preferred Language: English     Needed: No    Learning New Concepts: Listening, Reading, Demonstration and Pictures/Video            OBJECTIVE MEASUREMENTS/DATA:    Eval Assessment     Evaluation Assessment and Plan - 07/31/20 4527        Evaluation Assessment and Plan    Plan of Care reviewed and patient/family in agreement  Yes     System Pathology/Pathophysiology Noted  musculoskeletal;neuromuscular     Functional Limitations in Following Categories (PT Eval)  home management;community/leisure     Rehab Potential/Prognosis  good, to achieve stated therapy goals     Problem List  decreased endurance;decreased ROM;decreased strength;impaired balance;impaired sensation;other (see comments)    gait dysfunction    Clinical Assessment  This pleasant 89 yo male present today deconditioned s/p recent CVA, temporarily living with daughter locally for support and proximity to therapy.  Impairments also include mild RLE weakness, decreased R knee ext ROM, mild sensory changes anterior R knee and distal thigh (? r/t CVA vs. radicular sx),  all affecting standing posture, gait mechanics, and balance reactions.  JIMENEZ 42/56, gait speed .79 m/s, TUG 17 sec, all reflecting mild to moderate falls risk.  Currently using a rolling walker for both home and community ambulation at mod I level.  Pt denies knee pain o diagnosis of R knee arthritis, however knee shows evidence of arthritic changes that may be contributing to ROM deficits.  Pt will benefit from skilled PT to address these limitations in an effort to restore prior level of function (driving, independent amb without AD, house and yard maintenance, return to living I with wife in [place]).  Pt also receiving speech therapy for dysphagia and is currently NPO with PEG tube.     Planned Services  CPT 64521 Neuromuscular Reeducation;CPT 71978 Gait training;CPT 03087 Manual therapy;CPT 52327 Therapeutic Massage;CPT 77460 Electrical stimulation ATTENDED;CPT 52786 Electrical stimulation UNATTENDED;CPT 90264 Therapeutic activities;CPT 29641 Therapeutic exercises;CPT 91034 Hot/Cold Packs therapy         General Info   General Information - 07/31/20 8311         Session Details    Document Type  initial evaluation     Mode of Treatment  individual therapy;physical therapy     Patient/Family/Caregiver Comments/Observations  Pt's daughter Corrina present for evaluation.  Pt ambulated in with RW.  No complaints of pain.       OP Specialty  Neuro        Time Calculation    Start Time  1500     Stop Time  1600     Time Calculation (min)  60 min        General Information    Onset of Illness/Injury or Date of Surgery  07/16/20    dx with CVA on 7/16 but daughter suspects initially happened on 7/2-3 (see HPI)    Referring Physician  Herb Dailey     Pertinent History of Current Functional Problem  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  HPI:  Pt's daughter stated that the onset of the stroke may have been 7/1 or 7/2. Pt went to the ER on 7/3 and he was diagnosed with pneumonia and bells palsy. Pt was given medication for that, which he couldn't swallow. Pt went to see his PCP on 7/7/20 who referred him to a neurologist. Pt saw the PA and he was tested for myasthenia gravis. Pt would cough throughout the whole week every time he ate or drank. Pt was seen by neurologist 7/16 for the MRI and swallow study.  MRI showed that he had a brainstem stroke. Pt had a swallow study which showed asp on all consistencies and absent epiglottic inversion. Pt had a peg tube placed on 7/20. Pt was also admitted overnight for IV fluids.       Limitations/Impairments  swallowing     Existing Precautions/Restrictions  NPO;fall;other (see comments)    PEG tube        Pain and Vitals   Pain/Vitals - 07/31/20 1740        Pain Assessment    Currently in pain  No/Denies    no pain, but reports a tingling sensation in anterior knee/thigh, onset approx 4 days into hospital stay.  Pt feels it was related to the bed he slept in.  PCP suggests possible sciatica.         Pre Activity Vital Signs    BP  126/66     BP Location  Left upper arm     BP Method  " Manual     Patient Position  Sitting        Pain Intervention    Intervention   monitored for any sign of R knee discomfort, which pt denies.  Daughter seems to remember that pt had complained about R knee pain limiting walking tolerance prior to recent CVA  (pt has no recollection of this)         Falls Assessment    Falls - 07/31/20 1747        Initial Falls Assessment    One or more falls in the last year  No         Living Environment    Living Environment - 07/31/20 1745        Living Environment    Living Environment Comment  Lives with wife in [place], but currently stayiing indefinitely with daughter, Corrina, who is local, to be close to therapy services.  Karl and his wife are living in their daughter's in law suite, 1 arvind, 1 step up/down into living area.       Transportation Concerns  car, none         PLOF:   Prior Level of Function - 07/31/20 1746        OTHER    Previous level of function  Living with wife in [place],  without AD, + , actively taking care of home/property;  enjoys carpentry, retired from job with [employer].           Type and Frequency:   PT - 07/31/20 1743        Physical Therapy    Physical Therapy  Neuro        PT Plan    Frequency of treatment  2 times/week     PT Duration  3 months     PT Cert From  07/31/20     PT Cert To  10/29/20     Date PT POC was sent to provider  07/31/20     Signed PT Plan of Care received?   No         Special Tests    Special Tests - 07/31/20 1756        Outcome measures tracking    Outcome measure used:  ABC confidence scale:  1390/1600 (Pt reports \"100%\" confidence with all mobility except standing on chair to reach for something overhead 0%, and 90% with escalator without UE support, 0% confidence on icy surfaces.   This may change as pt works in therapy without AD - appears less confident with overall mobility without AD         Sensory Tests   Sensory Testing - 07/31/20 1759        Sensory Assessment (Somatosensory)    Sensory " Subjective Reports  (S) tingling;other (see comments)    Sensation intact except tingling anterior distal thigh and knee.          ROM:  R knee ext -15a, -10p;  SLR approx 50-60 B;  Dec hip ext with B bridge, approx +5-10 in sidelying;  Ankles approx +5 B per visual    Edema:  Mild R ankle     MMT   Manual Muscle Tests - 07/31/20 1747        RIGHT: Upper Extremity Manual Muscle Test Assessment    Right UE MMT comments:  grossly WNL 5/5 t/o BUE per quick screen        RIGHT: Lower Extremity Manual Muscle Test Assessment    Right LE MMT comments:  overall slightly weaker RLE vs L.       Hip Flexion gross movement  (4+/5) good plus     Hip Extension gross movement  --    I B bridge 3/4 range limited by tight hip flexers    Hip Abduction gross movement  (4-/5) good minus     Knee Flexion strength  (4/5) good     Knee Extension strength  (4/5) good     Ankle Dorsiflexion gross movement  (5/5) normal     Ankle Inversion gross movement  (5/5) normal     Ankle Eversion gross movement  (5/5) normal        LEFT: Lower Extremity Manual Muscle Test Assessment    Left LE MMT comments:  grossly 5/5 t/o         Transfers   Transfers - 07/31/20 1749        Bed Mobility    Comment (Bed Mobility)  mod I supine to/from sit on mat with extra time.  Reports mod I at home in queen sized bed        Transfers    Comment  STS and SPT with RW mod I;  S without AD.  Uses UEs for pushoff         Gait and Mobility   Gait and Mobility - 07/31/20 1750        Gait Training    Ridge, Gait  modified independence     Variable surfaces  Flat surface     Gait surface comments  tiled indoor     Assistive Device  walker, front-wheeled;none    Primarily using RW at this time;  assessed without AD for short distances    Distance in Feet  200 feet     Comment  stooped posture;  wide LEI; dec TKE R at IC/heel strike and t/o stance phase, dec R stance time and apparent dec confidence in stance control R.    Looks antalgic, though pt denies R knee pain,  only reports tingly feeling anterior knee and thigh.     6 Minute Walk Test  TBA     Gait Speed (m/sec)  0.79 m/sec    .79 m/s with RW mod I CGS;  .95 m/s without AD CGS with S       Stairs Assessment    Comment  reports mod I with RW up/down 1 step in in law suite.           Neuromuscular/Motor   Neuromuscular/Motor - 07/31/20 1800        Motor Skills    Motor Skills  coordination;muscle tone     Coordination  WNL;finger to nose;heel to shin     Muscle Tone  WNL         Balance/Posture   Balance and Posture - 07/31/20 1542        Balance Assessment    Balance Test Results (Other)  Timed Up and Go Test (TUG)        Postural Deviations    Comment, Postural Deviations  Standing with R knee slightly flexed (lacking terminal ext active and passive        Juarez Balance Scale    Sitting to Standing  Able to stand without using hands and stabilize independently     Standing Unsupported  Able to stand safely for 2 minutes     Sitting with Back Unsupported but Feet Supported on Floor or on a Stool  Able to sit safely and securely for 2 minutes     Standing to Sitting  Sits safely with minimal use of hands     Transfers  Able to transfer safely definite need of hands     Standing Unsupported with Eyes Closed  Able to stand 10 seconds safely     Standing Unsupported with Feet Together  Able to place feet together independently and stand 1 minute safely     Reach Forward with Outstretched Arm While Standing  Can reach forward confidently 25 cm (10 inches)      Object from Floor from a Standing Position  Able to  slipper but needs supervision     Turning to Look Behind Over Left and Right Shoulders While Standing  Turns sideways only but maintains balance    harder to R    Turn 360 Degrees  Needs close supervision or verbal cueing    6 sec B, 10 steps    Place Alternate Foot on Step or Stool While Standing Unsupported  Able to complete greater than 2 steps needs minimal assist    8 steps Rogers     Standing  Unsupported One Foot in Front  Able to place foot ahead independently and hold 30 seconds    R front    Standing on One Leg  Tries to lift leg unable to hold 3 seconds but remains standing independently     Jimenez Balance Score  42        Posture    Posture  postural deviations        Timed Up and Go Test    Results, Timed Up and Go Test (Balance)  17 sec with or without RW.  (mod I with RW,  S without AD)             Goals     • PT goals      Short Term Goals Time Frame Result Comment/Progress   Pt will amb mod I in household setting with SPC or no AD, including 1 step up/down in his in-law suite at daughter's home 4 weeks     Improve JIMENEZ score by at least 3 points (45/56) reflecting dec falls risk 4 weeks     TUG </= 14 sec reflecting lower falls risk 4 weeks     Improve CGS by at least .1 m/s without decline in gait quality 4 weeks     FGA >/= 15/30 with least restrictive AD 4 weeks     Consistent STS and SPT transfers without AD mod I 4 weeks         Long Term Goals Time Frame Result Comment/Progress   Pt will amb I at home without AD, in community without AD or with SPC as needed for more challenging terrain/more distracting environments 8-12 weeks       JIMENEZ >/= 50/56 reflecting lower falls risk 8-12 weeks     TUG completed safely in </= 12 sec with no decline in gait quality, balance with pivot turns 8-12 weeks     Gait speed 1.0 m/s maintaining gait quality 8-12 weeks     FGA >/= 20/30 8-12 weeks     Mod I reciprocal stairs with 1 HR, amb up/down curb step and ADA ramps without AD 8-12 weeks     I finalized HEP 8-12 weeks                         TREATMENT PLAN:  PT Neuro Exercises Current Session Time   THER ACT  TOTAL TIME FOR SESSION Not performed   Precautions NPO/PEG tube.  Pt is I with PEG tube feedings.  Working on swallow in speech.  L facial droop and slight L eye ptosis post stroke.   Transfer training Mod I with RW   Patient Education Reviewed POC and intention for 2x/week for at least 8 weeks.     THER EX TOTAL TIME FOR SESSION Not performed   STRETCHING    Stretching by patient GS, HS (lacking TKE R knee approx 10-15 deg ? OA); mod Héctor if needed.   CARDIOVASCULAR    Nu Step Warmup and inc as kaylee   Stationary Bike    Treadmill    Standing Ther-Ex    Supine Ther-Ex    NEURO RE-ED TOTAL TIME FOR SESSION Not performed   COORDINATION    POSTURAL RE-ED    PRE-GAIT ACTIVITIES  STS from mat, chairs, without UE support   BALANCE TRAINING     Sitting balance    Standing balance - static    Standing balance - dynamic Floor, progress to compliant surfaces  With pivot turns without AD (S)   Standing balance - varied surface airex  rocker   GAIT TRAINING TOTAL TIME FOR SESSION Not performed   Ambulation with AD  Complete 2 MWT without AD  Complete FGA    Goal:  Return to amb without AD    Dynamic gait     Stair negotiation Assess curb/ramp stairs.   Curb negotiation    Ramp negotiation    Outdoor ambulation    BWS/vector training    MANUAL TOTAL TIME FOR SESSION Not performed   Stretching by therapist/PROM    Mobilization     MODALITIES TOTAL TIME FOR SESSION Not performed   Ice    Heat    ATTENDED E-STIM TOTAL TIME FOR SESSION Not performed   Attended E-Stim                ASSESSMENT:    This 89 y.o. year old male presents to PT with above stated diagnosis. Physical Therapy evaluation reveals decreased endurance, decreased ROM, decreased strength, impaired balance, impaired sensation, other (see comments)(gait dysfunction) resulting in home management, community/leisure limitations. Karl Park will benefit from skilled PT services to address limitation, work towards rehab and patient goals and maximize PLOF of chosen ADLs.     Planned Services: The patient's treatment will include CPT 91179 Neuromuscular Reeducation, CPT 80752 Gait training, CPT 00136 Manual therapy, CPT 73522 Therapeutic Massage, CPT 70950 Electrical stimulation ATTENDED, CPT 47243 Electrical stimulation UNATTENDED, CPT 67244 Therapeutic  activities, CPT 11634 Therapeutic exercises, CPT 95254 Hot/Cold Packs therapy, .

## 2020-07-31 NOTE — OP SLP TREATMENT LOG
Short Term Goals Progress on Goal Current Session   ?Pt. will tolerate trace PO trials without s/s of aspiration and adequate oral management with min cues over 2 sessions.      Pt. will complete pharyngeal strengthening, laryngeal excursion, and base of tongue strengthening exercises to improve airway protection and bolus propulsion with min cues for accurate production with 90% accuracy   SLP provided instruction on Mendelsohn maneuver, Teresa, high vocal glides up, effortful swallow, and tongue retraction exercises.     Teresa x5 (pt with significant difficulties triggering swallows)    High pitch glides x 10- pt cued for louder production    Mendelsohn x 3 (pt with difficulties holding for 3 seconds)    Effortful Swallow x 5 (lingual pumping noted)    Lingual retraction x 10 -min A after instruction   Pt. will verbalize understanding of current swallow status and risks of aspiration/choking with min cues.      EDUCATION  Discussed plan of care and goals. New exercises-instruction provided and handout provided.   Other  Pt's daughter present for intake and education on exercises, she left the room for completion of swallowing exercises unmasked.

## 2020-08-03 ENCOUNTER — HOSPITAL ENCOUNTER (OUTPATIENT)
Dept: SPEECH THERAPY | Facility: REHABILITATION | Age: 85
Setting detail: THERAPIES SERIES
Discharge: HOME | End: 2020-08-03
Attending: INTERNAL MEDICINE
Payer: COMMERCIAL

## 2020-08-03 ENCOUNTER — HOSPITAL ENCOUNTER (OUTPATIENT)
Dept: PHYSICAL THERAPY | Facility: REHABILITATION | Age: 85
Setting detail: THERAPIES SERIES
Discharge: HOME | End: 2020-08-03
Attending: FAMILY MEDICINE
Payer: COMMERCIAL

## 2020-08-03 ENCOUNTER — TRANSCRIBE ORDERS (OUTPATIENT)
Dept: ADMINISTRATIVE | Facility: HOSPITAL | Age: 86
End: 2020-08-03

## 2020-08-03 DIAGNOSIS — I69.393 ATAXIA FOLLOWING CEREBRAL INFARCTION: Primary | ICD-10-CM

## 2020-08-03 DIAGNOSIS — R13.10 PROBLEMS WITH SWALLOWING AND MASTICATION: Primary | ICD-10-CM

## 2020-08-03 DIAGNOSIS — I69.321 DYSPHASIA FOLLOWING CEREBROVASCULAR ACCIDENT (CVA): ICD-10-CM

## 2020-08-03 DIAGNOSIS — I63.9 CEREBROVASCULAR ACCIDENT (CVA), UNSPECIFIED MECHANISM (CMS/HCC): Primary | ICD-10-CM

## 2020-08-03 PROCEDURE — 97112 NEUROMUSCULAR REEDUCATION: CPT | Mod: GP,CQ,59

## 2020-08-03 PROCEDURE — 97110 THERAPEUTIC EXERCISES: CPT | Mod: GP,CQ

## 2020-08-03 PROCEDURE — 97116 GAIT TRAINING THERAPY: CPT | Mod: GP,CQ

## 2020-08-03 PROCEDURE — 92526 ORAL FUNCTION THERAPY: CPT | Mod: GN

## 2020-08-03 NOTE — PROGRESS NOTES
SLP DAILY NOTE FOR OUTPATIENT THERAPY    Patient: Karl Park   MRN: [id number]  : 1928 89 y.o.  Referring Physician: Edel Keller DO  Date of Visit: 8/3/2020      Certification Dates: 20 through 20    Diagnosis:   1. Cerebrovascular accident (CVA), unspecified mechanism (CMS/HCC)    2. Dysphasia following cerebrovascular accident (CVA)        Chief Complaints:  swallowing    Precautions:  Existing Precautions/Restrictions: aspiration, NPO       TODAY'S VISIT:    History/Vitals/Pain/Encounter Info - 20 1348        Injury History/Precautions/Daily Required Info    Primary Therapist  Laura Chávez SLP (primary - [facility])s     Chief Complaint/Reason for Visit   swallowing     Onset of Illness/Injury or Date of Surgery  20     Existing Precautions/Restrictions  aspiration;NPO     Document Type  daily treatment     Patient/Family/Caregiver Comments/Observations  Corrina present for first portion of session; pt denies pain, changes in meds and falls     Start Time  1100     Stop Time  1200     Time Calculation (min)  60 min     Patient reported fall since last visit  No        Pain Assessment    Currently in pain  No/Denies        Pain Interventions    Intervention  n/a     Post Intervention Comments  n/a         Daily Treatment Assessment and Plan - 20 1359        Daily Treatment Assessment and Plan    Progress toward goals  Progressing     Daily Outcome Summary  pt benefited from TG stim to faciliate swallows     Plan and Recommendations  target pharyngeal strengthening              Today's Treatment:      Short Term Goals Progress on Goal Current Session   ?Pt. will tolerate trace PO trials without s/s of aspiration and adequate oral management with min cues over 2 sessions.      Pt. will complete pharyngeal strengthening, laryngeal excursion, and base of tongue strengthening exercises to improve airway protection and bolus propulsion with min cues for accurate  "production with 90% accuracy   SLP provided instruction on CTAR chin tuck against resistance, and effortful \"guh\" for tongue base strengthening    CTAR x 20 with an additional one for 30 second hold    Mendelsohn x 3 (pt with difficulties holding for 3 seconds)    Effortful Swallow x 20 with stim from cold flavored spoon; pt was able to present cold spoon to self after training    Effortful \"guh\" x 10     Ice chip trials x 3 with cued effortful double swallow; no overt s/s aspiration   Pt. will verbalize understanding of current swallow status and risks of aspiration/choking with min cues.      EDUCATION  Plan to update exercise list tomorrow; pt will bring in current sheet for adjustments   Other                               "

## 2020-08-03 NOTE — PROGRESS NOTES
PT DAILY NOTE FOR OUTPATIENT THERAPY    Patient: Karl Park   MRN: [id number]  : 1928 89 y.o.  Referring Physician: Herb Dailey MD  Date of Visit: 8/3/2020    Certification Dates: 20 through 10/29/20    Diagnosis:   1. Ataxia following cerebral infarction        Chief Complaints:  difficulty walking, balance deficits, abnormal gait, decreased strength and endurance    Precautions:   Precautions comments: NPO, PEG tube  Existing Precautions/Restrictions: fall, other (see comments)     TODAY'S VISIT    History/Vitals/Pain/Encounter Info - 20 1500        Injury History/Precautions/Daily Required Info    Primary Therapist  Ana Rosa Winston, JACQUELINE     Chief Complaint/Reason for Visit   difficulty walking, balance deficits, abnormal gait, decreased strength and endurance     Onset of Illness/Injury or Date of Surgery  20     Referring Physician  Dr. KAYLEE Dailey     Existing Precautions/Restrictions  fall;other (see comments)     Precautions comments  NPO, PEG tube     Limitations/Impairments  swallowing     OP Specialty  Neuro     Document Type  daily treatment     Patient/Family/Caregiver Comments/Observations  No falls, no  pain, no changes in meds.       Start Time  1500     Stop Time  1600     Time Calculation (min)  60 min     Patient reported fall since last visit  No        Pain Assessment    Currently in pain  No/Denies        Pain Intervention    Intervention   na     Post Intervention Comments  na        Pre Activity Vital Signs    Pulse  67     SpO2  97 %     BP  112/68     BP Location  Right upper arm     BP Method  Manual     Patient Position  Sitting         Daily Treatment Assessment and Plan - 20 1553        Daily Treatment Assessment and Plan    Progress toward goals  Progressing     Daily Outcome Summary  Pt tolerated session well.  No LOB on airex activities for balance.  Pt able to navigate turns around slalom course of cones w/o any LOB.  Pt did well w 2 MWT w/ Rw and  therefore tested w/o AD as well. Good speed and no toe catch/LOB when tested w/o AD.       Plan and Recommendations  Cont w/ POC as directed by primary PT for functional mobility, balance, coordination, strength and endurance.                    Today's Treatment:      PT Neuro Exercises Current Session Time   THER ACT  TOTAL TIME FOR SESSION Not performed   Precautions NPO/PEG tube.  Pt is I with PEG tube feedings.  Working on swallow in speech.  L facial droop and slight L eye ptosis post stroke.   Transfer training Mod I with RW   Patient Education Reviewed POC and intention for 2x/week for at least 8 weeks.    THER EX TOTAL TIME FOR SESSION 8-22 Minutes   STRETCHING    Stretching by patient Seated HS stretch w/ block 20 sec x 3  B calf stretch on incline #2  1 min x 2   CARDIOVASCULAR    Nu Step Level 1  UE's/LE's 5 min   Stationary Bike    Treadmill    Standing Ther-Ex HR's, Abd, Hs curls, marching and mini squats 10x   Supine Ther-Ex    NEURO RE-ED TOTAL TIME FOR SESSION 8-22 Minutes   COORDINATION    POSTURAL RE-ED    PRE-GAIT ACTIVITIES  STS from mat, chairs, without UE support   BALANCE TRAINING     Sitting balance    Standing balance - static    Standing balance - dynamic Gait around slalom course of cones (w/o AD)   Standing balance - varied surface On Airex:  Ball toss/bounce/catch  FA/FT EO, UE's crossed over chest  60 sec (very slight sway in both positions)   GAIT TRAINING TOTAL TIME FOR SESSION 23-37 Minutes   Ambulation with AD  Complete 2 MWT w/  ft  w/o RW  368 ft 3/10 RPE after second 2 MWT  Complete FGA:  14/30  (1,2,1,2,3,1,0,0,2,2) - completed by Ana Rosa Winston PT (primary PT) - will update goals next visit with primary.      Goal:  Return to amb without AD    Dynamic gait     Stair negotiation Assess curb/ramp stairs.   Curb negotiation    Ramp negotiation    Outdoor ambulation    BWS/vector training    MANUAL TOTAL TIME FOR SESSION Not performed   Stretching by therapist/PROM     Mobilization     MODALITIES TOTAL TIME FOR SESSION Not performed   Ice    Heat    ATTENDED E-STIM TOTAL TIME FOR SESSION Not performed   Attended E-Stim

## 2020-08-03 NOTE — OP SLP TREATMENT LOG
"Short Term Goals Progress on Goal Current Session   ?Pt. will tolerate trace PO trials without s/s of aspiration and adequate oral management with min cues over 2 sessions.      Pt. will complete pharyngeal strengthening, laryngeal excursion, and base of tongue strengthening exercises to improve airway protection and bolus propulsion with min cues for accurate production with 90% accuracy   SLP provided instruction on CTAR chin tuck against resistance, and effortful \"guh\" for tongue base strengthening    CTAR x 20 with an additional one for 30 second hold    Mendelsohn x 3 (pt with difficulties holding for 3 seconds)    Effortful Swallow x 20 with stim from cold flavored spoon; pt was able to present cold spoon to self after training    Effortful \"guh\" x 10     Ice chip trials x 3 with cued effortful double swallow; no overt s/s aspiration   Pt. will verbalize understanding of current swallow status and risks of aspiration/choking with min cues.      EDUCATION  Plan to update exercise list tomorrow; pt will bring in current sheet for adjustments   Other       "

## 2020-08-03 NOTE — OP PT TREATMENT LOG
PT Neuro Exercises Current Session Time   THER ACT  TOTAL TIME FOR SESSION Not performed   Precautions NPO/PEG tube.  Pt is I with PEG tube feedings.  Working on swallow in speech.  L facial droop and slight L eye ptosis post stroke.   Transfer training Mod I with RW   Patient Education Reviewed POC and intention for 2x/week for at least 8 weeks.    THER EX TOTAL TIME FOR SESSION 8-22 Minutes   STRETCHING    Stretching by patient Seated HS stretch w/ block 20 sec x 3  B calf stretch on incline #2  1 min x 2   CARDIOVASCULAR    Nu Step Level 1  UE's/LE's 5 min   Stationary Bike    Treadmill    Standing Ther-Ex HR's, Abd, Hs curls, marching and mini squats 10x   Supine Ther-Ex    NEURO RE-ED TOTAL TIME FOR SESSION 8-22 Minutes   COORDINATION    POSTURAL RE-ED    PRE-GAIT ACTIVITIES  STS from mat, chairs, without UE support   BALANCE TRAINING     Sitting balance    Standing balance - static    Standing balance - dynamic Gait around slalom course of cones (w/o AD)   Standing balance - varied surface On Airex:  Ball toss/bounce/catch  FA/FT EO, UE's crossed over chest  60 sec (very slight sway in both positions)   GAIT TRAINING TOTAL TIME FOR SESSION 23-37 Minutes   Ambulation with AD  Complete 2 MWT w/  ft  w/o RW  368 ft 3/10 RPE after second 2 MWT  Complete FGA:  14/30  (1,2,1,2,3,1,0,0,2,2) - completed by Ana Rosa Winston PT (primary PT) - will update goals next visit with primary.      Goal:  Return to amb without AD    Dynamic gait     Stair negotiation Assess curb/ramp stairs.   Curb negotiation    Ramp negotiation    Outdoor ambulation    BWS/vector training    MANUAL TOTAL TIME FOR SESSION Not performed   Stretching by therapist/PROM    Mobilization     MODALITIES TOTAL TIME FOR SESSION Not performed   Ice    Heat    ATTENDED E-STIM TOTAL TIME FOR SESSION Not performed   Attended E-Stim

## 2020-08-04 ENCOUNTER — HOSPITAL ENCOUNTER (OUTPATIENT)
Dept: SPEECH THERAPY | Facility: REHABILITATION | Age: 85
Setting detail: THERAPIES SERIES
Discharge: HOME | End: 2020-08-04
Attending: INTERNAL MEDICINE
Payer: COMMERCIAL

## 2020-08-04 ENCOUNTER — HOSPITAL ENCOUNTER (OUTPATIENT)
Dept: PHYSICAL THERAPY | Facility: REHABILITATION | Age: 85
Setting detail: THERAPIES SERIES
Discharge: HOME | End: 2020-08-04
Attending: FAMILY MEDICINE
Payer: COMMERCIAL

## 2020-08-04 DIAGNOSIS — I69.321 DYSPHASIA FOLLOWING CEREBROVASCULAR ACCIDENT (CVA): ICD-10-CM

## 2020-08-04 DIAGNOSIS — I69.393 ATAXIA FOLLOWING CEREBRAL INFARCTION: Primary | ICD-10-CM

## 2020-08-04 DIAGNOSIS — I63.9 CEREBROVASCULAR ACCIDENT (CVA), UNSPECIFIED MECHANISM (CMS/HCC): Primary | ICD-10-CM

## 2020-08-04 PROCEDURE — 97116 GAIT TRAINING THERAPY: CPT | Mod: GP,CQ

## 2020-08-04 PROCEDURE — 97112 NEUROMUSCULAR REEDUCATION: CPT | Mod: GP,CQ,59

## 2020-08-04 PROCEDURE — 92526 ORAL FUNCTION THERAPY: CPT | Mod: GN

## 2020-08-04 PROCEDURE — 97530 THERAPEUTIC ACTIVITIES: CPT | Mod: GP,CQ

## 2020-08-04 PROCEDURE — 97110 THERAPEUTIC EXERCISES: CPT | Mod: GP,CQ,59

## 2020-08-04 NOTE — OP PT TREATMENT LOG
"PT Neuro Exercises Current Session Time Performed   THER ACT  TOTAL TIME FOR SESSION 8-22 Minutes    Precautions NPO/PEG tube.  Pt is I with PEG tube feedings.  Working on swallow in speech.  L facial droop and slight L eye ptosis post stroke. Yes   Transfer training Mod I with RW Yes   Patient Education Reviewed POC and intention for 2x/week for at least 8 weeks.     HEP - Issued and reviewed with Patient - instructed to perform at counter or with chair for support:  -LAQ  -Heel raises  -Abduction  -Hamstring curls       Yes      Yes  Yes  Yes  Yes   THER EX TOTAL TIME FOR SESSION 8-22 Minutes    STRETCHING     Stretching by patient Seated HS stretch w/ block 20 sec x 3  B calf stretch on incline #2  1 min x 2 No  yes   CARDIOVASCULAR     Nu Step Level 1  UE's/LE's 6 min Yes   Stationary Bike     Treadmill     Standing Ther-Ex HR's, Abd, Hs curls, marching and mini squats 10x Yes   Supine Ther-Ex     NEURO RE-ED TOTAL TIME FOR SESSION 8-22 Minutes    COORDINATION     POSTURAL RE-ED     PRE-GAIT ACTIVITIES  STS from mat, chairs, without UE support    BALANCE TRAINING      Sitting balance     Standing balance - static     Standing balance - dynamic Gait around slalom course of cones (w/o AD)    Standing balance - varied surface On Airex:  Ball toss/bounce/catch  FA/FT EO with HT/HN, UE's crossed over chest    Airex, Semi-Tandem with HT   Yes  Yes    Yes   GAIT TRAINING TOTAL TIME FOR SESSION 8-22 Minutes    Ambulation with AD  Complete 2 MWT w/  ft  w/o RW  368 ft 3/10 RPE after second 2 MWT  Complete FGA:  14/30  (1,2,1,2,3,1,0,0,2,2) - completed by Ana Rosa Winston PT (primary PT) - will update goals next visit with primary.    Arrived to session ambulating with RW; ambulated without AD during session with Cl S/CG      Goal:  Return to amb without AD              Yes   Dynamic gait      Stair negotiation Ascend/descend reciprocally 4-6\" steps with 2 rails, Cl \"S  Ascend/descend 6\" curb without AD, Min A and " cues  Up/down ramp with Min A and cues to slow down for safety Yes    Yes    Yes   Curb negotiation     Ramp negotiation     Outdoor ambulation     BWS/vector training     MANUAL TOTAL TIME FOR SESSION Not performed    Stretching by therapist/PROM     Mobilization      MODALITIES TOTAL TIME FOR SESSION Not performed    Ice     Heat     ATTENDED E-STIM TOTAL TIME FOR SESSION Not performed    Attended E-Stim

## 2020-08-04 NOTE — PROGRESS NOTES
"SLP DAILY NOTE FOR OUTPATIENT THERAPY    Patient: Karl Park   MRN: [id number]  : 1928 89 y.o.  Referring Physician: Edel Keller DO  Date of Visit: 2020      Certification Dates: 20 through 20    Diagnosis:   1. Cerebrovascular accident (CVA), unspecified mechanism (CMS/HCC)    2. Dysphasia following cerebrovascular accident (CVA)        Chief Complaints:  swallowing    Precautions:  Existing Precautions/Restrictions: aspiration, NPO       TODAY'S VISIT:    History/Vitals/Pain/Encounter Info - 20 1235        Injury History/Precautions/Daily Required Info    Primary Therapist  Laura Chávez SLP (primary - [facility])s     Chief Complaint/Reason for Visit   swallowing     Existing Precautions/Restrictions  aspiration;NPO     Document Type  daily treatment     Patient/Family/Caregiver Comments/Observations  pt is alert and cooperative; denies pain, changes in meds and falls; pt had some mucous that was orange tinged; he states \"that was from my medicine, I put it in my tube\"; recommendation made to patient and daughter to follow up with primary care doctor, daughter and patient in agreement     Start Time  0900     Stop Time  1000     Time Calculation (min)  60 min     Patient reported fall since last visit  No        Pain Assessment    Currently in pain  No/Denies        Pain Interventions    Intervention  n/a     Post Intervention Comments  n/a         Daily Treatment Assessment and Plan - 20 1245        Daily Treatment Assessment and Plan    Progress toward goals  Progressing     Daily Outcome Summary  improving swallow to stim     Plan and Recommendations  target pharyngeal strengthening, conservative ice chip trials/ consider conservative trials of puree/honey               Today's Treatment:      Short Term Goals Progress on Goal Current Session   ?Pt. will tolerate trace PO trials without s/s of aspiration and adequate oral management with min cues over 2 " "sessions.      Pt. will complete pharyngeal strengthening, laryngeal excursion, and base of tongue strengthening exercises to improve airway protection and bolus propulsion with min cues for accurate production with 90% accuracy   SLP provided a new handout for home exercise program     CTAR x 20 with an additional one for 30 second hold    Mendelsohn x 3 (pt with difficulties holding for 3 seconds)    Effortful Swallow x 20 with stim from cold flavored spoon; pt was able to present cold spoon to self after training    Effortful \"guh\" x 10     Ice chip trials x 15 with cued effortful double swallow; no overt s/s aspiration, but mild wet vocal quality noted a couple minutes after trials   Pt. will verbalize understanding of current swallow status and risks of aspiration/choking with min cues.   Pt is able to verbalize NPO status and risks of aspiration   EDUCATION  Training provided to patient and daughter re: home exercise program   Other  Pt provided with this how exercise program:     COMPLETE THESE EXERCISES 3x a DAY   COLD FLAVORED SPOON SWALLOW: Dip a metal spoon in ice with a little bit of flavor added (lemonade, orange juice, gingerale). Take the spoon out, shake it off so that there is no excess liquid on it, and rest the convex side of the spoon on your tongue, applying light even pressure to your tongue surface. Then take the spoon out and SWALLOW STRONG, squeezing your throat muscles and focusing on how your olvin’s apple moves up when you swallow. Repeat x 20.  EFFORTFUL “GUH”: Say “GUH” with effort. Repeat x 20.  CTAR (chin tuck against resistance): hold the ball underneath your chin. [place] your head to squeeze the ball. Hold for 3 seconds, then release. Repeat x 20. Afterwards, complete an extended pose, holding for 30 seconds. You should feel this under your chin/in your throat (not on the back of your neck).  ANA: Stick your tongue out. Keep your tongue out and swallow with your tongue out. Repeat x " 10.  MENDELSOHN:  Swallow, paying attention to how your Leo’s apple elevates. “Freeze” mid-swallow, with your Leo’s apple elevated. Hold for 4 seconds, then release and allow your Leo’s apple to drop back down to resting position. Repeat x 10.   HIGH PITCHED “EEE”: Say “eeee” in a high pitched voice. Repeat x 10.

## 2020-08-04 NOTE — OP SLP TREATMENT LOG
"Short Term Goals Progress on Goal Current Session   ?Pt. will tolerate trace PO trials without s/s of aspiration and adequate oral management with min cues over 2 sessions.      Pt. will complete pharyngeal strengthening, laryngeal excursion, and base of tongue strengthening exercises to improve airway protection and bolus propulsion with min cues for accurate production with 90% accuracy   SLP provided a new handout for home exercise program     CTAR x 20 with an additional one for 30 second hold    Mendelsohn x 3 (pt with difficulties holding for 3 seconds)    Effortful Swallow x 20 with stim from cold flavored spoon; pt was able to present cold spoon to self after training    Effortful \"guh\" x 10     Ice chip trials x 15 with cued effortful double swallow; no overt s/s aspiration, but mild wet vocal quality noted a couple minutes after trials   Pt. will verbalize understanding of current swallow status and risks of aspiration/choking with min cues.   Pt is able to verbalize NPO status and risks of aspiration   EDUCATION  Training provided to patient and daughter re: home exercise program   Other  Pt provided with this how exercise program:     COMPLETE THESE EXERCISES 3x a DAY   COLD FLAVORED SPOON SWALLOW: Dip a metal spoon in ice with a little bit of flavor added (lemonade, orange juice, gingerale). Take the spoon out, shake it off so that there is no excess liquid on it, and rest the convex side of the spoon on your tongue, applying light even pressure to your tongue surface. Then take the spoon out and SWALLOW STRONG, squeezing your throat muscles and focusing on how your olvin’s apple moves up when you swallow. Repeat x 20.  EFFORTFUL “GUH”: Say “GUH” with effort. Repeat x 20.  CTAR (chin tuck against resistance): hold the ball underneath your chin. [place] your head to squeeze the ball. Hold for 3 seconds, then release. Repeat x 20. Afterwards, complete an extended pose, holding for 30 seconds. You should feel " this under your chin/in your throat (not on the back of your neck).  ANA: Stick your tongue out. Keep your tongue out and swallow with your tongue out. Repeat x 10.  MENDELSOHN:  Swallow, paying attention to how your Leo’s apple elevates. “Freeze” mid-swallow, with your Leo’s apple elevated. Hold for 4 seconds, then release and allow your Leo’s apple to drop back down to resting position. Repeat x 10.   HIGH PITCHED “EEE”: Say “eeee” in a high pitched voice. Repeat x 10.

## 2020-08-04 NOTE — PROGRESS NOTES
PT DAILY NOTE FOR OUTPATIENT THERAPY    Patient: Karl Park   MRN: [id number]  : 1928 89 y.o.  Referring Physician: Herb Dailey MD  Date of Visit: 2020    Certification Dates: 20 through 10/29/20    Diagnosis:   1. Ataxia following cerebral infarction        Chief Complaints:  difficulty walking, balance deficits, abnormal gait, decreased strength and endurance    Precautions:   Existing Precautions/Restrictions: fall, other (see comments)     TODAY'S VISIT    History/Vitals/Pain/Encounter Info - 20 1126        Injury History/Precautions/Daily Required Info    Primary Therapist  Ana Rosa Winston, JACQUELINE     Chief Complaint/Reason for Visit   difficulty walking, balance deficits, abnormal gait, decreased strength and endurance     Onset of Illness/Injury or Date of Surgery  20     Referring Physician  Dr. 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  HPI:  Pt's daughter stated that the onset of the stroke may have been  or . Pt went to the ER on 7/3 and he was diagnosed with pneumonia and bells palsy. Pt was given medication for that, which he couldn't swallow. Pt went to see his PCP on 20 who referred him to a neurologist. Pt saw the PA and he was tested for myasthenia gravis. Pt would cough throughout the whole week every time he ate or drank. Pt was seen by neurologist  for the MRI and swallow study.  MRI showed that he had a brainstem stroke. Pt had a swallow study which showed asp on all consistencies and absent epiglottic inversion. Pt had a peg tube placed on . Pt was also admitted overnight for IV fluids.       OP Specialty  Neuro     Document Type  daily treatment     Patient/Family/Caregiver Comments/Observations  Patient not initially  scheduled for therapy today but had speech today and daughter asked if he could be seen today instead of tomorrow - able to accomodate due to cancel.  Arrived to waiting room ambulating with RW - reported feeling good today - no new issues to report and no c/o pain start of session     Start Time  1100     Stop Time  1205     Time Calculation (min)  65 min     Patient reported fall since last visit  No        Pain Assessment    Currently in pain  No/Denies     Preferred Pain Scale  number (Numeric Rating Pain Scale)     Pain: Body location  Knee    right    Pain Rating (0-10): Pre Activity  0     Pain Rating (0-10): Activity  2     Pain Rating (0-10): Post Activity  2     Frequency  intermittent        Pain Intervention    Intervention   monitor, seated breaks, exercises     Post Intervention Comments  reported no pain end of session ambulating with RW        Pre Activity Vital Signs    Pulse  80     SpO2  98 %     BP  126/70     BP Location  Left upper arm     BP Method  Manual     Patient Position  Sitting        Post Activity Vital Signs    Post Activity BP  126/70     Post Activity BP Location  Left upper arm     Post Activity BP Method  Manual     Patient Position  Sitting         Daily Treatment Assessment and Plan - 08/04/20 1137        Daily Treatment Assessment and Plan    Progress toward goals  Progressing     Daily Outcome Summary  Patient tolerated session well with occasional seated breaks.  Performed balance activities on airex in // bars with and without UE support - 3 LOB episodes requiring >Min A - posterior and to the right - balance improved with tactile cueing.  Patient had no reports of right knee pain until end of session performing elevations - he reports that it is minor pain and resolved ambulating with RW.  Daugther reports that she is taking her Dad and mom home for a few days - requested some exercises for him to do while at home.  Issued and reviewed with patient.  Written on HEP and  verbalized in person to perform at a counter or with a chair for support.- verbalized understanding.       Plan and Recommendations  Continue with primary PT's POC           OBJECTIVE DATA TAKEN TODAY:    None taken    Today's Treatment:      PT Neuro Exercises Current Session Time Performed   THER ACT  TOTAL TIME FOR SESSION 8-22 Minutes    Precautions NPO/PEG tube.  Pt is I with PEG tube feedings.  Working on swallow in speech.  L facial droop and slight L eye ptosis post stroke. Yes   Transfer training Mod I with RW Yes   Patient Education Reviewed POC and intention for 2x/week for at least 8 weeks.     HEP - Issued and reviewed with Patient - instructed to perform at counter or with chair for support:  -LAQ  -Heel raises  -Abduction  -Hamstring curls       Yes      Yes  Yes  Yes  Yes   THER EX TOTAL TIME FOR SESSION 8-22 Minutes    STRETCHING     Stretching by patient Seated HS stretch w/ block 20 sec x 3  B calf stretch on incline #2  1 min x 2 No  yes   CARDIOVASCULAR     Nu Step Level 1  UE's/LE's 6 min Yes   Stationary Bike     Treadmill     Standing Ther-Ex HR's, Abd, Hs curls, marching and mini squats 10x Yes   Supine Ther-Ex     NEURO RE-ED TOTAL TIME FOR SESSION 8-22 Minutes    COORDINATION     POSTURAL RE-ED     PRE-GAIT ACTIVITIES  STS from mat, chairs, without UE support    BALANCE TRAINING      Sitting balance     Standing balance - static     Standing balance - dynamic Gait around slalom course of cones (w/o AD)    Standing balance - varied surface On Airex:  Ball toss/bounce/catch  FA/FT EO with HT/HN, UE's crossed over chest    Airex, Semi-Tandem with HT   Yes  Yes    Yes   GAIT TRAINING TOTAL TIME FOR SESSION 8-22 Minutes    Ambulation with AD  Complete 2 MWT w/  ft  w/o RW  368 ft 3/10 RPE after second 2 MWT  Complete FGA:  14/30  (1,2,1,2,3,1,0,0,2,2) - completed by Ana Rosa Winston PT (primary PT) - will update goals next visit with primary.    Arrived to session ambulating with RW; ambulated  "without AD during session with Cl S/CG      Goal:  Return to amb without AD              Yes   Dynamic gait      Stair negotiation Ascend/descend reciprocally 4-6\" steps with 2 rails, Cl \"S  Ascend/descend 6\" curb without AD, Min A and cues  Up/down ramp with Min A and cues to slow down for safety Yes    Yes    Yes   Curb negotiation     Ramp negotiation     Outdoor ambulation     BWS/vector training     MANUAL TOTAL TIME FOR SESSION Not performed    Stretching by therapist/PROM     Mobilization      MODALITIES TOTAL TIME FOR SESSION Not performed    Ice     Heat     ATTENDED E-STIM TOTAL TIME FOR SESSION Not performed    Attended E-Stim                      "

## 2020-08-13 ENCOUNTER — HOSPITAL ENCOUNTER (OUTPATIENT)
Dept: PHYSICAL THERAPY | Facility: REHABILITATION | Age: 85
Setting detail: THERAPIES SERIES
Discharge: HOME | End: 2020-08-13
Attending: FAMILY MEDICINE
Payer: COMMERCIAL

## 2020-08-13 ENCOUNTER — HOSPITAL ENCOUNTER (OUTPATIENT)
Dept: SPEECH THERAPY | Facility: REHABILITATION | Age: 85
Setting detail: THERAPIES SERIES
Discharge: HOME | End: 2020-08-13
Attending: INTERNAL MEDICINE
Payer: COMMERCIAL

## 2020-08-13 DIAGNOSIS — I69.393 ATAXIA FOLLOWING CEREBRAL INFARCTION: Primary | ICD-10-CM

## 2020-08-13 DIAGNOSIS — I69.321 DYSPHASIA FOLLOWING CEREBROVASCULAR ACCIDENT (CVA): ICD-10-CM

## 2020-08-13 DIAGNOSIS — I63.9 CEREBROVASCULAR ACCIDENT (CVA), UNSPECIFIED MECHANISM (CMS/HCC): Primary | ICD-10-CM

## 2020-08-13 PROCEDURE — 97112 NEUROMUSCULAR REEDUCATION: CPT | Mod: GP

## 2020-08-13 PROCEDURE — 92526 ORAL FUNCTION THERAPY: CPT | Mod: GN

## 2020-08-13 PROCEDURE — 97110 THERAPEUTIC EXERCISES: CPT | Mod: GP

## 2020-08-13 PROCEDURE — 97116 GAIT TRAINING THERAPY: CPT | Mod: GP

## 2020-08-13 NOTE — OP SLP TREATMENT LOG
"Short Term Goals Progress on Goal Current Session   ?Pt. will tolerate trace PO trials without s/s of aspiration and adequate oral management with min cues over 2 sessions.   Ice chip trials x 20 with cued effortful double swallow; no overt s/s aspiration, but mild wet vocal quality noted a couple minutes in approx 40% of trials; pt got full strong swallow approx 40% of the trials with cues    Honey coated spoon x 3; pt did not get strong full swallow with these trials   Pt. will complete pharyngeal strengthening, laryngeal excursion, and base of tongue strengthening exercises to improve airway protection and bolus propulsion with min cues for accurate production with 90% accuracy   SLP provided a new handout for home exercise program; recommended 5 reps of CTAR, 5 reps of \"GUH\", 5 reps of cold spoon effortful swallow for home - this pattern seemed to facilitate a more complete and full swallow    CTAR x 20 with an additional one for 30 second hold; pt needed cues to hold the ball there the whole time with his hand and to not count out loud whild doing this exercise    Not completed today - Mendelsohn x 3 (pt with difficulties holding for 3 seconds)    Effortful Swallow x 20 with stim from cold flavored spoon; pt was able to present cold spoon to self after training    Effortful \"guh\" x 20         Pt. will verbalize understanding of current swallow status and risks of aspiration/choking with min cues.   Pt is able to verbalize NPO status and risks of aspiration   EDUCATION  Training provided to patient and daughter re: home exercise program; both verbalized understanding   Other       "

## 2020-08-13 NOTE — PROGRESS NOTES
"PT DAILY NOTE FOR OUTPATIENT THERAPY    Patient: Karl Park   MRN: [id number]  : 1928 89 y.o.  Referring Physician: Herb Dailey MD  Date of Visit: 2020    Certification Dates: 20 through 10/29/20    Diagnosis:   1. Ataxia following cerebral infarction        Chief Complaints:  difficulty walking, balance deficits, abnormal gait, decreased strength and endurance    Precautions:   Existing Precautions/Restrictions: fall, other (see comments)     TODAY'S VISIT    History/Vitals/Pain/Encounter Info - 20        Injury History/Precautions/Daily Required Info    Primary Therapist  Ana Rosa Winston, JACQUELINE     Chief Complaint/Reason for Visit   difficulty walking, balance deficits, abnormal gait, decreased strength and endurance     Onset of Illness/Injury or Date of Surgery  20     Referring Physician  Dr. 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  HPI:  Pt's daughter stated that the onset of the stroke may have been  or . Pt went to the ER on 7/3 and he was diagnosed with pneumonia and bells palsy. Pt was given medication for that, which he couldn't swallow. Pt went to see his PCP on 20 who referred him to a neurologist. Pt saw the PA and he was tested for myasthenia gravis. Pt would cough throughout the whole week every time he ate or drank. Pt was seen by neurologist  for the MRI and swallow study.  MRI showed that he had a brainstem stroke. Pt had a swallow study which showed asp on all consistencies and absent epiglottic inversion. Pt had a peg tube placed on . Pt was also admitted overnight for IV fluids.       Document Type  daily treatment     Patient/Family/Caregiver Comments/Observations  Reports that his time at home was good.  Admits to walking around frequently by \"cruising\" " furniture.  Has had intermittent R LE pain described at the post thigh to the front of the knee - but not recently.  (Dtr present to prompt.)  No c/o dizziness.     Start Time  1805     Stop Time  1900     Time Calculation (min)  55 min     Patient reported fall since last visit  No        Pain Assessment    Currently in pain  No/Denies        Pain Intervention    Intervention   ther ex and gait training     Post Intervention Comments  no complaints of pain        Pre Activity Vital Signs    Pulse  80     BP  110/58     BP Location  Left upper arm        Activity Vital Signs    Patient activity  Therapeutic Exercise     Activity Pulse  88     Activity BP  120/58         Daily Treatment Assessment and Plan - 08/13/20 1914        Daily Treatment Assessment and Plan    Progress toward goals  Progressing     Daily Outcome Summary  Pt demonstrates increased difficulty today when he performs L head turns vs R, miguel in semi tandem on airex.  He was able to maneuver slalom course with ClS and no LOB.  He is wearing less supportive shoes and PT suggested to dtr and patient that when he returns to home this weekend that he consider more solid shoe for therapy due to safety.     Plan and Recommendations  Progress balance and general conditioning as kaylee           OBJECTIVE DATA TAKEN TODAY:    None taken    Today's Treatment:      PT Neuro Exercises Current Session Time Performed   THER ACT  TOTAL TIME FOR SESSION 8-22 Minutes    Precautions NPO/PEG tube.  Pt is I with PEG tube feedings.  Working on swallow in speech.  L facial droop and slight L eye ptosis post stroke. Yes   Transfer training Mod I with RW Yes   Patient Education Reviewed POC and intention for 2x/week for at least 8 weeks.     HEP - Issued and reviewed with Patient - instructed to perform at counter or with chair for support:  -LAQ  -Heel raises  -Abduction  -Hamstring curls       Yes      Yes  Yes  Yes  Yes   THER EX TOTAL TIME FOR SESSION 8-22 Minutes   "  STRETCHING     Stretching by patient Seated HS stretch w/ block 20 sec x 3  B calf stretch on incline #2  30 sec  x 2 No  yes   CARDIOVASCULAR     Nu Step Level 2  UE's/LE's 6 min, 1 min cool down Yes   Stationary Bike     Treadmill     Standing Ther-Ex HR's, Abd, Hs curls, marching and mini squats 10x     March x 10 x 2 Y   Supine Ther-Ex     NEURO RE-ED TOTAL TIME FOR SESSION 23-37 Minutes    COORDINATION     POSTURAL RE-ED     PRE-GAIT ACTIVITIES  STS from mat, chairs, /without UE support x 5 Y   BALANCE TRAINING      Sitting balance     Standing balance - static Tap ups forward to 8 in block w/wo RUE support and cl S 2 x 10, Lat tap ups x 10 ea Y   Standing balance - dynamic Gait around slalom course of cones (w/o AD) Y   Standing balance - varied surface On Airex:  Ball toss/bounce/catch  FA/FT EO with HT/HN, UE's crossed over chest  (worse with head turn L)  Airex, Semi-Tandem with HT   No  Yes    Yes   GAIT TRAINING TOTAL TIME FOR SESSION 8-22 Minutes    Ambulation with AD  Complete 2 MWT w/  ft  w/o RW  368 ft 3/10 RPE after second 2 MWT  Complete FGA:  14/30  (1,2,1,2,3,1,0,0,2,2) - completed by Ana Rosa Winston PT (primary PT) - will update goals next visit with primary.    Arrived to session ambulating with RW; ambulated without AD during session with Cl S/CG x 200 ft x 1, Cl S x 50 ft x 2      Goal:  Return to amb without AD              Yes   Dynamic gait      Stair negotiation Ascend/descend reciprocally 4-6\" steps with 2 rails, Cl \"S  Ascend/descend 6\" curb without AD, Min A and cues  Up/down ramp with Min A and cues to slow down for safety N    N    N   Curb negotiation     Ramp negotiation     Outdoor ambulation     BWS/vector training     MANUAL TOTAL TIME FOR SESSION Not performed    Stretching by therapist/PROM     Mobilization      MODALITIES TOTAL TIME FOR SESSION Not performed    Ice     Heat     ATTENDED E-STIM TOTAL TIME FOR SESSION Not performed    Attended E-Stim                      "

## 2020-08-13 NOTE — PROGRESS NOTES
"SLP DAILY NOTE FOR OUTPATIENT THERAPY    Patient: Karl Park   MRN: [id number]  : 1928 89 y.o.  Referring Physician: Edel Keller DO  Date of Visit: 2020      Certification Dates: 20 through 20    Diagnosis:   1. Cerebrovascular accident (CVA), unspecified mechanism (CMS/HCC)    2. Dysphasia following cerebrovascular accident (CVA)        Chief Complaints:  swallowing    Precautions:          TODAY'S VISIT:    History/Vitals/Pain/Encounter Info - 20 1514        Injury History/Precautions/Daily Required Info    Primary Therapist  Laura Chávez SLP (primary - [facility])s     Chief Complaint/Reason for Visit   swallowing     Document Type  daily treatment     Patient/Family/Caregiver Comments/Observations  pt reports feeling \"very tired\"; he had 3 medical appointments today; denies pain, changes in meds and falls     Start Time  1400     Stop Time  1500     Time Calculation (min)  60 min     Patient reported fall since last visit  No        Pain Assessment    Currently in pain  No/Denies        Pain Interventions    Intervention  n/a     Post Intervention Comments  n/a         Daily Treatment Assessment and Plan - 20 1521        Daily Treatment Assessment and Plan    Progress toward goals  Progressing     Daily Outcome Summary  pt got full complete strong swallow approx 40% of trials with cues     Plan and Recommendations  target pharyngeal strengthening, conservative ice chip trials/ consider conservative trials of puree/honey             Today's Treatment:      Short Term Goals Progress on Goal Current Session   ?Pt. will tolerate trace PO trials without s/s of aspiration and adequate oral management with min cues over 2 sessions.   Ice chip trials x 20 with cued effortful double swallow; no overt s/s aspiration, but mild wet vocal quality noted a couple minutes in approx 40% of trials; pt got full strong swallow approx 40% of the trials with cues    Honey coated " "spoon x 3; pt did not get strong full swallow with these trials   Pt. will complete pharyngeal strengthening, laryngeal excursion, and base of tongue strengthening exercises to improve airway protection and bolus propulsion with min cues for accurate production with 90% accuracy   SLP provided a new handout for home exercise program; recommended 5 reps of CTAR, 5 reps of \"GUH\", 5 reps of cold spoon effortful swallow for home - this pattern seemed to facilitate a more complete and full swallow    CTAR x 20 with an additional one for 30 second hold; pt needed cues to hold the ball there the whole time with his hand and to not count out loud whild doing this exercise    Not completed today - Mendelsohn x 3 (pt with difficulties holding for 3 seconds)    Effortful Swallow x 20 with stim from cold flavored spoon; pt was able to present cold spoon to self after training    Effortful \"guh\" x 20         Pt. will verbalize understanding of current swallow status and risks of aspiration/choking with min cues.   Pt is able to verbalize NPO status and risks of aspiration   EDUCATION  Training provided to patient and daughter re: home exercise program; both verbalized understanding   Other                               "

## 2020-08-13 NOTE — OP PT TREATMENT LOG
PT Neuro Exercises Current Session Time Performed   THER ACT  TOTAL TIME FOR SESSION 8-22 Minutes    Precautions NPO/PEG tube.  Pt is I with PEG tube feedings.  Working on swallow in speech.  L facial droop and slight L eye ptosis post stroke. Yes   Transfer training Mod I with RW Yes   Patient Education Reviewed POC and intention for 2x/week for at least 8 weeks.     HEP - Issued and reviewed with Patient - instructed to perform at counter or with chair for support:  -LAQ  -Heel raises  -Abduction  -Hamstring curls       Yes      Yes  Yes  Yes  Yes   THER EX TOTAL TIME FOR SESSION 8-22 Minutes    STRETCHING     Stretching by patient Seated HS stretch w/ block 20 sec x 3  B calf stretch on incline #2  30 sec  x 2 No  yes   CARDIOVASCULAR     Nu Step Level 2  UE's/LE's 6 min, 1 min cool down Yes   Stationary Bike     Treadmill     Standing Ther-Ex HR's, Abd, Hs curls, marching and mini squats 10x     March x 10 x 2 Y   Supine Ther-Ex     NEURO RE-ED TOTAL TIME FOR SESSION 23-37 Minutes    COORDINATION     POSTURAL RE-ED     PRE-GAIT ACTIVITIES  STS from mat, chairs, /without UE support x 5 Y   BALANCE TRAINING      Sitting balance     Standing balance - static Tap ups forward to 8 in block w/wo RUE support and cl S 2 x 10, Lat tap ups x 10 ea Y   Standing balance - dynamic Gait around slalom course of cones (w/o AD) Y   Standing balance - varied surface On Airex:  Ball toss/bounce/catch  FA/FT EO with HT/HN, UE's crossed over chest  (worse with head turn L)  Airex, Semi-Tandem with HT   No  Yes    Yes   GAIT TRAINING TOTAL TIME FOR SESSION 8-22 Minutes    Ambulation with AD  Complete 2 MWT w/  ft  w/o RW  368 ft 3/10 RPE after second 2 MWT  Complete FGA:  14/30  (1,2,1,2,3,1,0,0,2,2) - completed by Ana Rosa Winston PT (primary PT) - will update goals next visit with primary.    Arrived to session ambulating with RW; ambulated without AD during session with Cl S/CG x 200 ft x 1, Cl S x 50 ft x 2      Goal:  Return to  "amb without AD              Yes   Dynamic gait      Stair negotiation Ascend/descend reciprocally 4-6\" steps with 2 rails, Cl \"S  Ascend/descend 6\" curb without AD, Min A and cues  Up/down ramp with Min A and cues to slow down for safety N    N    N   Curb negotiation     Ramp negotiation     Outdoor ambulation     BWS/vector training     MANUAL TOTAL TIME FOR SESSION Not performed    Stretching by therapist/PROM     Mobilization      MODALITIES TOTAL TIME FOR SESSION Not performed    Ice     Heat     ATTENDED E-STIM TOTAL TIME FOR SESSION Not performed    Attended E-Stim       "

## 2020-08-14 ENCOUNTER — HOSPITAL ENCOUNTER (OUTPATIENT)
Dept: SPEECH THERAPY | Facility: REHABILITATION | Age: 85
Setting detail: THERAPIES SERIES
Discharge: HOME | End: 2020-08-14
Attending: INTERNAL MEDICINE
Payer: COMMERCIAL

## 2020-08-14 ENCOUNTER — HOSPITAL ENCOUNTER (OUTPATIENT)
Dept: PHYSICAL THERAPY | Facility: REHABILITATION | Age: 85
Setting detail: THERAPIES SERIES
Discharge: HOME | End: 2020-08-14
Attending: FAMILY MEDICINE
Payer: COMMERCIAL

## 2020-08-14 DIAGNOSIS — I63.9 CEREBROVASCULAR ACCIDENT (CVA), UNSPECIFIED MECHANISM (CMS/HCC): Primary | ICD-10-CM

## 2020-08-14 DIAGNOSIS — I69.393 ATAXIA FOLLOWING CEREBRAL INFARCTION: Primary | ICD-10-CM

## 2020-08-14 DIAGNOSIS — I69.321 DYSPHASIA FOLLOWING CEREBROVASCULAR ACCIDENT (CVA): ICD-10-CM

## 2020-08-14 PROCEDURE — 92526 ORAL FUNCTION THERAPY: CPT | Mod: GN

## 2020-08-14 PROCEDURE — 97116 GAIT TRAINING THERAPY: CPT | Mod: GP

## 2020-08-14 PROCEDURE — 97112 NEUROMUSCULAR REEDUCATION: CPT | Mod: GP,59

## 2020-08-14 PROCEDURE — 97110 THERAPEUTIC EXERCISES: CPT | Mod: GP

## 2020-08-14 NOTE — OP PT TREATMENT LOG
PT Neuro Exercises Current Session Time Performed   THER ACT  TOTAL TIME FOR SESSION 0-7 Minutes    Precautions NPO/PEG tube.  Pt is I with PEG tube feedings.  Working on swallow in speech.  L facial droop and slight L eye ptosis post stroke. Y   Transfer training Mod I with RW, Mod I with UE support for sit to stand without AD Yes   Patient Education Reviewed POC and intention for 2x/week for at least 8 weeks.     HEP - Issued and reviewed with Patient - instructed to perform at counter or with chair for support:  -LAQ  -Heel raises  -Abduction  -Hamstring curls    Reviewed need for walker and difficulty with clearing R foot consistently                       Yes   THER EX TOTAL TIME FOR SESSION 8-22 Minutes 20    STRETCHING     Stretching by patient Seated HS stretch w/ block 20 sec x 3  B calf stretch on incline #2  30 sec  x 2  Runner stretch No  Yes  Yes   CARDIOVASCULAR     Nu Step Level 2  UE's/LE's 6 min, 1 min cool down Yes   Stationary Bike     Treadmill     Standing Ther-Ex HR's, Abd, Hs curls, marching and mini squats 10x     March x 10 x 2 Y   Supine Ther-Ex     NEURO RE-ED TOTAL TIME FOR SESSION 23-37 Minutes 30    COORDINATION     POSTURAL RE-ED     PRE-GAIT ACTIVITIES  STS from chair with 2 in pad, w/without UE support x 5 Y   BALANCE TRAINING      Sitting balance     Standing balance - static Tap ups forward to 8 in block w/wo RUE support and cl S 2 x 10, Y   Standing balance - dynamic Gait around slalom course of cones (w/o AD)  Gait over 6 in hurdles - stepping, not reciprocal.  No UE support except intermittent touch.  Forward/backward and lateral stepping over 6 in hurdles progressing from unilat support to intermittent touch N    yes    yes   Standing balance - varied surface On Airex:  Ball toss/bounce/catch  FA/FT EO with HT/HN, UE's crossed over chest  (worse with head turn L)  Airex, Semi-Tandem with HT  Rockerboard - tilting forward/back x 20  Rockerboard - maintain balance with head turns  "and head nods with approx 30% success without LOB, close S/CGA   No  N    N  YES  YES     GAIT TRAINING TOTAL TIME FOR SESSION 8-22 Minutes    Ambulation with AD  Complete 2 MWT w/  ft  w/o RW  368 ft 3/10 RPE after second 2 MWT  Complete FGA:  14/30  (1,2,1,2,3,1,0,0,2,2) - completed by Ana Rosa Winston PT (primary PT) - will update goals next visit with primary.      Arrived to session ambulating with RW; ambulated without AD during session with Cl S/CG x 100 ft x 2, Cl S x 50 ft x 1      Goal:  Return to amb without AD              Yes   Dynamic gait      Stair negotiation Ascend/descend reciprocally 4-6\" steps with 2 rails, Cl \"S  Ascend/descend 6\" curb without AD, Min A and cues  Up/down ramp with Min A and cues to slow down for safety N    N    N   Curb negotiation     Ramp negotiation     Outdoor ambulation     BWS/vector training     MANUAL TOTAL TIME FOR SESSION Not performed    Stretching by therapist/PROM     Mobilization      MODALITIES TOTAL TIME FOR SESSION Not performed    Ice     Heat     ATTENDED E-STIM TOTAL TIME FOR SESSION Not performed    Attended E-Stim       "

## 2020-08-14 NOTE — PROGRESS NOTES
PT DAILY NOTE FOR OUTPATIENT THERAPY    Patient: Karl Park   MRN: [id number]  : 1928 89 y.o.  Referring Physician: Herb Dailey MD  Date of Visit: 2020    Certification Dates: 20 through 10/29/20    Diagnosis:   1. Ataxia following cerebral infarction        Chief Complaints:  difficulty walking, balance deficits, abnormal gait, decreased strength and endurance    Precautions:   Existing Precautions/Restrictions: fall, other (see comments)     TODAY'S VISIT    History/Vitals/Pain/Encounter Info - 20 1406        Injury History/Precautions/Daily Required Info    Primary Therapist  Ana Rosa Winston, JACQUELINE     Chief Complaint/Reason for Visit   difficulty walking, balance deficits, abnormal gait, decreased strength and endurance     Onset of Illness/Injury or Date of Surgery  20     Referring Physician  Dr. 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  HPI:  Pt's daughter stated that the onset of the stroke may have been  or . Pt went to the ER on 7/3 and he was diagnosed with pneumonia and bells palsy. Pt was given medication for that, which he couldn't swallow. Pt went to see his PCP on 20 who referred him to a neurologist. Pt saw the PA and he was tested for myasthenia gravis. Pt would cough throughout the whole week every time he ate or drank. Pt was seen by neurologist  for the MRI and swallow study.  MRI showed that he had a brainstem stroke. Pt had a swallow study which showed asp on all consistencies and absent epiglottic inversion. Pt had a peg tube placed on . Pt was also admitted overnight for IV fluids.       Document Type  daily treatment     Patient/Family/Caregiver Comments/Observations  No complaints of discomfort today but was fatigued after a long day yesterday ( 4  appointments)     Start Time  1406     Stop Time  1500     Time Calculation (min)  54 min     Patient reported fall since last visit  No        Pain Assessment    Currently in pain  No/Denies    occ R knee, but no radic signs       Pain Intervention    Intervention   ther ex     Post Intervention Comments  no increase in pain reported        Pre Activity Vital Signs    Pulse  84     BP  110/58     BP Location  Left upper arm     BP Method  Manual     Patient Position  Sitting        Activity Vital Signs    Patient activity  Therapeutic Exercise     Activity Pulse  100     Activity BP  115/58     Activity BP Location  Left upper arm     Activity BP Method  Manual     Patient Position  Sitting         Daily Treatment Assessment and Plan - 08/14/20 1746        Daily Treatment Assessment and Plan    Progress toward goals  Progressing     Daily Outcome Summary  Pushed higher level balance activities including weight shifting with cues (e.g. toe tapping, stepping activities) as well as stretching for gastroc.  His R knee flexion contracture also contributing to balance deficits during ambulation, but mild ataxia remains evident as well.   Discussed use of cane and using it in the left hand - pt indicates his dtr got him a cane but he did not use it.    Discussed possiblity of training with SPC in clinic.     Plan and Recommendations  progress strength, conditioning, stretching, balance and gait training           OBJECTIVE DATA TAKEN TODAY:    None taken    Today's Treatment:      PT Neuro Exercises Current Session Time Performed   THER ACT  TOTAL TIME FOR SESSION 0-7 Minutes    Precautions NPO/PEG tube.  Pt is I with PEG tube feedings.  Working on swallow in speech.  L facial droop and slight L eye ptosis post stroke. Y   Transfer training Mod I with RW, Mod I with UE support for sit to stand without AD Yes   Patient Education Reviewed POC and intention for 2x/week for at least 8 weeks.     HEP - Issued and reviewed with  Patient - instructed to perform at counter or with chair for support:  -LAQ  -Heel raises  -Abduction  -Hamstring curls    Reviewed need for walker and difficulty with clearing R foot consistently                       Yes   THER EX TOTAL TIME FOR SESSION 8-22 Minutes 20    STRETCHING     Stretching by patient Seated HS stretch w/ block 20 sec x 3  B calf stretch on incline #2  30 sec  x 2  Runner stretch No  Yes  Yes   CARDIOVASCULAR     Nu Step Level 2  UE's/LE's 6 min, 1 min cool down Yes   Stationary Bike     Treadmill     Standing Ther-Ex HR's, Abd, Hs curls, marching and mini squats 10x     March x 10 x 2 Y   Supine Ther-Ex     NEURO RE-ED TOTAL TIME FOR SESSION 23-37 Minutes 30    COORDINATION     POSTURAL RE-ED     PRE-GAIT ACTIVITIES  STS from chair with 2 in pad, w/without UE support x 5 Y   BALANCE TRAINING      Sitting balance     Standing balance - static Tap ups forward to 8 in block w/wo RUE support and cl S 2 x 10, Y   Standing balance - dynamic Gait around slalom course of cones (w/o AD)  Gait over 6 in hurdles - stepping, not reciprocal.  No UE support except intermittent touch.  Forward/backward and lateral stepping over 6 in hurdles progressing from unilat support to intermittent touch N    yes    yes   Standing balance - varied surface On Airex:  Ball toss/bounce/catch  FA/FT EO with HT/HN, UE's crossed over chest  (worse with head turn L)  Airex, Semi-Tandem with HT  Rockerboard - tilting forward/back x 20  Rockerboard - maintain balance with head turns and head nods with approx 30% success without LOB, close S/CGA   No  N    N  YES  YES     GAIT TRAINING TOTAL TIME FOR SESSION 8-22 Minutes    Ambulation with AD  Complete 2 MWT w/  ft  w/o RW  368 ft 3/10 RPE after second 2 MWT  Complete FGA:  14/30  (1,2,1,2,3,1,0,0,2,2) - completed by Ana Rosa Winston PT (primary PT) - will update goals next visit with primary.      Arrived to session ambulating with RW; ambulated without AD during session  "with Cl S/CG x 100 ft x 2, Cl S x 50 ft x 1      Goal:  Return to amb without AD              Yes   Dynamic gait      Stair negotiation Ascend/descend reciprocally 4-6\" steps with 2 rails, Cl \"S  Ascend/descend 6\" curb without AD, Min A and cues  Up/down ramp with Min A and cues to slow down for safety N    N    N   Curb negotiation     Ramp negotiation     Outdoor ambulation     BWS/vector training     MANUAL TOTAL TIME FOR SESSION Not performed    Stretching by therapist/PROM     Mobilization      MODALITIES TOTAL TIME FOR SESSION Not performed    Ice     Heat     ATTENDED E-STIM TOTAL TIME FOR SESSION Not performed    Attended E-Stim                      "

## 2020-08-14 NOTE — OP SLP TREATMENT LOG
"Short Term Goals Progress on Goal Current Session   ?Pt. will tolerate trace PO trials without s/s of aspiration and adequate oral management with min cues over 2 sessions.   Ice chip trials x 17 with cued effortful double swallow; no overt s/s aspiration, but mild wet vocal quality noted a couple minutes in approx 30% of trials; pt got full strong swallow in 12/17; in 5 trials he achieved hyolaryngeal elevation but did not achieve full swallow; pt required max cues to swallow \"BIG and strong\"   Pt. will complete pharyngeal strengthening, laryngeal excursion, and base of tongue strengthening exercises to improve airway protection and bolus propulsion with min cues for accurate production with 90% accuracy   SLP provided a new handout for home exercise program; recommended 5 reps of CTAR, 5 reps of \"GUH\", 5 reps of cold spoon effortful swallow for home - this pattern seemed to facilitate a more complete and full swallow    CTAR x 20 with an additional one for 30 second hold; pt needed cues to hold the ball there the whole time with his hand and to not count out loud whild doing this exercise    Not completed today - Mendelsohn x 3 (pt with difficulties holding for 3 seconds)    Effortful Swallow x 20 with stim from cold flavored spoon; pt was able to present cold spoon to self after training    Effortful \"guh\" x 20         Pt. will verbalize understanding of current swallow status and risks of aspiration/choking with min cues.   Pt is able to verbalize NPO status and risks of aspiration   EDUCATION  Training provided to patient and daughter re: home exercise program; both verbalized understanding   Other       "

## 2020-08-14 NOTE — PROGRESS NOTES
"SLP DAILY NOTE FOR OUTPATIENT THERAPY    Patient: Karl Park   MRN: [id number]  : 1928 89 y.o.  Referring Physician: Edel Keller DO  Date of Visit: 2020      Certification Dates: 20 through 20    Diagnosis:   1. Cerebrovascular accident (CVA), unspecified mechanism (CMS/HCC)    2. Dysphasia following cerebrovascular accident (CVA)        Chief Complaints:  swallowing    Precautions:          TODAY'S VISIT:    History/Vitals/Pain/Encounter Info - 20 1540        Injury History/Precautions/Daily Required Info    Primary Therapist  Laura Chávez SLP (primary - [facility])s     Chief Complaint/Reason for Visit   swallowing     Document Type  daily treatment     Patient/Family/Caregiver Comments/Observations  pt is alert and cooperative     Start Time  1300     Stop Time  1400     Time Calculation (min)  60 min     Patient reported fall since last visit  No        Pain Assessment    Currently in pain  No/Denies        Pain Interventions    Intervention  n/a     Post Intervention Comments  n/a         Daily Treatment Assessment and Plan - 20 1543        Daily Treatment Assessment and Plan    Progress toward goals  Progressing     Daily Outcome Summary  pt got full complete strong swallow with ice chips x 12 today     Plan and Recommendations  target pharyngeal strengthening, conservative ice chip trials/ consider conservative trials of puree/honey             Today's Treatment:      Short Term Goals Progress on Goal Current Session   ?Pt. will tolerate trace PO trials without s/s of aspiration and adequate oral management with min cues over 2 sessions.   Ice chip trials x 17 with cued effortful double swallow; no overt s/s aspiration, but mild wet vocal quality noted a couple minutes in approx 30% of trials; pt got full strong swallow in 12/17; in 5 trials he achieved hyolaryngeal elevation but did not achieve full swallow; pt required max cues to swallow \"BIG and strong\" " "  Pt. will complete pharyngeal strengthening, laryngeal excursion, and base of tongue strengthening exercises to improve airway protection and bolus propulsion with min cues for accurate production with 90% accuracy   SLP provided a new handout for home exercise program; recommended 5 reps of CTAR, 5 reps of \"GUH\", 5 reps of cold spoon effortful swallow for home - this pattern seemed to facilitate a more complete and full swallow    CTAR x 20 with an additional one for 30 second hold; pt needed cues to hold the ball there the whole time with his hand and to not count out loud whild doing this exercise    Not completed today - Mendelsohn x 3 (pt with difficulties holding for 3 seconds)    Effortful Swallow x 20 with stim from cold flavored spoon; pt was able to present cold spoon to self after training    Effortful \"guh\" x 20         Pt. will verbalize understanding of current swallow status and risks of aspiration/choking with min cues.   Pt is able to verbalize NPO status and risks of aspiration   EDUCATION  Training provided to patient and daughter re: home exercise program; both verbalized understanding   Other                               "

## 2020-08-17 ENCOUNTER — HOSPITAL ENCOUNTER (OUTPATIENT)
Dept: PHYSICAL THERAPY | Facility: REHABILITATION | Age: 85
Setting detail: THERAPIES SERIES
Discharge: HOME | End: 2020-08-17
Attending: FAMILY MEDICINE
Payer: COMMERCIAL

## 2020-08-17 ENCOUNTER — HOSPITAL ENCOUNTER (OUTPATIENT)
Dept: SPEECH THERAPY | Facility: REHABILITATION | Age: 85
Setting detail: THERAPIES SERIES
Discharge: HOME | End: 2020-08-17
Attending: INTERNAL MEDICINE
Payer: COMMERCIAL

## 2020-08-17 DIAGNOSIS — I69.393 ATAXIA FOLLOWING CEREBRAL INFARCTION: Primary | ICD-10-CM

## 2020-08-17 DIAGNOSIS — I69.321 DYSPHASIA FOLLOWING CEREBROVASCULAR ACCIDENT (CVA): ICD-10-CM

## 2020-08-17 DIAGNOSIS — I63.9 CEREBROVASCULAR ACCIDENT (CVA), UNSPECIFIED MECHANISM (CMS/HCC): Primary | ICD-10-CM

## 2020-08-17 PROCEDURE — 97112 NEUROMUSCULAR REEDUCATION: CPT | Mod: GP,CQ,59

## 2020-08-17 PROCEDURE — 92526 ORAL FUNCTION THERAPY: CPT | Mod: GN

## 2020-08-17 PROCEDURE — 97110 THERAPEUTIC EXERCISES: CPT | Mod: GP,CQ,59

## 2020-08-17 PROCEDURE — 97116 GAIT TRAINING THERAPY: CPT | Mod: GP,CQ

## 2020-08-17 NOTE — PROGRESS NOTES
PT DAILY NOTE FOR OUTPATIENT THERAPY    Patient: Karl Park   MRN: [id number]  : 1928 89 y.o.  Referring Physician: Herb Dailey MD  Date of Visit: 2020    Certification Dates: 20 through 10/29/20    Diagnosis:   1. Ataxia following cerebral infarction        Chief Complaints:  difficulty walking, balance deficits, abnormal gait, decreased strength and endurance    Precautions:   Precautions comments: NPO, PEG Tube  Existing Precautions/Restrictions: fall, other (see comments)     TODAY'S VISIT    History/Vitals/Pain/Encounter Info - 20 1625        Injury History/Precautions/Daily Required Info    Primary Therapist  Ana Rosa Winston PT     Chief Complaint/Reason for Visit   difficulty walking, balance deficits, abnormal gait, decreased strength and endurance     Onset of Illness/Injury or Date of Surgery  20     Existing Precautions/Restrictions  fall;other (see comments)     Precautions comments  NPO, PEG Tube     Limitations/Impairments  swallowing     Pertinent History of Current Functional Problem  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  HPI:  Pt's daughter stated that the onset of the stroke may have been  or . Pt went to the ER on 7/3 and he was diagnosed with pneumonia and bells palsy. Pt was given medication for that, which he couldn't swallow. Pt went to see his PCP on 20 who referred him to a neurologist. Pt saw the PA and he was tested for myasthenia gravis. Pt would cough throughout the whole week every time he ate or drank. Pt was seen by neurologist  for the MRI and swallow study.  MRI showed that he had a brainstem stroke. Pt had a swallow study which showed asp on all consistencies and absent epiglottic inversion. Pt had a peg tube placed on . Pt was also admitted overnight for IV fluids.       OP Specialty  Neuro     Document Type  daily treatment     Patient/Family/Caregiver  Comments/Observations  Daughter reports patient had a good time at his home for a few days.  No new issues to report     Start Time  1500     Stop Time  1600     Time Calculation (min)  60 min     Patient reported fall since last visit  No        Pain Assessment    Currently in pain  No/Denies        Pain Intervention    Intervention   N/a     Post Intervention Comments  N/a        Pre Activity Vital Signs    Pulse  89     SpO2  96 %     BP  126/70     BP Location  Left upper arm     BP Method  Manual     Patient Position  Sitting         Daily Treatment Assessment and Plan - 08/17/20 1519        Daily Treatment Assessment and Plan    Progress toward goals  Progressing     Daily Outcome Summary  Patient tolerated session well with occasional seated breaks.  Patient able to maintain his balance on airex with FT and semi tandem with EO and EC.  Gait over hurdles improved with cues for heel strike - required reminders for heel strike during gait with RW end of session.       Plan and Recommendations  Continue with primary PT's POC           OBJECTIVE DATA TAKEN TODAY:    None taken    Today's Treatment:      PT Neuro Exercises Current Session Time Performed   THER ACT  TOTAL TIME FOR SESSION 0-7 Minutes    Precautions NPO/PEG tube.  Pt is I with PEG tube feedings.  Working on swallow in speech.  L facial droop and slight L eye ptosis post stroke. Yes   Transfer training Mod I with RW, Mod I with UE support for sit to stand without AD Yes   Patient Education Reviewed POC and intention for 2x/week for at least 8 weeks.     HEP - Issued and reviewed with Patient - instructed to perform at counter or with chair for support:  -LAQ  -Heel raises  -Abduction  -Hamstring curls    Reviewed need for walker and difficulty with clearing R foot consistently                       No   THER EX TOTAL TIME FOR SESSION 8-22 Minutes     STRETCHING     Stretching by patient Seated HS stretch w/ block 20 sec x 3  B calf stretch on incline  "#2  30 sec  x 2  Runner stretch No  Yes  Yes   CARDIOVASCULAR     Nu Step Level 2  UE's/LE's 6 min, 1 min cool down No   Stationary Bike Virtual RB (nustep not available) redwood dash, gear 6-7, 10 mins Yes   Treadmill     Standing Ther-Ex HR's, Abd, Hs curls, marching and mini squats 10x     March x 10 x 2 No   Supine Ther-Ex     NEURO RE-ED TOTAL TIME FOR SESSION 23-37 Minutes     COORDINATION     POSTURAL RE-ED     PRE-GAIT ACTIVITIES  STS from chair with 2 in pad, w/without UE support x 5 No   BALANCE TRAINING      Sitting balance     Standing balance - static Tap ups forward to 8 in block w/wo UE support and cl S 2 x 10 floor f/b airex Yes   Standing balance - dynamic Gait around slalom course of cones (w/o AD)  Gait over 6 in hurdles - step to pattern forward, side stepping.   reciprocal.  No UE support except intermittent touch.  Forward/backward and lateral stepping over 6 in hurdles progressing from unilat support to intermittent touch No    Yes    Yes         Standing balance - varied surface On Airex:  Ball  FA/FT EO with HT/HN, UE's crossed over chest  (worse with head turn L)  Airex, Semi-Tandem with HT  Rockerboard - tilting forward/back x 20  Rockerboard - maintain balance with head turns and head nods with approx 30% success without LOB, close S/CGA Yes  Yes    Yes    Yes  Yes  Yes     GAIT TRAINING TOTAL TIME FOR SESSION 8-22 Minutes    Ambulation with AD  Complete 2 MWT w/  ft  w/o RW  368 ft 3/10 RPE after second 2 MWT  Complete FGA:  14/30  (1,2,1,2,3,1,0,0,2,2) - completed by Ana Rosa Winston PT (primary PT) - will update goals next visit with primary.      Arrived to session ambulating with RW; ambulated without AD during session with Cl S/CG x 100 ft x 2, Cl S x 50 ft x 1      Goal:  Return to amb without AD                Yes   Dynamic gait      Stair negotiation Ascend/descend reciprocally 4-6\" steps with 2 rails, Cl \"S  Ascend/descend 6\" curb without AD, Min A and cues  Up/down ramp with " Min A and cues to slow down for safety N    N    N   Curb negotiation     Ramp negotiation     Outdoor ambulation     BWS/vector training     MANUAL TOTAL TIME FOR SESSION Not performed    Stretching by therapist/PROM     Mobilization      MODALITIES TOTAL TIME FOR SESSION Not performed    Ice     Heat     ATTENDED E-STIM TOTAL TIME FOR SESSION Not performed    Attended E-Stim

## 2020-08-17 NOTE — OP SLP TREATMENT LOG
"Short Term Goals Progress on Goal Current Session   ?Pt. will tolerate trace PO trials without s/s of aspiration and adequate oral management with min cues over 2 sessions.   Ice chip trials x 23 with cued effortful double swallow; no overt s/s aspiration, but mild wet vocal quality noted x 4; pt got full strong swallow in; in 5 trials he achieved hyolaryngeal elevation but did not achieve full swallow; pt required max cues to swallow \"BIG and strong\"   Pt. will complete pharyngeal strengthening, laryngeal excursion, and base of tongue strengthening exercises to improve airway protection and bolus propulsion with min cues for accurate production with 90% accuracy   SLP provided a new handout for home exercise program; recommended 5 reps of CTAR, 5 reps of \"GUH\", 5 reps of cold spoon effortful swallow for home - this pattern seemed to facilitate a more complete and full swallow    CTAR x 20 with an additional one for 30 second hold; pt needed cues to hold the ball there the whole time with his hand and to not count out loud whild doing this exercise    Not completed today - Mendelsohn x 3 (pt with difficulties holding for 3 seconds)    Effortful Swallow x 20 with stim from cold flavored spoon; pt was able to present cold spoon to self after training    Effortful \"guh\" x 20         Pt. will verbalize understanding of current swallow status and risks of aspiration/choking with min cues.   Pt is able to verbalize NPO status and risks of aspiration   EDUCATION  Training provided to patient and daughter re: home exercise program; both verbalized understanding   Other       "

## 2020-08-17 NOTE — OP PT TREATMENT LOG
PT Neuro Exercises Current Session Time Performed   THER ACT  TOTAL TIME FOR SESSION 0-7 Minutes    Precautions NPO/PEG tube.  Pt is I with PEG tube feedings.  Working on swallow in speech.  L facial droop and slight L eye ptosis post stroke. Yes   Transfer training Mod I with RW, Mod I with UE support for sit to stand without AD Yes   Patient Education Reviewed POC and intention for 2x/week for at least 8 weeks.     HEP - Issued and reviewed with Patient - instructed to perform at counter or with chair for support:  -LAQ  -Heel raises  -Abduction  -Hamstring curls    Reviewed need for walker and difficulty with clearing R foot consistently                       No   THER EX TOTAL TIME FOR SESSION 8-22 Minutes     STRETCHING     Stretching by patient Seated HS stretch w/ block 20 sec x 3  B calf stretch on incline #2  30 sec  x 2  Runner stretch No  Yes  Yes   CARDIOVASCULAR     Nu Step Level 2  UE's/LE's 6 min, 1 min cool down No   Stationary Bike Virtual RB (nustep not available) redwood dash, gear 6-7, 10 mins Yes   Treadmill     Standing Ther-Ex HR's, Abd, Hs curls, marching and mini squats 10x     March x 10 x 2 No   Supine Ther-Ex     NEURO RE-ED TOTAL TIME FOR SESSION 23-37 Minutes     COORDINATION     POSTURAL RE-ED     PRE-GAIT ACTIVITIES  STS from chair with 2 in pad, w/without UE support x 5 No   BALANCE TRAINING      Sitting balance     Standing balance - static Tap ups forward to 8 in block w/wo UE support and cl S 2 x 10 floor f/b airex Yes   Standing balance - dynamic Gait around slalom course of cones (w/o AD)  Gait over 6 in hurdles - step to pattern forward, side stepping.   reciprocal.  No UE support except intermittent touch.  Forward/backward and lateral stepping over 6 in hurdles progressing from unilat support to intermittent touch No    Yes    Yes         Standing balance - varied surface On Airex:  Ball  FA/FT EO with HT/HN, UE's crossed over chest  (worse with head turn L)  Airex,  "Semi-Tandem with HT  Rockerboard - tilting forward/back x 20  Rockerboard - maintain balance with head turns and head nods with approx 30% success without LOB, close S/CGA Yes  Yes    Yes    Yes  Yes  Yes     GAIT TRAINING TOTAL TIME FOR SESSION 8-22 Minutes    Ambulation with AD  Complete 2 MWT w/  ft  w/o RW  368 ft 3/10 RPE after second 2 MWT  Complete FGA:  14/30  (1,2,1,2,3,1,0,0,2,2) - completed by Ana Rosa Winston PT (primary PT) - will update goals next visit with primary.      Arrived to session ambulating with RW; ambulated without AD during session with Cl S/CG x 100 ft x 2, Cl S x 50 ft x 1      Goal:  Return to amb without AD                Yes   Dynamic gait      Stair negotiation Ascend/descend reciprocally 4-6\" steps with 2 rails, Cl \"S  Ascend/descend 6\" curb without AD, Min A and cues  Up/down ramp with Min A and cues to slow down for safety N    N    N   Curb negotiation     Ramp negotiation     Outdoor ambulation     BWS/vector training     MANUAL TOTAL TIME FOR SESSION Not performed    Stretching by therapist/PROM     Mobilization      MODALITIES TOTAL TIME FOR SESSION Not performed    Ice     Heat     ATTENDED E-STIM TOTAL TIME FOR SESSION Not performed    Attended E-Stim       "

## 2020-08-17 NOTE — PROGRESS NOTES
"SLP DAILY NOTE FOR OUTPATIENT THERAPY    Patient: Karl Park   MRN: [id number]  : 1928 89 y.o.  Referring Physician: Edel Keller DO  Date of Visit: 2020      Certification Dates: 20 through 20    Diagnosis:   1. Cerebrovascular accident (CVA), unspecified mechanism (CMS/HCC)    2. Dysphasia following cerebrovascular accident (CVA)        Chief Complaints:  swallowing    Precautions:          TODAY'S VISIT:    History/Vitals/Pain/Encounter Info - 20 1521        Injury History/Precautions/Daily Required Info    Primary Therapist  Laura Chávez SLP (primary - [facility])s     Chief Complaint/Reason for Visit   swallowing     Document Type  daily treatment     Patient/Family/Caregiver Comments/Observations  pt denies pain, changes in meds, and falls     Start Time  1600     Stop Time  1700     Time Calculation (min)  60 min     Patient reported fall since last visit  No        Pain Assessment    Currently in pain  No/Denies        Pain Interventions    Intervention  n/a     Post Intervention Comments  n/a         Daily Treatment Assessment and Plan - 20 1804        Daily Treatment Assessment and Plan    Progress toward goals  Progressing     Daily Outcome Summary  improving swallow function     Plan and Recommendations  target pharyngeal strengthening, conservative ice chip trials/ consider conservative trials of puree/honey               Today's Treatment:      Short Term Goals Progress on Goal Current Session   ?Pt. will tolerate trace PO trials without s/s of aspiration and adequate oral management with min cues over 2 sessions.   Ice chip trials x 23 with cued effortful double swallow; no overt s/s aspiration, but mild wet vocal quality noted x 4; pt got full strong swallow in; in 5 trials he achieved hyolaryngeal elevation but did not achieve full swallow; pt required max cues to swallow \"BIG and strong\"   Pt. will complete pharyngeal strengthening, laryngeal " "excursion, and base of tongue strengthening exercises to improve airway protection and bolus propulsion with min cues for accurate production with 90% accuracy   SLP provided a new handout for home exercise program; recommended 5 reps of CTAR, 5 reps of \"GUH\", 5 reps of cold spoon effortful swallow for home - this pattern seemed to facilitate a more complete and full swallow    CTAR x 20 with an additional one for 30 second hold; pt needed cues to hold the ball there the whole time with his hand and to not count out loud whild doing this exercise    Not completed today - Mendelsohn x 3 (pt with difficulties holding for 3 seconds)    Effortful Swallow x 20 with stim from cold flavored spoon; pt was able to present cold spoon to self after training    Effortful \"guh\" x 20         Pt. will verbalize understanding of current swallow status and risks of aspiration/choking with min cues.   Pt is able to verbalize NPO status and risks of aspiration   EDUCATION  Training provided to patient and daughter re: home exercise program; both verbalized understanding   Other                               "

## 2020-08-18 ENCOUNTER — HOSPITAL ENCOUNTER (OUTPATIENT)
Dept: SPEECH THERAPY | Facility: REHABILITATION | Age: 85
Setting detail: THERAPIES SERIES
Discharge: HOME | End: 2020-08-18
Attending: INTERNAL MEDICINE
Payer: COMMERCIAL

## 2020-08-18 ENCOUNTER — HOSPITAL ENCOUNTER (OUTPATIENT)
Dept: PHYSICAL THERAPY | Facility: REHABILITATION | Age: 85
Setting detail: THERAPIES SERIES
Discharge: HOME | End: 2020-08-18
Attending: FAMILY MEDICINE
Payer: COMMERCIAL

## 2020-08-18 DIAGNOSIS — I69.393 ATAXIA FOLLOWING CEREBRAL INFARCTION: Primary | ICD-10-CM

## 2020-08-18 DIAGNOSIS — I63.9 CEREBROVASCULAR ACCIDENT (CVA), UNSPECIFIED MECHANISM (CMS/HCC): Primary | ICD-10-CM

## 2020-08-18 DIAGNOSIS — I69.321 DYSPHASIA FOLLOWING CEREBROVASCULAR ACCIDENT (CVA): ICD-10-CM

## 2020-08-18 PROCEDURE — 97116 GAIT TRAINING THERAPY: CPT | Mod: GP

## 2020-08-18 PROCEDURE — 92526 ORAL FUNCTION THERAPY: CPT | Mod: GN

## 2020-08-18 PROCEDURE — 97110 THERAPEUTIC EXERCISES: CPT | Mod: GP

## 2020-08-18 PROCEDURE — 97112 NEUROMUSCULAR REEDUCATION: CPT | Mod: GP

## 2020-08-18 NOTE — PROGRESS NOTES
PT DAILY NOTE FOR OUTPATIENT THERAPY    Patient: Karl Park   MRN: [id number]  : 1928 89 y.o.  Referring Physician: Herb Dailey MD  Date of Visit: 2020    Certification Dates: 20 through 10/29/20    Diagnosis:   1. Ataxia following cerebral infarction        Chief Complaints:  difficulty walking, balance deficits, abnormal gait, decreased strength and endurance    Precautions:   Existing Precautions/Restrictions: fall, other (see comments)     TODAY'S VISIT    History/Vitals/Pain/Encounter Info - 20 1105        Injury History/Precautions/Daily Required Info    Primary Therapist  Ana Rosa Winston PT     Chief Complaint/Reason for Visit   difficulty walking, balance deficits, abnormal gait, decreased strength and endurance     Onset of Illness/Injury or Date of Surgery  20     Existing Precautions/Restrictions  fall;other (see comments)     Pertinent History of Current Functional Problem  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  HPI:  Pt's daughter stated that the onset of the stroke may have been  or . Pt went to the ER on 7/3 and he was diagnosed with pneumonia and bells palsy. Pt was given medication for that, which he couldn't swallow. Pt went to see his PCP on 20 who referred him to a neurologist. Pt saw the PA and he was tested for myasthenia gravis. Pt would cough throughout the whole week every time he ate or drank. Pt was seen by neurologist  for the MRI and swallow study.  MRI showed that he had a brainstem stroke. Pt had a swallow study which showed asp on all consistencies and absent epiglottic inversion. Pt had a peg tube placed on . Pt was also admitted overnight for IV fluids.       Document Type  daily treatment     Patient/Family/Caregiver Comments/Observations  Nothing new from last visit or overnight.     Start Time  1205     Stop Time  1300     Time Calculation (min)  55 min     Patient  "reported fall since last visit  No        Pain Assessment    Currently in pain  No/Denies        Pain Intervention    Intervention   NA     Post Intervention Comments  no increase in pain following therapy        Pre Activity Vital Signs    Pulse  82     BP  117/58     BP Location  Left upper arm     BP Method  Automatic     Patient Position  Sitting        Activity Vital Signs    Patient activity  Therapeutic Exercise     Activity Pulse  100        Post Activity Vital Signs    Post Activity Pulse  82         Daily Treatment Assessment and Plan - 08/18/20 1624        Daily Treatment Assessment and Plan    Progress toward goals  Progressing     Daily Outcome Summary  Pt tolerated exercise and gait progressions easier today - not as fatigued as last visit with this therapist.  Noted that HR increased from 82 at rest to 100 with ambulation episodes. No gross LOB, min instability, minimal loss of R LE appropriate step length and no overt catch of R toe.     Plan and Recommendations  Pt to continue with standing HEP (encouraged to do while he is in his own home for the coming days).  Encourage amb with RW for safety, although he is likely to \"cruise\" at home.           OBJECTIVE DATA TAKEN TODAY:    None taken    Today's Treatment:      PT Neuro Exercises Current Session Time Performed   THER ACT  TOTAL TIME FOR SESSION 0-7 Minutes    Precautions NPO/PEG tube.  Pt is I with PEG tube feedings.  Working on swallow in speech.  L facial droop and slight L eye ptosis post stroke. Yes   Transfer training Mod I with RW, Mod I with UE support for sit to stand without AD Yes   Patient Education Reviewed POC and intention for 2x/week for at least 8 weeks.     HEP - Issued and reviewed with Patient - instructed to perform at counter or with chair for support:  -LAQ  -Heel raises  -Abduction  -Hamstring curls    Reviewed need for walker and difficulty with clearing R foot consistently                       No   THER EX TOTAL TIME " FOR SESSION 8-22 Minutes     STRETCHING     Stretching by patient Seated HS stretch w/ manual assist 20 sec x 3  B calf stretch on incline #2  30 sec  x 2  Runner stretch YES  N  N   CARDIOVASCULAR     Nu Step Level 2  UE's/LE's 6 min, 1 min cool down No   Stationary Bike Virtual RB (nustep not available) ros dash, gear 6-7, 10 mins N   Treadmill     Standing Ther-Ex HR's, Abd, Hs curls, marching and mini squats 10x     March x 10 x 2, HR/TR x 10,  Yes   Supine Ther-Ex     NEURO RE-ED TOTAL TIME FOR SESSION 23-37 Minutes     COORDINATION     POSTURAL RE-ED     PRE-GAIT ACTIVITIES  STS from chair with 2 in pad, w/without UE support x 5 No   BALANCE TRAINING      Sitting balance     Standing balance - static Tap ups forward to 8 in block w/wo UE support and cl S 2 x 10 floor.  Tap ups from floor to 8 in box    Toe taps to cones, single and alternating w progression towards decreased UE support.  Therapist maintaining cl S or CGA        Yes        YES   Standing balance - dynamic Gait around slalom course of cones (w/o AD)  Gait over 6 in hurdles - step to pattern forward, side stepping.   reciprocal.  No UE support except intermittent touch.  Forward/backward  over 6 in hurdles progressing from unilat support to intermittent touch No    No      YES       Standing balance - varied surface On Airex:    FT EO with HT/HN, UE's at sides (worse with head turn L)  Airex, Semi-Tandem with HT  Rockerboard - tilting forward/back x 20  Rockerboard - maintain balance with head turns and head nods with approx 30% success without LOB, close S/CGA Yes    Yes    Yes  N  N  N     GAIT TRAINING TOTAL TIME FOR SESSION 8-22 Minutes    Gait with/ without AD  Complete 2 MWT w/  ft  w/o RW  368 ft 3/10 RPE after second 2 MWT  Complete FGA:  14/30  (1,2,1,2,3,1,0,0,2,2) - completed by Ana Rosa Winston PT (primary PT) - will update goals next visit with primary.      Arrived to session ambulating with RW; Gait training w/o AD x 250 ft x  "3 with brief rest in between, cl S.  Focus on cues for heel strike/appropriate knee extension and hip flexion on RLE.  HR increased from 82 at rest to 100 with ambulation episodes. No gross LOB, min instability, minimal loss of R LE appropriate step length and no overt catch of R toe.      Goal:  Return to amb without AD              YES     Dynamic gait      Stair negotiation Ascend/descend reciprocally 4-6\" steps with 2 rails, Cl \"S  Ascend/descend 6\" curb without AD, Min A and cues  Up/down ramp with Min A and cues to slow down for safety N    N    N   Curb negotiation     Ramp negotiation     Outdoor ambulation     BWS/vector training     MANUAL TOTAL TIME FOR SESSION Not performed    Stretching by therapist/PROM     Mobilization      MODALITIES TOTAL TIME FOR SESSION Not performed    Ice     Heat     ATTENDED E-STIM TOTAL TIME FOR SESSION Not performed    Attended E-Stim                      "

## 2020-08-18 NOTE — OP PT TREATMENT LOG
PT Neuro Exercises Current Session Time Performed   THER ACT  TOTAL TIME FOR SESSION 0-7 Minutes    Precautions NPO/PEG tube.  Pt is I with PEG tube feedings.  Working on swallow in speech.  L facial droop and slight L eye ptosis post stroke. Yes   Transfer training Mod I with RW, Mod I with UE support for sit to stand without AD Yes   Patient Education Reviewed POC and intention for 2x/week for at least 8 weeks.     HEP - Issued and reviewed with Patient - instructed to perform at counter or with chair for support:  -LAQ  -Heel raises  -Abduction  -Hamstring curls    Reviewed need for walker and difficulty with clearing R foot consistently                       No   THER EX TOTAL TIME FOR SESSION 8-22 Minutes     STRETCHING     Stretching by patient Seated HS stretch w/ manual assist 20 sec x 3  B calf stretch on incline #2  30 sec  x 2  Runner stretch YES  N  N   CARDIOVASCULAR     Nu Step Level 2  UE's/LE's 6 min, 1 min cool down No   Stationary Bike Virtual RB (nustep not available) redwood dash, gear 6-7, 10 mins N   Treadmill     Standing Ther-Ex HR's, Abd, Hs curls, marching and mini squats 10x     March x 10 x 2, HR/TR x 10,  Yes   Supine Ther-Ex     NEURO RE-ED TOTAL TIME FOR SESSION 23-37 Minutes     COORDINATION     POSTURAL RE-ED     PRE-GAIT ACTIVITIES  STS from chair with 2 in pad, w/without UE support x 5 No   BALANCE TRAINING      Sitting balance     Standing balance - static Tap ups forward to 8 in block w/wo UE support and cl S 2 x 10 floor.  Tap ups from floor to 8 in box    Toe taps to cones, single and alternating w progression towards decreased UE support.  Therapist maintaining cl S or CGA        Yes        YES   Standing balance - dynamic Gait around slalom course of cones (w/o AD)  Gait over 6 in hurdles - step to pattern forward, side stepping.   reciprocal.  No UE support except intermittent touch.  Forward/backward  over 6 in hurdles progressing from unilat support to intermittent touch  "No    No      YES       Standing balance - varied surface On Airex:    FT EO with HT/HN, UE's at sides (worse with head turn L)  Airex, Semi-Tandem with HT  Rockerboard - tilting forward/back x 20  Rockerboard - maintain balance with head turns and head nods with approx 30% success without LOB, close S/CGA Yes    Yes    Yes  N  N  N     GAIT TRAINING TOTAL TIME FOR SESSION 8-22 Minutes    Gait with/ without AD  Complete 2 MWT w/  ft  w/o RW  368 ft 3/10 RPE after second 2 MWT  Complete FGA:  14/30  (1,2,1,2,3,1,0,0,2,2) - completed by Ana Rosa Winston PT (primary PT) - will update goals next visit with primary.      Arrived to session ambulating with RW; Gait training w/o AD x 250 ft x 3 with brief rest in between, cl S.  Focus on cues for heel strike/appropriate knee extension and hip flexion on RLE.  HR increased from 82 at rest to 100 with ambulation episodes. No gross LOB, min instability, minimal loss of R LE appropriate step length and no overt catch of R toe.      Goal:  Return to amb without AD              YES     Dynamic gait      Stair negotiation Ascend/descend reciprocally 4-6\" steps with 2 rails, Cl \"S  Ascend/descend 6\" curb without AD, Min A and cues  Up/down ramp with Min A and cues to slow down for safety N    N    N   Curb negotiation     Ramp negotiation     Outdoor ambulation     BWS/vector training     MANUAL TOTAL TIME FOR SESSION Not performed    Stretching by therapist/PROM     Mobilization      MODALITIES TOTAL TIME FOR SESSION Not performed    Ice     Heat     ATTENDED E-STIM TOTAL TIME FOR SESSION Not performed    Attended E-Stim       "

## 2020-08-18 NOTE — PROGRESS NOTES
"SLP DAILY NOTE FOR OUTPATIENT THERAPY    Patient: Karl Park   MRN: [id number]  : 1928 89 y.o.  Referring Physician: Edel Keller DO  Date of Visit: 2020      Certification Dates: 20 through 20    Diagnosis:   1. Cerebrovascular accident (CVA), unspecified mechanism (CMS/HCC)    2. Dysphasia following cerebrovascular accident (CVA)        Chief Complaints:       Precautions:          TODAY'S VISIT:    History/Vitals/Pain/Encounter Info - 20 1112        Injury History/Precautions/Daily Required Info    Document Type  daily treatment     Patient/Family/Caregiver Comments/Observations  Pt reported that one of his liquid medications has been changed to dissolveable tablets. He was unable to recall the name of the medication.     Start Time  1103     Stop Time  1200     Time Calculation (min)  57 min     Patient reported fall since last visit  No        Pain Assessment    Currently in pain  No/Denies        Pain Interventions    Intervention  none     Post Intervention Comments  none         Daily Treatment Assessment and Plan - 20 1201        Daily Treatment Assessment and Plan    Progress toward goals  Progressing     Daily Outcome Summary  Pt continues with improvements in swallow.      Plan and Recommendations  Continue pharyngeal strengthening, ice chip trials/ consider conservative trials of puree/honey          Today's Treatment:      Short Term Goals Current Session   ?Pt. will tolerate trace PO trials without s/s of aspiration and adequate oral management with min cues over 2 sessions.  Ice chip trials x 25 with cued effortful double swallow; no overt s/s aspiration, but mild wet vocal quality noted x 2; pt got full strong swallow in; pt required max cues to swallow \"BIG and strong\"   Pt. will complete pharyngeal strengthening, laryngeal excursion, and base of tongue strengthening exercises to improve airway protection and bolus propulsion with min cues for " "accurate production with 90% accuracy  CTAR x 20 with an additional one for 30 second hold; pt needed cues to hold the ball there the whole time with his hand and to not count out loud whild doing this exercise    Not completed today - Mendelsohn x 3 (pt with difficulties holding for 3 seconds)    Effortful Swallow x 20 during ice chips    Effortful \"guh\" x 20    High \"ee\" glides x 20- cues to increase pitch for maximizing laryngeal excursion.         Pt. will verbalize understanding of current swallow status and risks of aspiration/choking with min cues.  Pt is able to verbalize NPO status and risks of aspiration   EDUCATION Training provided to patient and daughter re: home exercise program; both verbalized understanding   Other      "

## 2020-08-18 NOTE — OP SLP TREATMENT LOG
"Short Term Goals Current Session   ?Pt. will tolerate trace PO trials without s/s of aspiration and adequate oral management with min cues over 2 sessions.  Ice chip trials x 25 with cued effortful double swallow; no overt s/s aspiration, but mild wet vocal quality noted x 2; pt got full strong swallow in; pt required max cues to swallow \"BIG and strong\"   Pt. will complete pharyngeal strengthening, laryngeal excursion, and base of tongue strengthening exercises to improve airway protection and bolus propulsion with min cues for accurate production with 90% accuracy  CTAR x 20 with an additional one for 30 second hold; pt needed cues to hold the ball there the whole time with his hand and to not count out loud whild doing this exercise    Not completed today - Mendelsohn x 3 (pt with difficulties holding for 3 seconds)    Effortful Swallow x 20 during ice chips    Effortful \"guh\" x 20    High \"ee\" glides x 20- cues to increase pitch for maximizing laryngeal excursion.         Pt. will verbalize understanding of current swallow status and risks of aspiration/choking with min cues.  Pt is able to verbalize NPO status and risks of aspiration   EDUCATION Training provided to patient and daughter re: home exercise program; both verbalized understanding   Other      "

## 2020-08-27 ENCOUNTER — HOSPITAL ENCOUNTER (OUTPATIENT)
Dept: PHYSICAL THERAPY | Facility: REHABILITATION | Age: 85
Setting detail: THERAPIES SERIES
Discharge: HOME | End: 2020-08-27
Attending: FAMILY MEDICINE
Payer: COMMERCIAL

## 2020-08-27 ENCOUNTER — HOSPITAL ENCOUNTER (OUTPATIENT)
Dept: SPEECH THERAPY | Facility: REHABILITATION | Age: 85
Setting detail: THERAPIES SERIES
Discharge: HOME | End: 2020-08-27
Attending: INTERNAL MEDICINE
Payer: COMMERCIAL

## 2020-08-27 DIAGNOSIS — I69.393 ATAXIA FOLLOWING CEREBRAL INFARCTION: Primary | ICD-10-CM

## 2020-08-27 DIAGNOSIS — I69.321 DYSPHASIA FOLLOWING CEREBROVASCULAR ACCIDENT (CVA): ICD-10-CM

## 2020-08-27 DIAGNOSIS — I63.9 CEREBROVASCULAR ACCIDENT (CVA), UNSPECIFIED MECHANISM (CMS/HCC): Primary | ICD-10-CM

## 2020-08-27 PROCEDURE — 97110 THERAPEUTIC EXERCISES: CPT | Mod: GP

## 2020-08-27 PROCEDURE — 97112 NEUROMUSCULAR REEDUCATION: CPT | Mod: GP

## 2020-08-27 PROCEDURE — 97116 GAIT TRAINING THERAPY: CPT | Mod: GP

## 2020-08-27 PROCEDURE — 92526 ORAL FUNCTION THERAPY: CPT | Mod: GN

## 2020-08-27 PROCEDURE — 97010 HOT OR COLD PACKS THERAPY: CPT | Mod: GP

## 2020-08-27 NOTE — OP SLP TREATMENT LOG
"Short Term Goals Current Session   ?Pt. will tolerate trace PO trials without s/s of aspiration and adequate oral management with min cues over 2 sessions.  Ice chip trials x 25 with cued effortful double swallow; no overt s/s aspiration, but mild wet vocal quality noted x 2; pt got full strong swallow in; pt required max cues to swallow \"BIG and strong\"    Trial applesauce x 1 oz via 1/4 tsp amounts    Trial honey thick water x 2 oz via 1/4-1/2 tsp amounts     Pt occassionally verbalized her could feel residuals at suspected UES, and was able to clear his throat and reswallow; pt expectorated x 4 what looked like mucous during trials; no overt s/s aspiration   Pt. will complete pharyngeal strengthening, laryngeal excursion, and base of tongue strengthening exercises to improve airway protection and bolus propulsion with min cues for accurate production with 90% accuracy  CTAR x 10 completed    Effortful \"guh\" x 10 completed         Pt. will verbalize understanding of current swallow status and risks of aspiration/choking with min cues.  Pt is able to verbalize NPO status and risks of aspiration   EDUCATION Training provided to patient and daughter re: home exercise program; both verbalized understanding   Other      "

## 2020-08-27 NOTE — OP PT TREATMENT LOG
"PT Neuro Exercises Current Session Time Performed   THER ACT  TOTAL TIME FOR SESSION 0-7 Minutes    Precautions NPO/PEG tube.  Pt is I with PEG tube feedings.   Working on swallow in speech.  L facial droop and slight L eye ptosis post stroke. Yes   Transfer training Mod I with RW, Mod I without UE support for sit to stand without AD (at 20\" height) Yes   Patient Education Reviewed POC and intention for 2x/week for at least 8 weeks.     HEP - Issued and reviewed with Patient - instructed to perform at counter or with chair for support:  -LAQ  -Heel raises  -Abduction  -Hamstring curls    Reviewed need for walker and difficulty with clearing R foot consistently                          THER EX TOTAL TIME FOR SESSION 8-22 Minutes     STRETCHING     Stretching by patient Seated HS stretch w/ manual assist 20 sec x 3  B calf stretch on incline #2  30 sec  x 2  Runner stretch N  N  Yes   CARDIOVASCULAR     Nu Step Level 2  UE's/LE's 6 min, 1 min cool down No   Stationary Bike Virtual RB (nustep not available) redwood dash, gear 6-7, 10 mins N   Treadmill     Standing Ther-Ex HR's, Abd, Hs curls, marching and mini squats 10x     March x 10 x 2, HR/TR x 10,  N    STS from mat without UE support x 10 x 2 yes   NEURO RE-ED TOTAL TIME FOR SESSION 23-37 Minutes     COORDINATION     POSTURAL RE-ED     PRE-GAIT ACTIVITIES      BALANCE TRAINING      Sitting balance     Standing balance - static Tap ups forward to 8 in block wo UE/ cga support  2 x 10 floor.      Toe taps to maxine, single and alternating w progression towards decreased UE  support.  Therapist maintaining cl S or CGA    Standing with upper trunk rotation ea direction x 10  Standing with 360 turns        Yes        YES      YES    YES   Standing balance - dynamic Gait around slalom course of cones (w and w/o AD/SPC) and over hurdles  Gait over 6 in hurdles - step to pattern forward, side stepping.   reciprocal.  No UE support except intermittent " "touch.  Forward/backward  over 6 in hurdles progressing from unilat support to intermittent touch    Walking with head turns and nods w and w/o SPC  Walking with base of support within 12 in tiles  Walking with pivot turn  Walking backwards  Stepping over hurdles forwards Yes    No      No        Y    Y    Y  Y  Y   Standing balance - varied surface On Airex:    FT EO with HT/HN, UE's at sides (worse with head turn L)  Airex, Semi-Tandem with HT  Rockerboard - tilting forward/back x 20  Rockerboard - maintain balance with head turns and head nods with approx 30% success without LOB, close S/CGA Yes    Yes    Yes  N  N  N     GAIT TRAINING TOTAL TIME FOR SESSION 8-22 Minutes    Gait with/ without AD  Complete 2 MWT w/  ft  w/o RW  368 ft 3/10 RPE after second 2 MWT  Complete FGA:  14/30  (1,2,1,2,3,1,0,0,2,2) - completed by Ana Rosa Winston PT (primary PT) - will update goals next visit with primary.      Arrived to session ambulating with RW; Gait training w SPC(RUE) x 250 ft x 1 w/  cl S.  Focus on cues for heel strike/appropriate knee extension and hip flexion on RLE.  HR increased from 82 at rest to 104 with ambulation episodes. No gross LOB, min instability, minimal loss of R LE appropriate step length and no overt catch of R toe. Ambulates quicker with SPC than no AD, and has less deviation from midline.      Goal:  Return to amb without AD              YES     Dynamic gait      Stair negotiation Ascend/descend reciprocally 4-6\" steps with 2 rails, Cl \"S  Ascend/descend 6\" curb without AD, Min A and cues  Up/down ramp with Min A and cues to slow down for safety N    N    N   Curb negotiation     Ramp negotiation     Outdoor ambulation     BWS/vector training     MANUAL TOTAL TIME FOR SESSION Not performed    Stretching by therapist/PROM     Mobilization      MODALITIES TOTAL TIME FOR SESSION Not performed    Ice     Heat       "

## 2020-08-27 NOTE — PROGRESS NOTES
PT DAILY NOTE FOR OUTPATIENT THERAPY    Patient: Kalr Park   MRN: [id number]  : 1928 89 y.o.  Referring Physician: Herb Dailey MD  Date of Visit: 2020    Certification Dates: 20 through 10/29/20    Diagnosis:   1. Ataxia following cerebral infarction        Chief Complaints:  difficulty walking, balance deficits, abnormal gait, decreased strength and endurance    Precautions:   Precautions comments: PEG, NPO  Existing Precautions/Restrictions: fall, other (see comments)     TODAY'S VISIT    History/Vitals/Pain/Encounter Info - 20 1307        Injury History/Precautions/Daily Required Info    Primary Therapist  Ana Rosa Winston PT     Chief Complaint/Reason for Visit   difficulty walking, balance deficits, abnormal gait, decreased strength and endurance     Onset of Illness/Injury or Date of Surgery  20     Referring Physician  Asim     Existing Precautions/Restrictions  fall;other (see comments)     Precautions comments  PEG, NPO     Limitations/Impairments  swallowing     Pertinent History of Current Functional Problem  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  HPI:  Pt's daughter stated that the onset of the stroke may have been  or . Pt went to the ER on 7/3 and he was diagnosed with pneumonia and bells palsy. Pt was given medication for that, which he couldn't swallow. Pt went to see his PCP on 20 who referred him to a neurologist. Pt saw the PA and he was tested for myasthenia gravis. Pt would cough throughout the whole week every time he ate or drank. Pt was seen by neurologist  for the MRI and swallow study.  MRI showed that he had a brainstem stroke. Pt had a swallow study which showed asp on all consistencies and absent epiglottic inversion. Pt had a peg tube placed on . Pt was also admitted overnight for IV fluids.       OP Specialty  Neuro     Document Type  daily treatment      "Patient/Family/Caregiver Comments/Observations  States he had a good week at home.  No c/o pain or change in meds.  Notes he has some difficulty getting out of low chairs.     Start Time  1305     Stop Time  1400     Time Calculation (min)  55 min     Patient reported fall since last visit  Yes        Pain Assessment    Currently in pain  No/Denies        Pre Activity Vital Signs    Pulse  82     BP  112/56     BP Location  Left upper arm     BP Method  Manual     Patient Position  Sitting        Activity Vital Signs    Patient activity  Therapeutic Exercise     Activity Pulse  104        Post Activity Vital Signs    Post Activity Pulse  88         Daily Treatment Assessment and Plan - 08/27/20 1823        Daily Treatment Assessment and Plan    Progress toward goals  Progressing     Daily Outcome Summary  Pt continues to amb HHD  during the day w/o consistent use of AD (reports using furniture to help him walk).  He recognizes weakness in LEs and pt was educated in post stroke weakness and decond.  Notes decreased ability to easily rise from lower chairs. Continues to be receptive to therapy.     Plan and Recommendations  Progress as tolerated - progress note in the next visit or two.           OBJECTIVE DATA TAKEN TODAY:    None taken    Today's Treatment:      PT Neuro Exercises Current Session Time Performed   THER ACT  TOTAL TIME FOR SESSION 0-7 Minutes    Precautions NPO/PEG tube.  Pt is I with PEG tube feedings.   Working on swallow in speech.  L facial droop and slight L eye ptosis post stroke. Yes   Transfer training Mod I with RW, Mod I without UE support for sit to stand without AD (at 20\" height) Yes   Patient Education Reviewed POC and intention for 2x/week for at least 8 weeks.     HEP - Issued and reviewed with Patient - instructed to perform at counter or with chair for support:  -LAQ  -Heel raises  -Abduction  -Hamstring curls    Reviewed need for walker and difficulty with clearing R foot " consistently                          THER EX TOTAL TIME FOR SESSION 8-22 Minutes     STRETCHING     Stretching by patient Seated HS stretch w/ manual assist 20 sec x 3  B calf stretch on incline #2  30 sec  x 2  Runner stretch N  N  Yes   CARDIOVASCULAR     Nu Step Level 2  UE's/LE's 6 min, 1 min cool down No   Stationary Bike Virtual RB (nustep not available) redwood dash, gear 6-7, 10 mins N   Treadmill     Standing Ther-Ex HR's, Abd, Hs curls, marching and mini squats 10x     March x 10 x 2, HR/TR x 10,  N    STS from mat without UE support x 10 x 2 yes   NEURO RE-ED TOTAL TIME FOR SESSION 23-37 Minutes     COORDINATION     POSTURAL RE-ED     PRE-GAIT ACTIVITIES      BALANCE TRAINING      Sitting balance     Standing balance - static Tap ups forward to 8 in block wo UE/ cga support  2 x 10 floor.      Toe taps to maxine, single and alternating w progression towards decreased UE  support.  Therapist maintaining cl S or CGA    Standing with upper trunk rotation ea direction x 10  Standing with 360 turns        Yes        YES      YES    YES   Standing balance - dynamic Gait around slalom course of cones (w and w/o AD/SPC) and over hurdles  Gait over 6 in hurdles - step to pattern forward, side stepping.   reciprocal.  No UE support except intermittent touch.  Forward/backward  over 6 in hurdles progressing from unilat support to intermittent touch    Walking with head turns and nods w and w/o SPC  Walking with base of support within 12 in tiles  Walking with pivot turn  Walking backwards  Stepping over hurdles forwards Yes    No      No        Y    Y    Y  Y  Y   Standing balance - varied surface On Airex:    FT EO with HT/HN, UE's at sides (worse with head turn L)  Airex, Semi-Tandem with HT  Rockerboard - tilting forward/back x 20  Rockerboard - maintain balance with head turns and head nods with approx 30% success without LOB, close S/CGA Yes    Yes    Yes  N  N  N     GAIT TRAINING TOTAL TIME FOR SESSION 8-22  "Minutes    Gait with/ without AD  Complete 2 MWT w/  ft  w/o RW  368 ft 3/10 RPE after second 2 MWT  Complete FGA:  14/30  (1,2,1,2,3,1,0,0,2,2) - completed by Ana Rosa Winston PT (primary PT) - will update goals next visit with primary.      Arrived to session ambulating with RW; Gait training w SPC(RUE) x 250 ft x 1 w/  cl S.  Focus on cues for heel strike/appropriate knee extension and hip flexion on RLE.  HR increased from 82 at rest to 104 with ambulation episodes. No gross LOB, min instability, minimal loss of R LE appropriate step length and no overt catch of R toe. Ambulates quicker with SPC than no AD, and has less deviation from midline.      Goal:  Return to amb without AD              YES     Dynamic gait      Stair negotiation Ascend/descend reciprocally 4-6\" steps with 2 rails, Cl \"S  Ascend/descend 6\" curb without AD, Min A and cues  Up/down ramp with Min A and cues to slow down for safety N    N    N   Curb negotiation     Ramp negotiation     Outdoor ambulation     BWS/vector training     MANUAL TOTAL TIME FOR SESSION Not performed    Stretching by therapist/PROM     Mobilization      MODALITIES TOTAL TIME FOR SESSION Not performed    Ice     Heat                      "

## 2020-08-27 NOTE — PROGRESS NOTES
"SLP PROGRESS NOTE FOR OUTPATIENT THERAPY    Patient: Karl Park   MRN: [id number]  : 1928 89 y.o.  Referring Physician: Edel Keller DO  Date of Visit: 2020      Certification Dates: 20 through 20    Recommended Frequency & Duration:  2 times/week for up to 8 weeks     Diagnosis:   1. Cerebrovascular accident (CVA), unspecified mechanism (CMS/HCC)    2. Dysphasia following cerebrovascular accident (CVA)        Chief Complaints:  No chief complaint on file.      Precautions:   Existing Precautions/Restrictions: aspiration, NPO    TODAY'S VISIT:          Daily Treatment Assessment and Plan - 20 1544        Daily Treatment Assessment and Plan    Progress toward goals  Progressing     Daily Outcome Summary  pt continues with improvements to swallow     Plan and Recommendations  target exercise program and conservative trials of puree/honey and ice chips             Today's Treatment::      Short Term Goals Current Session   ?Pt. will tolerate trace PO trials without s/s of aspiration and adequate oral management with min cues over 2 sessions.  Ice chip trials x 25 with cued effortful double swallow; no overt s/s aspiration, but mild wet vocal quality noted x 2; pt got full strong swallow in; pt required max cues to swallow \"BIG and strong\"    Trial applesauce x 1 oz via 1/4 tsp amounts    Trial honey thick water x 2 oz via 1/4-1/2 tsp amounts     Pt occassionally verbalized her could feel residuals at suspected UES, and was able to clear his throat and reswallow; pt expectorated x 4 what looked like mucous during trials; no overt s/s aspiration   Pt. will complete pharyngeal strengthening, laryngeal excursion, and base of tongue strengthening exercises to improve airway protection and bolus propulsion with min cues for accurate production with 90% accuracy  CTAR x 10 completed    Effortful \"guh\" x 10 completed         Pt. will verbalize understanding of current swallow status " "and risks of aspiration/choking with min cues.  Pt is able to verbalize NPO status and risks of aspiration   EDUCATION Training provided to patient and daughter re: home exercise program; both verbalized understanding   Other        Goals Addressed                 This Visit's Progress    • swallowing        Short Term Goals Time Frame  Result  Comment/Progress    ?Pt. will tolerate trace PO trials without s/s of aspiration and adequate oral management with min cues over 2 sessions.  4 weeks   Ongoing  8/27/2020: Conservative trials of puree/honey completed x 1 session. Pt c/o of feeling residuals at suspected UES. Pt with occassional throat clear.   Pt. will complete pharyngeal strengthening, laryngeal excursion, and base of tongue strengthening exercises to improve airway protection and bolus propulsion with min cues for accurate production with 90% accuracy  4 weeks   Ongoing  8/27/2020: Pt is able to complete effortful swallow, swallow to stim, effortful \"guh\" and CTAR exercises with min cues. Pt is unable to complete yvonne or mendelsohn.   Pt. will verbalize understanding of current swallow status and risks of aspiration/choking with min cues.  2 weeks  MET  8/27/2020: Pt is able to verbalize NPO status and risks of aspiration/choking without cues.      Long Term Goals   Time Frame  Result  Comment/Progress    Pt. will tolerate the least restrictive diet level without complications or respiratory compromise with min cues/supervision.    NOMS swallowing goal: 4  8 weeks   New    Baseline swallowing NOMS: 1       CLINICAL IMPRESSION: Pt is making improvements with swallow function. Pt remains NPO. Recommend continuing swallow exercise program and conservative trials of puree/honey as well as ice chip trials. Continue POC 2x a week for 4 more weeks. May benefit from VFSS in the next couple of weeks to re-assess swallow function.              "

## 2020-08-28 ENCOUNTER — HOSPITAL ENCOUNTER (OUTPATIENT)
Dept: PHYSICAL THERAPY | Facility: REHABILITATION | Age: 85
Setting detail: THERAPIES SERIES
Discharge: HOME | End: 2020-08-28
Attending: FAMILY MEDICINE
Payer: COMMERCIAL

## 2020-08-28 ENCOUNTER — HOSPITAL ENCOUNTER (OUTPATIENT)
Dept: SPEECH THERAPY | Facility: REHABILITATION | Age: 85
Setting detail: THERAPIES SERIES
Discharge: HOME | End: 2020-08-28
Attending: INTERNAL MEDICINE
Payer: COMMERCIAL

## 2020-08-28 DIAGNOSIS — I63.9 CEREBROVASCULAR ACCIDENT (CVA), UNSPECIFIED MECHANISM (CMS/HCC): Primary | ICD-10-CM

## 2020-08-28 DIAGNOSIS — I69.321 DYSPHASIA FOLLOWING CEREBROVASCULAR ACCIDENT (CVA): ICD-10-CM

## 2020-08-28 DIAGNOSIS — I69.393 ATAXIA FOLLOWING CEREBRAL INFARCTION: Primary | ICD-10-CM

## 2020-08-28 PROCEDURE — 97112 NEUROMUSCULAR REEDUCATION: CPT | Mod: GP,59

## 2020-08-28 PROCEDURE — 97116 GAIT TRAINING THERAPY: CPT | Mod: GP

## 2020-08-28 PROCEDURE — 97110 THERAPEUTIC EXERCISES: CPT | Mod: GP

## 2020-08-28 PROCEDURE — 92526 ORAL FUNCTION THERAPY: CPT | Mod: GN

## 2020-08-28 ASSESSMENT — BALANCE ASSESSMENTS: TOTAL SCORE: 45

## 2020-08-28 NOTE — OP PT TREATMENT LOG
"PT Neuro Exercises Current Session Time Performed   THER ACT  TOTAL TIME FOR SESSION 0-7 Minutes    Precautions NPO/PEG tube.  Pt is I with PEG tube feedings.   Working on swallow in speech.  L facial droop and slight L eye ptosis post stroke. Yes   Transfer training Mod I with RW, Mod I without UE support for sit to stand without AD (at 20\" height) Yes   Patient Education Reviewed POC goals and intention for 2x/week for at least 8 weeks.     HEP - Issued and reviewed with Patient - instructed to perform at counter or with chair for support:  -LAQ  -Heel raises  -Abduction  -Hamstring curls    Reviewed need for walker and difficulty with clearing R foot consistently YES                         THER EX TOTAL TIME FOR SESSION 8-22 Minutes     STRETCHING     Stretching by patient Seated HS stretch w/ manual assist 20 sec x 3  B calf stretch on incline #2  30 sec  x 2  Runner stretch Y    N   CARDIOVASCULAR     Nu Step Level 2  UE's/LE's 6 min, 1 min cool down No   Stationary Bike Virtual RB (nustep not available) redwood dash, gear 6-7, 10 mins N   Treadmill     Standing Ther-Ex HR's, Abd, Hs curls, marching and mini squats 10x     March x 10 x 2, HR/TR x 10,  N    STS from mat without UE support x 10 x 1 yes   NEURO RE-ED TOTAL TIME FOR SESSION 23-37 Minutes     COORDINATION     POSTURAL RE-ED     PRE-GAIT ACTIVITIES      BALANCE TRAINING      Sitting balance     Standing balance - static Tap ups forward to 8 in block wo UE/ cga support  2 x 10 floor.      Toe taps to maxine, single and alternating w progression towards decreased UE  support.  Therapist maintaining cl S or CGA    Standing with upper trunk rotation ea direction x 10  Standing with 360 turns        Yes        N      YES    YES   Standing balance - dynamic Gait around slalom course of cones (w and w/o AD/SPC) and over hurdles  Gait over 6 in hurdles - step to pattern forward, side stepping.   reciprocal.  No UE support except intermittent " "touch.  Forward/backward  over 6 in hurdles progressing from unilat support to intermittent touch    Walking with head turns and nods w and w/o SPC  Walking with base of support within 12 in tiles  Walking with pivot turn  Walking backwards  Stepping over shoebox forwards N    No      No        Y    Y    Y  Y  Y   Standing balance - varied surface On Airex:    FT EO with HT/HN, UE's at sides (worse with head turn L)  Airex, Semi-Tandem with HT  Rockerboard - tilting forward/back x 20  Rockerboard - maintain balance with head turns and head nods with approx 30% success without LOB, close S/CGA N    N    N  N  N  N     GAIT TRAINING TOTAL TIME FOR SESSION 8-22 Minutes    Gait with/ without AD  Complete 2 MWT w/  ft  w/o RW  368 ft 3/10 RPE after second 2 MWT  Complete FGA:  14/30  (1,2,1,2,3,1,0,0,2,2) - completed by Ana Rosa Winston PT (primary PT) - will update goals next visit with primary.      Arrived to session ambulating with RW; Gait training w SPC(RUE) x 250 ft x 1 w/  cl S.  Focus on cues for heel strike/appropriate knee extension and hip flexion on RLE.  HR increased from 82 at rest to 104 with ambulation episodes. No gross LOB, min instability, minimal loss of R LE appropriate step length and no overt catch of R toe. Ambulates quicker with SPC than no AD, and has less deviation from midline.    GT without AD x 250 with RW, 250ft x 2 without AD      Goal:  Return to amb without AD                                      YES     Dynamic gait      Stair negotiation Ascend/descend reciprocally 6\" steps with 2 rails, S x 4, single rail x 1   Ascend/descend 6\" curb without AD, Min A and cues  Up/down ramp with Min A and cues to slow down for safety Y    N    N   Curb negotiation     Ramp negotiation     Outdoor ambulation     BWS/vector training     MANUAL TOTAL TIME FOR SESSION Not performed    Stretching by therapist/PROM     Mobilization      MODALITIES TOTAL TIME FOR SESSION Not performed    Ice     Heat   "

## 2020-08-28 NOTE — PROGRESS NOTES
PT PROGRESS NOTE FOR OUTPATIENT THERAPY    Patient: Karl Park   MRN: [id number]  : 1928 89 y.o.  Referring Physician: Herb Dailey MD  Date of Visit: 2020      Certification Dates: 20 through 10/29/20    Recommended Frequency & Duration:  2 times/week for up to 3 months     Diagnosis:   1. Ataxia following cerebral infarction        Chief Complaints:  Chief Complaint   Patient presents with   • Difficulty Walking   • Balance Deficits   • Dec Strength   • Dec ROM   • Decreased Endurance       Precautions:   Precautions comments: PEG, NPO  Existing Precautions/Restrictions: fall, other (see comments)     TODAY'S VISIT:    General Information - 20 1200        Session Details    Mode of Treatment  physical therapy;individual therapy     Patient/Family/Caregiver Comments/Observations  Pt reports he is concerned about how much better he will be after therapy is all over.  He recognizes he will likely have some limitations.  He does not use his RW or cane in the home, except the RW at night.        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  HPI:  Pt's daughter stated that the onset of the stroke may have been  or . Pt went to the ER on 7/3 and he was diagnosed with pneumonia and bells palsy. Pt was given medication for that, which he couldn't swallow. Pt went to see his PCP on 20 who referred him to a neurologist. Pt saw the PA and he was tested for myasthenia gravis. Pt would cough throughout the whole week every time he ate or drank. Pt was seen by neurologist  for the MRI and swallow study.  MRI showed that he had a brainstem stroke. Pt had a swallow study which showed asp on all consistencies and absent epiglottic inversion. Pt had a peg tube placed on .  Pt was also admitted overnight for IV fluids.       Limitations/Impairments  swallowing     Existing Precautions/Restrictions  fall;other (see comments)     Precautions comments  PEG, NPO         Pain/Vitals - 08/28/20 1752        Pain Assessment    Currently in pain  No/Denies        Pain Intervention    Intervention   na     Post Intervention Comments  no increase in pain            Daily Treatment Assessment and Plan - 08/28/20 1417        Daily Treatment Assessment and Plan    Progress toward goals  Progressing     Daily Outcome Summary  Making good progress and meeting 4 week goals to reflect improved balance and strength.  Continues to benefit from skilled PT to maximize potential as he was completely indep without AD prior to stroke with active lifestyle.  Problems remaining include overall balance,  L LE strength/control, R knee OA/sciatica history affecting RLE strength and knee ROM,  and general conditioning.  Pt demonstrates safety awareness, but remains a fall risk as identified by FGA and JIMENEZ.     Plan and Recommendations  Continue to progress balance activities including beginning some targeted resistance training for strength and conditioning.           OBJECTIVE MEASUREMENTS/DATA:    Balance/Posture   Balance and Posture - 08/28/20 1412        Jimenez Balance Scale    Sitting to Standing  Able to stand without using hands and stabilize independently     Standing Unsupported  Able to stand safely for 2 minutes     Sitting with Back Unsupported but Feet Supported on Floor or on a Stool  Able to sit safely and securely for 2 minutes     Standing to Sitting  Sits safely with minimal use of hands     Transfers  Able to transfer safely definite need of hands     Standing Unsupported with Eyes Closed  Able to stand 10 seconds with supervision     Standing Unsupported with Feet Together  Able to place feet together independently and stand 1 minute with supervision     Reach Forward with Outstretched Arm While  "Standing  Can reach forward confidently 25 cm (10 inches)      Object from Floor from a Standing Position  Able to  slipper safely and easily     Turning to Look Behind Over Left and Right Shoulders While Standing  Looks behind one side only other side shows less weight shift     Turn 360 Degrees  Able to turn 360 degrees safely but slowly     Place Alternate Foot on Step or Stool While Standing Unsupported  Able to stand independently and complete 8 steps in greater than 20 seconds     Standing Unsupported One Foot in Front  Able to place foot ahead independently and hold 30 seconds     Standing on One Leg  Tries to lift leg unable to hold 3 seconds but remains standing independently     Juarez Balance Score  45        FGA     FGA Score (out of 30 total)  18        Timed Up and Go Test    Trial One: Timed Up and Go Test  16.75    with walker    Trial Two: Timed Up and Go Test  13.53    with cane    Trial Three: Timed Up and Go Test  12.5    no device    Mean of 3 Trials: Timed Up and Go Test  14.26        [id number] Balance Interventions    Results, Timed Up and Go Test (Balance)  13.01    avg trial 2 and 3          Today's Treatment::      PT Neuro Exercises Current Session Time Performed   THER ACT  TOTAL TIME FOR SESSION 0-7 Minutes    Precautions NPO/PEG tube.  Pt is I with PEG tube feedings.   Working on swallow in speech.  L facial droop and slight L eye ptosis post stroke. Yes   Transfer training Mod I with RW, Mod I without UE support for sit to stand without AD (at 20\" height) Yes   Patient Education Reviewed POC goals and intention for 2x/week for at least 8 weeks.     HEP - Issued and reviewed with Patient - instructed to perform at counter or with chair for support:  -LAQ  -Heel raises  -Abduction  -Hamstring curls    Reviewed need for walker and difficulty with clearing R foot consistently YES                         THER EX TOTAL TIME FOR SESSION 8-22 Minutes     STRETCHING     Stretching by " patient Seated HS stretch w/ manual assist 20 sec x 3  B calf stretch on incline #2  30 sec  x 2  Runner stretch Y    N   CARDIOVASCULAR     Nu Step Level 2  UE's/LE's 6 min, 1 min cool down No   Stationary Bike Virtual RB (nustep not available) redwood dash, gear 6-7, 10 mins N   Treadmill     Standing Ther-Ex HR's, Abd, Hs curls, marching and mini squats 10x     March x 10 x 2, HR/TR x 10,  N    STS from mat without UE support x 10 x 1 yes   NEURO RE-ED TOTAL TIME FOR SESSION 23-37 Minutes     COORDINATION     POSTURAL RE-ED     PRE-GAIT ACTIVITIES      BALANCE TRAINING      Sitting balance     Standing balance - static Tap ups forward to 8 in block wo UE/ cga support  2 x 10 floor.      Toe taps to maxine, single and alternating w progression towards decreased UE  support.  Therapist maintaining cl S or CGA    Standing with upper trunk rotation ea direction x 10  Standing with 360 turns        Yes        N      YES    YES   Standing balance - dynamic Gait around slalom course of cones (w and w/o AD/SPC) and over hurdles  Gait over 6 in hurdles - step to pattern forward, side stepping.   reciprocal.  No UE support except intermittent touch.  Forward/backward  over 6 in hurdles progressing from unilat support to intermittent touch    Walking with head turns and nods w and w/o SPC  Walking with base of support within 12 in tiles  Walking with pivot turn  Walking backwards  Stepping over shoebox forwards N    No      No        Y    Y    Y  Y  Y   Standing balance - varied surface On Airex:    FT EO with HT/HN, UE's at sides (worse with head turn L)  Airex, Semi-Tandem with HT  Rockerboard - tilting forward/back x 20  Rockerboard - maintain balance with head turns and head nods with approx 30% success without LOB, close S/CGA N    N    N  N  N  N     GAIT TRAINING TOTAL TIME FOR SESSION 8-22 Minutes    Gait with/ without AD  Complete 2 MWT w/  ft  w/o RW  368 ft 3/10 RPE after second 2 MWT  Complete FGA:  14/30   "(1,2,1,2,3,1,0,0,2,2) - completed by Ana Rosa Winston PT (primary PT) - will update goals next visit with primary.      Arrived to session ambulating with RW; Gait training w SPC(RUE) x 250 ft x 1 w/  cl S.  Focus on cues for heel strike/appropriate knee extension and hip flexion on RLE.  HR increased from 82 at rest to 104 with ambulation episodes. No gross LOB, min instability, minimal loss of R LE appropriate step length and no overt catch of R toe. Ambulates quicker with SPC than no AD, and has less deviation from midline.    GT without AD x 250 with RW, 250ft x 2 without AD      Goal:  Return to amb without AD                                      YES     Dynamic gait      Stair negotiation Ascend/descend reciprocally 6\" steps with 2 rails, S x 4, single rail x 1   Ascend/descend 6\" curb without AD, Min A and cues  Up/down ramp with Min A and cues to slow down for safety Y    N    N   Curb negotiation     Ramp negotiation     Outdoor ambulation     BWS/vector training     MANUAL TOTAL TIME FOR SESSION Not performed    Stretching by therapist/PROM     Mobilization      MODALITIES TOTAL TIME FOR SESSION Not performed    Ice     Heat           Goals Addressed                 This Visit's Progress    • PT goals        Short Term Goals Time Frame Result Comment/Progress   Pt will amb mod I in household setting with SPC or no AD, including 1 step up/down in his in-law suite at daughter's home 4 weeks MET    Improve JUAREZ score by at least 3 points (45/56) reflecting dec falls risk 4 weeks MET Juarez = 45/56    *FGA = 18/30   TUG </= 14 sec reflecting lower falls risk 4 weeks MET TUG = 14s w/ SPC  TUG = 12.5 w/o with S   Improve CGS by at least .1 m/s without decline in gait quality 4 weeks  Not tested on 8/28   FGA >/= 15/30 with least restrictive AD 4 weeks     Consistent STS and SPT transfers without AD mod I 4 weeks         Long Term Goals Time Frame Result Comment/Progress   Pt will amb I at home without AD, in community " "without AD or with SPC as needed for more challenging terrain/more distracting environments 8-12 weeks       JIMENEZ >/= 50/56 reflecting lower falls risk 8-12 weeks     TUG completed safely in </= 12 sec with no decline in gait quality, balance with pivot turns 8-12 weeks     Gait speed 1.0 m/s maintaining gait quality 8-12 weeks     FGA >/= 20/30 8-12 weeks  8/28 FGA = 18/30   Mod I reciprocal stairs with 1 HR, amb up/down curb step and ADA ramps without AD 8-12 weeks     I finalized HEP 8-12 weeks                     Education provided this session. Goals and progress reviewed.  Pt concurs with continuing PT.      Clinical Impression: 89 y/o male with impairments in gait/balance and general strength related to recent brain stem infarct, making good progress but still at risk for falls, as determined by his FGA, TUG and JIMENEZ scores - although there has been steady progress in these areas.  Resistant to using RW in home, preferring to \"cruise\" furniture.  He is able to reciprocally climb steps as he wishes to access his lower level basement.  Has bilat hand rails and is mod I in this activity.  Pt indicates a desire to increase his overall strength with some resistance training, and appropriate to work on this skill.  Will benefit from a continued short course of therapy geared towards balance/gait, fall risk reduction, and strength and conditioning.            "

## 2020-08-28 NOTE — PROGRESS NOTES
SLP DAILY NOTE FOR OUTPATIENT THERAPY    Patient: Karl Park   MRN: [id number]  : 1928 89 y.o.  Referring Physician: Edel Keller DO  Date of Visit: 2020      Certification Dates: 20 through 20    Diagnosis:   1. Cerebrovascular accident (CVA), unspecified mechanism (CMS/HCC)    2. Dysphasia following cerebrovascular accident (CVA)        Chief Complaints:  swallowing      TODAY'S VISIT:    History/Vitals/Pain/Encounter Info - 20 1212        Injury History/Precautions/Daily Required Info    Primary Therapist  Laura WELCH      Chief Complaint/Reason for Visit   swallowing     Document Type  daily treatment     Patient/Family/Caregiver Comments/Observations  pt denies pain and changes in meds     Start Time  1100     Stop Time  1200     Time Calculation (min)  60 min     Patient reported fall since last visit  No        Pain Assessment    Currently in pain  No/Denies        Pain Interventions    Intervention  none     Post Intervention Comments  none         Daily Treatment Assessment and Plan - 20 1216        Daily Treatment Assessment and Plan    Progress toward goals  Progressing     Daily Outcome Summary  pt continues with improvements to swallow     Plan and Recommendations  target exercise program and conservative trials of puree/honey/nectar and ice chips            Today's Treatment:      Short Term Goals Current Session   ?Pt. will tolerate trace PO trials without s/s of aspiration and adequate oral management with min cues over 2 sessions.  Ice chip trials x 25 with cued effortful double swallow; cough x 1; min cues for strong double swallows    Trial pudding x 2 oz via 1/4 tsp amounts    Trial nectar cranberry x 2 oz via 1/4-1/2 tsp amounts     Pt occassionally verbalized he could feel residuals at suspected UES, and was able to clear his throat and reswallow; pt expectorated x 2 what looked like mucous during trials; tinged with pink x 1 after cranberry  "trials; pt needed cues x 2 to note wet vocal quality and re-clear   Pt. will complete pharyngeal strengthening, laryngeal excursion, and base of tongue strengthening exercises to improve airway protection and bolus propulsion with min cues for accurate production with 90% accuracy      Effortful \"guh\" x  7 sets of 10 completed         Pt. will verbalize understanding of current swallow status and risks of aspiration/choking with min cues.  Pt is able to verbalize NPO status and risks of aspiration   EDUCATION Training provided to patient and daughter re: home exercise program; both verbalized understanding   Other Allowing ice chips with rigorous oral care x 3 sets of 20 per day AS TOLERATED with effortful double swallows and throat clear/reswallow PRN; pt and daughter educated                             "

## 2020-08-28 NOTE — OP SLP TREATMENT LOG
"Short Term Goals Current Session   ?Pt. will tolerate trace PO trials without s/s of aspiration and adequate oral management with min cues over 2 sessions.  Ice chip trials x 25 with cued effortful double swallow; cough x 1; min cues for strong double swallows    Trial pudding x 2 oz via 1/4 tsp amounts    Trial nectar cranberry x 2 oz via 1/4-1/2 tsp amounts     Pt occassionally verbalized he could feel residuals at suspected UES, and was able to clear his throat and reswallow; pt expectorated x 2 what looked like mucous during trials; tinged with pink x 1 after cranberry trials; pt needed cues x 2 to note wet vocal quality and re-clear   Pt. will complete pharyngeal strengthening, laryngeal excursion, and base of tongue strengthening exercises to improve airway protection and bolus propulsion with min cues for accurate production with 90% accuracy      Effortful \"guh\" x  7 sets of 10 completed         Pt. will verbalize understanding of current swallow status and risks of aspiration/choking with min cues.  Pt is able to verbalize NPO status and risks of aspiration   EDUCATION Training provided to patient and daughter re: home exercise program; both verbalized understanding   Other Allowing ice chips with rigorous oral care x 3 sets of 20 per day AS TOLERATED with effortful double swallows and throat clear/reswallow PRN; pt and daughter educated     "

## 2020-08-31 ENCOUNTER — HOSPITAL ENCOUNTER (OUTPATIENT)
Dept: PHYSICAL THERAPY | Facility: REHABILITATION | Age: 85
Setting detail: THERAPIES SERIES
End: 2020-08-31
Attending: FAMILY MEDICINE
Payer: COMMERCIAL

## 2020-08-31 ENCOUNTER — HOSPITAL ENCOUNTER (OUTPATIENT)
Dept: SPEECH THERAPY | Facility: REHABILITATION | Age: 85
Setting detail: THERAPIES SERIES
Discharge: HOME | End: 2020-08-31
Attending: INTERNAL MEDICINE
Payer: COMMERCIAL

## 2020-08-31 DIAGNOSIS — I69.321 DYSPHASIA FOLLOWING CEREBROVASCULAR ACCIDENT (CVA): ICD-10-CM

## 2020-08-31 DIAGNOSIS — I63.9 CEREBROVASCULAR ACCIDENT (CVA), UNSPECIFIED MECHANISM (CMS/HCC): Primary | ICD-10-CM

## 2020-08-31 PROCEDURE — 92526 ORAL FUNCTION THERAPY: CPT | Mod: GN

## 2020-09-01 ENCOUNTER — HOSPITAL ENCOUNTER (OUTPATIENT)
Dept: SPEECH THERAPY | Facility: REHABILITATION | Age: 85
Setting detail: THERAPIES SERIES
Discharge: HOME | End: 2020-09-01
Attending: FAMILY MEDICINE
Payer: COMMERCIAL

## 2020-09-01 DIAGNOSIS — I63.9 CEREBROVASCULAR ACCIDENT (CVA), UNSPECIFIED MECHANISM (CMS/HCC): Primary | ICD-10-CM

## 2020-09-01 DIAGNOSIS — I69.321 DYSPHASIA FOLLOWING CEREBROVASCULAR ACCIDENT (CVA): ICD-10-CM

## 2020-09-01 PROCEDURE — 92526 ORAL FUNCTION THERAPY: CPT | Mod: GN

## 2020-09-01 NOTE — PROGRESS NOTES
SLP DAILY NOTE FOR OUTPATIENT THERAPY    Patient: Karl Park   MRN: [id number]  : 1928 89 y.o.  Referring Physician: Herb Dailey MD  Date of Visit: 2020      Certification Dates: 20 through 20    Diagnosis:   1. Cerebrovascular accident (CVA), unspecified mechanism (CMS/HCC)    2. Dysphasia following cerebrovascular accident (CVA)        Chief Complaints:  swallowing    Precautions:          TODAY'S VISIT:    History/Vitals/Pain/Encounter Info - 20 1627        Injury History/Precautions/Daily Required Info    Primary Therapist  Laura WELCH      Chief Complaint/Reason for Visit   swallowing     Document Type  daily treatment     Patient/Family/Caregiver Comments/Observations  denies pain, changes in meds and falls     Start Time  1400     Stop Time  1500     Time Calculation (min)  60 min     Patient reported fall since last visit  No        Pain Assessment    Currently in pain  No/Denies        Pain Interventions    Intervention  none     Post Intervention Comments  none         Daily Treatment Assessment and Plan - 20 1630        Daily Treatment Assessment and Plan    Progress toward goals  Progressing     Daily Outcome Summary  pt continues with improvements to swallow     Plan and Recommendations  target exercise program and conservative trials of puree/honey/nectar and ice chips          Today's Treatment:      Short Term Goals Current Session   ?Pt. will tolerate trace PO trials without s/s of aspiration and adequate oral management with min cues over 2 sessions.  Ice chip trials x 25 with effortful double swallow - no s/s aspiration    Trial water x 3 oz via cup sip with no overt s/s aspiration    Trial applesauce x .5 oz; pt verbalized he could feel residuals at suspected UES x 2, and was able to clear his throat and reswallow; pt expectorated x 1 what looked like a minimal amount of applesauce. Pt then had a clear vocal quality with no s/s aspiration.  "  Pt. will complete pharyngeal strengthening, laryngeal excursion, and base of tongue strengthening exercises to improve airway protection and bolus propulsion with min cues for accurate production with 90% accuracy      Effortful \"guh\" x  10 sets of 10 completed    CTAR x 4 sets of 10 completed    yvonne x 2 sets of 10 completed    Pitch glides x 2 sets of 10 completed         Pt. will verbalize understanding of current swallow status and risks of aspiration/choking with min cues.  Pt is able to verbalize NPO status and risks of aspiration   EDUCATION Training provided to patient and daughter re: home exercise program; both verbalized understanding   Other Allowing ice chips with rigorous oral care x 3 sets of 20 per day AS TOLERATED with effortful double swallows and throat clear/reswallow PRN; pt and daughter educated    Pt reports tolerating ice chip trials at home                             "

## 2020-09-01 NOTE — OP SLP TREATMENT LOG
"Short Term Goals Current Session   ?Pt. will tolerate trace PO trials without s/s of aspiration and adequate oral management with min cues over 2 sessions.  Ice chip trials x 25 with effortful double swallow - no s/s aspiration    Trial water x 3 oz via cup sip with no overt s/s aspiration    Trial applesauce x .5 oz; pt verbalized he could feel residuals at suspected UES x 2, and was able to clear his throat and reswallow; pt expectorated x 1 what looked like a minimal amount of applesauce. Pt then had a clear vocal quality with no s/s aspiration.   Pt. will complete pharyngeal strengthening, laryngeal excursion, and base of tongue strengthening exercises to improve airway protection and bolus propulsion with min cues for accurate production with 90% accuracy      Effortful \"guh\" x  10 sets of 10 completed    CTAR x 4 sets of 10 completed    yvonne x 2 sets of 10 completed    Pitch glides x 2 sets of 10 completed         Pt. will verbalize understanding of current swallow status and risks of aspiration/choking with min cues.  Pt is able to verbalize NPO status and risks of aspiration   EDUCATION Training provided to patient and daughter re: home exercise program; both verbalized understanding   Other Allowing ice chips with rigorous oral care x 3 sets of 20 per day AS TOLERATED with effortful double swallows and throat clear/reswallow PRN; pt and daughter educated    Pt reports tolerating ice chip trials at home     "

## 2020-09-08 ENCOUNTER — TRANSCRIBE ORDERS (OUTPATIENT)
Dept: SCHEDULING | Age: 85
End: 2020-09-08

## 2020-09-08 DIAGNOSIS — R13.10 DYSPHAGIA, UNSPECIFIED: Primary | ICD-10-CM

## 2020-09-10 ENCOUNTER — HOSPITAL ENCOUNTER (OUTPATIENT)
Dept: SPEECH THERAPY | Facility: REHABILITATION | Age: 85
Setting detail: THERAPIES SERIES
Discharge: HOME | End: 2020-09-10
Attending: FAMILY MEDICINE
Payer: COMMERCIAL

## 2020-09-10 ENCOUNTER — HOSPITAL ENCOUNTER (OUTPATIENT)
Dept: PHYSICAL THERAPY | Facility: REHABILITATION | Age: 85
Setting detail: THERAPIES SERIES
Discharge: HOME | End: 2020-09-10
Attending: FAMILY MEDICINE
Payer: COMMERCIAL

## 2020-09-10 DIAGNOSIS — I69.393 ATAXIA FOLLOWING CEREBRAL INFARCTION: Primary | ICD-10-CM

## 2020-09-10 DIAGNOSIS — I69.321 DYSPHASIA FOLLOWING CEREBROVASCULAR ACCIDENT (CVA): ICD-10-CM

## 2020-09-10 DIAGNOSIS — I63.9 CEREBROVASCULAR ACCIDENT (CVA), UNSPECIFIED MECHANISM (CMS/HCC): Primary | ICD-10-CM

## 2020-09-10 PROCEDURE — 97112 NEUROMUSCULAR REEDUCATION: CPT | Mod: GP,CQ

## 2020-09-10 PROCEDURE — 92526 ORAL FUNCTION THERAPY: CPT | Mod: GN

## 2020-09-10 PROCEDURE — 97116 GAIT TRAINING THERAPY: CPT | Mod: GP,CQ

## 2020-09-10 PROCEDURE — 97110 THERAPEUTIC EXERCISES: CPT | Mod: GP,CQ

## 2020-09-10 NOTE — PROGRESS NOTES
PT DAILY NOTE FOR OUTPATIENT THERAPY    Patient: Karl Park   MRN: [id number]  : 1928 89 y.o.  Referring Physician: Herb Dailey MD  Date of Visit: 9/10/2020    Certification Dates: 20 through 10/29/20    Diagnosis:   1. Ataxia following cerebral infarction        Chief Complaints:  functional mobility, balance, coordination, strength, ROM, endurance    Precautions:   Precautions comments: PEG, NPO  Existing Precautions/Restrictions: fall, other (see comments)     TODAY'S VISIT    History/Vitals/Pain/Encounter Info - 09/10/20 1405        Injury History/Precautions/Daily Required Info    Primary Therapist  Aretha Ferrara PT     Chief Complaint/Reason for Visit   functional mobility, balance, coordination, strength, ROM, endurance     Onset of Illness/Injury or Date of Surgery  20     Referring Physician  Asim     Existing Precautions/Restrictions  fall;other (see comments)     Precautions comments  PEG, NPO     Limitations/Impairments  swallowing     OP Specialty  Neuro     Document Type  daily treatment     Patient/Family/Caregiver Comments/Observations  No falls, no pain, no changes in meds     Start Time  1400     Stop Time  1500     Time Calculation (min)  60 min     Patient reported fall since last visit  No        Pain Assessment    Currently in pain  No/Denies        Pain Intervention    Intervention   na     Post Intervention Comments  na        Pre Activity Vital Signs    Pulse  83     SpO2  98 %     BP  110/68     BP Location  Left upper arm     BP Method  Manual     Patient Position  Sitting         Daily Treatment Assessment and Plan - 09/10/20 1450        Daily Treatment Assessment and Plan    Progress toward goals  Progressing     Daily Outcome Summary  Pt returns after short break due to insurance.  Pt focus on ambulation w/o AD device w/ added HT's, HN's and turns.  No LOB.  Pt reports he does not use an AD at his home, however, at his daughter's house he uses a RW.  Pt  "did well w/ balance challenges w/ occasional need for light UE support to begin balance exercise, then able to release  and complete successfully.      Plan and Recommendations  Cont w/ POC as directed by primary PT to advance neuromotor status.                    Today's Treatment:      PT Neuro Exercises Current Session Time Performed   THER ACT  TOTAL TIME FOR SESSION 0-7 Minutes    Precautions NPO/PEG tube.  Pt is I with PEG tube feedings.   Working on swallow in speech.  L facial droop and slight L eye ptosis post stroke. Yes   Transfer training Mod I with RW, Mod I without UE support for sit to stand without AD (at 20\" height) Yes   Patient Education Reviewed POC goals and intention for 2x/week for at least 8 weeks.     HEP - Issued and reviewed with Patient - instructed to perform at counter or with chair for support:  -LAQ  -Heel raises  -Abduction  -Hamstring curls    Reviewed need for walker and difficulty with clearing R foot consistently YES                         THER EX TOTAL TIME FOR SESSION 8-22 Minutes     STRETCHING     Stretching by patient Seated HS stretch w/ manual assist 20 sec x 3  B calf stretch on incline #2  30 sec  x 2  Runner stretch Y    yes   CARDIOVASCULAR     Nu Step Level 1  UE's/LE's 6 min, 1 min cool down yes   Stationary Bike Virtual RB (nustep not available) [employer] dash, gear 6-7, 10 mins N   Treadmill     Standing Ther-Ex HR's, Abd, Hs curls, marching and mini squats 10x     March x 10 x 2, HR/TR x 10,  N    STS from mat without UE support x 10 x 1 yes   NEURO RE-ED TOTAL TIME FOR SESSION 23-37 Minutes     COORDINATION     POSTURAL RE-ED     PRE-GAIT ACTIVITIES      BALANCE TRAINING      Sitting balance     Standing balance - static Tap ups forward to 8 in block wo UE/ cga support  2 x 10 floor.      Toe taps to maxine, single and alternating w progression towards decreased UE  support.  Therapist maintaining cl S or CGA    Standing with upper trunk rotation ea direction " "x 10  Standing with 360 turns        Yes        N          YES   Standing balance - dynamic Gait around slalom course of cones (w and w/o AD/SPC) and over hurdles  Gait over 6 in hurdles - step to pattern forward, side stepping.   reciprocal.  No UE support except intermittent touch.  Forward/backward  over 6 in hurdles progressing from unilat support to intermittent touch    Walking with head turns and nods w/o AD  Walking with base of support within 12 in tiles  Walking with pivot turn  Walking backwards  Stepping over shoebox forwards N    yes      No        Y        Y       Standing balance - varied surface On Airex:  Ball toss/bounce/catch  FT EO with HT/HN, UE's at sides   Airex, Semi-Tandem with HT    Blue tread rockerboard A-P gentle rocking for ankle strategies  Rockerboard - maintain balance with head turns and head nods with approx 30% success without LOB, close S/CGA Y  Y  Y    N  Y    N     GAIT TRAINING TOTAL TIME FOR SESSION 8-22 Minutes    Gait with/ without AD  Complete 2 MWT w/  ft  w/o RW  368 ft 3/10 RPE after second 2 MWT  Complete FGA:  14/30  (1,2,1,2,3,1,0,0,2,2) - completed by Ana Rosa Winston PT (primary PT) - will update goals next visit with primary.      Arrived to session ambulating with RW; Gait training w SPC(RUE) x 250 ft x 1 w/  cl S.  Focus on cues for heel strike/appropriate knee extension and hip flexion on RLE.  HR increased from 82 at rest to 104 with ambulation episodes. No gross LOB, min instability, minimal loss of R LE appropriate step length and no overt catch of R toe. Ambulates quicker with SPC than no AD, and has less deviation from midline.    GT  250ft x 2 without AD      Goal:  Return to amb without AD                                      YES     Dynamic gait      Stair negotiation Ascend/descend reciprocally four  6\" steps with 2 rails, x3  Ascend/descend 6\" curb without AD, Min A and cues  Up/down ramp with Min A and cues to slow down for safety Yes    N    N "   Curb negotiation     Ramp negotiation     Outdoor ambulation     BWS/vector training     MANUAL TOTAL TIME FOR SESSION Not performed    Stretching by therapist/PROM     Mobilization      MODALITIES TOTAL TIME FOR SESSION Not performed    Ice     Heat

## 2020-09-10 NOTE — OP SLP TREATMENT LOG
"Short Term Goals Current Session   ?Pt. will tolerate trace PO trials without s/s of aspiration and adequate oral management with min cues over 2 sessions.  Ice chip trials x 25 with effortful double swallow - no s/s aspiration    Trial water x 2 oz via cup sip with cough x 1; cued for effortful double swallows and forward lean vs backward lean posture while sitting    Trial applesauce x .5 oz; pt verbalized he could feel residuals at suspected UES x 2, and was able to clear his throat and reswallow; pt expectorated x 1 what looked like a minimal amount of applesauce. Pt then had a clear vocal quality with no s/s aspiration.   Pt. will complete pharyngeal strengthening, laryngeal excursion, and base of tongue strengthening exercises to improve airway protection and bolus propulsion with min cues for accurate production with 90% accuracy      Effortful \"guh\" x  10 sets of 10 completed    CTAR x 4 sets of 10 completed    yvonne x 2 sets of 10 completed    Pitch glides x 2 sets of 10 completed         Pt. will verbalize understanding of current swallow status and risks of aspiration/choking with min cues.  Pt is able to verbalize NPO status and risks of aspiration   EDUCATION Training provided to patient and daughter re: home exercise program; both verbalized understanding   Other Allowing ice chips with rigorous oral care x 3 sets of 20 per day AS TOLERATED with effortful double swallows and throat clear/reswallow PRN; pt and daughter educated    Plan for VFSS next week; education provided for patient     "

## 2020-09-10 NOTE — OP PT TREATMENT LOG
"PT Neuro Exercises Current Session Time Performed   THER ACT  TOTAL TIME FOR SESSION 0-7 Minutes    Precautions NPO/PEG tube.  Pt is I with PEG tube feedings.   Working on swallow in speech.  L facial droop and slight L eye ptosis post stroke. Yes   Transfer training Mod I with RW, Mod I without UE support for sit to stand without AD (at 20\" height) Yes   Patient Education Reviewed POC goals and intention for 2x/week for at least 8 weeks.     HEP - Issued and reviewed with Patient - instructed to perform at counter or with chair for support:  -LAQ  -Heel raises  -Abduction  -Hamstring curls    Reviewed need for walker and difficulty with clearing R foot consistently YES                         THER EX TOTAL TIME FOR SESSION 8-22 Minutes     STRETCHING     Stretching by patient Seated HS stretch w/ manual assist 20 sec x 3  B calf stretch on incline #2  30 sec  x 2  Runner stretch Y    yes   CARDIOVASCULAR     Nu Step Level 1  UE's/LE's 6 min, 1 min cool down yes   Stationary Bike Virtual RB (nustep not available) [employer] dash, gear 6-7, 10 mins N   Treadmill     Standing Ther-Ex HR's, Abd, Hs curls, marching and mini squats 10x     March x 10 x 2, HR/TR x 10,  N    STS from mat without UE support x 10 x 1 yes   NEURO RE-ED TOTAL TIME FOR SESSION 23-37 Minutes     COORDINATION     POSTURAL RE-ED     PRE-GAIT ACTIVITIES      BALANCE TRAINING      Sitting balance     Standing balance - static Tap ups forward to 8 in block wo UE/ cga support  2 x 10 floor.      Toe taps to maxine, single and alternating w progression towards decreased UE  support.  Therapist maintaining cl S or CGA    Standing with upper trunk rotation ea direction x 10  Standing with 360 turns        Yes        N          YES   Standing balance - dynamic Gait around slalom course of cones (w and w/o AD/SPC) and over hurdles  Gait over 6 in hurdles - step to pattern forward, side stepping.   reciprocal.  No UE support except intermittent " "touch.  Forward/backward  over 6 in hurdles progressing from unilat support to intermittent touch    Walking with head turns and nods w/o AD  Walking with base of support within 12 in tiles  Walking with pivot turn  Walking backwards  Stepping over shoebox forwards N    yes      No        Y        Y       Standing balance - varied surface On Airex:  Ball toss/bounce/catch  FT EO with HT/HN, UE's at sides   Airex, Semi-Tandem with HT    Blue tread rockerboard A-P gentle rocking for ankle strategies  Rockerboard - maintain balance with head turns and head nods with approx 30% success without LOB, close S/CGA Y  Y  Y    N  Y    N     GAIT TRAINING TOTAL TIME FOR SESSION 8-22 Minutes    Gait with/ without AD  Complete 2 MWT w/  ft  w/o RW  368 ft 3/10 RPE after second 2 MWT  Complete FGA:  14/30  (1,2,1,2,3,1,0,0,2,2) - completed by Ana Rosa Winston PT (primary PT) - will update goals next visit with primary.      Arrived to session ambulating with RW; Gait training w SPC(RUE) x 250 ft x 1 w/  cl S.  Focus on cues for heel strike/appropriate knee extension and hip flexion on RLE.  HR increased from 82 at rest to 104 with ambulation episodes. No gross LOB, min instability, minimal loss of R LE appropriate step length and no overt catch of R toe. Ambulates quicker with SPC than no AD, and has less deviation from midline.    GT  250ft x 2 without AD      Goal:  Return to amb without AD                                      YES     Dynamic gait      Stair negotiation Ascend/descend reciprocally four  6\" steps with 2 rails, x3  Ascend/descend 6\" curb without AD, Min A and cues  Up/down ramp with Min A and cues to slow down for safety Yes    N    N   Curb negotiation     Ramp negotiation     Outdoor ambulation     BWS/vector training     MANUAL TOTAL TIME FOR SESSION Not performed    Stretching by therapist/PROM     Mobilization      MODALITIES TOTAL TIME FOR SESSION Not performed    Ice     Heat       "

## 2020-09-10 NOTE — PROGRESS NOTES
SLP DAILY NOTE FOR OUTPATIENT THERAPY    Patient: Karl Park   MRN: [id number]  : 1928 89 y.o.  Referring Physician: Herb Dailey MD  Date of Visit: 9/10/2020      Certification Dates: 20 through 20    Diagnosis:   1. Cerebrovascular accident (CVA), unspecified mechanism (CMS/HCC)    2. Dysphasia following cerebrovascular accident (CVA)        Chief Complaints:  swallowing    Precautions:  Existing Precautions/Restrictions: aspiration, NPO       TODAY'S VISIT:    History/Vitals/Pain/Encounter Info - 09/10/20 1417        Injury History/Precautions/Daily Required Info    Primary Therapist  Laura WELCH      Chief Complaint/Reason for Visit   swallowing     Onset of Illness/Injury or Date of Surgery  20     Existing Precautions/Restrictions  aspiration;NPO     Document Type  daily treatment     Patient/Family/Caregiver Comments/Observations  denies pain, changes in meds, and falls     Start Time  1300     Stop Time  1400     Time Calculation (min)  60 min     Patient reported fall since last visit  No        Pain Assessment    Currently in pain  No/Denies        Pain Interventions    Intervention  none     Post Intervention Comments  none         Daily Treatment Assessment and Plan - 09/10/20 1420        Daily Treatment Assessment and Plan    Progress toward goals  Progressing     Daily Outcome Summary  pt continues with improvements to swallow     Plan and Recommendations  target exercise program and conservative trials of puree/honey/nectar and ice chips              Today's Treatment:      Short Term Goals Current Session   ?Pt. will tolerate trace PO trials without s/s of aspiration and adequate oral management with min cues over 2 sessions.  Ice chip trials x 25 with effortful double swallow - no s/s aspiration    Trial water x 2 oz via cup sip with cough x 1; cued for effortful double swallows and forward lean vs backward lean posture while sitting    Trial applesauce x .5  "oz; pt verbalized he could feel residuals at suspected UES x 2, and was able to clear his throat and reswallow; pt expectorated x 1 what looked like a minimal amount of applesauce. Pt then had a clear vocal quality with no s/s aspiration.   Pt. will complete pharyngeal strengthening, laryngeal excursion, and base of tongue strengthening exercises to improve airway protection and bolus propulsion with min cues for accurate production with 90% accuracy      Effortful \"guh\" x  10 sets of 10 completed    CTAR x 4 sets of 10 completed    yvonne x 2 sets of 10 completed    Pitch glides x 2 sets of 10 completed         Pt. will verbalize understanding of current swallow status and risks of aspiration/choking with min cues.  Pt is able to verbalize NPO status and risks of aspiration   EDUCATION Training provided to patient and daughter re: home exercise program; both verbalized understanding   Other Allowing ice chips with rigorous oral care x 3 sets of 20 per day AS TOLERATED with effortful double swallows and throat clear/reswallow PRN; pt and daughter educated    Plan for VFSS next week; education provided for patient                             "

## 2020-09-11 ENCOUNTER — HOSPITAL ENCOUNTER (OUTPATIENT)
Dept: PHYSICAL THERAPY | Facility: REHABILITATION | Age: 85
Setting detail: THERAPIES SERIES
Discharge: HOME | End: 2020-09-11
Attending: FAMILY MEDICINE
Payer: COMMERCIAL

## 2020-09-11 ENCOUNTER — HOSPITAL ENCOUNTER (OUTPATIENT)
Dept: SPEECH THERAPY | Facility: REHABILITATION | Age: 85
Setting detail: THERAPIES SERIES
Discharge: HOME | End: 2020-09-11
Attending: FAMILY MEDICINE
Payer: COMMERCIAL

## 2020-09-11 DIAGNOSIS — I69.321 DYSPHASIA FOLLOWING CEREBROVASCULAR ACCIDENT (CVA): ICD-10-CM

## 2020-09-11 DIAGNOSIS — I63.9 CEREBROVASCULAR ACCIDENT (CVA), UNSPECIFIED MECHANISM (CMS/HCC): Primary | ICD-10-CM

## 2020-09-11 DIAGNOSIS — I69.393 ATAXIA FOLLOWING CEREBRAL INFARCTION: Primary | ICD-10-CM

## 2020-09-11 PROCEDURE — 97112 NEUROMUSCULAR REEDUCATION: CPT | Mod: GP

## 2020-09-11 PROCEDURE — 97110 THERAPEUTIC EXERCISES: CPT | Mod: GP,59

## 2020-09-11 PROCEDURE — 92526 ORAL FUNCTION THERAPY: CPT | Mod: GN

## 2020-09-11 PROCEDURE — 97116 GAIT TRAINING THERAPY: CPT | Mod: GP

## 2020-09-11 NOTE — OP SLP TREATMENT LOG
"Short Term Goals Current Session   ?Pt. will tolerate trace PO trials without s/s of aspiration and adequate oral management with min cues over 2 sessions.  Ice chip trials x 30 with effortful double swallow - no s/s aspiration    Trial water x 4 oz via cup sip with cough x 1; cues needed x 1 for effortful double swallows and forward lean vs backward lean posture while sitting    Trial honey cranberry juice by 1/2 tsp x 2.5 oz; pt independent with double effortful swallows; clear vocals throughout, no s/s aspiration   Pt. will complete pharyngeal strengthening, laryngeal excursion, and base of tongue strengthening exercises to improve airway protection and bolus propulsion with min cues for accurate production with 90% accuracy      Effortful \"guh\" 4 sets of 20 completed    CTAR x 4 sets of 20 completed    yvonne x 2 sets of 10 completed    Pitch glides x 2 sets of 10 completed         Pt. will verbalize understanding of current swallow status and risks of aspiration/choking with min cues.  Pt is able to verbalize NPO status and risks of aspiration   EDUCATION Training provided to patient and daughter re: home exercise program; both verbalized understanding   Other Allowing ice chips with rigorous oral care x 3 sets of 20 per day AS TOLERATED with effortful double swallows and throat clear/reswallow PRN; pt and daughter educated    Plan for VFSS next week; education provided for patient     "

## 2020-09-11 NOTE — PROGRESS NOTES
SLP DAILY NOTE FOR OUTPATIENT THERAPY    Patient: Karl Park   MRN: [id number]  : 1928 89 y.o.  Referring Physician: Herb Dailey MD  Date of Visit: 2020      Certification Dates: 20 through 20    Diagnosis:   1. Cerebrovascular accident (CVA), unspecified mechanism (CMS/HCC)    2. Dysphasia following cerebrovascular accident (CVA)        Chief Complaints:  swallowing    Precautions:  Existing Precautions/Restrictions: aspiration, NPO       TODAY'S VISIT:    History/Vitals/Pain/Encounter Info - 20 1120        Injury History/Precautions/Daily Required Info    Primary Therapist  Laura WELCH      Chief Complaint/Reason for Visit   swallowing     Onset of Illness/Injury or Date of Surgery  20     Existing Precautions/Restrictions  aspiration;NPO     Limitations/Impairments  swallowing     Document Type  daily treatment     Patient/Family/Caregiver Comments/Observations  denies pain, changes in meds, and falls     Start Time  1100     Stop Time  1200     Time Calculation (min)  60 min     Patient reported fall since last visit  No        Pain Assessment    Currently in pain  No/Denies        Pain Interventions    Intervention  none     Post Intervention Comments  none         Daily Treatment Assessment and Plan - 20 1513        Daily Treatment Assessment and Plan    Progress toward goals  Progressing     Daily Outcome Summary  pt continues with improvements to swallow     Plan and Recommendations  plan for VFSS on  Treatment:      Short Term Goals Current Session   ?Pt. will tolerate trace PO trials without s/s of aspiration and adequate oral management with min cues over 2 sessions.  Ice chip trials x 30 with effortful double swallow - no s/s aspiration    Trial water x 4 oz via cup sip with cough x 1; cues needed x 1 for effortful double swallows and forward lean vs backward lean posture while sitting    Trial honey cranberry juice  "by 1/2 tsp x 2.5 oz; pt independent with double effortful swallows; clear vocals throughout, no s/s aspiration   Pt. will complete pharyngeal strengthening, laryngeal excursion, and base of tongue strengthening exercises to improve airway protection and bolus propulsion with min cues for accurate production with 90% accuracy      Effortful \"guh\" 4 sets of 20 completed    CTAR x 4 sets of 20 completed    yvonne x 2 sets of 10 completed    Pitch glides x 2 sets of 10 completed         Pt. will verbalize understanding of current swallow status and risks of aspiration/choking with min cues.  Pt is able to verbalize NPO status and risks of aspiration   EDUCATION Training provided to patient and daughter re: home exercise program; both verbalized understanding   Other Allowing ice chips with rigorous oral care x 3 sets of 20 per day AS TOLERATED with effortful double swallows and throat clear/reswallow PRN; pt and daughter educated    Plan for VFSS next week; education provided for patient                             "

## 2020-09-11 NOTE — OP PT TREATMENT LOG
"PT Neuro Exercises Current Session Time Performed   THER ACT  TOTAL TIME FOR SESSION 0-7 Minutes    Precautions NPO/PEG tube.  Pt is I with PEG tube feedings.   Working on swallow in speech.  L facial droop and slight L eye ptosis post stroke. Yes   Transfer training Mod I with RW, Mod I without UE support for sit to stand without AD (at 20\" height)    Patient Education Reviewed POC goals and intention for 2x/week for at least 8 weeks.     HEP - Issued and reviewed with Patient - instructed to perform at counter or with chair for support:  -LAQ  -Heel raises  -Abduction  -Hamstring curls    Reviewed need for walker and difficulty with clearing R foot consistently YES                         THER EX TOTAL TIME FOR SESSION 23-37 Minutes     STRETCHING     Stretching by patient Seated HS stretch w/ manual assist 20 sec x 3 and added to HEP via foot prop (pt demos indep and understanding)  B calf stretch on incline #2  30 sec  x 2  Runner stretch Y      yes   CARDIOVASCULAR     Nu Step Level 1  UE's/LE's 6 min, 1 min cool down yes   Stationary Bike Virtual RB (nustep not available) [employer] dash, gear 6-7, 10 mins N   Treadmill     Standing Ther-Ex HR's, Abd, Hs curls, marching and mini squats 10x     March x 10 x 2, HR/TR x 10,  N    2 x 10 for bicep curls 5 #, alt ue raises x 2 x 10 5 # EA , mini squats 2 x 10 5#, bent rows with 5# ea 2 x 10 Y to all    STS from mat without UE support x 10 x 1, 10# wts. yes   NEURO RE-ED TOTAL TIME FOR SESSION 8-22 Minutes     COORDINATION     POSTURAL RE-ED     PRE-GAIT ACTIVITIES      BALANCE TRAINING      Sitting balance     Standing balance - static Tap ups forward to 8 in block wo UE/ cga support  2 x 10 floor.      Toe taps to maxine, single and alternating w progression towards decreased UE  support.  Therapist maintaining cl S or CGA    Standing with upper trunk rotation ea direction x 10  Standing with 360 turns        Yes        N          N   Standing balance - dynamic Gait " "around slalom course of cones (w and w/o AD/SPC) and over hurdles  Gait over 6 in hurdles - step to pattern forward, side stepping.   reciprocal.  No UE support except intermittent touch.  Forward/backward  over 6 in hurdles progressing from unilat support to intermittent touch    Walking with head turns and nods w/o AD  Walking with base of support within 12 in tiles  Walking with pivot turn  Walking backwards  Stepping over shoebox forwards   N    N      No        N        N       Standing balance - varied surface On Airex:  Ball toss/bounce/catch  FT EO with HT/HN, UE's at sides   Airex, Semi-Tandem with HT    Blue tread rockerboard A-P gentle rocking for ankle strategies  Rockerboard - maintain balance with head turns and head nods with approx 30% success without LOB, close S/CGA N  N  N    N  N    N     GAIT TRAINING TOTAL TIME FOR SESSION 8-22 Minutes    Gait with/ without AD  Complete 2 MWT w/  ft  w/o RW  368 ft 3/10 RPE after second 2 MWT  Complete FGA:  14/30  (1,2,1,2,3,1,0,0,2,2) - completed by Ana Rosa Winston PT (primary PT) - will update goals next visit with primary.      Arrived to session ambulating with RW; Gait training w SPC(RUE) x 250 ft x 1 w/  cl S.  Focus on cues for heel strike/appropriate knee extension and hip flexion on RLE.  HR increased from 82 at rest to 104 with ambulation episodes. No gross LOB, min instability, minimal loss of R LE appropriate step length and no overt catch of R toe. Ambulates quicker with SPC than no AD, and has less deviation from midline.    GT  250ft x 2 over outdoor uneven terrain and slight incline with cl S/cga - HR to 120bpm (carried SPC but did not use)      Goal:  Return to amb without AD                                      YES     Dynamic gait      Stair negotiation Ascend/descend reciprocally four  6\" steps with 2 rails, x3  Ascend/descend 6\" curb without AD, Min A and cues  Up/down ramp with Min A and cues to slow down for safety N    N    N   Curb " negotiation     Ramp negotiation     Outdoor ambulation     BWS/vector training     MANUAL TOTAL TIME FOR SESSION Not performed    Stretching by therapist/PROM     Mobilization      MODALITIES TOTAL TIME FOR SESSION Not performed    Ice     Heat

## 2020-09-11 NOTE — PROGRESS NOTES
PT DAILY NOTE FOR OUTPATIENT THERAPY    Patient: Karl Park   MRN: [id number]  : 1928 89 y.o.  Referring Physician: Herb Dailey MD  Date of Visit: 2020    Certification Dates: 20 through 10/29/20    Diagnosis:   1. Ataxia following cerebral infarction        Chief Complaints:  functional mobility, balance, coordination, strength, ROM, endurance    Precautions:   Existing Precautions/Restrictions: fall, other (see comments)     TODAY'S VISIT    History/Vitals/Pain/Encounter Info - 20 1015        Injury History/Precautions/Daily Required Info    Primary Therapist  Aretha Ferrara PT     Chief Complaint/Reason for Visit   functional mobility, balance, coordination, strength, ROM, endurance     Onset of Illness/Injury or Date of Surgery  20     Referring Physician  Asim     Existing Precautions/Restrictions  fall;other (see comments)     Pertinent History of Current Functional Problem  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 HPI: Pt's daughter stated that the onset of the stroke may have been  or . Pt went to the ER on 7/3 and he was diagnosed with pneumonia and bells palsy. Pt was given medication for that, which he couldn't swallow. Pt went to see his PCP on 20 who referred him to a neurologist. Pt saw the PA and he was tested for myasthenia gravis. Pt would cough throughout the whole week every time he ate or drank. Pt was seen by neurologist  for the MRI and swallow study. MRI showed that he had a brainstem stroke. Pt had a swallow study which showed asp on all consistencies and absent epiglottic inversion. Pt had a peg tube placed on . Pt was also admitted overnight for IV fluids.      OP Specialty  Neuro     Document Type  daily treatment     Patient/Family/Caregiver Comments/Observations  Pt reports no new falls or med changes.     Start Time  1007     Stop Time  1100     Time Calculation (min)  53  "min     Patient reported fall since last visit  No        Pain Assessment    Currently in pain  No/Denies        Pain Intervention    Intervention   ther ex/nmre     Post Intervention Comments  no c/o increased pain        Pre Activity Vital Signs    Pulse  100     BP  107/57     BP Location  Left upper arm     BP Method  Automatic     Patient Position  Sitting        Activity Vital Signs    Patient activity  Walking     Activity Pulse  120     Activity BP  135/62        Post Activity Vital Signs    Post Activity Pulse  100         Daily Treatment Assessment and Plan - 09/11/20 1200        Daily Treatment Assessment and Plan    Progress toward goals  Progressing     Daily Outcome Summary  Pt demonstrates increased understanding of need to work on HS stretches to improve efficiency and stability of ambulation.   He will incorporate into HEP.  He is able to ambulate over uneven terrain without using an AD (carrying it), but HR increased to 120 bpm.  States understanding of safety concerns if amb outdoors without AD.  Rec always using at least SPC or walker for outdoor amb.    Initiated weight training exercises as discussed with patient last visit.  Good performance with guidance and supervision for balance.     Plan and Recommendations  Progress strengthening, recheck for HEP HS stretch.           OBJECTIVE DATA TAKEN TODAY:    None taken    Today's Treatment:      PT Neuro Exercises Current Session Time Performed   THER ACT  TOTAL TIME FOR SESSION 0-7 Minutes    Precautions NPO/PEG tube.  Pt is I with PEG tube feedings.   Working on swallow in speech.  L facial droop and slight L eye ptosis post stroke. Yes   Transfer training Mod I with RW, Mod I without UE support for sit to stand without AD (at 20\" height)    Patient Education Reviewed POC goals and intention for 2x/week for at least 8 weeks.     HEP - Issued and reviewed with Patient - instructed to perform at counter or with chair for support:  -LAQ  -Heel " raises  -Abduction  -Hamstring curls    Reviewed need for walker and difficulty with clearing R foot consistently YES                         THER EX TOTAL TIME FOR SESSION 23-37 Minutes     STRETCHING     Stretching by patient Seated HS stretch w/ manual assist 20 sec x 3 and added to HEP via foot prop (pt demos indep and understanding)  B calf stretch on incline #2  30 sec  x 2  Runner stretch Y      yes   CARDIOVASCULAR     Nu Step Level 1  UE's/LE's 6 min, 1 min cool down yes   Stationary Bike Virtual RB (nustep not available) redwood dash, gear 6-7, 10 mins N   Treadmill     Standing Ther-Ex HR's, Abd, Hs curls, marching and mini squats 10x     March x 10 x 2, HR/TR x 10,  N    2 x 10 for bicep curls 5 #, alt ue raises x 2 x 10 5 # EA , mini squats 2 x 10 5#, bent rows with 5# ea 2 x 10 Y to all    STS from mat without UE support x 10 x 1, 10# wts. yes   NEURO RE-ED TOTAL TIME FOR SESSION 8-22 Minutes     COORDINATION     POSTURAL RE-ED     PRE-GAIT ACTIVITIES      BALANCE TRAINING      Sitting balance     Standing balance - static Tap ups forward to 8 in block wo UE/ cga support  2 x 10 floor.      Toe taps to maxine, single and alternating w progression towards decreased UE  support.  Therapist maintaining cl S or CGA    Standing with upper trunk rotation ea direction x 10  Standing with 360 turns        Yes        N          N   Standing balance - dynamic Gait around slalom course of cones (w and w/o AD/SPC) and over hurdles  Gait over 6 in hurdles - step to pattern forward, side stepping.   reciprocal.  No UE support except intermittent touch.  Forward/backward  over 6 in hurdles progressing from unilat support to intermittent touch    Walking with head turns and nods w/o AD  Walking with base of support within 12 in tiles  Walking with pivot turn  Walking backwards  Stepping over shoebox forwards   N    N      No        N        N       Standing balance - varied surface On Airex:  Ball  "toss/bounce/catch  FT EO with HT/HN, UE's at sides   Airex, Semi-Tandem with HT    Blue tread rockerboard A-P gentle rocking for ankle strategies  Rockerboard - maintain balance with head turns and head nods with approx 30% success without LOB, close S/CGA N  N  N    N  N    N     GAIT TRAINING TOTAL TIME FOR SESSION 8-22 Minutes    Gait with/ without AD  Complete 2 MWT w/  ft  w/o RW  368 ft 3/10 RPE after second 2 MWT  Complete FGA:  14/30  (1,2,1,2,3,1,0,0,2,2) - completed by Ana Rosa Winston PT (primary PT) - will update goals next visit with primary.      Arrived to session ambulating with RW; Gait training w SPC(RUE) x 250 ft x 1 w/  cl S.  Focus on cues for heel strike/appropriate knee extension and hip flexion on RLE.  HR increased from 82 at rest to 104 with ambulation episodes. No gross LOB, min instability, minimal loss of R LE appropriate step length and no overt catch of R toe. Ambulates quicker with SPC than no AD, and has less deviation from midline.    GT  250ft x 2 over outdoor uneven terrain and slight incline with cl S/cga - HR to 120bpm (carried SPC but did not use)      Goal:  Return to amb without AD                                      YES     Dynamic gait      Stair negotiation Ascend/descend reciprocally four  6\" steps with 2 rails, x3  Ascend/descend 6\" curb without AD, Min A and cues  Up/down ramp with Min A and cues to slow down for safety N    N    N   Curb negotiation     Ramp negotiation     Outdoor ambulation     BWS/vector training     MANUAL TOTAL TIME FOR SESSION Not performed    Stretching by therapist/PROM     Mobilization      MODALITIES TOTAL TIME FOR SESSION Not performed    Ice     Heat                      "

## 2020-09-14 ENCOUNTER — HOSPITAL ENCOUNTER (OUTPATIENT)
Dept: SPEECH THERAPY | Facility: REHABILITATION | Age: 85
Setting detail: THERAPIES SERIES
Discharge: HOME | End: 2020-09-14
Attending: FAMILY MEDICINE
Payer: COMMERCIAL

## 2020-09-14 ENCOUNTER — HOSPITAL ENCOUNTER (OUTPATIENT)
Dept: PHYSICAL THERAPY | Facility: REHABILITATION | Age: 85
Setting detail: THERAPIES SERIES
Discharge: HOME | End: 2020-09-14
Attending: FAMILY MEDICINE
Payer: COMMERCIAL

## 2020-09-14 DIAGNOSIS — I69.393 ATAXIA FOLLOWING CEREBRAL INFARCTION: Primary | ICD-10-CM

## 2020-09-14 DIAGNOSIS — I63.9 CEREBROVASCULAR ACCIDENT (CVA), UNSPECIFIED MECHANISM (CMS/HCC): Primary | ICD-10-CM

## 2020-09-14 DIAGNOSIS — I69.321 DYSPHASIA FOLLOWING CEREBROVASCULAR ACCIDENT (CVA): ICD-10-CM

## 2020-09-14 PROCEDURE — 92526 ORAL FUNCTION THERAPY: CPT | Mod: GN

## 2020-09-14 PROCEDURE — 97110 THERAPEUTIC EXERCISES: CPT | Mod: GP,CQ

## 2020-09-14 PROCEDURE — 97112 NEUROMUSCULAR REEDUCATION: CPT | Mod: GP,CQ

## 2020-09-14 NOTE — OP PT TREATMENT LOG
"PT Neuro Exercises Current Session Time Performed   THER ACT  TOTAL TIME FOR SESSION 0-7 Minutes    Precautions NPO/PEG tube.  Pt is I with PEG tube feedings.   Working on swallow in speech.  L facial droop and slight L eye ptosis post stroke. Yes   Transfer training Mod I with RW, Mod I without UE support for sit to stand without AD (at 20\" height)    Patient Education Reviewed POC goals and intention for 2x/week for at least 8 weeks.     HEP - Issued and reviewed with Patient - instructed to perform at counter or with chair for support:  -LAQ  -Heel raises  -Abduction  -Hamstring curls    Reviewed need for walker and difficulty with clearing R foot consistently                          THER EX TOTAL TIME FOR SESSION 23-37 Minutes     STRETCHING     Stretching by patient Seated HS stretch w/ ft on block,  w/ manual assist 20 sec x 3 and added to HEP via foot prop (pt demos indep and understanding)  B calf stretch on incline #2  30 sec  x 2  Runner stretch Y        yes   CARDIOVASCULAR     Nu Step Level 1  UE's/LE's 8 min, plus1 min cool down yes   Stationary Bike Virtual RB (nustep not available) redwood dash, gear 6-7, 10 mins N   Treadmill     Standing Ther-Ex HR's, Abd, Hs curls, marching and mini squats 10x     March x 10 x 2, HR/TR x 10,  N    2 x 10 for bicep curls 5 #, alt ue raises x 2 x 10 5 # EA , mini squats 2 x 10 8#, bent rows with 8# ea 2 x 10 Y to all    STS from mat without UE support x 10 x 1, 10# wts. yes   NEURO RE-ED TOTAL TIME FOR SESSION 23-37 Minutes     COORDINATION     POSTURAL RE-ED     PRE-GAIT ACTIVITIES      BALANCE TRAINING      Sitting balance     Standing balance - static Tap ups forward to 8 in block wo UE/ cga support  2 x 10 floor.      Toe taps to maxine, single and alternating w progression towards decreased UE  support.  Therapist maintaining cl S or CGA    Standing with upper trunk rotation ea direction x 10  Standing with 360 turns        Yes        N          N   Standing " "balance - dynamic Gait around slalom course of cones (w and w/o AD/SPC) and over hurdles    Gait over 6 in hurdles - step to pattern forward, side stepping.   reciprocal.  No UE support except intermittent touch.      Walking with head turns and nods w/o AD  Walking with base of support within 12 in tiles  Walking with pivot turn  Walking backwards  Stepping over shoebox forwards   N      yes          yes        Yes  yes       Standing balance - varied surface On Airex:  Ball toss/bounce/catch  FT EO with HT/HN, UE's at sides   Airex, Semi-Tandem with HT    Blue tread rockerboard A-P gentle rocking for ankle strategies  Rockerboard - maintain balance with head turns and head nods with 1 finger support and w/o UE support N  N  N    N  N    yes     GAIT TRAINING TOTAL TIME FOR SESSION 0-7 Minutes    Gait with/ without AD  Gait t/o session w/o AD w/ CS  Gait in hallway w/ HT's and HN's    Complete 2 MWT w/  ft  w/o RW  368 ft 3/10 RPE after second 2 MWT  Complete FGA:  14/30  (1,2,1,2,3,1,0,0,2,2) - completed by Ana Rosa Winston PT (primary PT) - will update goals next visit with primary.      Arrived to session ambulating with RW; Gait training w SPC(RUE) x 250 ft x 1 w/  cl S.  Focus on cues for heel strike/appropriate knee extension and hip flexion on RLE.  HR increased from 82 at rest to 104 with ambulation episodes. No gross LOB, min instability, minimal loss of R LE appropriate step length and no overt catch of R toe. Ambulates quicker with SPC than no AD, and has less deviation from midline.    GT  250ft x 2 over outdoor uneven terrain and slight incline with cl S/cga - HR to 120bpm (carried SPC but did not use)      Goal:  Return to amb without AD  yes                                         Dynamic gait      Stair negotiation Ascend/descend reciprocally four  6\" steps with 2 rails, x3  Ascend/descend 6\" curb without AD, Min A and cues  Up/down ramp with Min A and cues to slow down for safety N    N    N "   Curb negotiation     Ramp negotiation     Outdoor ambulation     BWS/vector training     MANUAL TOTAL TIME FOR SESSION Not performed    Stretching by therapist/PROM     Mobilization      MODALITIES TOTAL TIME FOR SESSION Not performed    Ice     Heat

## 2020-09-14 NOTE — OP SLP TREATMENT LOG
"Short Term Goals Current Session   ?Pt. will tolerate trace PO trials without s/s of aspiration and adequate oral management with min cues over 2 sessions.  Ice chip trials x 30 with effortful double swallow - no s/s aspiration    Trial water x 1 oz via cup sip with cough x 1; no cues needed for strategies    Trial honey orange juice by 1/2-1 tsp x 4 oz; pt independent with double effortful swallows; clear vocals throughout, no s/s aspiration; expectoration x1 secondary to possible holdup at [facility]   Pt. will complete pharyngeal strengthening, laryngeal excursion, and base of tongue strengthening exercises to improve airway protection and bolus propulsion with min cues for accurate production with 90% accuracy      Effortful \"guh\" 5 sets of 20 completed    CTAR x 5 sets of 20 completed    yvonne x 3 sets of 5 completed    Pitch glides x 4 sets of 10 completed         Pt. will verbalize understanding of current swallow status and risks of aspiration/choking with min cues.  Pt is able to verbalize NPO status and risks of aspiration   EDUCATION Education provided re: VFSS tomorrow; pt and daughter expressed understanding   Other Allowing ice chips with rigorous oral care x 3 sets of 20 per day AS TOLERATED with effortful double swallows and throat clear/reswallow PRN; pt and daughter educated    Plan for VFSS tomorrow     "

## 2020-09-14 NOTE — PROGRESS NOTES
PT DAILY NOTE FOR OUTPATIENT THERAPY    Patient: Karl Park   MRN: [id number]  : 1928 89 y.o.  Referring Physician: Herb Dailey MD  Date of Visit: 2020    Certification Dates: 20 through 10/29/20    Diagnosis:   1. Ataxia following cerebral infarction        Chief Complaints:  functional mobility, balance, coordination, ROM, strength    Precautions:   Precautions comments: PEG, NPO  Existing Precautions/Restrictions: fall, other (see comments)     TODAY'S VISIT    History/Vitals/Pain/Encounter Info - 20 1054        Injury History/Precautions/Daily Required Info    Primary Therapist  Aretha Ferrara PT     Chief Complaint/Reason for Visit   functional mobility, balance, coordination, ROM, strength     Onset of Illness/Injury or Date of Surgery  20     Referring Physician  Asim     Existing Precautions/Restrictions  fall;other (see comments)     Precautions comments  PEG, NPO     Limitations/Impairments  swallowing     OP Specialty  Neuro     Document Type  daily treatment     Patient/Family/Caregiver Comments/Observations  No pain, no changes in meds, no falls reported by pt/daughter.  Daughter reported walking in a park yesterday.      Start Time  1100     Stop Time  1200     Time Calculation (min)  60 min     Patient reported fall since last visit  No        Pain Assessment    Currently in pain  No/Denies        Pain Intervention    Intervention   na     Post Intervention Comments  na        Pre Activity Vital Signs    Pulse  91     SpO2  99 %     BP  112/62     BP Location  Left upper arm     BP Method  Manual     Patient Position  Sitting         Daily Treatment Assessment and Plan - 20 1148        Daily Treatment Assessment and Plan    Progress toward goals  Progressing     Daily Outcome Summary  Pt tolerated session well.  Pt reports no pain after last session UE therex using weights and asked for increase this session.  Pt completed some of the UE exercises (see  "tx log) using 8# wts w/o difficulty.  Some slight LOB (self corrected) on hurdles and rockerboard w/ HT's/HN's.       Plan and Recommendations  Cont w/ POC as directed by primary PT for functional mobility, strength, ROM,  coordination, balance and endurance.                 Today's Treatment:      PT Neuro Exercises Current Session Time Performed   THER ACT  TOTAL TIME FOR SESSION 0-7 Minutes    Precautions NPO/PEG tube.  Pt is I with PEG tube feedings.   Working on swallow in speech.  L facial droop and slight L eye ptosis post stroke. Yes   Transfer training Mod I with RW, Mod I without UE support for sit to stand without AD (at 20\" height)    Patient Education Reviewed POC goals and intention for 2x/week for at least 8 weeks.     HEP - Issued and reviewed with Patient - instructed to perform at counter or with chair for support:  -LAQ  -Heel raises  -Abduction  -Hamstring curls    Reviewed need for walker and difficulty with clearing R foot consistently                          THER EX TOTAL TIME FOR SESSION 23-37 Minutes     STRETCHING     Stretching by patient Seated HS stretch w/ ft on block,  w/ manual assist 20 sec x 3 and added to HEP via foot prop (pt demos indep and understanding)  B calf stretch on incline #2  30 sec  x 2  Runner stretch Y        yes   CARDIOVASCULAR     Nu Step Level 1  UE's/LE's 8 min, plus1 min cool down yes   Stationary Bike Virtual RB (nustep not available) redSocialspiel dash, gear 6-7, 10 mins N   Treadmill     Standing Ther-Ex HR's, Abd, Hs curls, marching and mini squats 10x     March x 10 x 2, HR/TR x 10,  N    2 x 10 for bicep curls 5 #, alt ue raises x 2 x 10 5 # EA , mini squats 2 x 10 8#, bent rows with 8# ea 2 x 10 Y to all    STS from mat without UE support x 10 x 1, 10# wts. yes   NEURO RE-ED TOTAL TIME FOR SESSION 23-37 Minutes     COORDINATION     POSTURAL RE-ED     PRE-GAIT ACTIVITIES      BALANCE TRAINING      Sitting balance     Standing balance - static Tap ups forward " to 8 in block wo UE/ cga support  2 x 10 floor.      Toe taps to maxine, single and alternating w progression towards decreased UE  support.  Therapist maintaining cl S or CGA    Standing with upper trunk rotation ea direction x 10  Standing with 360 turns        Yes        N          N   Standing balance - dynamic Gait around slalom course of cones (w and w/o AD/SPC) and over hurdles    Gait over 6 in hurdles - step to pattern forward, side stepping.   reciprocal.  No UE support except intermittent touch.      Walking with head turns and nods w/o AD  Walking with base of support within 12 in tiles  Walking with pivot turn  Walking backwards  Stepping over shoebox forwards   N      yes          yes        Yes  yes       Standing balance - varied surface On Airex:  Ball toss/bounce/catch  FT EO with HT/HN, UE's at sides   Airex, Semi-Tandem with HT    Blue tread rockerboard A-P gentle rocking for ankle strategies  Rockerboard - maintain balance with head turns and head nods with 1 finger support and w/o UE support N  N  N    N  N    yes     GAIT TRAINING TOTAL TIME FOR SESSION 0-7 Minutes    Gait with/ without AD  Gait t/o session w/o AD w/ CS  Gait in hallway w/ HT's and HN's    Complete 2 MWT w/  ft  w/o RW  368 ft 3/10 RPE after second 2 MWT  Complete FGA:  14/30  (1,2,1,2,3,1,0,0,2,2) - completed by Ana Rosa Winston PT (primary PT) - will update goals next visit with primary.      Arrived to session ambulating with RW; Gait training w SPC(RUE) x 250 ft x 1 w/  cl S.  Focus on cues for heel strike/appropriate knee extension and hip flexion on RLE.  HR increased from 82 at rest to 104 with ambulation episodes. No gross LOB, min instability, minimal loss of R LE appropriate step length and no overt catch of R toe. Ambulates quicker with SPC than no AD, and has less deviation from midline.    GT  250ft x 2 over outdoor uneven terrain and slight incline with cl S/cga - HR to 120bpm (carried SPC but did not  "use)      Goal:  Return to amb without AD  yes                                         Dynamic gait      Stair negotiation Ascend/descend reciprocally four  6\" steps with 2 rails, x3  Ascend/descend 6\" curb without AD, Min A and cues  Up/down ramp with Min A and cues to slow down for safety N    N    N   Curb negotiation     Ramp negotiation     Outdoor ambulation     BWS/vector training     MANUAL TOTAL TIME FOR SESSION Not performed    Stretching by therapist/PROM     Mobilization      MODALITIES TOTAL TIME FOR SESSION Not performed    Ice     Heat                      "

## 2020-09-14 NOTE — PROGRESS NOTES
[facility] DAILY NOTE FOR OUTPATIENT THERAPY    Patient: Karl Park   MRN: [id number]  : 1928 89 y.o.  Referring Physician: Herb Dailey MD  Date of Visit: 2020      Certification Dates: 20 through 20    Diagnosis:   1. Cerebrovascular accident (CVA), unspecified mechanism (CMS/HCC)    2. Dysphasia following cerebrovascular accident (CVA)        Chief Complaints:  swallowing    Precautions:  Existing Precautions/Restrictions: aspiration, NPO       TODAY'S VISIT:    History/Vitals/Pain/Encounter Info - 20 1118        Injury History/Precautions/Daily Required Info    Primary Therapist  Laura WELCH      Chief Complaint/Reason for Visit   swallowing     Existing Precautions/Restrictions  aspiration;NPO     Limitations/Impairments  swallowing     Document Type  daily treatment     Patient/Family/Caregiver Comments/Observations  pt denies pain, changes in meds, and falls     Start Time  1000     Stop Time  1100     Time Calculation (min)  60 min     Patient reported fall since last visit  No        Pain Assessment    Currently in pain  No/Denies        Pain Interventions    Intervention  none     Post Intervention Comments  none         Daily Treatment Assessment and Plan - 20 1122        Daily Treatment Assessment and Plan    Progress toward goals  Progressing     Daily Outcome Summary  pt continues with improvements to swallow     Plan and Recommendations  plan for VFSS on Tuesday              Today's Treatment:      Short Term Goals Current Session   ?Pt. will tolerate trace PO trials without s/s of aspiration and adequate oral management with min cues over 2 sessions.  Ice chip trials x 30 with effortful double swallow - no s/s aspiration    Trial water x 1 oz via cup sip with cough x 1; no cues needed for strategies    Trial honey orange juice by 1/2-1 tsp x 4 oz; pt independent with double effortful swallows; clear vocals throughout, no s/s aspiration; expectoration x1  "secondary to possible holdup at [facility]   Pt. will complete pharyngeal strengthening, laryngeal excursion, and base of tongue strengthening exercises to improve airway protection and bolus propulsion with min cues for accurate production with 90% accuracy      Effortful \"guh\" 5 sets of 20 completed    CTAR x 5 sets of 20 completed    yvonne x 3 sets of 5 completed    Pitch glides x 4 sets of 10 completed         Pt. will verbalize understanding of current swallow status and risks of aspiration/choking with min cues.  Pt is able to verbalize NPO status and risks of aspiration   EDUCATION Education provided re: VFSS tomorrow; pt and daughter expressed understanding   Other Allowing ice chips with rigorous oral care x 3 sets of 20 per day AS TOLERATED with effortful double swallows and throat clear/reswallow PRN; pt and daughter educated    Plan for VFSS tomorrow                             "

## 2020-09-15 ENCOUNTER — HOSPITAL ENCOUNTER (OUTPATIENT)
Dept: SPEECH THERAPY | Facility: REHABILITATION | Age: 85
Setting detail: THERAPIES SERIES
Discharge: HOME | End: 2020-09-15
Attending: FAMILY MEDICINE
Payer: COMMERCIAL

## 2020-09-15 ENCOUNTER — HOSPITAL ENCOUNTER (OUTPATIENT)
Dept: RADIOLOGY | Facility: HOSPITAL | Age: 85
Discharge: HOME | End: 2020-09-15
Attending: FAMILY MEDICINE
Payer: COMMERCIAL

## 2020-09-15 ENCOUNTER — HOSPITAL ENCOUNTER (OUTPATIENT)
Dept: PHYSICAL THERAPY | Facility: REHABILITATION | Age: 85
Setting detail: THERAPIES SERIES
Discharge: HOME | End: 2020-09-15
Attending: FAMILY MEDICINE
Payer: COMMERCIAL

## 2020-09-15 DIAGNOSIS — I63.9 CEREBROVASCULAR ACCIDENT (CVA), UNSPECIFIED MECHANISM (CMS/HCC): Primary | ICD-10-CM

## 2020-09-15 DIAGNOSIS — I69.321 DYSPHASIA FOLLOWING CEREBROVASCULAR ACCIDENT (CVA): ICD-10-CM

## 2020-09-15 DIAGNOSIS — R13.10 DYSPHAGIA, UNSPECIFIED: ICD-10-CM

## 2020-09-15 DIAGNOSIS — I69.393 ATAXIA FOLLOWING CEREBRAL INFARCTION: Primary | ICD-10-CM

## 2020-09-15 PROCEDURE — 92526 ORAL FUNCTION THERAPY: CPT | Mod: GN

## 2020-09-15 PROCEDURE — 97110 THERAPEUTIC EXERCISES: CPT | Mod: GP,CQ

## 2020-09-15 PROCEDURE — 74230 X-RAY XM SWLNG FUNCJ C+: CPT

## 2020-09-15 PROCEDURE — 97112 NEUROMUSCULAR REEDUCATION: CPT | Mod: GP,CQ

## 2020-09-15 PROCEDURE — 97116 GAIT TRAINING THERAPY: CPT | Mod: GP,CQ

## 2020-09-15 PROCEDURE — 92611 MOTION FLUOROSCOPY/SWALLOW: CPT | Mod: GN

## 2020-09-15 NOTE — PROGRESS NOTES
SLP DAILY NOTE FOR OUTPATIENT THERAPY    Patient: Karl Park   MRN: [id number]  : 1928 89 y.o.  Referring Physician: Herb Dailey MD  Date of Visit: 9/15/2020      Certification Dates: 20 through 20    Diagnosis:   1. Cerebrovascular accident (CVA), unspecified mechanism (CMS/HCC)    2. Dysphasia following cerebrovascular accident (CVA)        Chief Complaints:  swallowing    Precautions:  Existing Precautions/Restrictions: aspiration, NPO       TODAY'S VISIT:    History/Vitals/Pain/Encounter Info - 09/15/20 1642        Injury History/Precautions/Daily Required Info    Primary Therapist  Laura WELCH      Chief Complaint/Reason for Visit   swallowing     Existing Precautions/Restrictions  aspiration;NPO     Limitations/Impairments  swallowing     Document Type  daily treatment     Patient/Family/Caregiver Comments/Observations  pt denies pain, falls, and changes in meds     Start Time  1000     Stop Time  1100     Time Calculation (min)  60 min     Patient reported fall since last visit  No        Pain Assessment    Currently in pain  No/Denies        Pain Interventions    Intervention  none     Post Intervention Comments  none         Daily Treatment Assessment and Plan - 09/15/20 1644        Daily Treatment Assessment and Plan    Progress toward goals  Progressing     Daily Outcome Summary  pt continues with improvements to swallow     Plan and Recommendations  continue to target swallowing goals              Today's Treatment:      Short Term Goals Current Session   ?Pt. will tolerate trace PO trials without s/s of aspiration and adequate oral management with min cues over 2 sessions.  Ice chip trials x 30 with effortful double swallow - no s/s aspiration    Trial water x 1 oz via cup sip with cough x 1; no cues needed for strategies    Trial honey orange juice by 1/2-1 tsp x 4 oz; pt independent with double effortful swallows; clear vocals throughout, no s/s aspiration; no  "expectoration   Pt. will complete pharyngeal strengthening, laryngeal excursion, and base of tongue strengthening exercises to improve airway protection and bolus propulsion with min cues for accurate production with 90% accuracy      Effortful \"guh\" 5 sets of 20 completed    CTAR x 5 sets of 20 completed    yvonne x 3 sets of 5 completed    Pitch glides x 4 sets of 10 completed         Pt. will verbalize understanding of current swallow status and risks of aspiration/choking with min cues.  Pt is able to verbalize NPO status and risks of aspiration   EDUCATION    Other Allowing ice chips with rigorous oral care x 3 sets of 20 per day AS TOLERATED with effortful double swallows and throat clear/reswallow PRN; pt and daughter educated    Plan for VFSS tomorrow                             "

## 2020-09-15 NOTE — PROGRESS NOTES
PT DAILY NOTE FOR OUTPATIENT THERAPY    Patient: Karl Park   MRN: [id number]  : 1928 89 y.o.  Referring Physician: Herb Dailey MD  Date of Visit: 9/15/2020    Certification Dates: 20 through 10/29/20    Diagnosis:   1. Ataxia following cerebral infarction        Chief Complaints:  functional mobility, balance, coordination, ROM, strength    Precautions:   Precautions comments: PEG, NPO  Existing Precautions/Restrictions: fall, other (see comments)     TODAY'S VISIT    History/Vitals/Pain/Encounter Info - 09/15/20 1056        Injury History/Precautions/Daily Required Info    Primary Therapist  Aretha Ferrara PT     Chief Complaint/Reason for Visit   functional mobility, balance, coordination, ROM, strength     Onset of Illness/Injury or Date of Surgery  20     Referring Physician  Asim     Existing Precautions/Restrictions  fall;other (see comments)     Precautions comments  PEG, NPO     Limitations/Impairments  swallowing     OP Specialty  Neuro     Document Type  daily treatment     Patient/Family/Caregiver Comments/Observations  No falls, no changes in meds, no pain reported by pt.     Start Time  1100     Stop Time  1200     Time Calculation (min)  60 min     Patient reported fall since last visit  No        Pain Assessment    Currently in pain  No/Denies        Pain Intervention    Intervention   na     Post Intervention Comments  na        Pre Activity Vital Signs    Pulse  78     SpO2  99 %     BP  118/56     BP Location  Left upper arm     BP Method  Manual     Patient Position  Sitting         Daily Treatment Assessment and Plan - 09/15/20 1148        Daily Treatment Assessment and Plan    Progress toward goals  Progressing     Daily Outcome Summary  Pt did well w/ outdoor ambulation w/ SPC. No LOB and good safety awareness of environmental challenges.  Pt reports he uses his RW in the community.  Pt also reported he mows his own lawn.  Pt challenged w/ HT's and HN's on  "varying surfaces.       Plan and Recommendations  Cont w POC as directed by primary PT for functional mobility, ROM, strength, endurance, balance and coordination.                   Today's Treatment:      PT Neuro Exercises Current Session Time Performed   THER ACT  TOTAL TIME FOR SESSION 0-7 Minutes    Precautions NPO/PEG tube.  Pt is I with PEG tube feedings.   Working on swallow in speech.  L facial droop and slight L eye ptosis post stroke. Yes   Transfer training Mod I with RW, Mod I without UE support for sit to stand without AD (at 20\" height)    Patient Education Reviewed POC goals and intention for 2x/week for at least 8 weeks.     HEP - Issued and reviewed with Patient - instructed to perform at counter or with chair for support:  -LAQ  -Heel raises  -Abduction  -Hamstring curls    Reviewed need for walker and difficulty with clearing R foot consistently                          THER EX TOTAL TIME FOR SESSION 8-22 Minutes     STRETCHING     Stretching by patient Seated HS stretch w/ ft on block,  w/ manual assist 20 sec x 3 and added to HEP via foot prop (pt demos indep and understanding)  B calf stretch on incline #2  30 sec  x 2  Runner stretch Yes        yes   CARDIOVASCULAR     Nu Step Level 1  UE's/LE's 9 min, plus1 min cool down yes   Stationary Bike Virtual RB (nustep not available) Zopa dash, gear 6-7, 10 mins N   Treadmill     Standing Ther-Ex HR's, Abd, Hs curls, marching and mini squats 10x     March x 10 x 2, HR/TR x 10,  N    Stdg 2 x 10 for bicep curls 8 #,   alt ue raises x 2 x 10 5 # EA ,   mini squats 2 x 10 8#,   bent rows with 8# ea 2 x 10 Yes  No  Yes  yes         STS from mat without UE support x 10 x 1, 10# wts.    NEURO RE-ED TOTAL TIME FOR SESSION 8-22 Minutes     COORDINATION     POSTURAL RE-ED     PRE-GAIT ACTIVITIES      BALANCE TRAINING      Sitting balance     Standing balance - static Tap ups forward to 8 in block wo UE/ cga support  2 x 10 floor.      Toe taps to maxine, " single and alternating w progression towards decreased UE  support.  Therapist maintaining cl S or CGA    Static stdg w/ HT's and HN's 10x w/ CG        Yes        N      yes    N   Standing balance - dynamic Gait around slalom course of cones (w and w/o AD/SPC) and over hurdles    Gait over 6 in hurdles - step to pattern forward, side stepping.   reciprocal.  No UE support except intermittent touch.      Walking with head turns and nods w/o AD  Walking with base of support within 12 in tiles  Walking with pivot turn  Walking backwards  Stepping over shoebox forwards   N                                 Standing balance - varied surface On Airex:  Ball toss/bounce/catch  FT EO with HT/HN, UE's at sides   Airex, Semi-Tandem with HT    Blue tread rockerboard A-P gentle rocking for ankle strategies    Rockerboard - maintain balance with head turns and head nods with 1 finger support and w/o UE support (CG for HT's, Min A for HN's) N  yes  N    N  N      yes     GAIT TRAINING TOTAL TIME FOR SESSION 23-37 Minutes    Gait with/ without AD  Gait during  session w/o AD w/ CS  Gait in hallway w/ HT's and HN's    Complete 2 MWT w/  ft  w/o RW  368 ft 3/10 RPE after second 2 MWT  Complete FGA:  14/30  (1,2,1,2,3,1,0,0,2,2) - completed by Ana Rosa Winston PT (primary PT) - will update goals next visit with primary.      Arrived to session ambulating with RW; Gait training w SPC(RUE) x 250 ft x 1 w/  cl S.  Focus on cues for heel strike/appropriate knee extension and hip flexion on RLE.  HR increased from 82 at rest to 104 with ambulation episodes. No gross LOB, min instability, minimal loss of R LE appropriate step length and no overt catch of R toe. Ambulates quicker with SPC than no AD, and has less deviation from midline.    GT  250ft x 2 over outdoor uneven terrain and slight incline with cl S/cga - HR to 120bpm (carried SPC but did not use)      Goal:  Return to amb without AD   "yes                                          yes   Dynamic gait      Stair negotiation Up/down FF w/ B HR's and step over step pattern  Ascend/descend 6\" curb with SPC, CS   Up/down ramp with SPC and cues to slow down for safety yes    yes  yes   Curb negotiation     Ramp negotiation     Outdoor ambulation Amb outdoors over asphalt, concrete, slate and grass, up/down hills w/ SPC and CS yes   BWS/vector training     MANUAL TOTAL TIME FOR SESSION Not performed    Stretching by therapist/PROM     Mobilization      MODALITIES TOTAL TIME FOR SESSION Not performed    Ice     Heat                      "

## 2020-09-15 NOTE — OP SLP TREATMENT LOG
"Short Term Goals Current Session   ?Pt. will tolerate trace PO trials without s/s of aspiration and adequate oral management with min cues over 2 sessions.  Ice chip trials x 30 with effortful double swallow - no s/s aspiration    Trial water x 1 oz via cup sip with cough x 1; no cues needed for strategies    Trial honey orange juice by 1/2-1 tsp x 4 oz; pt independent with double effortful swallows; clear vocals throughout, no s/s aspiration; no expectoration   Pt. will complete pharyngeal strengthening, laryngeal excursion, and base of tongue strengthening exercises to improve airway protection and bolus propulsion with min cues for accurate production with 90% accuracy      Effortful \"guh\" 5 sets of 20 completed    CTAR x 5 sets of 20 completed    yvonne x 3 sets of 5 completed    Pitch glides x 4 sets of 10 completed         Pt. will verbalize understanding of current swallow status and risks of aspiration/choking with min cues.  Pt is able to verbalize NPO status and risks of aspiration   EDUCATION    Other Allowing ice chips with rigorous oral care x 3 sets of 20 per day AS TOLERATED with effortful double swallows and throat clear/reswallow PRN; pt and daughter educated    Plan for VFSS tomorrow     "

## 2020-09-15 NOTE — OP PT TREATMENT LOG
"PT Neuro Exercises Current Session Time Performed   THER ACT  TOTAL TIME FOR SESSION 0-7 Minutes    Precautions NPO/PEG tube.  Pt is I with PEG tube feedings.   Working on swallow in speech.  L facial droop and slight L eye ptosis post stroke. Yes   Transfer training Mod I with RW, Mod I without UE support for sit to stand without AD (at 20\" height)    Patient Education Reviewed POC goals and intention for 2x/week for at least 8 weeks.     HEP - Issued and reviewed with Patient - instructed to perform at counter or with chair for support:  -LAQ  -Heel raises  -Abduction  -Hamstring curls    Reviewed need for walker and difficulty with clearing R foot consistently                          THER EX TOTAL TIME FOR SESSION 8-22 Minutes     STRETCHING     Stretching by patient Seated HS stretch w/ ft on block,  w/ manual assist 20 sec x 3 and added to HEP via foot prop (pt demos indep and understanding)  B calf stretch on incline #2  30 sec  x 2  Runner stretch Yes        yes   CARDIOVASCULAR     Nu Step Level 1  UE's/LE's 9 min, plus1 min cool down yes   Stationary Bike Virtual RB (nustep not available) redwood dash, gear 6-7, 10 mins N   Treadmill     Standing Ther-Ex HR's, Abd, Hs curls, marching and mini squats 10x     March x 10 x 2, HR/TR x 10,  N    Stdg 2 x 10 for bicep curls 8 #,   alt ue raises x 2 x 10 5 # EA ,   mini squats 2 x 10 8#,   bent rows with 8# ea 2 x 10 Yes  No  Yes  yes         STS from mat without UE support x 10 x 1, 10# wts.    NEURO RE-ED TOTAL TIME FOR SESSION 8-22 Minutes     COORDINATION     POSTURAL RE-ED     PRE-GAIT ACTIVITIES      BALANCE TRAINING      Sitting balance     Standing balance - static Tap ups forward to 8 in block wo UE/ cga support  2 x 10 floor.      Toe taps to maxine, single and alternating w progression towards decreased UE  support.  Therapist maintaining cl S or CGA    Static stdg w/ HT's and HN's 10x w/ CG        Yes        N      yes    N   Standing balance - " "dynamic Gait around slalom course of cones (w and w/o AD/SPC) and over hurdles    Gait over 6 in hurdles - step to pattern forward, side stepping.   reciprocal.  No UE support except intermittent touch.      Walking with head turns and nods w/o AD  Walking with base of support within 12 in tiles  Walking with pivot turn  Walking backwards  Stepping over shoebox forwards   N                                 Standing balance - varied surface On Airex:  Ball toss/bounce/catch  FT EO with HT/HN, UE's at sides   Airex, Semi-Tandem with HT    Blue tread rockerboard A-P gentle rocking for ankle strategies    Rockerboard - maintain balance with head turns and head nods with 1 finger support and w/o UE support (CG for HT's, Min A for HN's) N  yes  N    N  N      yes     GAIT TRAINING TOTAL TIME FOR SESSION 23-37 Minutes    Gait with/ without AD  Gait during  session w/o AD w/ CS  Gait in hallway w/ HT's and HN's    Complete 2 MWT w/  ft  w/o RW  368 ft 3/10 RPE after second 2 MWT  Complete FGA:  14/30  (1,2,1,2,3,1,0,0,2,2) - completed by Ana Rosa Winston PT (primary PT) - will update goals next visit with primary.      Arrived to session ambulating with RW; Gait training w SPC(RUE) x 250 ft x 1 w/  cl S.  Focus on cues for heel strike/appropriate knee extension and hip flexion on RLE.  HR increased from 82 at rest to 104 with ambulation episodes. No gross LOB, min instability, minimal loss of R LE appropriate step length and no overt catch of R toe. Ambulates quicker with SPC than no AD, and has less deviation from midline.    GT  250ft x 2 over outdoor uneven terrain and slight incline with cl S/cga - HR to 120bpm (carried SPC but did not use)      Goal:  Return to amb without AD  yes                                          yes   Dynamic gait      Stair negotiation Up/down FF w/ B HR's and step over step pattern  Ascend/descend 6\" curb with SPC, CS   Up/down ramp with SPC and cues to slow down for safety yes    yes  yes "   Curb negotiation     Ramp negotiation     Outdoor ambulation Amb outdoors over asphalt, concrete, slate and grass, up/down hills w/ SPC and CS yes   BWS/vector training     MANUAL TOTAL TIME FOR SESSION Not performed    Stretching by therapist/PROM     Mobilization      MODALITIES TOTAL TIME FOR SESSION Not performed    Ice     Heat

## 2020-09-15 NOTE — PROCEDURES
General Information  Start Time: 1340  Stop Time: 4600  Type of Study: Initial VFSS    OP VFSS completed. Full report available under Imaging tab.

## 2020-09-21 ENCOUNTER — HOSPITAL ENCOUNTER (OUTPATIENT)
Dept: SPEECH THERAPY | Facility: REHABILITATION | Age: 85
Setting detail: THERAPIES SERIES
Discharge: HOME | End: 2020-09-21
Attending: FAMILY MEDICINE
Payer: COMMERCIAL

## 2020-09-21 DIAGNOSIS — I69.321 DYSPHASIA FOLLOWING CEREBROVASCULAR ACCIDENT (CVA): ICD-10-CM

## 2020-09-21 DIAGNOSIS — I63.9 CEREBROVASCULAR ACCIDENT (CVA), UNSPECIFIED MECHANISM (CMS/HCC): Primary | ICD-10-CM

## 2020-09-21 PROCEDURE — 92526 ORAL FUNCTION THERAPY: CPT | Mod: GN

## 2020-09-21 NOTE — OP SLP TREATMENT LOG
"Short Term Goals Current Session   ?Pt. will tolerate trace PO trials without s/s of aspiration and adequate oral management with min cues over 2 sessions.  Ice chip trials x 30 with supersupraglottic swallow - no s/s aspiration    Trial nectar x 4 oz via cup sip with expectoration x 1; no overt s/s aspiration using supersupraglottic swallow maneuver   Pt. will complete pharyngeal strengthening, laryngeal excursion, and base of tongue strengthening exercises to improve airway protection and bolus propulsion with min cues for accurate production with 90% accuracy      Effortful \"guh\" 6 sets of 20 completed    CTAR x 6 sets of 20 completed    yvonne x 1 sets of 5 completed    Pitch glides x 4 sets of 10 completed         Pt. will verbalize understanding of current swallow status and risks of aspiration/choking with min cues.  Pt is able to verbalize NPO status and risks of aspiration   EDUCATION Reviewed VFSS and shaker exercise; instructed not to complete at home yet   Other VFSS completed     "

## 2020-09-21 NOTE — PROGRESS NOTES
SLP DAILY NOTE FOR OUTPATIENT THERAPY    Patient: Karl Park   MRN: [id number]  : 1928 89 y.o.  Referring Physician: Herb Dailey MD  Date of Visit: 2020      Certification Dates: 20 through 20    Diagnosis:   1. Cerebrovascular accident (CVA), unspecified mechanism (CMS/HCC)    2. Dysphasia following cerebrovascular accident (CVA)        Chief Complaints:  swallowing    Precautions:  Existing Precautions/Restrictions: NPO       TODAY'S VISIT:    History/Vitals/Pain/Encounter Info - 20 182        Injury History/Precautions/Daily Required Info    Primary Therapist  Laura WELCH      Chief Complaint/Reason for Visit   swallowing     Existing Precautions/Restrictions  NPO     Limitations/Impairments  swallowing     Document Type  daily treatment     Patient/Family/Caregiver Comments/Observations  no new issues to report     Start Time  1700     Stop Time  1800     Time Calculation (min)  60 min     Patient reported fall since last visit  No        Pain Assessment    Currently in pain  No/Denies        Pain Interventions    Intervention  none     Post Intervention Comments  none         Daily Treatment Assessment and Plan - 20        Daily Treatment Assessment and Plan    Progress toward goals  Progressing     Daily Outcome Summary  improving swallow fxn     Plan and Recommendations  target swallow goals              Today's Treatment:      Short Term Goals Current Session   ?Pt. will tolerate trace PO trials without s/s of aspiration and adequate oral management with min cues over 2 sessions.  Ice chip trials x 30 with supersupraglottic swallow - no s/s aspiration    Trial nectar x 4 oz via cup sip with expectoration x 1; no overt s/s aspiration using supersupraglottic swallow maneuver   Pt. will complete pharyngeal strengthening, laryngeal excursion, and base of tongue strengthening exercises to improve airway protection and bolus propulsion with min cues for  "accurate production with 90% accuracy      Effortful \"guh\" 6 sets of 20 completed    CTAR x 6 sets of 20 completed    yvonne x 1 sets of 5 completed    Pitch glides x 4 sets of 10 completed         Pt. will verbalize understanding of current swallow status and risks of aspiration/choking with min cues.  Pt is able to verbalize NPO status and risks of aspiration   EDUCATION Reviewed VFSS and shaker exercise; instructed not to complete at home yet   Other VFSS completed                             "

## 2020-09-22 ENCOUNTER — HOSPITAL ENCOUNTER (OUTPATIENT)
Dept: SPEECH THERAPY | Facility: REHABILITATION | Age: 85
Setting detail: THERAPIES SERIES
Discharge: HOME | End: 2020-09-22
Attending: FAMILY MEDICINE
Payer: COMMERCIAL

## 2020-09-22 DIAGNOSIS — I63.9 CEREBROVASCULAR ACCIDENT (CVA), UNSPECIFIED MECHANISM (CMS/HCC): Primary | ICD-10-CM

## 2020-09-22 DIAGNOSIS — I69.321 DYSPHASIA FOLLOWING CEREBROVASCULAR ACCIDENT (CVA): ICD-10-CM

## 2020-09-22 PROCEDURE — 92526 ORAL FUNCTION THERAPY: CPT | Mod: GN

## 2020-09-22 NOTE — Clinical Note
[address]  [facility] [address]  [phone]      Dear DR. Dailey,      Thank you for this referral. Please review the attached notes and plan of care for your approval.  Please contact our department with any questions.     Sincerely,     Laura Chávez, CCC-SLP    Referring Provider: By co-signing this Plan of Care (POC), you agree with the planned services and interventions recommended by the therapist.       NAME: ___________________________________ DATE: _________________        Neuro Rehab Therapy Fax: [phone]    SLP RE-EVALUATION FOR OUTPATIENT THERAPY    Patient: Karl Park   MRN: [id number]  : 1928 89 y.o.  Referring Physician: Herb Dailey MD  Date of Visit: 2020      New Certification Dates: 20 through 20    Recommended Frequency & Duration:  Other(2-3x week) for up to 3 months     Diagnosis:   1. Cerebrovascular accident (CVA), unspecified mechanism (CMS/HCC)    2. Dysphasia following cerebrovascular accident (CVA)        Chief Complaints:   No chief complaint on file.      Precautions:   Existing Precautions/Restrictions: fall, aspiration, NPO    TODAY'S VISIT:    General Information - 20 1430        General Information    Existing Precautions/Restrictions  fall;aspiration;NPO             Daily Treatment Assessment and Plan - 20 1622        Daily Treatment Assessment and Plan    Progress toward goals  Progressing     Daily Outcome Summary  improving swallow function     Plan and Recommendations  complete swallow exercises; conservative trial nectar with super supraglottic swallow and expectoration of bolus as needed             Today's Treatment:      Short Term Goals Current Session   ?Pt. will tolerate trace PO trials without s/s of aspiration and adequate oral management with min cues over 2 sessions.  Ice chip trials x 20 with supersupraglottic swallow - no s/s aspiration    Trial nectar x 1 oz via cup sip with expectoration x 1; no overt  "s/s aspiration using supersupraglottic swallow maneuver    Trial ice cream by 1/4 tsp x 3; pt with significant increase in mucous; discontinued   Pt. will complete pharyngeal strengthening, laryngeal excursion, and base of tongue strengthening exercises to improve airway protection and bolus propulsion with min cues for accurate production with 90% accuracy      Effortful \"guh\" 3 sets of 20 completed    CTAR x 3 sets of 20 completed    yvonne x 1 set of 5 completed    Pitch glides x 4 sets of 10 completed    Pt provided with new exercise program with increased difficulty level of exercises and increased repetitions for home         Pt. will verbalize understanding of current swallow status and risks of aspiration/choking with min cues.  Pt is able to verbalize NPO status and risks of aspiration   EDUCATION Reviewed VFSS, shaker exercise, and new exercise program for home with advanced complexity and number of reps seondary to improving swallow function   Other VFSS completed; see media section for full results         Goals:    Goals Addressed                 This Visit's Progress    • swallowing        Short Term Goals Time Frame  Result  Comment/Progress    ?Pt. will tolerate trace PO trials without s/s of aspiration and adequate oral management with min cues over 2 sessions.  4 weeks   Ongoing  9/22/2020: Trialing nectar with super supraglottic swallow with min cues; 4 oz trialed x 2 sessions; pt has occassional need to clear and expectorate bolus.   Pt. will complete pharyngeal strengthening, laryngeal excursion, and base of tongue strengthening exercises to improve airway protection and bolus propulsion with min cues for accurate production with 90% accuracy  4 weeks   Ongoing  9/222020: Pt is able to complete effortful swallow (up to 40/session), effortful \"guh\" (up to 60/session) and CTAR (up to 60/session) exercises with min cues. Pt is also able to complete yvonne x 5 per session. Just introduced standard " Shaker exercise to add to home program.   Pt. will verbalize understanding of current swallow status and risks of aspiration/choking with min cues.  2 weeks  MET  8/27/2020: Pt is able to verbalize NPO status and risks of aspiration/choking without cues.      Long Term Goals   Time Frame  Result  Comment/Progress    Pt. will tolerate the least restrictive diet level without complications or respiratory compromise with min cues/supervision.    NOMS swallowing goal: 4  8 weeks   Ongoing    Baseline swallowing NOMS: 1       CLINICAL IMPRESSION: Pt is making improvements with swallow function as evidenced by improvements in recent VFSS (see media section). Pt remains NPO, but has increased trials within session and has increased complexity and number of repetitions with swallow exercise program. Pt is able to complete ice chips via FWP at home independently. Pt needs min cues to complete super-supraglottic swallow maneuver. Recommend continuing swallow exercise program and conservative trials of nectar as well as ice chip trials. Recommend 2-3x week for up to 12 weeks.             This 89 y.o. year old male presents to  with above stated diagnosis. Speech Therapy evaluation reveals   resulting in severe limitations. Karl Park will benefit from skilled  services to address limitation, work towards rehab and patient goals and maximize PLOF of chosen ADLs.     Planned Services:  Speech therapies will focus on swallowing.

## 2020-09-22 NOTE — PROGRESS NOTES
Referring Provider: By co-signing this Plan of Care (POC), you agree with the planned services and interventions recommended by the therapist.       NAME: ___________________________________ DATE: _________________        [facility] Fax: [phone]    SLP RE-EVALUATION FOR OUTPATIENT THERAPY    Patient: Karl Park   MRN: [id number]  : 1928 89 y.o.  Referring Physician: Herb Dailey MD  Date of Visit: 2020      New Certification Dates: 20 through 20    Recommended Frequency & Duration:  Other(2-3x week) for up to 3 months     Diagnosis:   1. Cerebrovascular accident (CVA), unspecified mechanism (CMS/HCC)    2. Dysphasia following cerebrovascular accident (CVA)        Chief Complaints:   No chief complaint on file.      Precautions:   Existing Precautions/Restrictions: fall, aspiration, NPO    TODAY'S VISIT:    General Information - 20 1430        General Information    Existing Precautions/Restrictions  fall;aspiration;NPO             Daily Treatment Assessment and Plan - 20 1622        Daily Treatment Assessment and Plan    Progress toward goals  Progressing     Daily Outcome Summary  improving swallow function     Plan and Recommendations  complete swallow exercises; conservative trial nectar with super supraglottic swallow and expectoration of bolus as needed             Today's Treatment:      Short Term Goals Current Session   ?Pt. will tolerate trace PO trials without s/s of aspiration and adequate oral management with min cues over 2 sessions.  Ice chip trials x 20 with supersupraglottic swallow - no s/s aspiration    Trial nectar x 1 oz via cup sip with expectoration x 1; no overt s/s aspiration using supersupraglottic swallow maneuver    Trial ice cream by 1/4 tsp x 3; pt with significant increase in mucous; discontinued   Pt. will complete pharyngeal strengthening, laryngeal excursion, and base of tongue strengthening exercises to improve airway  "protection and bolus propulsion with min cues for accurate production with 90% accuracy      Effortful \"guh\" 3 sets of 20 completed    CTAR x 3 sets of 20 completed    yvonne x 1 set of 5 completed    Pitch glides x 4 sets of 10 completed    Pt provided with new exercise program with increased difficulty level of exercises and increased repetitions for home         Pt. will verbalize understanding of current swallow status and risks of aspiration/choking with min cues.  Pt is able to verbalize NPO status and risks of aspiration   EDUCATION Reviewed VFSS, shaker exercise, and new exercise program for home with advanced complexity and number of reps seondary to improving swallow function   Other VFSS completed; see media section for full results         Goals:    Goals Addressed                 This Visit's Progress    • swallowing        Short Term Goals Time Frame  Result  Comment/Progress    ?Pt. will tolerate trace PO trials without s/s of aspiration and adequate oral management with min cues over 2 sessions.  4 weeks   Ongoing  9/22/2020: Trialing nectar with super supraglottic swallow with min cues; 4 oz trialed x 2 sessions; pt has occassional need to clear and expectorate bolus.   Pt. will complete pharyngeal strengthening, laryngeal excursion, and base of tongue strengthening exercises to improve airway protection and bolus propulsion with min cues for accurate production with 90% accuracy  4 weeks   Ongoing  9/222020: Pt is able to complete effortful swallow (up to 40/session), effortful \"guh\" (up to 60/session) and CTAR (up to 60/session) exercises with min cues. Pt is also able to complete yvonne x 5 per session. Just introduced standard Shaker exercise to add to home program.   Pt. will verbalize understanding of current swallow status and risks of aspiration/choking with min cues.  2 weeks  MET  8/27/2020: Pt is able to verbalize NPO status and risks of aspiration/choking without cues.      Long Term " Goals   Time Frame  Result  Comment/Progress    Pt. will tolerate the least restrictive diet level without complications or respiratory compromise with min cues/supervision.    NOMS swallowing goal: 4  8 weeks   Ongoing    Baseline swallowing NOMS: 1       CLINICAL IMPRESSION: Pt is making improvements with swallow function as evidenced by improvements in recent VFSS (see media section). Pt remains NPO, but has increased trials within session and has increased complexity and number of repetitions with swallow exercise program. Pt is able to complete ice chips via FWP at home independently. Pt needs min cues to complete super-supraglottic swallow maneuver. Recommend continuing swallow exercise program and conservative trials of nectar as well as ice chip trials. Recommend 2-3x week for up to 12 weeks.             This 89 y.o. year old male presents to  with above stated diagnosis. Speech Therapy evaluation reveals   resulting in severe limitations. Karl Park will benefit from skilled ST services to address limitation, work towards rehab and patient goals and maximize PLOF of chosen ADLs.     Planned Services:  Speech therapies will focus on swallowing.

## 2020-09-22 NOTE — OP SLP TREATMENT LOG
"Short Term Goals Current Session   ?Pt. will tolerate trace PO trials without s/s of aspiration and adequate oral management with min cues over 2 sessions.  Ice chip trials x 20 with supersupraglottic swallow - no s/s aspiration    Trial nectar x 1 oz via cup sip with expectoration x 1; no overt s/s aspiration using supersupraglottic swallow maneuver    Trial ice cream by 1/4 tsp x 3; pt with significant increase in mucous; discontinued   Pt. will complete pharyngeal strengthening, laryngeal excursion, and base of tongue strengthening exercises to improve airway protection and bolus propulsion with min cues for accurate production with 90% accuracy      Effortful \"guh\" 3 sets of 20 completed    CTAR x 3 sets of 20 completed    yvonne x 1 set of 5 completed    Pitch glides x 4 sets of 10 completed    Pt provided with new exercise program with increased difficulty level of exercises and increased repetitions for home         Pt. will verbalize understanding of current swallow status and risks of aspiration/choking with min cues.  Pt is able to verbalize NPO status and risks of aspiration   EDUCATION Reviewed VFSS, shaker exercise, and new exercise program for home with advanced complexity and number of reps seondary to improving swallow function   Other VFSS completed; see media section for full results     "

## 2020-09-23 ENCOUNTER — HOSPITAL ENCOUNTER (OUTPATIENT)
Dept: PHYSICAL THERAPY | Facility: REHABILITATION | Age: 85
Setting detail: THERAPIES SERIES
Discharge: HOME | End: 2020-09-23
Attending: FAMILY MEDICINE
Payer: COMMERCIAL

## 2020-09-23 DIAGNOSIS — I69.393 ATAXIA FOLLOWING CEREBRAL INFARCTION: Primary | ICD-10-CM

## 2020-09-23 PROCEDURE — 97116 GAIT TRAINING THERAPY: CPT | Mod: GP,CQ

## 2020-09-23 PROCEDURE — 97112 NEUROMUSCULAR REEDUCATION: CPT | Mod: GP,CQ

## 2020-09-23 PROCEDURE — 97110 THERAPEUTIC EXERCISES: CPT | Mod: GP,CQ

## 2020-09-23 ASSESSMENT — BALANCE ASSESSMENTS: TOTAL SCORE: 49

## 2020-09-23 NOTE — PROGRESS NOTES
PT DAILY NOTE FOR OUTPATIENT THERAPY    Patient: Karl Park   MRN: [id number]  : 1928 89 y.o.  Referring Physician: Herb Dailey MD  Date of Visit: 2020    Certification Dates: 20 through 10/29/20    Diagnosis:   1. Ataxia following cerebral infarction        Chief Complaints:  functional mobility, balance, coordination, ROM, strength    Precautions:   Precautions comments: PEG, NPO  Existing Precautions/Restrictions: fall, other (see comments)     TODAY'S VISIT    History/Vitals/Pain/Encounter Info - 20 1204        Injury History/Precautions/Daily Required Info    Primary Therapist  Aretha Ferrara PT     Chief Complaint/Reason for Visit   functional mobility, balance, coordination, ROM, strength     Onset of Illness/Injury or Date of Surgery  20     Referring Physician  Asim     Existing Precautions/Restrictions  fall;other (see comments)     Precautions comments  PEG, NPO     Limitations/Impairments  swallowing     Document Type  daily treatment     Patient/Family/Caregiver Comments/Observations  No falls, no changes in medications, no pain reported by pt.     Start Time  1200     Stop Time  1300     Time Calculation (min)  60 min     Patient reported fall since last visit  No        Pain Assessment    Currently in pain  No/Denies        Pain Intervention    Intervention   na     Post Intervention Comments  na        Pre Activity Vital Signs    Pulse  82     SpO2  100 %     BP  118/66     BP Location  Left upper arm     BP Method  Manual     Patient Position  Sitting         Daily Treatment Assessment and Plan - 20 1302        Daily Treatment Assessment and Plan    Progress toward goals  Progressing     Daily Outcome Summary  Testing completed today using Juarez Balance test.  TUG also completed using RW and SPC as well as w/o AD. Pt continues to show good safety awareness outdoors ambulating w/ SPC, however, with occasional, slight imbalance evident on uneven  "surfaces.     Plan and Recommendations  Cont w/ POC as directed by primary PT for gait, balance, coordination, strength and endurance.                 Today's Treatment:      PT Neuro Exercises Current Session Time Performed   THER ACT  TOTAL TIME FOR SESSION 0-7 Minutes    Precautions NPO/PEG tube.  Pt is I with PEG tube feedings.   Working on swallow in speech.  L facial droop and slight L eye ptosis post stroke. Yes   Transfer training Mod I with RW, Mod I without UE support for sit to stand without AD (at 20\" height)    Patient Education Reviewed POC goals and intention for 2x/week for at least 8 weeks.     HEP - Issued and reviewed with Patient - instructed to perform at counter or with chair for support:  -LAQ  -Heel raises  -Abduction  -Hamstring curls    Reviewed need for walker and difficulty with clearing R foot consistently                          THER EX TOTAL TIME FOR SESSION 8-22 Minutes     STRETCHING     Stretching by patient Seated HS stretch w/ ft on block,  w/ manual assist 20 sec x 3 and added to HEP via foot prop (pt demos indep and understanding)  B calf stretch on incline #2  30 sec  x 2  Runner stretch            CARDIOVASCULAR     Nu Step Level 1  UE's/LE's 9 min, plus1 min cool down yes   Stationary Bike Virtual RB (nustep not available) [employer] dash, gear 6-7, 10 mins N   Treadmill     Standing Ther-Ex HR's, Abd, Hs curls, marching and mini squats 10x     March x 10 x 2, HR/TR x 10,  N    Stdg 2 x 10 for bicep curls 8 #,   alt ue raises x 2 x 10 5 # EA ,   mini squats 2 x 10 8#,   bent rows with 8# ea 2 x 10   No             STS from mat without UE support x 10 x 1, 10# wts.    NEURO RE-ED TOTAL TIME FOR SESSION 23-37 Minutes     Juarez Balance Test Score:  49 pts yes   COORDINATION     POSTURAL RE-ED     PRE-GAIT ACTIVITIES      BALANCE TRAINING      Sitting balance     Standing balance - static Tap ups forward to 8 in block wo UE/ cga support  2 x 10 floor.      Toe taps to maxine, single " and alternating w progression towards decreased UE  support.  Therapist maintaining cl S or CGA    Static stdg w/ HT's and HN's 10x w/ CG                N          N   Standing balance - dynamic Gait around slalom course of cones (w and w/o AD/SPC) and over hurdles    Gait over 6 in hurdles - step to pattern forward, side stepping.   reciprocal.  No UE support except intermittent touch.      Walking with head turns and nods w/o AD  Walking with base of support within 12 in tiles  Walking with pivot turn  Walking backwards  Stepping over shoebox forwards   N                                 Standing balance - varied surface On Airex:  Ball toss/bounce/catch  FT EO with HT/HN, UE's at sides   Airex, Semi-Tandem with HT    Blue tread rockerboard A-P gentle rocking for ankle strategies    Rockerboard - maintain balance with head turns and head nods with 1 finger support and w/o UE support (CG for HT's, Min A for HN's) N    N    N  yes           GAIT TRAINING TOTAL TIME FOR SESSION 8-22 Minutes    TUG W/ RW  13.12 sec  W/ SPC 12.75 sec  W/O AD 10.63 sec  Average of the three:  12.16 seconds yes   Gait with/ without AD  Gait during  session w/o AD w/ CS  Gait in hallway w/ HT's and HN's    Complete 2 MWT w/  ft  w/o RW  368 ft 3/10 RPE after second 2 MWT  Complete FGA:  14/30  (1,2,1,2,3,1,0,0,2,2) - completed by Ana Rosa Winston PT (primary PT) - will update goals next visit with primary.      Arrived to session ambulating with RW; Gait training w SPC(RUE) x 250 ft x 1 w/  cl S.  Focus on cues for heel strike/appropriate knee extension and hip flexion on RLE.  HR increased from 82 at rest to 104 with ambulation episodes. No gross LOB, min instability, minimal loss of R LE appropriate step length and no overt catch of R toe. Ambulates quicker with SPC than no AD, and has less deviation from midline.    GT  250ft x 2 over outdoor uneven terrain and slight incline with cl S/cga - HR to 120bpm (carried SPC but did not  "use)      Goal:  Return to amb without AD  yes                                             Dynamic gait      Stair negotiation Up/down FF w/ B HR's and step over step pattern  Ascend/descend 6\" curb with SPC, CS   Up/down ramp with SPC and cues to slow down for safety          Curb negotiation     Ramp negotiation     Outdoor ambulation Amb outdoors over asphalt, concrete, slate and grass, up/down hills w/ SPC and CS yes   BWS/vector training     MANUAL TOTAL TIME FOR SESSION Not performed    Stretching by therapist/PROM     Mobilization      MODALITIES TOTAL TIME FOR SESSION Not performed    Ice     Heat                      "

## 2020-09-23 NOTE — OP PT TREATMENT LOG
"PT Neuro Exercises Current Session Time Performed   THER ACT  TOTAL TIME FOR SESSION 0-7 Minutes    Precautions NPO/PEG tube.  Pt is I with PEG tube feedings.   Working on swallow in speech.  L facial droop and slight L eye ptosis post stroke. Yes   Transfer training Mod I with RW, Mod I without UE support for sit to stand without AD (at 20\" height)    Patient Education Reviewed POC goals and intention for 2x/week for at least 8 weeks.     HEP - Issued and reviewed with Patient - instructed to perform at counter or with chair for support:  -LAQ  -Heel raises  -Abduction  -Hamstring curls    Reviewed need for walker and difficulty with clearing R foot consistently                          THER EX TOTAL TIME FOR SESSION 8-22 Minutes     STRETCHING     Stretching by patient Seated HS stretch w/ ft on block,  w/ manual assist 20 sec x 3 and added to HEP via foot prop (pt demos indep and understanding)  B calf stretch on incline #2  30 sec  x 2  Runner stretch            CARDIOVASCULAR     Nu Step Level 1  UE's/LE's 9 min, plus1 min cool down yes   Stationary Bike Virtual RB (nustep not available) [employer] [address] dash, gear 6-7, 10 mins N   Treadmill     Standing Ther-Ex HR's, Abd, Hs curls, marching and mini squats 10x     March x 10 x 2, HR/TR x 10,  N    Stdg 2 x 10 for bicep curls 8 #,   alt ue raises x 2 x 10 5 # EA ,   mini squats 2 x 10 8#,   bent rows with 8# ea 2 x 10   No             STS from mat without UE support x 10 x 1, 10# wts.    NEURO RE-ED TOTAL TIME FOR SESSION 23-37 Minutes     Juarez Balance Test Score:  49 pts yes   COORDINATION     POSTURAL RE-ED     PRE-GAIT ACTIVITIES      BALANCE TRAINING      Sitting balance     Standing balance - static Tap ups forward to 8 in block wo UE/ cga support  2 x 10 floor.      Toe taps to maxine, single and alternating w progression towards decreased UE  support.  Therapist maintaining cl S or CGA    Static stdg w/ HT's and HN's 10x w/ CG                N          N "   Standing balance - dynamic Gait around slalom course of cones (w and w/o AD/SPC) and over hurdles    Gait over 6 in hurdles - step to pattern forward, side stepping.   reciprocal.  No UE support except intermittent touch.      Walking with head turns and nods w/o AD  Walking with base of support within 12 in tiles  Walking with pivot turn  Walking backwards  Stepping over shoebox forwards   N                                 Standing balance - varied surface On Airex:  Ball toss/bounce/catch  FT EO with HT/HN, UE's at sides   Airex, Semi-Tandem with HT    Blue tread rockerboard A-P gentle rocking for ankle strategies    Rockerboard - maintain balance with head turns and head nods with 1 finger support and w/o UE support (CG for HT's, Min A for HN's) N    N    N  yes           GAIT TRAINING TOTAL TIME FOR SESSION 8-22 Minutes    TUG W/ RW  13.12 sec  W/ SPC 12.75 sec  W/O AD 10.63 sec  Average of the three:  12.16 seconds yes   Gait with/ without AD  Gait during  session w/o AD w/ CS  Gait in hallway w/ HT's and HN's    Complete 2 MWT w/  ft  w/o RW  368 ft 3/10 RPE after second 2 MWT  Complete FGA:  14/30  (1,2,1,2,3,1,0,0,2,2) - completed by Ana Rosa Winston PT (primary PT) - will update goals next visit with primary.      Arrived to session ambulating with RW; Gait training w SPC(RUE) x 250 ft x 1 w/  cl S.  Focus on cues for heel strike/appropriate knee extension and hip flexion on RLE.  HR increased from 82 at rest to 104 with ambulation episodes. No gross LOB, min instability, minimal loss of R LE appropriate step length and no overt catch of R toe. Ambulates quicker with SPC than no AD, and has less deviation from midline.    GT  250ft x 2 over outdoor uneven terrain and slight incline with cl S/cga - HR to 120bpm (carried SPC but did not use)      Goal:  Return to amb without AD  yes                                             Dynamic gait      Stair negotiation Up/down FF w/ B HR's and step over step  "pattern  Ascend/descend 6\" curb with SPC, CS   Up/down ramp with SPC and cues to slow down for safety          Curb negotiation     Ramp negotiation     Outdoor ambulation Amb outdoors over asphalt, concrete, slate and grass, up/down hills w/ SPC and CS yes   BWS/vector training     MANUAL TOTAL TIME FOR SESSION Not performed    Stretching by therapist/PROM     Mobilization      MODALITIES TOTAL TIME FOR SESSION Not performed    Ice     Heat       "

## 2020-09-24 ENCOUNTER — HOSPITAL ENCOUNTER (OUTPATIENT)
Dept: SPEECH THERAPY | Facility: REHABILITATION | Age: 85
Setting detail: THERAPIES SERIES
Discharge: HOME | End: 2020-09-24
Attending: FAMILY MEDICINE
Payer: COMMERCIAL

## 2020-09-24 ENCOUNTER — HOSPITAL ENCOUNTER (OUTPATIENT)
Dept: PHYSICAL THERAPY | Facility: REHABILITATION | Age: 85
Setting detail: THERAPIES SERIES
Discharge: HOME | End: 2020-09-24
Attending: FAMILY MEDICINE
Payer: COMMERCIAL

## 2020-09-24 DIAGNOSIS — I69.393 ATAXIA FOLLOWING CEREBRAL INFARCTION: Primary | ICD-10-CM

## 2020-09-24 DIAGNOSIS — I69.321 DYSPHASIA FOLLOWING CEREBROVASCULAR ACCIDENT (CVA): ICD-10-CM

## 2020-09-24 DIAGNOSIS — I63.9 CEREBROVASCULAR ACCIDENT (CVA), UNSPECIFIED MECHANISM (CMS/HCC): Primary | ICD-10-CM

## 2020-09-24 PROCEDURE — 97110 THERAPEUTIC EXERCISES: CPT | Mod: GP,CQ

## 2020-09-24 PROCEDURE — 97112 NEUROMUSCULAR REEDUCATION: CPT | Mod: GP,CQ

## 2020-09-24 PROCEDURE — 92526 ORAL FUNCTION THERAPY: CPT | Mod: GN

## 2020-09-24 NOTE — PROGRESS NOTES
PT DAILY NOTE FOR OUTPATIENT THERAPY    Patient: Karl Park   MRN: [id number]  : 1928 89 y.o.  Referring Physician: Herb Dailey MD  Date of Visit: 2020    Certification Dates: 20 through 10/29/20    Diagnosis:   1. Ataxia following cerebral infarction        Chief Complaints:  functional mobility, balance, coordination, ROM, strength    Precautions:   Precautions comments: PEG, NPO  Existing Precautions/Restrictions: fall, other (see comments)     TODAY'S VISIT    History/Vitals/Pain/Encounter Info - 20 1355        Injury History/Precautions/Daily Required Info    Primary Therapist  Aretha Ferrara PT     Chief Complaint/Reason for Visit   functional mobility, balance, coordination, ROM, strength     Onset of Illness/Injury or Date of Surgery  20     Referring Physician  Asim     Existing Precautions/Restrictions  fall;other (see comments)     Precautions comments  PEG, NPO     Limitations/Impairments  swallowing     Document Type  daily treatment     Patient/Family/Caregiver Comments/Observations  No falls, no pain, no changes in medications     Start Time  1400     Stop Time  1500     Time Calculation (min)  60 min     Patient reported fall since last visit  No        Pain Assessment    Currently in pain  No/Denies        Pain Intervention    Intervention   na     Post Intervention Comments  na        Pre Activity Vital Signs    Pulse  79     SpO2  99 %     BP  120/64     BP Location  Left upper arm     BP Method  Manual     Patient Position  Sitting         Daily Treatment Assessment and Plan - 20 1450        Daily Treatment Assessment and Plan    Progress toward goals  Progressing     Daily Outcome Summary  Reviewed Shaker Exercises, as per requested by speech therapist.  Pt reports completing at home on his bed as well.  Pt continues to show improvement w/ balance both on airex foam and rockerboard.  Pt is CS w/ occasional Min A  to complete balance exercises.      "Plan and Recommendations  Cont w/ POC as directed by primary PT for gait, balance, coordination, strength, ROM and endurance.                   Today's Treatment:      PT Neuro Exercises Current Session Time Performed   THER ACT  TOTAL TIME FOR SESSION 0-7 Minutes    Precautions NPO/PEG tube.  Pt is I with PEG tube feedings.   Working on swallow in speech.  L facial droop and slight L eye ptosis post stroke. Yes   Transfer training Mod I with RW, Mod I without UE support for sit to stand without AD (at 20\" height)    Patient Education Reviewed POC goals and intention for 2x/week for at least 8 weeks.     HEP - Issued and reviewed with Patient - instructed to perform at counter or with chair for support:  -LAQ  -Heel raises  -Abduction  -Hamstring curls    Reviewed need for walker and difficulty with clearing R foot consistently                          THER EX TOTAL TIME FOR SESSION 38-52 Minutes     STRETCHING     Stretching by patient Seated HS stretch w/ ft on block,  w/ manual assist 20 sec x 3 and added to HEP via foot prop (pt demos indep and understanding)  B calf stretch on incline #2  30 sec  x 2  Runner stretch Yes        yes       CARDIOVASCULAR     Nu Step Level 1  UE's/LE's 10 min, 1 min cool down yes   Stationary Bike Virtual RB (nustep not available) [employer] dash, gear 6-7, 10 mins N   Treadmill     Supine there ex Shaker exercises   Supine w/ 20x head lifts off pillow; 1x head lift and hold for 20 sec;  Cues to breathe yes   Standing Ther-Ex HR's, Abd, Hs curls, marching and mini squats 10x     March x 10 x 2, HR/TR x 10,  N    Stdg 3 x 10 for bicep curls 8 #,   alt ue raises  2 x 10 5 # EA ,   mini squats 2 x 10 8#,   bent rows with 8# ea 2 x 10 Yes for all              Seated in chair:  LAQ's w/ 5# 10x , repeated w/ 3 sec hold 10x yes    STS from mat without UE support x 10 x 1, 10# wts.    NEURO RE-ED TOTAL TIME FOR SESSION 8-22 Minutes     Juarez Balance Test Score:  49 pts Not today  "   COORDINATION     POSTURAL RE-ED     PRE-GAIT ACTIVITIES      BALANCE TRAINING      Sitting balance     Standing balance - static Tap ups forward to 8 in block w/wo UE/ cga support  2 x 10 floor.  First 10x w/ UE support, second 10x w/o UE support (w/ CG)    Toe taps to maxine, single and alternating w progression towards decreased UE  support.  Therapist maintaining cl S or CGA    Static stdg on airex w/ HT's and HN's 10x w/ CS/CG        yes                  yes   Standing balance - dynamic Gait around slalom course of cones (w and w/o AD/SPC) and over hurdles    Gait over 6 in hurdles - step to pattern forward, side stepping.   reciprocal.  No UE support except intermittent touch.      Walking with head turns and nods w/o AD  Walking with base of support within 12 in tiles  Walking with pivot turn  Walking backwards  Stepping over shoebox forwards   N                                 Standing balance - varied surface On Airex:  Ball toss/bounce/catch  FT EO with HT/HN, UE's at sides   Airex, Semi-Tandem with HT    Blue tread rockerboard A-P gentle rocking for ankle strategies    Rockerboard - maintain balance with head turns and head nods with 1 finger support and w/o UE support (CG for HT's, Min A for HN's) N    N    N  yes           GAIT TRAINING TOTAL TIME FOR SESSION 0-7 Minutes    TUG W/ RW  13.12 sec  W/ SPC 12.75 sec  W/O AD 10.63 sec  Average of the three:  12.16 seconds    Gait with/ without AD  Gait during  session w/o AD w/ CS  Gait in hallway w/ HT's and HN's    Complete 2 MWT w/  ft  w/o RW  368 ft 3/10 RPE after second 2 MWT  Complete FGA:  14/30  (1,2,1,2,3,1,0,0,2,2) - completed by Ana Rosa Winston PT (primary PT) - will update goals next visit with primary.      Arrived to session ambulating with RW; Gait training w SPC(RUE) x 250 ft x 1 w/  cl S.  Focus on cues for heel strike/appropriate knee extension and hip flexion on RLE.  HR increased from 82 at rest to 104 with ambulation episodes. No  "gross LOB, min instability, minimal loss of R LE appropriate step length and no overt catch of R toe. Ambulates quicker with SPC than no AD, and has less deviation from midline.    GT  250ft x 2 over outdoor uneven terrain and slight incline with cl S/cga - HR to 120bpm (carried SPC but did not use)      Goal:  Return to amb without AD  yes                                             Dynamic gait      Stair negotiation Up/down FF w/ B HR's and step over step pattern  Ascend/descend 6\" curb with SPC, CS   Up/down ramp with SPC and cues to slow down for safety          Curb negotiation     Ramp negotiation     Outdoor ambulation Amb outdoors over asphalt, concrete, slate and grass, up/down hills w/ SPC and CS    BWS/vector training     MANUAL TOTAL TIME FOR SESSION Not performed    Stretching by therapist/PROM     Mobilization      MODALITIES TOTAL TIME FOR SESSION Not performed    Ice     Heat                      "

## 2020-09-24 NOTE — OP SLP TREATMENT LOG
"Short Term Goals Current Session   ?Pt. will tolerate trace PO trials without s/s of aspiration and adequate oral management with min cues over 2 sessions.  Ice chip trials x 20 with supersupraglottic swallow - no s/s aspiration, expectoration after 17 trials    Trial nectar x 4 via 1/4 tsp with expectoration x 1; no overt s/s aspiration using supersupraglottic swallow maneuver   Pt. will complete pharyngeal strengthening, laryngeal excursion, and base of tongue strengthening exercises to improve airway protection and bolus propulsion with min cues for accurate production with 90% accuracy      Effortful \"guh\" 4 sets of 20 completed    CTAR x 3 sets of 10 completed    yvonne x 1 set of 5 completed    Pitch glides x 4 sets of 20 completed    Pt provided with new exercise program with increased difficulty level of exercises and increased repetitions for home         Pt. will verbalize understanding of current swallow status and risks of aspiration/choking with min cues.     EDUCATION Reviewed shaker exercise, new exercise program for home with advanced complexity and number of reps seondary to improving swallow function   Other      "

## 2020-09-24 NOTE — OP PT TREATMENT LOG
"PT Neuro Exercises Current Session Time Performed   THER ACT  TOTAL TIME FOR SESSION 0-7 Minutes    Precautions NPO/PEG tube.  Pt is I with PEG tube feedings.   Working on swallow in speech.  L facial droop and slight L eye ptosis post stroke. Yes   Transfer training Mod I with RW, Mod I without UE support for sit to stand without AD (at 20\" height)    Patient Education Reviewed POC goals and intention for 2x/week for at least 8 weeks.     HEP - Issued and reviewed with Patient - instructed to perform at counter or with chair for support:  -LAQ  -Heel raises  -Abduction  -Hamstring curls    Reviewed need for walker and difficulty with clearing R foot consistently                          THER EX TOTAL TIME FOR SESSION 38-52 Minutes     STRETCHING     Stretching by patient Seated HS stretch w/ ft on block,  w/ manual assist 20 sec x 3 and added to HEP via foot prop (pt demos indep and understanding)  B calf stretch on incline #2  30 sec  x 2  Runner stretch Yes        yes       CARDIOVASCULAR     Nu Step Level 1  UE's/LE's 10 min, 1 min cool down yes   Stationary Bike Virtual RB (nustep not available) [employer] dash, gear 6-7, 10 mins N   Treadmill     Supine there ex Shaker exercises   Supine w/ 20x head lifts off pillow; 1x head lift and hold for 20 sec;  Cues to breathe yes   Standing Ther-Ex HR's, Abd, Hs curls, marching and mini squats 10x     March x 10 x 2, HR/TR x 10,  N    Stdg 3 x 10 for bicep curls 8 #,   alt ue raises  2 x 10 5 # EA ,   mini squats 2 x 10 8#,   bent rows with 8# ea 2 x 10 Yes for all              Seated in chair:  LAQ's w/ 5# 10x , repeated w/ 3 sec hold 10x yes    STS from mat without UE support x 10 x 1, 10# wts.    NEURO RE-ED TOTAL TIME FOR SESSION 8-22 Minutes     Juarez Balance Test Score:  49 pts Not today    COORDINATION     POSTURAL RE-ED     PRE-GAIT ACTIVITIES      BALANCE TRAINING      Sitting balance     Standing balance - static Tap ups forward to 8 in block w/wo UE/ cga support "  2 x 10 floor.  First 10x w/ UE support, second 10x w/o UE support (w/ CG)    Toe taps to maxine, single and alternating w progression towards decreased UE  support.  Therapist maintaining cl S or CGA    Static stdg on airex w/ HT's and HN's 10x w/ CS/CG        yes                  yes   Standing balance - dynamic Gait around slalom course of cones (w and w/o AD/SPC) and over hurdles    Gait over 6 in hurdles - step to pattern forward, side stepping.   reciprocal.  No UE support except intermittent touch.      Walking with head turns and nods w/o AD  Walking with base of support within 12 in tiles  Walking with pivot turn  Walking backwards  Stepping over shoebox forwards   N                                 Standing balance - varied surface On Airex:  Ball toss/bounce/catch  FT EO with HT/HN, UE's at sides   Airex, Semi-Tandem with HT    Blue tread rockerboard A-P gentle rocking for ankle strategies    Rockerboard - maintain balance with head turns and head nods with 1 finger support and w/o UE support (CG for HT's, Min A for HN's) N    N    N  yes           GAIT TRAINING TOTAL TIME FOR SESSION 0-7 Minutes    TUG W/ RW  13.12 sec  W/ SPC 12.75 sec  W/O AD 10.63 sec  Average of the three:  12.16 seconds    Gait with/ without AD  Gait during  session w/o AD w/ CS  Gait in hallway w/ HT's and HN's    Complete 2 MWT w/  ft  w/o RW  368 ft 3/10 RPE after second 2 MWT  Complete FGA:  14/30  (1,2,1,2,3,1,0,0,2,2) - completed by Ana Rosa Winston PT (primary PT) - will update goals next visit with primary.      Arrived to session ambulating with RW; Gait training w SPC(RUE) x 250 ft x 1 w/  cl S.  Focus on cues for heel strike/appropriate knee extension and hip flexion on RLE.  HR increased from 82 at rest to 104 with ambulation episodes. No gross LOB, min instability, minimal loss of R LE appropriate step length and no overt catch of R toe. Ambulates quicker with SPC than no AD, and has less deviation from midline.    GT   "250ft x 2 over outdoor uneven terrain and slight incline with cl S/cga - HR to 120bpm (carried SPC but did not use)      Goal:  Return to amb without AD  yes                                             Dynamic gait      Stair negotiation Up/down FF w/ B HR's and step over step pattern  Ascend/descend 6\" curb with SPC, CS   Up/down ramp with SPC and cues to slow down for safety          Curb negotiation     Ramp negotiation     Outdoor ambulation Amb outdoors over asphalt, concrete, slate and grass, up/down hills w/ SPC and CS    BWS/vector training     MANUAL TOTAL TIME FOR SESSION Not performed    Stretching by therapist/PROM     Mobilization      MODALITIES TOTAL TIME FOR SESSION Not performed    Ice     Heat       "

## 2020-09-24 NOTE — PROGRESS NOTES
SLP DAILY NOTE FOR OUTPATIENT THERAPY    Patient: Karl Park   MRN: [id number]  : 1928 89 y.o.  Referring Physician: Herb Dailey MD  Date of Visit: 2020      Certification Dates: 20 through 20    Diagnosis:   1. Cerebrovascular accident (CVA), unspecified mechanism (CMS/HCC)    2. Dysphasia following cerebrovascular accident (CVA)        Chief Complaints:       Precautions:          TODAY'S VISIT:    History/Vitals/Pain/Encounter Info - 20 1113        Injury History/Precautions/Daily Required Info    Speech Therapy  Swallowing     Document Type  daily treatment     Patient/Family/Caregiver Comments/Observations  Pt reported that the Shaker exercise is very difficult, but that he has been doing that regularly. Pt stated that the grape juice does not coat the spoon for the other exercise.     Start Time  1103     Stop Time  1200     Time Calculation (min)  57 min     Patient reported fall since last visit  No        Pain Assessment    Currently in pain  No/Denies        Pain Interventions    Intervention  none     Post Intervention Comments  none         Daily Treatment Assessment and Plan - 20 1215        Daily Treatment Assessment and Plan    Progress toward goals  Progressing     Daily Outcome Summary  Pt is tolerating increased repetitions well. Discussed that fatigue with Shaker is normal and to continue.     Plan and Recommendations  Continue swallow exercises and conservative trials of nectar with super supraglottic swallow.          Today's Treatment:      Short Term Goals Current Session   ?Pt. will tolerate trace PO trials without s/s of aspiration and adequate oral management with min cues over 2 sessions.  Ice chip trials x 20 with supersupraglottic swallow - no s/s aspiration, expectoration after 17 trials    Trial nectar x 4 via 1/4 tsp with expectoration x 1; no overt s/s aspiration using supersupraglottic swallow maneuver   Pt. will complete pharyngeal  "strengthening, laryngeal excursion, and base of tongue strengthening exercises to improve airway protection and bolus propulsion with min cues for accurate production with 90% accuracy      Effortful \"guh\" 4 sets of 20 completed    CTAR x 3 sets of 10 completed    yvonne x 1 set of 5 completed    Pitch glides x 4 sets of 20 completed    Pt provided with new exercise program with increased difficulty level of exercises and increased repetitions for home         Pt. will verbalize understanding of current swallow status and risks of aspiration/choking with min cues.     EDUCATION Reviewed shaker exercise, new exercise program for home with advanced complexity and number of reps seondary to improving swallow function   Other      "

## 2020-09-25 NOTE — OP PT TREATMENT LOG
"PT Neuro Exercises Current Session Time Performed   THER ACT  TOTAL TIME FOR SESSION 0-7 Minutes    Precautions NPO/PEG tube.  Pt is I with PEG tube feedings.   Working on swallow in speech.  L facial droop and slight L eye ptosis post stroke. Yes   Transfer training Mod I with RW, Mod I without UE support for sit to stand without AD (at 20\" height)    Patient Education Reviewed POC goals and intention for 2x/week for at least 8 weeks.     HEP - Issued and reviewed with Patient - instructed to perform at counter or with chair for support:  -LAQ  -Heel raises  -Abduction  -Hamstring curls    Reviewed need for walker and difficulty with clearing R foot consistently                          THER EX TOTAL TIME FOR SESSION 38-52 Minutes     STRETCHING TE performed by Sarahy Guzman PTA on 9/23    Stretching by patient Seated HS stretch w/ ft on block,  w/ manual assist 20 sec x 3 and added to HEP via foot prop (pt demos indep and understanding)  B calf stretch on incline #2  30 sec  x 2  Runner stretch Yes        yes       CARDIOVASCULAR     Nu Step Level 1  UE's/LE's 10 min, 1 min cool down yes   Stationary Bike Virtual RB (nustep not available) [employer] dash, gear 6-7, 10 mins N   Treadmill     Supine there ex Shaker exercises   Supine w/ 20x head lifts off pillow; 1x head lift and hold for 20 sec;  Cues to breathe yes   Standing Ther-Ex HR's, Abd, Hs curls, marching and mini squats 10x     March x 10 x 2, HR/TR x 10,  N    Stdg 3 x 10 for bicep curls 8 #,   alt ue raises  2 x 10 5 # EA ,   mini squats 2 x 10 8#,   bent rows with 8# ea 2 x 10 Yes for all              Seated in chair:  LAQ's w/ 5# 10x , repeated w/ 3 sec hold 10x yes    STS from mat without UE support x 10 x 1, 10# wts.    NEURO RE-ED TOTAL TIME FOR SESSION 8-22 Minutes     Juarez Balance Test Score:  49 pts  Performed by Sarahy Guzman PTA 9/23/20    COORDINATION     POSTURAL RE-ED     PRE-GAIT ACTIVITIES      BALANCE TRAINING      Sitting balance     Standing " balance - static Tap ups forward to 8 in block w/wo UE/ cga support  2 x 10 floor.  First 10x w/ UE support, second 10x w/o UE support (w/ CG)    Toe taps to maxine, single and alternating w progression towards decreased UE  support.  Therapist maintaining cl S or CGA    Static stdg on airex w/ HT's and HN's 10x w/ CS/CG        yes                  yes   Standing balance - dynamic Gait around slalom course of cones (w and w/o AD/SPC) and over hurdles    Gait over 6 in hurdles - step to pattern forward, side stepping.   reciprocal.  No UE support except intermittent touch.      Walking with head turns and nods w/o AD  Walking with base of support within 12 in tiles  Walking with pivot turn  Walking backwards  Stepping over shoebox forwards   N                                 Standing balance - varied surface On Airex:  Ball toss/bounce/catch  FT EO with HT/HN, UE's at sides   Airex, Semi-Tandem with HT    Blue tread rockerboard A-P gentle rocking for ankle strategies    Rockerboard - maintain balance with head turns and head nods with 1 finger support and w/o UE support (CG for HT's, Min A for HN's) N    N    N  yes           GAIT TRAINING TOTAL TIME FOR SESSION 0-7 Minutes    TUG W/ RW  13.12 sec  W/ SPC 12.75 sec  W/O AD 10.63 sec  Average of the three:  12.16 seconds    Gait with/ without AD  Gait during  session w/o AD w/ CS  Gait in hallway w/ HT's and HN's    Complete 2 MWT w/  ft  w/o RW  368 ft 3/10 RPE after second 2 MWT  Complete FGA:  14/30  (1,2,1,2,3,1,0,0,2,2) - completed by Ana Rosa Winston PT (primary PT) - will update goals next visit with primary.      Arrived to session ambulating with RW; Gait training w SPC(RUE) x 250 ft x 1 w/  cl S.  Focus on cues for heel strike/appropriate knee extension and hip flexion on RLE.  HR increased from 82 at rest to 104 with ambulation episodes. No gross LOB, min instability, minimal loss of R LE appropriate step length and no overt catch of R toe. Ambulates  "quicker with SPC than no AD, and has less deviation from midline.    GT  250ft x 2 over outdoor uneven terrain and slight incline with cl S/cga - HR to 120bpm (carried SPC but did not use)      Goal:  Return to amb without AD  yes                                             Dynamic gait      Stair negotiation Up/down FF w/ B HR's and step over step pattern  Ascend/descend 6\" curb with SPC, CS   Up/down ramp with SPC and cues to slow down for safety          Curb negotiation     Ramp negotiation     Outdoor ambulation Amb outdoors over asphalt, concrete, slate and grass, up/down hills w/ SPC and CS    BWS/vector training     MANUAL TOTAL TIME FOR SESSION Not performed    Stretching by therapist/PROM     Mobilization      MODALITIES TOTAL TIME FOR SESSION Not performed    Ice     Heat       "

## 2020-09-25 NOTE — ADDENDUM NOTE
Encounter addended by: Aretha Ferrara, PT on: 9/25/2020 5:36 PM   Actions taken: Chief Complaint modified, Medication List reviewed, Allergies reviewed, Flowsheet accepted, Patient Goal modified, Clinical Note Signed, Delete clinical note

## 2020-09-30 ENCOUNTER — HOSPITAL ENCOUNTER (OUTPATIENT)
Dept: PHYSICAL THERAPY | Facility: REHABILITATION | Age: 85
Setting detail: THERAPIES SERIES
Discharge: HOME | End: 2020-09-30
Attending: FAMILY MEDICINE
Payer: COMMERCIAL

## 2020-09-30 ENCOUNTER — HOSPITAL ENCOUNTER (OUTPATIENT)
Dept: SPEECH THERAPY | Facility: REHABILITATION | Age: 85
Setting detail: THERAPIES SERIES
Discharge: HOME | End: 2020-09-30
Attending: FAMILY MEDICINE
Payer: COMMERCIAL

## 2020-09-30 DIAGNOSIS — I63.9 CEREBROVASCULAR ACCIDENT (CVA), UNSPECIFIED MECHANISM (CMS/HCC): Primary | ICD-10-CM

## 2020-09-30 DIAGNOSIS — I69.321 DYSPHASIA FOLLOWING CEREBROVASCULAR ACCIDENT (CVA): ICD-10-CM

## 2020-09-30 DIAGNOSIS — I69.393 ATAXIA FOLLOWING CEREBRAL INFARCTION: Primary | ICD-10-CM

## 2020-09-30 PROCEDURE — 97116 GAIT TRAINING THERAPY: CPT | Mod: GP

## 2020-09-30 PROCEDURE — 97110 THERAPEUTIC EXERCISES: CPT | Mod: GP

## 2020-09-30 PROCEDURE — 92526 ORAL FUNCTION THERAPY: CPT | Mod: GN

## 2020-09-30 NOTE — OP SLP TREATMENT LOG
"Short Term Goals Current Session   ?Pt. will tolerate trace PO trials without s/s of aspiration and adequate oral management with min cues over 2 sessions.  Ice chip trials x 20 with supersupraglottic swallow - no s/s aspiration, expectoration after 3 trials    Trial nectar x 4 oz  via  Tsp and cup sip with expectoration x 2; no overt s/s aspiration using supersupraglottic swallow maneuver   Pt. will complete pharyngeal strengthening, laryngeal excursion, and base of tongue strengthening exercises to improve airway protection and bolus propulsion with min cues for accurate production with 90% accuracy      Effortful \"guh\" 4 sets of 20 completed    CTAR x 2 sets of 20 completed and 2 30 second trials    yvonne x 2 set of 5 completed    Pitch glides x 4 sets of 20 completed             Pt. will verbalize understanding of current swallow status and risks of aspiration/choking with min cues.     EDUCATION    Other      "

## 2020-09-30 NOTE — PROGRESS NOTES
PT DAILY NOTE FOR OUTPATIENT THERAPY    Patient: Karl Park   MRN: [id number]  : 1928 89 y.o.  Referring Physician: Herb Dailey MD  Date of Visit: 2020    Certification Dates: 20 through 10/29/20    Diagnosis:   1. Ataxia following cerebral infarction        Chief Complaints:  functional mobility, balance, coordination, ROM, strength    Precautions:   Precautions comments: PEG, NPO  Existing Precautions/Restrictions: fall, other (see comments)     TODAY'S VISIT    History/Vitals/Pain/Encounter Info - 20 1128        Injury History/Precautions/Daily Required Info    Primary Therapist  Aretha Ferrara PT     Chief Complaint/Reason for Visit   functional mobility, balance, coordination, ROM, strength     Onset of Illness/Injury or Date of Surgery  20     Referring Physician  Asim     Existing Precautions/Restrictions  fall;other (see comments)     Precautions comments  PEG, NPO     Limitations/Impairments  swallowing    swallowing    Document Type  daily treatment     Patient/Family/Caregiver Comments/Observations  Pt has no new complaints, no new meds, no new falls.  Wants to walk with SPC.     Start Time  1105     Stop Time  1205     Time Calculation (min)  60 min     Patient reported fall since last visit  No        Pain Assessment    Currently in pain  No/Denies        Pain Intervention    Intervention   gait and ther ex     Post Intervention Comments  no increase in pain reported        Pre Activity Vital Signs    Pulse  90     BP  132/60     BP Location  Left upper arm     BP Method  Automatic     Patient Position  Sitting        Activity Vital Signs    Patient activity  Therapeutic Exercise     Activity Pulse  125     Activity BP  132/60     Activity BP Location  Left upper arm     Activity BP Method  Automatic     Patient Position  Sitting        Post Activity Vital Signs    Post Activity Pulse  100    > 5 m in recovery time needed but no SOB, mod RPE    Post Activity  "SpO2  100 %     Post Activity BP Location  Left upper arm     Post Activity BP Method  Automatic     Patient Position  Sitting         Daily Treatment Assessment and Plan - 09/30/20 1323        Daily Treatment Assessment and Plan    Progress toward goals  Progressing     Daily Outcome Summary  Progressing with gait skills and continue to recommend use of SPC for outdoor terrain especially uneven terrain.  HR increases to max 120, but slow rate recovery.  Only mod RPE reported.  No TORRES noted.  Added increased resistance exercises and reviewed program with patient (waas his previous home program prior to CVA) and discussed rationale for increased reps and lighter weight vs fewer reps and heavier weights at this time.  Discussed with dtr and pt the appropriateness of beginning outdoor ambulation with the SPC.  Pt much steadier with SPC than without any AD.  He likely will be able to move completely off the walker this week unless fatigued.  NEW GOAL:  floor -stand transfer     Plan and Recommendations  Add floor transfer to program, continue functional gait training out doors with and without AD, balance and resistance training.           OBJECTIVE DATA TAKEN TODAY:    None taken    Today's Treatment:      PT Neuro Exercises Current Session Time Performed   THER ACT  TOTAL TIME FOR SESSION 0-7 Minutes    Precautions NPO/PEG tube.  Pt is I with PEG tube feedings.   Working on swallow in speech.  L facial droop and slight L eye ptosis post stroke. Yes   Transfer training Mod I with RW, Mod I without UE support for sit to stand without AD (at 20\" height)    Patient Education Reviewed POC goals and intention for 2x/week for at least 8 weeks.     HEP - Issued and reviewed with Patient - instructed to perform at counter or with chair for support:  -LAQ  -Heel raises  -Abduction  -Hamstring curls    Reviewed need for walker and difficulty with clearing R foot consistently    Reviewed progression of dumbell weight exercises to " focus on increased reps vs increased weight for now.  Education regarding need to improve endurance and return to resting HR more efficiently.  Directed pt and rena to begin walking with SPC outdoors up to approx 20 min with rest periods as needed, close S.                             YES   THER EX TOTAL TIME FOR SESSION 23-37 Minutes     STRETCHING     Stretching by patient Seated HS stretch w/ ft on block,  w/ manual assist 20 sec x 3 and added to HEP via foot prop (pt demos indep and understanding)  B calf stretch on incline #2  30 sec  x 2  Runner stretch N        N       CARDIOVASCULAR     Nu Step Level 5  UE's/LE's 5 min, 1 min cool down yes   Stationary Bike Virtual RB (nustep not available) [place] dash, gear 6-7, 10 mins N   Treadmill     Supine there ex Shaker exercises   Supine w/ 20x head lifts off pillow; 1x head lift and hold for 20 sec;  Cues to breathe N- reviewed verbally   Standing Ther-Ex HR's, Abd, Hs curls, marching and mini squats 10x     March x 10 x 2, HR/TR x 10,  N    Stdg 2 x 10 for bicep curls 10 #,   alt ue raises  2 x 10 5 # EA ,   mini squats 1 x 10 10#, 1 x 10 20#  bent rows with 20# ea 1 x 10 (decr to 10# next visit and do 2 sets) Yes   N  Y  Y              Seated in chair:  LAQ's w/ 5# 10x , repeated w/ 3 sec hold 10x N    STS from mat without UE support x 10 x 1, 10# wts.    NEURO RE-ED TOTAL TIME FOR SESSION 0-7 Minutes     Juarez Balance Test Score:  49 pts  Performed by Sarahy Guzman PTA 9/23/20    COORDINATION     POSTURAL RE-ED     PRE-GAIT ACTIVITIES      BALANCE TRAINING      Sitting balance     Standing balance - static Tap ups forward to 8 in block w/wo UE/ cga support  2 x 10 floor.  First 10x w/ UE support, second 10x w/o UE support (w/ CG)    Toe taps to maxine, single and alternating w progression towards decreased UE  support.  Therapist maintaining cl S or CGA    Static stdg on airex w/ HT's and HN's 10x w/ CS/CG        N                  N   Standing balance - dynamic  Gait around slalom course of cones (w and w/o AD/SPC) and over hurdles    Gait over 6 in hurdles - step to pattern forward, side stepping.   reciprocal.  No UE support except intermittent touch.      Walking with head turns and nods w/o AD  Walking with base of support within 12 in tiles  Walking with pivot turn  Walking backwards  Stepping over shoebox forwards   N                                 Standing balance - varied surface On Airex:  Ball toss/bounce/catch  FT EO with HT/HN, UE's at sides   Airex, Semi-Tandem with HT    Blue tread rockerboard A-P gentle rocking for ankle strategies    Rockerboard - maintain balance with head turns and head nods with 1 finger support and w/o UE support (CG for HT's, Min A for HN's) N    N    N  N           GAIT TRAINING TOTAL TIME FOR SESSION 23-37 Minutes    TUG W/ RW  13.12 sec  W/ SPC 12.75 sec  W/O AD 10.63 sec  Average of the three:  12.16 seconds    Gait with/ without AD  Gait during  session w/o AD w/ CS  Gait in hallway w/ HT's and HN's    Complete 2 MWT w/  ft  w/o RW  368 ft 3/10 RPE after second 2 MWT  Complete FGA:  14/30  (1,2,1,2,3,1,0,0,2,2) - completed by Ana Rosa Winston PT (primary PT) - will update goals next visit with primary.      Arrived to session ambulating with RW; Gait training w SPC(RUE) x 250 ft x 1 w/  cl S.  Focus on cues for heel strike/appropriate knee extension and hip flexion on RLE.  HR increased from 82 at rest to 104 with ambulation episodes. No gross LOB, min instability, minimal loss of R LE appropriate step length and no overt catch of R toe. Ambulates quicker with SPC than no AD, and has less deviation from midline.    GT  600-800ft over outdoor uneven terrain including grassy terrain and macadam.  Flagstones, brick, gravel, step over edges.  Moderate incline/decline with cl S - HR to 120bpm and with slow recovery to resting rate.  Cued to use proper pattern with cane without difficulty. 1 seated rest.      Goal:  Return to amb  "without AD  N                                          YES   Dynamic gait      Stair negotiation Up/down FF w/ B HR's and step over step pattern  Ascend/descend 6\" curb with SPC, CS   Up/down ramp with SPC and cues to slow down for safety          Curb negotiation     Ramp negotiation     Outdoor ambulation Amb outdoors over asphalt, concrete, slate and grass, up/down hills w/ SPC and CS See above   BWS/vector training     MANUAL TOTAL TIME FOR SESSION Not performed    Stretching by therapist/PROM     Mobilization      MODALITIES TOTAL TIME FOR SESSION Not performed    Ice     Heat                      "

## 2020-09-30 NOTE — PROGRESS NOTES
SLP DAILY NOTE FOR OUTPATIENT THERAPY    Patient: Karl Park   MRN: [id number]  : 1928 89 y.o.  Referring Physician: Herb Dailey MD  Date of Visit: 2020      Certification Dates: 20 through 20    Diagnosis:   1. Cerebrovascular accident (CVA), unspecified mechanism (CMS/HCC)    2. Dysphasia following cerebrovascular accident (CVA)        Chief Complaints:  dysphagia    Precautions:  Existing Precautions/Restrictions: fall, NPO, aspiration       TODAY'S VISIT:    History/Vitals/Pain/Encounter Info - 20 1631        Injury History/Precautions/Daily Required Info    Primary Therapist  Laura Chávez     Chief Complaint/Reason for Visit   dysphagia     Existing Precautions/Restrictions  fall;NPO;aspiration     Speech Therapy  Swallowing     Document Type  daily treatment     Patient/Family/Caregiver Comments/Observations  pt is alert and cooperative     Start Time  1000     Stop Time  1100     Time Calculation (min)  60 min     Patient reported fall since last visit  No        Pain Assessment    Currently in pain  No/Denies        Pain Interventions    Intervention  none     Post Intervention Comments  none         Daily Treatment Assessment and Plan - 20 1634        Daily Treatment Assessment and Plan    Progress toward goals  Progressing     Daily Outcome Summary  improving swallow function     Plan and Recommendations  target swallow goals              Today's Treatment:      Short Term Goals Current Session   ?Pt. will tolerate trace PO trials without s/s of aspiration and adequate oral management with min cues over 2 sessions.  Ice chip trials x 20 with supersupraglottic swallow - no s/s aspiration, expectoration after 3 trials    Trial nectar x 4 oz  via  Tsp and cup sip with expectoration x 2; no overt s/s aspiration using supersupraglottic swallow maneuver   Pt. will complete pharyngeal strengthening, laryngeal excursion, and base of tongue strengthening exercises to  "improve airway protection and bolus propulsion with min cues for accurate production with 90% accuracy      Effortful \"guh\" 4 sets of 20 completed    CTAR x 2 sets of 20 completed and 2 30 second trials    yvonne x 2 set of 5 completed    Pitch glides x 4 sets of 20 completed             Pt. will verbalize understanding of current swallow status and risks of aspiration/choking with min cues.     EDUCATION    Other                              "

## 2020-09-30 NOTE — OP PT TREATMENT LOG
"PT Neuro Exercises Current Session Time Performed   THER ACT  TOTAL TIME FOR SESSION 0-7 Minutes    Precautions NPO/PEG tube.  Pt is I with PEG tube feedings.   Working on swallow in speech.  L facial droop and slight L eye ptosis post stroke. Yes   Transfer training Mod I with RW, Mod I without UE support for sit to stand without AD (at 20\" height)    Patient Education Reviewed POC goals and intention for 2x/week for at least 8 weeks.     HEP - Issued and reviewed with Patient - instructed to perform at counter or with chair for support:  -LAQ  -Heel raises  -Abduction  -Hamstring curls    Reviewed need for walker and difficulty with clearing R foot consistently    Reviewed progression of dumbell weight exercises to focus on increased reps vs increased weight for now.  Education regarding need to improve endurance and return to resting HR more efficiently.  Directed pt and rena to begin walking with SPC outdoors up to approx 20 min with rest periods as needed, close S.                             YES   THER EX TOTAL TIME FOR SESSION 23-37 Minutes     STRETCHING     Stretching by patient Seated HS stretch w/ ft on block,  w/ manual assist 20 sec x 3 and added to HEP via foot prop (pt demos indep and understanding)  B calf stretch on incline #2  30 sec  x 2  Runner stretch N        N       CARDIOVASCULAR     Nu Step Level 5  UE's/LE's 5 min, 1 min cool down yes   Stationary Bike Virtual RB (nustep not available) [employer] dash, gear 6-7, 10 mins N   Treadmill     Supine there ex Shaker exercises   Supine w/ 20x head lifts off pillow; 1x head lift and hold for 20 sec;  Cues to breathe N- reviewed verbally   Standing Ther-Ex HR's, Abd, Hs curls, marching and mini squats 10x     March x 10 x 2, HR/TR x 10,  N    Stdg 2 x 10 for bicep curls 10 #,   alt ue raises  2 x 10 5 # EA ,   mini squats 1 x 10 10#, 1 x 10 20#  bent rows with 20# ea 1 x 10 (decr to 10# next visit and do 2 sets) Yes   N  Y  Y              Seated in " chair:  LAQ's w/ 5# 10x , repeated w/ 3 sec hold 10x N    STS from mat without UE support x 10 x 1, 10# wts.    NEURO RE-ED TOTAL TIME FOR SESSION 0-7 Minutes     Juarez Balance Test Score:  49 pts  Performed by Sarahy Guzman PTA 9/23/20    COORDINATION     POSTURAL RE-ED     PRE-GAIT ACTIVITIES      BALANCE TRAINING      Sitting balance     Standing balance - static Tap ups forward to 8 in block w/wo UE/ cga support  2 x 10 floor.  First 10x w/ UE support, second 10x w/o UE support (w/ CG)    Toe taps to maxine, single and alternating w progression towards decreased UE  support.  Therapist maintaining cl S or CGA    Static stdg on airex w/ HT's and HN's 10x w/ CS/CG        N                  N   Standing balance - dynamic Gait around slalom course of cones (w and w/o AD/SPC) and over hurdles    Gait over 6 in hurdles - step to pattern forward, side stepping.   reciprocal.  No UE support except intermittent touch.      Walking with head turns and nods w/o AD  Walking with base of support within 12 in tiles  Walking with pivot turn  Walking backwards  Stepping over shoebox forwards   N                                 Standing balance - varied surface On Airex:  Ball toss/bounce/catch  FT EO with HT/HN, UE's at sides   Airex, Semi-Tandem with HT    Blue tread rockerboard A-P gentle rocking for ankle strategies    Rockerboard - maintain balance with head turns and head nods with 1 finger support and w/o UE support (CG for HT's, Min A for HN's) N    N    N  N           GAIT TRAINING TOTAL TIME FOR SESSION 23-37 Minutes    TUG W/ RW  13.12 sec  W/ SPC 12.75 sec  W/O AD 10.63 sec  Average of the three:  12.16 seconds    Gait with/ without AD  Gait during  session w/o AD w/ CS  Gait in hallway w/ HT's and HN's    Complete 2 MWT w/  ft  w/o RW  368 ft 3/10 RPE after second 2 MWT  Complete FGA:  14/30  (1,2,1,2,3,1,0,0,2,2) - completed by Ana Rosa Winston PT (primary PT) - will update goals next visit with primary.      Arrived  "to session ambulating with RW; Gait training w SPC(RUE) x 250 ft x 1 w/  cl S.  Focus on cues for heel strike/appropriate knee extension and hip flexion on RLE.  HR increased from 82 at rest to 104 with ambulation episodes. No gross LOB, min instability, minimal loss of R LE appropriate step length and no overt catch of R toe. Ambulates quicker with SPC than no AD, and has less deviation from midline.    GT  600-800ft over outdoor uneven terrain including grassy terrain and macadam.  Flagstones, brick, gravel, step over edges.  Moderate incline/decline with cl S - HR to 120bpm and with slow recovery to resting rate.  Cued to use proper pattern with cane without difficulty. 1 seated rest.      Goal:  Return to amb without AD  N                                          YES   Dynamic gait      Stair negotiation Up/down FF w/ B HR's and step over step pattern  Ascend/descend 6\" curb with SPC, CS   Up/down ramp with SPC and cues to slow down for safety          Curb negotiation     Ramp negotiation     Outdoor ambulation Amb outdoors over asphalt, concrete, slate and grass, up/down hills w/ SPC and CS See above   BWS/vector training     MANUAL TOTAL TIME FOR SESSION Not performed    Stretching by therapist/PROM     Mobilization      MODALITIES TOTAL TIME FOR SESSION Not performed    Ice     Heat       "

## 2020-10-01 ENCOUNTER — HOSPITAL ENCOUNTER (OUTPATIENT)
Dept: SPEECH THERAPY | Facility: REHABILITATION | Age: 85
Setting detail: THERAPIES SERIES
Discharge: HOME | End: 2020-10-01
Attending: FAMILY MEDICINE
Payer: COMMERCIAL

## 2020-10-01 ENCOUNTER — HOSPITAL ENCOUNTER (OUTPATIENT)
Dept: PHYSICAL THERAPY | Facility: REHABILITATION | Age: 85
Setting detail: THERAPIES SERIES
Discharge: HOME | End: 2020-10-01
Attending: FAMILY MEDICINE
Payer: COMMERCIAL

## 2020-10-01 DIAGNOSIS — I69.321 DYSPHASIA FOLLOWING CEREBROVASCULAR ACCIDENT (CVA): ICD-10-CM

## 2020-10-01 DIAGNOSIS — I69.393 ATAXIA FOLLOWING CEREBRAL INFARCTION: Primary | ICD-10-CM

## 2020-10-01 DIAGNOSIS — I63.9 CEREBROVASCULAR ACCIDENT (CVA), UNSPECIFIED MECHANISM (CMS/HCC): Primary | ICD-10-CM

## 2020-10-01 PROCEDURE — 92526 ORAL FUNCTION THERAPY: CPT | Mod: GN

## 2020-10-01 PROCEDURE — 97112 NEUROMUSCULAR REEDUCATION: CPT | Mod: GP

## 2020-10-01 PROCEDURE — 97116 GAIT TRAINING THERAPY: CPT | Mod: GP

## 2020-10-01 PROCEDURE — 92507 TX SP LANG VOICE COMM INDIV: CPT | Mod: GN

## 2020-10-01 NOTE — OP PT TREATMENT LOG
"PT Neuro Exercises Current Session Time Performed   THER ACT  TOTAL TIME FOR SESSION 0-7 Minutes    Precautions NPO/PEG tube.  Pt is I with PEG tube feedings.   Working on swallow in speech.  L facial droop and slight L eye ptosis post stroke.  Pt educated in use of HR with max HR defined as 129bpm.  Avoiding >120bpm. Yes   Transfer training Mod I with RW, Mod I without UE support for sit to stand without AD (at 20\" height)    Patient Education Reviewed POC goals and intention for 2x/week for at least 8 weeks.     HEP - Issued and reviewed with Patient - instructed to perform at counter or with chair for support:  -LAQ  -Heel raises  -Abduction  -Hamstring curls    Reviewed need for walker and difficulty with clearing R foot consistently    Reviewed progression of dumbell weight exercises to focus on increased reps vs increased weight for now.  Education regarding need to improve endurance and return to resting HR more efficiently.  Directed pt and rena to begin walking with SPC outdoors up to approx 20 min with rest periods as needed, close S.    Patient Education regarding the use of the SPC for outdoor ambulation.  Instruction in standing HS stretches and standing gastroc stretch on wedge.  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                                               Yes   THER EX TOTAL TIME FOR SESSION 0-7 Minutes     STRETCHING     Stretching by patient Standing HS stretch w/ ft on block,  w/ manual assist 20 sec x 3 and added to HEP via foot prop (pt demos indep and understanding)  B calf stretch on incline #3  30 sec  x 3  Runner stretch Y        Y       CARDIOVASCULAR     Nu Step Level 5  UE's/LE's 5 min, 1 min cool down N   Stationary Bike Virtual RB (nustep not available) [employer] dash, gear 6-7, 10 mins N   Walking warm up (check gait below)    Supine there ex Shaker exercises   Supine w/ 20x head lifts off " pillow; 1x head lift and hold for 20 sec;  Cues to breathe N- reviewed verbally   Standing Ther-Ex HR's, Abd, Hs curls, marching and mini squats 10x     March x 10 x 2, HR/TR x 10,  N    Stdg 2 x 10 for bicep curls 10 #,   alt ue raises  2 x 10 5 # EA ,   mini squats 1 x 10 10#, 1 x 10 20#  bent rows with 20# ea 1 x 10 (decr to 10# next visit and do 2 sets)  Deep squats with holding onto rail x 5  1/2 kneel with holding onto rail x 2 N   N  N  N    Y  Y          Seated in chair:  LAQ's w/ 5# 10x , repeated w/ 3 sec hold 10x N    STS from mat without UE support x 10 x 1, 10# wts. N   NEURO RE-ED TOTAL TIME FOR SESSION 23-37 Minutes     Juarez Balance Test Score:  49 pts  Performed by Sarahy Guzman PTA 9/23/20    COORDINATION     POSTURAL RE-ED     PRE-GAIT ACTIVITIES      BALANCE TRAINING      Sitting balance     Standing balance - static Tap ups forward to 8 in block w/wo UE/ cga support  2 x 10 floor.  First 10x w/ UE support, second 10x w/o UE support (w/ CG)    Toe taps to maxine, single and alternating w progression towards decreased UE  support.  Therapist maintaining cl S or CGA    Static stdg on airex w/ HT's and HN's 10x w/ CS/CG    Standing with foot on small ball for rolls to increase sls x 10 ea LE     Rockerboard - A/P with head turns, head nods x 5 x 2 ea and cga/intermittent touches N                  N      Y        Y   Standing balance - dynamic Gait around slalom course of cones (w and w/o AD/SPC) and over hurdles    Gait over 6 in hurdles - step to pattern forward, side stepping.   reciprocal.  No UE support except intermittent touch.      Walking with head turns and nods w/o AD  Walking with base of support within 12 in tiles  Walking with pivot turn  Walking backwards  Stepping over shoebox forwards   N                                 Standing balance - varied surface On Airex:  Ball toss/bounce/catch  FT EO with HT/HN, UE's at sides   Airex, Semi-Tandem with HT    Blue tread rockerboard A-P gentle  "rocking for ankle strategies   N    N    N  N           GAIT TRAINING TOTAL TIME FOR SESSION 23-37 Minutes    TUG W/ RW  13.12 sec  W/ SPC 12.75 sec  W/O AD 10.63 sec  Average of the three:  12.16 seconds    Gait with/ without AD  Gait during  session w/o AD w/ CS  Gait in hallway w/ HT's and HN's    Complete 2 MWT w/  ft  w/o RW  368 ft 3/10 RPE after second 2 MWT  Complete FGA:  14/30  (1,2,1,2,3,1,0,0,2,2) - completed by Ana Rosa Winston PT (primary PT) - will update goals next visit with primary.      Arrived to session ambulating with RW; Gait training w SPC(RUE) x 250 ft x 3 w/  cl S, including uneven grass terrain and uneven macadam.   Focus on cues for heel strike/appropriate knee extension RLE.   HR increased from 82 at rest to 116 with ambulation episodes. No gross LOB, min instability, minimal loss of R LE appropriate step length and no  catch of R toe. Ambulates quicker with SPC than no AD, and has min. deviation from midline.    Short distance (75 ft) amb without using SPC, but carrying while walking over uneven grass terrain including incline.      GT  600-800ft over outdoor uneven terrain including grassy terrain and macadam.  Flagstones, brick, gravel, step over edges.  Moderate incline/decline with cl S - HR to 120bpm and with slow recovery to resting rate.  Cued to use proper pattern with cane without difficulty. 1 seated rest.      Goal:  Return to amb without AD  N                  YES                        YES          N   Dynamic gait      Stair negotiation Up/down FF w/ B HR's and step over step pattern  Ascend/descend 6\" curb with SPC, CS   Up/down ramp with SPC and cues to slow down for safety          Curb negotiation     Ramp negotiation     Outdoor ambulation Amb outdoors over asphalt, concrete, slate and grass, up/down hills w/ SPC and CS See above   BWS/vector training     MANUAL TOTAL TIME FOR SESSION Not performed    Stretching by therapist/PROM     Mobilization      MODALITIES " TOTAL TIME FOR SESSION Not performed    Ice     Heat

## 2020-10-01 NOTE — OP SLP TREATMENT LOG
"Short Term Goals Current Session   ?Pt. will tolerate trace PO trials without s/s of aspiration and adequate oral management with min cues over 2 sessions.  Ice chip trials x 20 with supersupraglottic swallow - no s/s aspiration, expectoration after 3 trials    Trial nectar x 2 oz  via  Tsp and cup sip with expectoration x 2; cough x 1 ?deep penetration   Pt. will complete pharyngeal strengthening, laryngeal excursion, and base of tongue strengthening exercises to improve airway protection and bolus propulsion with min cues for accurate production with 90% accuracy      Effortful \"guh\" 4 sets of 20 completed    CTAR x 2 sets of 20 completed and 2 30 second trials, 1 1 minute trial    yvonne x 2 set of 5 completed    Pitch glides x 4 sets of 20 completed    Able to complete mendelsohn for first time today ever! Benefited from tactile cues and was able to complete x 7              Pt. will verbalize understanding of current swallow status and risks of aspiration/choking with min cues.     EDUCATION Education provided on mendelsohn and its impact on swallow fxn   Other      "

## 2020-10-02 ENCOUNTER — HOSPITAL ENCOUNTER (OUTPATIENT)
Dept: SPEECH THERAPY | Facility: REHABILITATION | Age: 85
Setting detail: THERAPIES SERIES
Discharge: HOME | End: 2020-10-02
Attending: FAMILY MEDICINE
Payer: COMMERCIAL

## 2020-10-02 DIAGNOSIS — I69.321 DYSPHASIA FOLLOWING CEREBROVASCULAR ACCIDENT (CVA): ICD-10-CM

## 2020-10-02 DIAGNOSIS — I63.9 CEREBROVASCULAR ACCIDENT (CVA), UNSPECIFIED MECHANISM (CMS/HCC): Primary | ICD-10-CM

## 2020-10-02 PROCEDURE — 92526 ORAL FUNCTION THERAPY: CPT | Mod: GN

## 2020-10-02 NOTE — OP SLP TREATMENT LOG
"Short Term Goals Current Session   ?Pt. will tolerate trace PO trials without s/s of aspiration and adequate oral management with min cues over 2 sessions.  Ice chip trials x 20 with supersupraglottic swallow - no s/s aspiration, no expectoration    Trial nectar x 4 oz via cup sip with supraglottic/effortful double swallow with no s/s aspiration, 1 minimal expectoration   Pt. will complete pharyngeal strengthening, laryngeal excursion, and base of tongue strengthening exercises to improve airway protection and bolus propulsion with min cues for accurate production with 90% accuracy      Effortful \"guh\" 4 sets of 20 completed    CTAR x 2 sets of 20 completed and 2 30 second trials, 1 1 minute trial    yvonne x 3 sets of 6 completed    Pitch glides x 2 sets of 10 completed    Mendelsohn x 3 sets of 10 completed (some with ice chip trials) with min cues           Pt. will verbalize understanding of current swallow status and risks of aspiration/choking with min cues.     EDUCATION    Other      "

## 2020-10-02 NOTE — PROGRESS NOTES
PT DAILY NOTE FOR OUTPATIENT THERAPY    Patient: Karl Park   MRN: [id number]  : 1928 89 y.o.  Referring Physician: Herb Dailey MD  Date of Visit: 10/1/2020    Certification Dates: 20 through 10/29/20    Diagnosis:   1. Ataxia following cerebral infarction        Chief Complaints:  functional mobility, balance, coordination, ROM, strength    Precautions:   Precautions comments: PEG, NPO  Existing Precautions/Restrictions: fall, other (see comments)     TODAY'S VISIT    History/Vitals/Pain/Encounter Info - 10/01/20 1311        Injury History/Precautions/Daily Required Info    Primary Therapist  Aretha Ferrara PT     Chief Complaint/Reason for Visit   functional mobility, balance, coordination, ROM, strength     Onset of Illness/Injury or Date of Surgery  20     Referring Physician  Asim     Existing Precautions/Restrictions  fall;other (see comments)     Precautions comments  PEG, NPO     Limitations/Impairments  swallowing    swallowing    Pertinent History of Current Functional Problem  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 HPI: Pt's daughter stated that the onset of the stroke may have been  or . Pt went to the ER on 7/3 and he was diagnosed with pneumonia and bells palsy. Pt was given medication for that, which he couldn't swallow. Pt went to see his PCP on 20 who referred him to a neurologist. Pt saw the PA and he was tested for myasthenia gravis. Pt would cough throughout the whole week every time he ate or drank. Pt was seen by neurologist  for the MRI and swallow study. MRI showed that he had a brainstem stroke. Pt had a swallow study which showed asp on all consistencies and absent epiglottic inversion. Pt had a peg tube placed on . Pt was also admitted overnight for IV fluids.     OP Specialty  Neuro     Document Type  daily treatment     Patient/Family/Caregiver Comments/Observations  Pt reports no  "pain after increased weight training yesterday.     Start Time  1300     Stop Time  1400     Time Calculation (min)  60 min     Patient reported fall since last visit  No        Pain Assessment    Currently in pain  --        Pain Intervention    Intervention   ther ex and gait, NMRE     Post Intervention Comments  no increase in pain from today or yesterday's treatment        Pre Activity Vital Signs    Pulse  84     BP  120/58     BP Location  Left upper arm     BP Method  Manual     Patient Position  Sitting        Activity Vital Signs    Patient activity  Walking     Activity Pulse  116        Post Activity Vital Signs    Post Activity Pulse  110         Daily Treatment Assessment and Plan - 10/01/20 1431        Daily Treatment Assessment and Plan    Progress toward goals  Progressing     Daily Outcome Summary  Patient continues to demonstrate improvement in ambulation balance and overall strength.  Understands that the RLE in particular has less knee extension and more hs tightness than the left.  Is interested in working on home stretching more diligently. His max HR is approx 129 and understands our goal is to stay below max HR at approx 75-80% and to facilitate improved post ex recovery.     Plan and Recommendations  Provide written HS standing stretch and gastroc stretch on incline board.  Review HEP for weight training.           OBJECTIVE DATA TAKEN TODAY:    None taken    Today's Treatment:      PT Neuro Exercises Current Session Time Performed   THER ACT  TOTAL TIME FOR SESSION 0-7 Minutes    Precautions NPO/PEG tube.  Pt is I with PEG tube feedings.   Working on swallow in speech.  L facial droop and slight L eye ptosis post stroke.  Pt educated in use of HR with max HR defined as 129bpm.  Avoiding >120bpm. Yes   Transfer training Mod I with RW, Mod I without UE support for sit to stand without AD (at 20\" height)    Patient Education Reviewed POC goals and intention for 2x/week for at least 8 weeks. "     HEP - Issued and reviewed with Patient - instructed to perform at counter or with chair for support:  -LAQ  -Heel raises  -Abduction  -Hamstring curls    Reviewed need for walker and difficulty with clearing R foot consistently    Reviewed progression of dumbell weight exercises to focus on increased reps vs increased weight for now.  Education regarding need to improve endurance and return to resting HR more efficiently.  Directed pt and rena to begin walking with SPC outdoors up to approx 20 min with rest periods as needed, close S.    Patient Education regarding the use of the SPC for outdoor ambulation.  Instruction in standing HS stretches and standing gastroc stretch on wedge.  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                                               Yes   THER EX TOTAL TIME FOR SESSION 23-37 Minutes     STRETCHING     Stretching by patient Standing HS stretch w/ ft on block,  w/ manual assist 20 sec x 3 and added to HEP via foot prop (pt demos indep and understanding)  B calf stretch on incline #3  30 sec  x 3  Runner stretch Y        Y       CARDIOVASCULAR     Nu Step Level 5  UE's/LE's 5 min, 1 min cool down N   Stationary Bike Virtual RB (nustep not available) [email or web address], gear 6-7, 10 mins N   Walking warm up (check gait below)    Supine there ex Shaker exercises   Supine w/ 20x head lifts off pillow; 1x head lift and hold for 20 sec;  Cues to breathe N- reviewed verbally   Standing Ther-Ex HR's, Abd, Hs curls, marching and mini squats 10x     March x 10 x 2, HR/TR x 10,  N    Stdg 2 x 10 for bicep curls 10 #,   alt ue raises  2 x 10 5 # EA ,   mini squats 1 x 10 10#, 1 x 10 20#  bent rows with 20# ea 1 x 10 (decr to 10# next visit and do 2 sets)  Deep squats with holding onto rail x 5  1/2 kneel with holding onto rail x 2 N   N  N  N    Y  Y          Seated in chair:  LAQ's w/ 5# 10x , repeated w/ 3  sec hold 10x N    STS from mat without UE support x 10 x 1, 10# wts. N   NEURO RE-ED TOTAL TIME FOR SESSION 0-7 Minutes     Juarez Balance Test Score:  49 pts  Performed by Sarahy Guzman PTA 9/23/20    COORDINATION     POSTURAL RE-ED     PRE-GAIT ACTIVITIES      BALANCE TRAINING      Sitting balance     Standing balance - static Tap ups forward to 8 in block w/wo UE/ cga support  2 x 10 floor.  First 10x w/ UE support, second 10x w/o UE support (w/ CG)    Toe taps to maxine, single and alternating w progression towards decreased UE  support.  Therapist maintaining cl S or CGA    Static stdg on airex w/ HT's and HN's 10x w/ CS/CG    Standing with foot on small ball for rolls to increase sls x 10 ea LE     Rockerboard - A/P with head turns, head nods x 5 x 2 ea and cga/intermittent touches N                  N      Y        Y   Standing balance - dynamic Gait around slalom course of cones (w and w/o AD/SPC) and over hurdles    Gait over 6 in hurdles - step to pattern forward, side stepping.   reciprocal.  No UE support except intermittent touch.      Walking with head turns and nods w/o AD  Walking with base of support within 12 in tiles  Walking with pivot turn  Walking backwards  Stepping over shoebox forwards   N                                 Standing balance - varied surface On Airex:  Ball toss/bounce/catch  FT EO with HT/HN, UE's at sides   Airex, Semi-Tandem with HT    Blue tread rockerboard A-P gentle rocking for ankle strategies   N    N    N  N           GAIT TRAINING TOTAL TIME FOR SESSION 23-37 Minutes    TUG W/ RW  13.12 sec  W/ SPC 12.75 sec  W/O AD 10.63 sec  Average of the three:  12.16 seconds    Gait with/ without AD  Gait during  session w/o AD w/ CS  Gait in hallway w/ HT's and HN's    Complete 2 MWT w/  ft  w/o RW  368 ft 3/10 RPE after second 2 MWT  Complete FGA:  14/30  (1,2,1,2,3,1,0,0,2,2) - completed by Ana Rosa Winston PT (primary PT) - will update goals next visit with primary.      Arrived  "to session ambulating with RW; Gait training w SPC(RUE) x 250 ft x 3 w/  cl S, including uneven grass terrain and uneven macadam.   Focus on cues for heel strike/appropriate knee extension RLE.   HR increased from 82 at rest to 116 with ambulation episodes. No gross LOB, min instability, minimal loss of R LE appropriate step length and no  catch of R toe. Ambulates quicker with SPC than no AD, and has min. deviation from midline.    Short distance (75 ft) amb without using SPC, but carrying while walking over uneven grass terrain including incline.      GT  600-800ft over outdoor uneven terrain including grassy terrain and macadam.  Flagstones, brick, gravel, step over edges.  Moderate incline/decline with cl S - HR to 120bpm and with slow recovery to resting rate.  Cued to use proper pattern with cane without difficulty. 1 seated rest.      Goal:  Return to amb without AD  N                  YES                        YES   Dynamic gait      Stair negotiation Up/down FF w/ B HR's and step over step pattern  Ascend/descend 6\" curb with SPC, CS   Up/down ramp with SPC and cues to slow down for safety          Curb negotiation     Ramp negotiation     Outdoor ambulation Amb outdoors over asphalt, concrete, slate and grass, up/down hills w/ SPC and CS See above   BWS/vector training     MANUAL TOTAL TIME FOR SESSION Not performed    Stretching by therapist/PROM     Mobilization      MODALITIES TOTAL TIME FOR SESSION Not performed    Ice     Heat                    PT DAILY NOTE FOR OUTPATIENT THERAPY    Patient: Karl Park   MRN: [id number]  : 1928 89 y.o.  Referring Physician: Herb Dailey MD  Date of Visit: 10/1/2020    Certification Dates: 20 through 10/29/20    Diagnosis:   1. Ataxia following cerebral infarction        Chief Complaints:  functional mobility, balance, coordination, ROM, strength    Precautions:   Precautions comments: PEG, NPO  Existing Precautions/Restrictions: fall, " other (see comments)     TODAY'S VISIT    History/Vitals/Pain/Encounter Info - 10/01/20 1311        Injury History/Precautions/Daily Required Info    Primary Therapist  Aretha Ferrara PT     Chief Complaint/Reason for Visit   functional mobility, balance, coordination, ROM, strength     Onset of Illness/Injury or Date of Surgery  07/16/20     Referring Physician  Asim     Existing Precautions/Restrictions  fall;other (see comments)     Precautions comments  PEG, NPO     Limitations/Impairments  swallowing    swallowing    Pertinent History of Current Functional Problem  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 HPI: Pt's daughter stated that the onset of the stroke may have been 7/1 or 7/2. Pt went to the ER on 7/3 and he was diagnosed with pneumonia and bells palsy. Pt was given medication for that, which he couldn't swallow. Pt went to see his PCP on 7/7/20 who referred him to a neurologist. Pt saw the PA and he was tested for myasthenia gravis. Pt would cough throughout the whole week every time he ate or drank. Pt was seen by neurologist 7/16 for the MRI and swallow study. MRI showed that he had a brainstem stroke. Pt had a swallow study which showed asp on all consistencies and absent epiglottic inversion. Pt had a peg tube placed on 7/20. Pt was also admitted overnight for IV fluids.     OP Specialty  Neuro     Document Type  daily treatment     Patient/Family/Caregiver Comments/Observations  Pt reports no pain after increased weight training yesterday.     Start Time  1300     Stop Time  1400     Time Calculation (min)  60 min     Patient reported fall since last visit  No        Pain Assessment    Currently in pain  --        Pain Intervention    Intervention   ther ex and gait, NMRE     Post Intervention Comments  no increase in pain from today or yesterday's treatment        Pre Activity Vital Signs    Pulse  84     BP  120/58     BP Location  Left upper  "arm     BP Method  Manual     Patient Position  Sitting        Activity Vital Signs    Patient activity  Walking     Activity Pulse  116        Post Activity Vital Signs    Post Activity Pulse  110         Daily Treatment Assessment and Plan - 10/01/20 1431        Daily Treatment Assessment and Plan    Progress toward goals  Progressing     Daily Outcome Summary  Patient continues to demonstrate improvement in ambulation balance and overall strength.  Understands that the RLE in particular has less knee extension and more hs tightness than the left.  Is interested in working on home stretching more diligently. His max HR is approx 129 and understands our goal is to stay below max HR at approx 75-80% and to facilitate improved post ex recovery.     Plan and Recommendations  Provide written HS standing stretch and gastroc stretch on incline board.  Review HEP for weight training.           OBJECTIVE DATA TAKEN TODAY:    None taken      Today's Treatment:      PT Neuro Exercises Current Session Time Performed   THER ACT  TOTAL TIME FOR SESSION 0-7 Minutes    Precautions NPO/PEG tube.  Pt is I with PEG tube feedings.   Working on swallow in speech.  L facial droop and slight L eye ptosis post stroke.  Pt educated in use of HR with max HR defined as 129bpm.  Avoiding >120bpm. Yes   Transfer training Mod I with RW, Mod I without UE support for sit to stand without AD (at 20\" height)    Patient Education Reviewed POC goals and intention for 2x/week for at least 8 weeks.     HEP - Issued and reviewed with Patient - instructed to perform at counter or with chair for support:  -LAQ  -Heel raises  -Abduction  -Hamstring curls    Reviewed need for walker and difficulty with clearing R foot consistently    Reviewed progression of dumbell weight exercises to focus on increased reps vs increased weight for now.  Education regarding need to improve endurance and return to resting HR more efficiently.  Directed pt and rena to begin " walking with SPC outdoors up to approx 20 min with rest periods as needed, close S.    Patient Education regarding the use of the SPC for outdoor ambulation.  Instruction in standing HS stretches and standing gastroc stretch on wedge.  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                                               Yes   THER EX TOTAL TIME FOR SESSION 23-37 Minutes     STRETCHING     Stretching by patient Standing HS stretch w/ ft on block,  w/ manual assist 20 sec x 3 and added to HEP via foot prop (pt demos indep and understanding)  B calf stretch on incline #3  30 sec  x 3  Runner stretch Y        Y       CARDIOVASCULAR     Nu Step Level 5  UE's/LE's 5 min, 1 min cool down N   Stationary Bike Virtual RB (nustep not available) [employer] dash, gear 6-7, 10 mins N   Walking warm up (check gait below)    Supine there ex Shaker exercises   Supine w/ 20x head lifts off pillow; 1x head lift and hold for 20 sec;  Cues to breathe N- reviewed verbally   Standing Ther-Ex HR's, Abd, Hs curls, marching and mini squats 10x     March x 10 x 2, HR/TR x 10,  N    Stdg 2 x 10 for bicep curls 10 #,   alt ue raises  2 x 10 5 # EA ,   mini squats 1 x 10 10#, 1 x 10 20#  bent rows with 20# ea 1 x 10 (decr to 10# next visit and do 2 sets)  Deep squats with holding onto rail x 5  1/2 kneel with holding onto rail x 2 N   N  N  N    Y  Y          Seated in chair:  LAQ's w/ 5# 10x , repeated w/ 3 sec hold 10x N    STS from mat without UE support x 10 x 1, 10# wts. N   NEURO RE-ED TOTAL TIME FOR SESSION 23-37 Minutes     Juarez Balance Test Score:  49 pts  Performed by Sarahy Guzman PTA 9/23/20    COORDINATION     POSTURAL RE-ED     PRE-GAIT ACTIVITIES      BALANCE TRAINING      Sitting balance     Standing balance - static Tap ups forward to 8 in block w/wo UE/ cga support  2 x 10 floor.  First 10x w/ UE support, second 10x w/o UE support (w/ CG)    Toe  taps to maxine, single and alternating w progression towards decreased UE  support.  Therapist maintaining cl S or CGA    Static stdg on airex w/ HT's and HN's 10x w/ CS/CG    Standing with foot on small ball for rolls to increase sls x 10 ea LE     Rockerboard - A/P with head turns, head nods x 5 x 2 ea and cga/intermittent touches N                  N      Y        Y   Standing balance - dynamic Gait around slalom course of cones (w and w/o AD/SPC) and over hurdles    Gait over 6 in hurdles - step to pattern forward, side stepping.   reciprocal.  No UE support except intermittent touch.      Walking with head turns and nods w/o AD  Walking with base of support within 12 in tiles  Walking with pivot turn  Walking backwards  Stepping over shoebox forwards   N                                 Standing balance - varied surface On Airex:  Ball toss/bounce/catch  FT EO with HT/HN, UE's at sides   Airex, Semi-Tandem with HT    Blue tread rockerboard A-P gentle rocking for ankle strategies   N    N    N  N           GAIT TRAINING TOTAL TIME FOR SESSION 23-37 Minutes    TUG W/ RW  13.12 sec  W/ SPC 12.75 sec  W/O AD 10.63 sec  Average of the three:  12.16 seconds    Gait with/ without AD  Gait during  session w/o AD w/ CS  Gait in hallway w/ HT's and HN's    Complete 2 MWT w/  ft  w/o RW  368 ft 3/10 RPE after second 2 MWT  Complete FGA:  14/30  (1,2,1,2,3,1,0,0,2,2) - completed by Ana Rosa Winston PT (primary PT) - will update goals next visit with primary.      Arrived to session ambulating with RW; Gait training w SPC(RUE) x 250 ft x 3 w/  cl S, including uneven grass terrain and uneven macadam.   Focus on cues for heel strike/appropriate knee extension RLE.   HR increased from 82 at rest to 116 with ambulation episodes. No gross LOB, min instability, minimal loss of R LE appropriate step length and no  catch of R toe. Ambulates quicker with SPC than no AD, and has min. deviation from midline.    Short distance (75 ft)  "amb without using SPC, but carrying while walking over uneven grass terrain including incline.      GT  600-800ft over outdoor uneven terrain including grassy terrain and macadam.  Flagstones, brick, gravel, step over edges.  Moderate incline/decline with cl S - HR to 120bpm and with slow recovery to resting rate.  Cued to use proper pattern with cane without difficulty. 1 seated rest.      Goal:  Return to amb without AD  N                  YES                        YES   Dynamic gait      Stair negotiation Up/down FF w/ B HR's and step over step pattern  Ascend/descend 6\" curb with SPC, CS   Up/down ramp with SPC and cues to slow down for safety          Curb negotiation     Ramp negotiation     Outdoor ambulation Amb outdoors over asphalt, concrete, slate and grass, up/down hills w/ SPC and CS See above   BWS/vector training     MANUAL TOTAL TIME FOR SESSION Not performed    Stretching by therapist/PROM     Mobilization      MODALITIES TOTAL TIME FOR SESSION Not performed    Ice     Heat                      "

## 2020-10-02 NOTE — ADDENDUM NOTE
Encounter addended by: Laura Chávez, CCC-SLP on: 10/2/2020 12:41 PM   Actions taken: Flowsheet accepted

## 2020-10-02 NOTE — PROGRESS NOTES
"SLP DAILY NOTE FOR OUTPATIENT THERAPY    Patient: Karl Park   MRN: [id number]  : 1928 89 y.o.  Referring Physician: Herb Dailey MD  Date of Visit: 10/2/2020      Certification Dates: 20 through 20    Diagnosis:   1. Cerebrovascular accident (CVA), unspecified mechanism (CMS/HCC)    2. Dysphasia following cerebrovascular accident (CVA)        Chief Complaints:  dysphagia    Precautions:  Existing Precautions/Restrictions: fall, NPO, aspiration       TODAY'S VISIT:    History/Vitals/Pain/Encounter Info - 10/02/20 1230        Injury History/Precautions/Daily Required Info    Primary Therapist  Laura Chávez     Chief Complaint/Reason for Visit   dysphagia     Existing Precautions/Restrictions  fall;NPO;aspiration     Limitations/Impairments  swallowing     Speech Therapy  Swallowing     Document Type  daily treatment     Patient/Family/Caregiver Comments/Observations  pt reports compliance with home program and \"working hard\"     Start Time  1000     Stop Time  1100     Time Calculation (min)  60 min     Patient reported fall since last visit  No        Pain Assessment    Currently in pain  No/Denies        Pain Interventions    Intervention  none     Post Intervention Comments  none         Daily Treatment Assessment and Plan - 10/02/20 1239        Daily Treatment Assessment and Plan    Progress toward goals  Progressing     Daily Outcome Summary  improving number of repetitions and tolerance of nectar trials     Plan and Recommendations  reinforce use of mendelsohn next session, trial nectar with superaglottic effortful double swallow            Today's Treatment:      Short Term Goals Current Session   ?Pt. will tolerate trace PO trials without s/s of aspiration and adequate oral management with min cues over 2 sessions.  Ice chip trials x 20 with supersupraglottic swallow - no s/s aspiration, no expectoration    Trial nectar x 4 oz via cup sip with supraglottic/effortful double " "swallow with no s/s aspiration, 1 minimal expectoration   Pt. will complete pharyngeal strengthening, laryngeal excursion, and base of tongue strengthening exercises to improve airway protection and bolus propulsion with min cues for accurate production with 90% accuracy      Effortful \"guh\" 4 sets of 20 completed    CTAR x 2 sets of 20 completed and 2 30 second trials, 1 1 minute trial    yvonne x 3 sets of 6 completed    Pitch glides x 2 sets of 10 completed    Mendelsohn x 3 sets of 10 completed (some with ice chip trials) with min cues           Pt. will verbalize understanding of current swallow status and risks of aspiration/choking with min cues.     EDUCATION    Other                              "

## 2020-10-05 ENCOUNTER — HOSPITAL ENCOUNTER (OUTPATIENT)
Dept: SPEECH THERAPY | Facility: REHABILITATION | Age: 85
Setting detail: THERAPIES SERIES
Discharge: HOME | End: 2020-10-05
Attending: FAMILY MEDICINE
Payer: COMMERCIAL

## 2020-10-05 ENCOUNTER — HOSPITAL ENCOUNTER (OUTPATIENT)
Dept: PHYSICAL THERAPY | Facility: REHABILITATION | Age: 85
Setting detail: THERAPIES SERIES
Discharge: HOME | End: 2020-10-05
Attending: FAMILY MEDICINE
Payer: COMMERCIAL

## 2020-10-05 DIAGNOSIS — I63.9 CEREBROVASCULAR ACCIDENT (CVA), UNSPECIFIED MECHANISM (CMS/HCC): Primary | ICD-10-CM

## 2020-10-05 DIAGNOSIS — I69.393 ATAXIA FOLLOWING CEREBRAL INFARCTION: Primary | ICD-10-CM

## 2020-10-05 DIAGNOSIS — I69.321 DYSPHASIA FOLLOWING CEREBROVASCULAR ACCIDENT (CVA): ICD-10-CM

## 2020-10-05 PROCEDURE — 97112 NEUROMUSCULAR REEDUCATION: CPT | Mod: GP,CQ

## 2020-10-05 PROCEDURE — 92526 ORAL FUNCTION THERAPY: CPT | Mod: GN

## 2020-10-05 PROCEDURE — 97110 THERAPEUTIC EXERCISES: CPT | Mod: GP,CQ,59

## 2020-10-05 NOTE — PROGRESS NOTES
SLP DAILY NOTE FOR OUTPATIENT THERAPY    Patient: Karl Park   MRN: [id number]  : 1928 89 y.o.  Referring Physician: Herb Dailey MD  Date of Visit: 10/5/2020      Certification Dates: 20 through 20    Diagnosis:   1. Cerebrovascular accident (CVA), unspecified mechanism (CMS/HCC)    2. Dysphasia following cerebrovascular accident (CVA)        Chief Complaints:  dysphagia    Precautions:  Existing Precautions/Restrictions: NPO       TODAY'S VISIT:    History/Vitals/Pain/Encounter Info - 10/05/20 1627        Injury History/Precautions/Daily Required Info    Primary Therapist  Laura Chávez     Chief Complaint/Reason for Visit   dysphagia     Existing Precautions/Restrictions  NPO     Limitations/Impairments  swallowing     Speech Therapy  Swallowing     Document Type  daily treatment     Patient/Family/Caregiver Comments/Observations  pt is alert and cooperative     Start Time  1200     Stop Time  1300     Time Calculation (min)  60 min     Patient reported fall since last visit  No        Pain Assessment    Currently in pain  No/Denies        Pain Interventions    Intervention  none     Post Intervention Comments  none         Daily Treatment Assessment and Plan - 10/05/20 1630        Daily Treatment Assessment and Plan    Progress toward goals  Progressing     Daily Outcome Summary  improving swallow fxn     Plan and Recommendations  target mendelsohn next session along with other pharygneal swallow exercises, trial nectar with superaglottic effortful double swallow                Today's Treatment:      Short Term Goals Current Session   ?Pt. will tolerate trace PO trials without s/s of aspiration and adequate oral management with min cues over 2 sessions.  Ice chip trials x 30 with supersupraglottic swallow - no s/s aspiration, no expectoration    Trial nectar x 4 oz via cup sip with supraglottic/effortful double swallow with no s/s aspiration, 1 minimal expectoration    Trial  "applesauce by 1/4 tsp x 9 trials with supersupraglottic swallow and no s/s aspiration   Pt. will complete pharyngeal strengthening, laryngeal excursion, and base of tongue strengthening exercises to improve airway protection and bolus propulsion with min cues for accurate production with 90% accuracy      Effortful \"guh\" 4 sets of 20 completed    CTAR x 2 sets of 20 completed and 2 30 second trials, 1 1 minute trial    yvonne x 3 sets of 6 completed    Pitch glides x 2 sets of 10 completed    Mendelsohn x 3 sets of 10 completed (some with ice chip trials) with min cues           Pt. will verbalize understanding of current swallow status and risks of aspiration/choking with min cues.     EDUCATION    Other                              "

## 2020-10-05 NOTE — OP PT TREATMENT LOG
"PT Neuro Exercises Current Session Time Performed   THER ACT  TOTAL TIME FOR SESSION 0-7 Minutes    Precautions NPO/PEG tube.  Pt is I with PEG tube feedings.   Working on swallow in speech.  L facial droop and slight L eye ptosis post stroke.  Pt educated in use of HR with max HR defined as 129bpm.  Avoiding >120bpm. Yes   Transfer training Mod I with RW, Mod I without UE support for sit to stand without AD (at 20\" height)    Patient Education Reviewed POC goals and intention for 2x/week for at least 8 weeks.     HEP - Issued and reviewed with Patient - instructed to perform at counter or with chair for support:  -LAQ  -Heel raises  -Abduction  -Hamstring curls    Reviewed need for walker and difficulty with clearing R foot consistently    Reviewed progression of dumbell weight exercises to focus on increased reps vs increased weight for now.  Education regarding need to improve endurance and return to resting HR more efficiently.  Directed pt and rena to begin walking with SPC outdoors up to approx 20 min with rest periods as needed, close S.    Patient Education regarding the use of the SPC for outdoor ambulation.  Instruction in standing HS stretches and standing gastroc stretch on wedge.  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                                                  THER EX TOTAL TIME FOR SESSION 23-37 Minutes     STRETCHING     Stretching by patient Standing HS stretch w/ ft on block,  w/ manual assist 20 sec x 3 and added to HEP via foot prop (pt demos indep and understanding)  Seated HS stretch w/ block 20 sec x 3  B calf stretch on incline #3  30 sec  x 3  Runner stretch         Y  Y     CARDIOVASCULAR     Nu Step Level 1  UE's/LE's 7 min, 1 min cool down, 8 total y   Stationary Bike Virtual RB (nustep not available) [employer] dash, gear 6-7, 10 mins N   Walking warm up (check gait below)    Supine there ex Shaker " exercises   Supine w/ 20x head lifts off pillow; 1x head lift and hold for 20 sec;  Cues to breathe N- reviewed verbally   Standing Ther-Ex HR's, Abd, Hs curls, marching and mini squats 10x     March x 10 x 2, HR/TR x 10,  N    Stdg 2 x 10 for bicep curls 10 #,   alt ue raises  2 x 10 5 # EA ,   mini squats 2 x 10 10#  bent rows with 10# ea  2 x 10  next visit and do 2 sets)  Deep squats with holding onto rail x 5  1/2 kneel with holding onto rail x 2 y  y  y  y    y            Seated in chair:  LAQ's w/ 5# 10x , repeated w/ 3 sec hold 10x 2 yes    STS from mat without UE support x 10 x 1, 10# wts. yes   NEURO RE-ED TOTAL TIME FOR SESSION 23-37 Minutes     Juarez Balance Test Score:  49 pts  Performed by Sarahy Guzman PTA 9/23/20    COORDINATION     POSTURAL RE-ED     PRE-GAIT ACTIVITIES      BALANCE TRAINING      Sitting balance     Standing balance - static Tap ups forward to 8 in block w/wo UE/ cga support  2 x 10 floor.  First 10x w/ UE support, second 10x w/o UE support (w/ CG)    Toe taps to maxine, single and alternating w progression towards decreased UE  support.  Therapist maintaining cl S or CGA    Static stdg on airex w/ HT's and HN's 10x w/ CS/CG    Standing with foot on small ball for rolls to increase sls x 10 ea LE     Rockerboard - A/P with head turns, head nods x 5 x 2 ea and cga/intermittent touches  (posterior LOB w/ HN's ) y                  N            Y   Standing balance - dynamic Gait around slalom course of cones (w and w/o AD/SPC) and over hurdles    Gait over 6 in hurdles - step to pattern forward, side stepping.   reciprocal.  No UE support except intermittent touch.      Walking with head turns and nods w/o AD  Walking with base of support within 12 in tiles  Walking with pivot turn  Walking backwards  Stepping over shoebox forwards   N                 Yes for all                   Standing balance - varied surface On Airex:  Ball toss/bounce/catch  FT EO with HT/HN, UE's at sides   Airex,  "Semi-Tandem with HT    Blue tread rockerboard A-P gentle rocking for ankle strategies   N    N    N  yes           GAIT TRAINING TOTAL TIME FOR SESSION 0-7 Minutes    TUG W/ RW  13.12 sec  W/ SPC 12.75 sec  W/O AD 10.63 sec  Average of the three:  12.16 seconds    Gait with/ without AD  Gait during  session w/o AD w/ CS  Gait in hallway w/ HT's and HN's    Complete 2 MWT w/  ft  w/o RW  368 ft 3/10 RPE after second 2 MWT  Complete FGA:  14/30  (1,2,1,2,3,1,0,0,2,2) - completed by Ana Rosa Winston PT (primary PT) - will update goals next visit with primary.    Gait t/o session w/o AD      Arrived to session ambulating with RW; Gait training w SPC(RUE) x 250 ft x 3 w/  cl S, including uneven grass terrain and uneven macadam.   Focus on cues for heel strike/appropriate knee extension RLE.   HR increased from 82 at rest to 116 with ambulation episodes. No gross LOB, min instability, minimal loss of R LE appropriate step length and no  catch of R toe. Ambulates quicker with SPC than no AD, and has min. deviation from midline.    Short distance (75 ft) amb without using SPC, but carrying while walking over uneven grass terrain including incline.      GT  600-800ft over outdoor uneven terrain including grassy terrain and macadam.  Flagstones, brick, gravel, step over edges.  Moderate incline/decline with cl S - HR to 120bpm and with slow recovery to resting rate.  Cued to use proper pattern with cane without difficulty. 1 seated rest.      Goal:  Return to amb without AD  N                  yes                                  N   Dynamic gait      Stair negotiation Up/down FF w/ B HR's and step over step pattern  Ascend/descend 6\" curb with SPC, CS   Up/down ramp with SPC and cues to slow down for safety          Curb negotiation     Ramp negotiation     Outdoor ambulation Amb outdoors over asphalt, concrete, slate and grass, up/down hills w/ SPC and CS See above   BWS/vector training     MANUAL TOTAL TIME FOR SESSION " Not performed    Stretching by therapist/PROM     Mobilization      MODALITIES TOTAL TIME FOR SESSION Not performed    Ice     Heat

## 2020-10-05 NOTE — PROGRESS NOTES
PT DAILY NOTE FOR OUTPATIENT THERAPY    Patient: Karl Park   MRN: [id number]  : 1928 89 y.o.  Referring Physician: Herb Dailey MD  Date of Visit: 10/5/2020    Certification Dates: 20 through 10/29/20    Diagnosis:   1. Ataxia following cerebral infarction        Chief Complaints:  functional mobility, balance, coordination, ROM, strength    Precautions:   Precautions comments: PEG, NPO  Existing Precautions/Restrictions: fall, other (see comments)     TODAY'S VISIT    History/Vitals/Pain/Encounter Info - 10/05/20 1302        Injury History/Precautions/Daily Required Info    Primary Therapist  Aretha Ferrara PT     Chief Complaint/Reason for Visit   functional mobility, balance, coordination, ROM, strength     Onset of Illness/Injury or Date of Surgery  20     Referring Physician  Asim     Existing Precautions/Restrictions  fall;other (see comments)     Precautions comments  PEG, NPO     Limitations/Impairments  swallowing     Pertinent History of Current Functional Problem  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 HPI: Pt's daughter stated that the onset of the stroke may have been  or . Pt went to the ER on 7/3 and he was diagnosed with pneumonia and bells palsy. Pt was given medication for that, which he couldn't swallow. Pt went to see his PCP on 20 who referred him to a neurologist. Pt saw the PA and he was tested for myasthenia gravis. Pt would cough throughout the whole week every time he ate or drank. Pt was seen by neurologist  for the MRI and swallow study. MRI showed that he had a brainstem stroke. Pt had a swallow study which showed asp on all consistencies and absent epiglottic inversion. Pt had a peg tube placed on . Pt was also admitted overnight for IV fluids.     OP Specialty  Neuro     Document Type  daily treatment     Patient/Family/Caregiver Comments/Observations  No pain, no changes in meds,  "no falls reported by pt.     Start Time  1300     Stop Time  1400     Time Calculation (min)  60 min     Patient reported fall since last visit  No        Pain Assessment    Currently in pain  No/Denies        Pain Intervention    Intervention   na     Post Intervention Comments  na        Pre Activity Vital Signs    Pulse  89     SpO2  99 %     BP  110/62     BP Location  Left upper arm     BP Method  Manual     Patient Position  Sitting         Daily Treatment Assessment and Plan - 10/05/20 1351        Daily Treatment Assessment and Plan    Progress toward goals  Progressing     Daily Outcome Summary  Pt tolerated session well. Pt showing good improvement w/ ambulation w/o AD w/ CS during therapy.  Pt able to complete balance exercises w/o LOB. Pt showing good control when ambulating in arteaga w/o AD and completing HT's and HN's and stops and turns.     Plan and Recommendations  Cont w/ POC as directed by primary PT for gait, strength, ROM, balance.                   Today's Treatment:      PT Neuro Exercises Current Session Time Performed   THER ACT  TOTAL TIME FOR SESSION 0-7 Minutes    Precautions NPO/PEG tube.  Pt is I with PEG tube feedings.   Working on swallow in speech.  L facial droop and slight L eye ptosis post stroke.  Pt educated in use of HR with max HR defined as 129bpm.  Avoiding >120bpm. Yes   Transfer training Mod I with RW, Mod I without UE support for sit to stand without AD (at 20\" height)    Patient Education Reviewed POC goals and intention for 2x/week for at least 8 weeks.     HEP - Issued and reviewed with Patient - instructed to perform at counter or with chair for support:  -LAQ  -Heel raises  -Abduction  -Hamstring curls    Reviewed need for walker and difficulty with clearing R foot consistently    Reviewed progression of dumbell weight exercises to focus on increased reps vs increased weight for now.  Education regarding need to improve endurance and return to resting HR more " efficiently.  Directed pt and rena to begin walking with SPC outdoors up to approx 20 min with rest periods as needed, close S.    Patient Education regarding the use of the SPC for outdoor ambulation.  Instruction in standing HS stretches and standing gastroc stretch on wedge.  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                                                  THER EX TOTAL TIME FOR SESSION 23-37 Minutes     STRETCHING     Stretching by patient Standing HS stretch w/ ft on block,  w/ manual assist 20 sec x 3 and added to HEP via foot prop (pt demos indep and understanding)  Seated HS stretch w/ block 20 sec x 3  B calf stretch on incline #3  30 sec  x 3  Runner stretch         Y  Y     CARDIOVASCULAR     Nu Step Level 1  UE's/LE's 7 min, 1 min cool down, 8 total y   Stationary Bike Virtual RB (nustep not available) [employer] dash, gear 6-7, 10 mins N   Walking warm up (check gait below)    Supine there ex Shaker exercises   Supine w/ 20x head lifts off pillow; 1x head lift and hold for 20 sec;  Cues to breathe N- reviewed verbally   Standing Ther-Ex HR's, Abd, Hs curls, marching and mini squats 10x     March x 10 x 2, HR/TR x 10,  N    Stdg 2 x 10 for bicep curls 10 #,   alt ue raises  2 x 10 5 # EA ,   mini squats 2 x 10 10#  bent rows with 10# ea  2 x 10  next visit and do 2 sets)  Deep squats with holding onto rail x 5  1/2 kneel with holding onto rail x 2 y  y  y  y    y            Seated in chair:  LAQ's w/ 5# 10x , repeated w/ 3 sec hold 10x 2 yes    STS from mat without UE support x 10 x 1, 10# wts. yes   NEURO RE-ED TOTAL TIME FOR SESSION 23-37 Minutes     Juarez Balance Test Score:  49 pts  Performed by Sarahy Guzman PTA 9/23/20    COORDINATION     POSTURAL RE-ED     PRE-GAIT ACTIVITIES      BALANCE TRAINING      Sitting balance     Standing balance - static Tap ups forward to 8 in block w/wo UE/ cga support  2 x 10 floor.   First 10x w/ UE support, second 10x w/o UE support (w/ CG)    Toe taps to maxine, single and alternating w progression towards decreased UE  support.  Therapist maintaining cl S or CGA    Static stdg on airex w/ HT's and HN's 10x w/ CS/CG    Standing with foot on small ball for rolls to increase sls x 10 ea LE     Rockerboard - A/P with head turns, head nods x 5 x 2 ea and cga/intermittent touches  (posterior LOB w/ HN's ) y                  N            Y   Standing balance - dynamic Gait around slalom course of cones (w and w/o AD/SPC) and over hurdles    Gait over 6 in hurdles - step to pattern forward, side stepping.   reciprocal.  No UE support except intermittent touch.      Walking with head turns and nods w/o AD  Walking with base of support within 12 in tiles  Walking with pivot turn  Walking backwards  Stepping over shoebox forwards   N                 Yes for all                   Standing balance - varied surface On Airex:  Ball toss/bounce/catch  FT EO with HT/HN, UE's at sides   Airex, Semi-Tandem with HT    Blue tread rockerboard A-P gentle rocking for ankle strategies   N    N    N  yes           GAIT TRAINING TOTAL TIME FOR SESSION 0-7 Minutes    TUG W/ RW  13.12 sec  W/ SPC 12.75 sec  W/O AD 10.63 sec  Average of the three:  12.16 seconds    Gait with/ without AD  Gait during  session w/o AD w/ CS  Gait in hallway w/ HT's and HN's    Complete 2 MWT w/  ft  w/o RW  368 ft 3/10 RPE after second 2 MWT  Complete FGA:  14/30  (1,2,1,2,3,1,0,0,2,2) - completed by Ana Rosa Winston PT (primary PT) - will update goals next visit with primary.    Gait t/o session w/o AD      Arrived to session ambulating with RW; Gait training w SPC(RUE) x 250 ft x 3 w/  cl S, including uneven grass terrain and uneven macadam.   Focus on cues for heel strike/appropriate knee extension RLE.   HR increased from 82 at rest to 116 with ambulation episodes. No gross LOB, min instability, minimal loss of R LE appropriate step  "length and no  catch of R toe. Ambulates quicker with SPC than no AD, and has min. deviation from midline.    Short distance (75 ft) amb without using SPC, but carrying while walking over uneven grass terrain including incline.      GT  600-800ft over outdoor uneven terrain including grassy terrain and macadam.  Flagstones, brick, gravel, step over edges.  Moderate incline/decline with cl S - HR to 120bpm and with slow recovery to resting rate.  Cued to use proper pattern with cane without difficulty. 1 seated rest.      Goal:  Return to amb without AD  N                  yes                                  N   Dynamic gait      Stair negotiation Up/down FF w/ B HR's and step over step pattern  Ascend/descend 6\" curb with SPC, CS   Up/down ramp with SPC and cues to slow down for safety          Curb negotiation     Ramp negotiation     Outdoor ambulation Amb outdoors over asphalt, concrete, slate and grass, up/down hills w/ SPC and CS See above   BWS/vector training     MANUAL TOTAL TIME FOR SESSION Not performed    Stretching by therapist/PROM     Mobilization      MODALITIES TOTAL TIME FOR SESSION Not performed    Ice     Heat                      "

## 2020-10-05 NOTE — OP SLP TREATMENT LOG
"Short Term Goals Current Session   ?Pt. will tolerate trace PO trials without s/s of aspiration and adequate oral management with min cues over 2 sessions.  Ice chip trials x 30 with supersupraglottic swallow - no s/s aspiration, no expectoration    Trial nectar x 4 oz via cup sip with supraglottic/effortful double swallow with no s/s aspiration, 1 minimal expectoration    Trial applesauce by 1/4 tsp x 9 trials with supersupraglottic swallow and no s/s aspiration   Pt. will complete pharyngeal strengthening, laryngeal excursion, and base of tongue strengthening exercises to improve airway protection and bolus propulsion with min cues for accurate production with 90% accuracy      Effortful \"guh\" 4 sets of 20 completed    CTAR x 2 sets of 20 completed and 2 30 second trials, 1 1 minute trial    yvonne x 3 sets of 6 completed    Pitch glides x 2 sets of 10 completed    Mendelsohn x 3 sets of 10 completed (some with ice chip trials) with min cues           Pt. will verbalize understanding of current swallow status and risks of aspiration/choking with min cues.     EDUCATION    Other      "

## 2020-10-06 ENCOUNTER — HOSPITAL ENCOUNTER (OUTPATIENT)
Dept: PHYSICAL THERAPY | Facility: REHABILITATION | Age: 85
Setting detail: THERAPIES SERIES
Discharge: HOME | End: 2020-10-06
Attending: FAMILY MEDICINE
Payer: COMMERCIAL

## 2020-10-06 ENCOUNTER — HOSPITAL ENCOUNTER (OUTPATIENT)
Dept: SPEECH THERAPY | Facility: REHABILITATION | Age: 85
Setting detail: THERAPIES SERIES
Discharge: HOME | End: 2020-10-06
Attending: FAMILY MEDICINE
Payer: COMMERCIAL

## 2020-10-06 DIAGNOSIS — I69.321 DYSPHASIA FOLLOWING CEREBROVASCULAR ACCIDENT (CVA): ICD-10-CM

## 2020-10-06 DIAGNOSIS — I63.9 CEREBROVASCULAR ACCIDENT (CVA), UNSPECIFIED MECHANISM (CMS/HCC): Primary | ICD-10-CM

## 2020-10-06 DIAGNOSIS — I69.393 ATAXIA FOLLOWING CEREBRAL INFARCTION: Primary | ICD-10-CM

## 2020-10-06 PROCEDURE — 92526 ORAL FUNCTION THERAPY: CPT | Mod: GN

## 2020-10-06 PROCEDURE — 97112 NEUROMUSCULAR REEDUCATION: CPT | Mod: GP

## 2020-10-06 PROCEDURE — 97110 THERAPEUTIC EXERCISES: CPT | Mod: GP

## 2020-10-06 ASSESSMENT — BALANCE ASSESSMENTS: TOTAL SCORE: 48

## 2020-10-06 NOTE — PROGRESS NOTES
"PT PROGRESS NOTE FOR OUTPATIENT THERAPY    Patient: Karl Park   MRN: [id number]  : 1928 89 y.o.  Referring Physician: Herb Dailey MD  Date of Visit: 10/6/2020      Certification Dates: 20 through 10/29/20    Recommended Frequency & Duration:  2 times/week for up to   3  months  Diagnosis:   1. Ataxia following cerebral infarction        Chief Complaints:  Chief Complaint   Patient presents with   • Difficulty Walking   • Balance Deficits   • Dec Strength   • Decreased Endurance       Precautions:   Existing Precautions/Restrictions: fall, other (see comments)     TODAY'S VISIT:    General Information - 10/06/20 1105        Session Details    Document Type  progress note     Mode of Treatment  physical therapy;individual therapy     Patient/Family/Caregiver Comments/Observations  Stiffness in knee and in general, but no new problems.  Uses walker mainly to come to therapy.  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 HPI: Pt's daughter stated that the onset of the stroke may have been  or . Pt went to the ER on 7/3 and he was diagnosed with pneumonia and bells palsy. Pt was given medication for that, which he couldn't swallow. Pt went to see his PCP on 20 who referred him to a neurologist. Pt saw the PA and he was tested for myasthenia gravis. Pt would cough throughout the whole week every time he ate or drank. Pt was seen by neurologist  for the MRI and swallow study. MRI showed that he had a brainstem stroke. Pt had a swallow study which showed asp on all consistencies and absent epiglottic inversion. Pt had a peg " tube placed on 7/20. Pt was also admitted overnight for IV fluids.     Existing Precautions/Restrictions  fall;other (see comments)         Pain/Vitals - 10/06/20 1112        Pain Assessment    Currently in pain  No/Denies        Pain Intervention    Intervention   ther ex, no pain - just stiffness     Post Intervention Comments  decreased stiffness          Daily Falls Screen - 10/06/20 1115        Daily Falls Assessment    Patient reported fall since last visit  No         Daily Treatment Assessment and Plan - 10/06/20 1613        Daily Treatment Assessment and Plan    Progress toward goals  Progressing     Daily Outcome Summary  Patient has continued to improve over the past several weeks.  Today he is not performing as well as last visit, admitting he slept poorly and probably is still not hydrating sufficiently which is difficult due to PEG.  His balance scores are approximately the same as last progress report (JIMENEZ 48/56 and FGA 18/30).  He has been able to ambulate fairly consistently without AD in the home, and could use a cane or RW for short community distances.  Therapy has increased focus on weight training and outdoor ambulation with a cane to work towards achieving prior level of ambulation without AD.  Therapy is also working on strength and flexibiltiy  to increase smooth transition to and from the floor, reaching to the floor without loss of balance.  It is anticipated that patient will have an upgraded HEP to further address his remaining balance deficits and flexibility/strength.  Continue to expect discharge by end of plan of care (10/29/20).     Plan and Recommendations  Progress HEP upgrades, ambulation outdoors with SPC.           OBJECTIVE MEASUREMENTS/DATA:    Balance/Posture   Balance and Posture - 10/06/20 1357        Jimenez Balance Scale    Sitting to Standing  Able to stand without using hands and stabilize independently     Standing Unsupported  Able to stand safely for 2 minutes      Sitting with Back Unsupported but Feet Supported on Floor or on a Stool  Able to sit safely and securely for 2 minutes     Standing to Sitting  Sits safely with minimal use of hands     Transfers  Able to transfer safely with minor use of hands     Standing Unsupported with Eyes Closed  Able to stand 10 seconds safely     Standing Unsupported with Feet Together  Able to place feet together independently and stand 1 minute safely     Reach Forward with Outstretched Arm While Standing  Can reach forward confidently 25 cm (10 inches)      Object from Floor from a Standing Position  Able to  slipper safely and easily     Turning to Look Behind Over Left and Right Shoulders While Standing  Looks behind one side only other side shows less weight shift     Turn 360 Degrees  Able to turn 360 degrees safely but slowly     Place Alternate Foot on Step or Stool While Standing Unsupported  Able to stand independently and complete 8 steps in greater than 20 seconds    had to recover balance x 3    Standing Unsupported One Foot in Front  Able to place foot ahead independently and hold 30 seconds     Standing on One Leg  Tries to lift leg unable to hold 3 seconds but remains standing independently     Juarez Balance Score  48        FGA     FGA Score (out of 30 total)  0.6         Gait and Mobility   Gait and Mobility - 10/06/20 1556        Gait Training    [place], Gait  modified independence    Pt is indep within HHD without an AD, with occasional touch assist on furniture    Variable surfaces  Flat surface     Assistive Device  cane, straight;walker, front-wheeled     Gait Pattern Utilized  step-through     Deviations/Abnormal Patterns (Gait)  --    occ variable step length/width if ambulating without AD       Stairs Assessment    [place], Stairs  modified independence     Handrail Location  both sides     Ascending Stairs Technique  step-over-step     Descending Stairs Technique  step-over-step      "      Today's Treatment::      PT Neuro Exercises Current Session Time Performed   THER ACT  TOTAL TIME FOR SESSION 0-7 Minutes    Precautions NPO/PEG tube.  Pt is I with PEG tube feedings.   Working on swallow in speech.  L facial droop and slight L eye ptosis post stroke.  Pt educated in use of HR with max HR defined as 129bpm.  Avoiding >120bpm. Yes   PROGRESS REPORT OMAIRA JIMENEZ YES   Transfer training Mod I with RW, Mod I without UE support for sit to stand without AD (at 20\" height)    Patient Education Reviewed POC goals and intention for 2x/week for at least 8 weeks.     HEP - Issued and reviewed with Patient - instructed to perform at counter or with chair for support:  -LAQ  -Heel raises  -Abduction  -Hamstring curls    Reviewed need for walker and difficulty with clearing R foot consistently    Reviewed progression of dumbell weight exercises to focus on increased reps vs increased weight for now.  Education regarding need to improve endurance and return to resting HR more efficiently.  Directed pt and rena to begin walking with SPC outdoors up to approx 20 min with rest periods as needed, close S.    Patient Education regarding the use of the SPC for outdoor ambulation.  Instruction in standing HS stretches and standing gastroc stretch on wedge.  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                             YES                     THER EX TOTAL TIME FOR SESSION 23-37 Minutes     STRETCHING     Stretching by patient Standing HS stretch w/ ft on block,  w/ manual assist 20 sec x 3 and added to HEP via foot prop (pt demos indep and understanding)  B calf stretch on incline #3  30 sec  x 3  Runner stretch N        N       CARDIOVASCULAR     Nu Step Level 5  UE's/LE's 8min, 1 min cool down Level 1 Y   Stationary Bike Virtual RB (nustep not available) [employer] dash, gear 6-7, 10 mins N   Walking warm up (check gait below)  "   Supine there ex Shaker exercises   Supine w/ 20x head lifts off pillow; 1x head lift and hold for 20 sec;  Cues to breathe N- reviewed verbally   Standing Ther-Ex HR's, Abd, Hs curls, marching and mini squats 10x     March x 10 x 2, HR/TR x 10,  N    Stdg 2 x 10 for bicep curls alt 9 # ea  alt ue raises  2 x 10 9 # EA ,   mini squats  2 x 10 9# ea UE  bent rows with 9# ea 2 x 10  Deep squats with holding onto rail x 5  1/2 kneel with holding onto rail x 2 Y  N  Y  Y  N  N            Seated in chair:  LAQ's w/ 5# 10x , repeated w/ 3 sec hold 10x N    STS from mat without UE support x 10 x 1, 10# wts. Y   NEURO RE-ED TOTAL TIME FOR SESSION 23-37 Minutes     Juarez Balance Test Score:  49 pts  Performed by Sarahy Guzman PTA 9/23/20  10/6 JUAREZ 48/56  10/6 FGA 18/30     Y   COORDINATION     POSTURAL RE-ED     PRE-GAIT ACTIVITIES      BALANCE TRAINING      Sitting balance     Standing balance - static Tap ups forward to 8 in block w/wo UE/ cga support  2 x 10 floor.  First 10x w/ UE support, second 10x w/o UE support (w/ CG)    Toe taps to maxine, single and alternating w progression towards decreased UE  support.  Therapist maintaining cl S or CGA    Static stdg on airex w/ HT's and HN's 10x w/ CS/CG    Standing with foot on small ball for rolls to increase sls x 10 ea LE     Rockerboard - A/P with head turns, head nods x 5 x 2 ea and cga/intermittent touches N                  N      N        N   Standing balance - dynamic Gait around slalom course of cones (w and w/o AD/SPC) and over hurdles    Gait over 6 in hurdles - step to pattern forward, side stepping.   reciprocal.  No UE support except intermittent touch.      Walking with head turns and nods w/o AD  Walking with base of support within 12 in tiles  Walking with pivot turn  Walking backwards  Stepping over shoebox forwards   N                                 Standing balance - varied surface On Airex:  Ball toss/bounce/catch  FT EO with HT/HN, UE's at sides  "  Airex, Semi-Tandem with HT    Blue tread rockerboard A-P gentle rocking for ankle strategies   N    N    N  N           GAIT TRAINING TOTAL TIME FOR SESSION 0-7 Minutes    TUG W/ RW  13.12 sec  W/ SPC 12.75 sec  W/O AD 10.63 sec  Average of the three:  12.16 seconds    Gait with/ without AD  Gait during  session w/o AD w/ CS  Gait in hallway w/ HT's and HN's    Complete 2 MWT w/  ft  w/o RW  368 ft 3/10 RPE after second 2 MWT  Complete FGA:  14/30  (1,2,1,2,3,1,0,0,2,2) - completed by Ana Rosa Winston PT (primary PT) - will update goals next visit with primary.      Arrived to session ambulating with RW; Gait training w SPC(RUE) x 250 ft x 3 w/  cl S, including uneven grass terrain and uneven macadam.   Focus on cues for heel strike/appropriate knee extension RLE.   HR increased from 82 at rest to 116 with ambulation episodes. No gross LOB, min instability, minimal loss of R LE appropriate step length and no  catch of R toe. Ambulates quicker with SPC than no AD, and has min. deviation from midline.    Short distance (75 ft) amb without using SPC, but carrying while walking over uneven grass terrain including incline.      GT  600-800ft over outdoor uneven terrain including grassy terrain and macadam.  Flagstones, brick, gravel, step over edges.  Moderate incline/decline with cl S - HR to 120bpm and with slow recovery to resting rate.  Cued to use proper pattern with cane without difficulty. 1 seated rest.    GT without AD within department - note increased unsteadiness today,  Educated regarding when to use AD.    Goal:  Return to amb without AD  N                                                                    Y   Dynamic gait      Stair negotiation Up/down FF w/ B HR's and step over step pattern  Ascend/descend 6\" curb with SPC, CS   Up/down ramp with SPC and cues to slow down for safety          Curb negotiation     Ramp negotiation     Outdoor ambulation Amb outdoors over asphalt, concrete, slate and " grass, up/down hills w/ SPC and CS    BWS/vector training     MANUAL TOTAL TIME FOR SESSION Not performed    Stretching by therapist/PROM     Mobilization      MODALITIES TOTAL TIME FOR SESSION Not performed    Ice     Heat           Goals Addressed                 This Visit's Progress    • PT goals        Short Term Goals Time Frame Result Comment/Progress   Pt will amb mod I in household setting with SPC or no AD, including 1 step up/down in his in-law suite at daughter's home 4 weeks MET    Improve JIMENEZ score by at least 3 points (45/56) reflecting dec falls risk 4 weeks MET Jimenez = 45/56    *FGA = 18/30   TUG </= 14 sec reflecting lower falls risk 4 weeks MET TUG = 14s w/ SPC  TUG = 12.5 w/o with S  12.16 on 9/23   Improve CGS by at least .1 m/s without decline in gait quality 4 weeks  Not tested on 8/28   FGA >/= 15/30 with least restrictive AD 4 weeks  18/30   Consistent STS and SPT transfers without AD mod I 4 weeks         Long Term Goals Time Frame Result Comment/Progress   Pt will amb I at home without AD, in community without AD or with SPC as needed for more challenging terrain/more distracting environments 8-12 weeks MET for HHD, ongoing for SPC Amb CD with RW     JIMENEZ >/= 50/56 reflecting lower falls risk 8-12 weeks ongoing 49/56  10/6 = 48/56   TUG completed safely in </= 12 sec with no decline in gait quality, balance with pivot turns 8-12 weeks ongoing 12.16   Gait speed 1.0 m/s maintaining gait quality 8-12 weeks MET If using RW   FGA >/= 20/30 8-12 weeks  8/28 FGA = 18/30  10/6 = 18/30   Mod I reciprocal stairs with 1 HR, amb up/down curb step and ADA ramps without AD 8-12 weeks Ongoing Requires AD pr supervision for safety with curb, especially descent   I finalized HEP 8-12 weeks Ongoing To finalize program with balance exercises; just completed HEP for weight training                   Education provided this session. Progression towards goals, balance scores, process to upgrade HEP and plan  for discharge/    Clinical Impression: 89y/o male with completely indep PLOF, including no assistive devices for ambulation,  has continued to improve over the past several weeks.  Today he is not performing as well as last visit, admitting he slept poorly and probably is still not hydrating sufficiently which is difficult due to PEG.  His balance scores are approximately the same as last progress report (JIMENEZ 48/56 and FGA 18/30).  He has been able to ambulate fairly consistently without AD in the home, and could use a cane or RW for short community distances.  Therapy has increased focus on weight training and outdoor ambulation with a cane to work towards achieving prior level of ambulation without AD.  Therapy is also working on strength and flexibiltiy  to increase smooth transition to and from the floor, reaching to the floor without loss of balance.  It is anticipated that patient will be ready for discharge by end of plan of care.  (10/29/20)

## 2020-10-06 NOTE — PROGRESS NOTES
[facility] DAILY NOTE FOR OUTPATIENT THERAPY    Patient: Karl Park   MRN: [id number]  : 1928 89 y.o.  Referring Physician: Herb Dailey MD  Date of Visit: 10/6/2020      Certification Dates: 20 through 20    Diagnosis:   1. Cerebrovascular accident (CVA), unspecified mechanism (CMS/HCC)    2. Dysphasia following cerebrovascular accident (CVA)        Chief Complaints:  dysphagia    Precautions:  Existing Precautions/Restrictions: NPO, aspiration, fall       TODAY'S VISIT:    History/Vitals/Pain/Encounter Info - 10/06/20 1320        Injury History/Precautions/Daily Required Info    Primary Therapist  Laura Chávez     Chief Complaint/Reason for Visit   dysphagia     Existing Precautions/Restrictions  NPO;aspiration;fall     Speech Therapy  Swallowing     Document Type  daily treatment     Patient/Family/Caregiver Comments/Observations  pt reports feeling tired today     Start Time  1200     Stop Time  1300     Time Calculation (min)  60 min     Patient reported fall since last visit  No        Pain Assessment    Currently in pain  No/Denies        Pain Interventions    Intervention  none     Post Intervention Comments  none         Daily Treatment Assessment and Plan - 10/06/20 1322        Daily Treatment Assessment and Plan    Progress toward goals  Progressing     Daily Outcome Summary  pt reports feeling tired today, reduced trials and exercises to accomodate     Plan and Recommendations  target swallow goals              Today's Treatment:      Short Term Goals Current Session   ?Pt. will tolerate trace PO trials without s/s of aspiration and adequate oral management with min cues over 2 sessions.  Ice chip trials x 20 with supersupraglottic swallow - no s/s aspiration, no expectoration        (trials of nectar on hold today secondary to pt reports of feeling tired)   Pt. will complete pharyngeal strengthening, laryngeal excursion, and base of tongue strengthening exercises to improve  "airway protection and bolus propulsion with min cues for accurate production with 90% accuracy      Effortful \"guh\" 2 sets of 20 completed    CTAR x 2 sets of 20 completed and 2 30 second trials, 1 1 minute trial    yvonne x 3 sets of 5 completed    Pitch glides x 2 sets of 5 completed    Mendelsohn x 1 sets of 10 completed (some with ice chip trials) with min cues           Pt. will verbalize understanding of current swallow status and risks of aspiration/choking with min cues.     EDUCATION    Other                              "

## 2020-10-06 NOTE — OP PT TREATMENT LOG
"PT Neuro Exercises Current Session Time Performed   THER ACT  TOTAL TIME FOR SESSION 0-7 Minutes    Precautions NPO/PEG tube.  Pt is I with PEG tube feedings.   Working on swallow in speech.  L facial droop and slight L eye ptosis post stroke.  Pt educated in use of HR with max HR defined as 129bpm.  Avoiding >120bpm. Yes   PROGRESS REPORT OMAIRA JIMENEZ YES   Transfer training Mod I with RW, Mod I without UE support for sit to stand without AD (at 20\" height)    Patient Education Reviewed POC goals and intention for 2x/week for at least 8 weeks.     HEP - Issued and reviewed with Patient - instructed to perform at counter or with chair for support:  -LAQ  -Heel raises  -Abduction  -Hamstring curls    Reviewed need for walker and difficulty with clearing R foot consistently    Reviewed progression of dumbell weight exercises to focus on increased reps vs increased weight for now.  Education regarding need to improve endurance and return to resting HR more efficiently.  Directed pt and rena to begin walking with SPC outdoors up to approx 20 min with rest periods as needed, close S.    Patient Education regarding the use of the SPC for outdoor ambulation.  Instruction in standing HS stretches and standing gastroc stretch on wedge.  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                             YES                     THER EX TOTAL TIME FOR SESSION 23-37 Minutes     STRETCHING     Stretching by patient Standing HS stretch w/ ft on block,  w/ manual assist 20 sec x 3 and added to HEP via foot prop (pt demos indep and understanding)  B calf stretch on incline #3  30 sec  x 3  Runner stretch N        N       CARDIOVASCULAR     Nu Step Level 5  UE's/LE's 8min, 1 min cool down Level 1 Y   Stationary Bike Virtual RB (nustep not available) [employer] dash, gear 6-7, 10 mins N   Walking warm up (check gait below)    Supine there ex Shaker " exercises   Supine w/ 20x head lifts off pillow; 1x head lift and hold for 20 sec;  Cues to breathe N- reviewed verbally   Standing Ther-Ex HR's, Abd, Hs curls, marching and mini squats 10x     March x 10 x 2, HR/TR x 10,  N    Stdg 2 x 10 for bicep curls alt 9 # ea  alt ue raises  2 x 10 9 # EA ,   mini squats  2 x 10 9# ea UE  bent rows with 9# ea 2 x 10  Deep squats with holding onto rail x 5  1/2 kneel with holding onto rail x 2 Y  N  Y  Y  N  N            Seated in chair:  LAQ's w/ 5# 10x , repeated w/ 3 sec hold 10x N    STS from mat without UE support x 10 x 1, 10# wts. Y   NEURO RE-ED TOTAL TIME FOR SESSION 23-37 Minutes     Juarez Balance Test Score:  49 pts  Performed by Sarahy Guzman PTA 9/23/20  10/6 JUAREZ 48/56  10/6 FGA 18/30     Y   COORDINATION     POSTURAL RE-ED     PRE-GAIT ACTIVITIES      BALANCE TRAINING      Sitting balance     Standing balance - static Tap ups forward to 8 in block w/wo UE/ cga support  2 x 10 floor.  First 10x w/ UE support, second 10x w/o UE support (w/ CG)    Toe taps to maxine, single and alternating w progression towards decreased UE  support.  Therapist maintaining cl S or CGA    Static stdg on airex w/ HT's and HN's 10x w/ CS/CG    Standing with foot on small ball for rolls to increase sls x 10 ea LE     Rockerboard - A/P with head turns, head nods x 5 x 2 ea and cga/intermittent touches N                  N      N        N   Standing balance - dynamic Gait around slalom course of cones (w and w/o AD/SPC) and over hurdles    Gait over 6 in hurdles - step to pattern forward, side stepping.   reciprocal.  No UE support except intermittent touch.      Walking with head turns and nods w/o AD  Walking with base of support within 12 in tiles  Walking with pivot turn  Walking backwards  Stepping over shoebox forwards   N                                 Standing balance - varied surface On Airex:  Ball toss/bounce/catch  FT EO with HT/HN, UE's at sides   Airex, Semi-Tandem with  "HT    Blue tread rockerboard A-P gentle rocking for ankle strategies   N    N    N  N           GAIT TRAINING TOTAL TIME FOR SESSION 0-7 Minutes    TUG W/ RW  13.12 sec  W/ SPC 12.75 sec  W/O AD 10.63 sec  Average of the three:  12.16 seconds    Gait with/ without AD  Gait during  session w/o AD w/ CS  Gait in hallway w/ HT's and HN's    Complete 2 MWT w/  ft  w/o RW  368 ft 3/10 RPE after second 2 MWT  Complete FGA:  14/30  (1,2,1,2,3,1,0,0,2,2) - completed by Ana Rosa Winston PT (primary PT) - will update goals next visit with primary.      Arrived to session ambulating with RW; Gait training w SPC(RUE) x 250 ft x 3 w/  cl S, including uneven grass terrain and uneven macadam.   Focus on cues for heel strike/appropriate knee extension RLE.   HR increased from 82 at rest to 116 with ambulation episodes. No gross LOB, min instability, minimal loss of R LE appropriate step length and no  catch of R toe. Ambulates quicker with SPC than no AD, and has min. deviation from midline.    Short distance (75 ft) amb without using SPC, but carrying while walking over uneven grass terrain including incline.      GT  600-800ft over outdoor uneven terrain including grassy terrain and macadam.  Flagstones, brick, gravel, step over edges.  Moderate incline/decline with cl S - HR to 120bpm and with slow recovery to resting rate.  Cued to use proper pattern with cane without difficulty. 1 seated rest.    GT without AD within department - note increased unsteadiness today,  Educated regarding when to use AD.    Goal:  Return to amb without AD  N                                                                    Y   Dynamic gait      Stair negotiation Up/down FF w/ B HR's and step over step pattern  Ascend/descend 6\" curb with SPC, CS   Up/down ramp with SPC and cues to slow down for safety          Curb negotiation     Ramp negotiation     Outdoor ambulation Amb outdoors over asphalt, concrete, slate and grass, up/down hills w/ SPC " and CS    BWS/vector training     MANUAL TOTAL TIME FOR SESSION Not performed    Stretching by therapist/PROM     Mobilization      MODALITIES TOTAL TIME FOR SESSION Not performed    Ice     Heat

## 2020-10-06 NOTE — OP SLP TREATMENT LOG
"Short Term Goals Current Session   ?Pt. will tolerate trace PO trials without s/s of aspiration and adequate oral management with min cues over 2 sessions.  Ice chip trials x 20 with supersupraglottic swallow - no s/s aspiration, no expectoration        (trials of nectar on hold today secondary to pt reports of feeling tired)   Pt. will complete pharyngeal strengthening, laryngeal excursion, and base of tongue strengthening exercises to improve airway protection and bolus propulsion with min cues for accurate production with 90% accuracy      Effortful \"guh\" 2 sets of 20 completed    CTAR x 2 sets of 20 completed and 2 30 second trials, 1 1 minute trial    yvonne x 3 sets of 5 completed    Pitch glides x 2 sets of 5 completed    Mendelsohn x 1 sets of 10 completed (some with ice chip trials) with min cues           Pt. will verbalize understanding of current swallow status and risks of aspiration/choking with min cues.     EDUCATION    Other      "

## 2020-10-07 ENCOUNTER — HOSPITAL ENCOUNTER (OUTPATIENT)
Dept: SPEECH THERAPY | Facility: REHABILITATION | Age: 85
Setting detail: THERAPIES SERIES
Discharge: HOME | End: 2020-10-07
Attending: FAMILY MEDICINE
Payer: COMMERCIAL

## 2020-10-07 DIAGNOSIS — I69.321 DYSPHASIA FOLLOWING CEREBROVASCULAR ACCIDENT (CVA): ICD-10-CM

## 2020-10-07 DIAGNOSIS — I63.9 CEREBROVASCULAR ACCIDENT (CVA), UNSPECIFIED MECHANISM (CMS/HCC): Primary | ICD-10-CM

## 2020-10-07 PROCEDURE — 92526 ORAL FUNCTION THERAPY: CPT | Mod: GN

## 2020-10-07 NOTE — PROGRESS NOTES
SLP DAILY NOTE FOR OUTPATIENT THERAPY    Patient: Karl Park   MRN: [id number]  : 1928 89 y.o.  Referring Physician: Herb Dailey MD  Date of Visit: 10/7/2020      Certification Dates: 20 through 20    Diagnosis:   1. Cerebrovascular accident (CVA), unspecified mechanism (CMS/HCC)    2. Dysphasia following cerebrovascular accident (CVA)        Chief Complaints:  dysphagia    TODAY'S VISIT:    History/Vitals/Pain/Encounter Info - 10/07/20 1330        Injury History/Precautions/Daily Required Info    Primary Therapist  Laura Kyler     Chief Complaint/Reason for Visit   dysphagia     Speech Therapy  Swallowing     Document Type  daily treatment     Patient/Family/Caregiver Comments/Observations  pt reports feeling better and having more energy than yesterday     Start Time  1000     Stop Time  1100     Time Calculation (min)  60 min     Patient reported fall since last visit  No        Pain Assessment    Currently in pain  No/Denies        Pain Interventions    Intervention  none     Post Intervention Comments  none         Daily Treatment Assessment and Plan - 10/07/20 1428        Daily Treatment Assessment and Plan    Progress toward goals  Progressing     Daily Outcome Summary  improving swallow fxn     Plan and Recommendations  target mendelsohn next session along with other pharygneal swallow exercises, trial nectar with superaglottic effortful double swallow              Today's Treatment:      Short Term Goals Current Session   ?Pt. will tolerate trace PO trials without s/s of aspiration and adequate oral management with min cues over 2 sessions.  Ice chip trials x 30 with supersupraglottic swallow - no s/s aspiration, no expectoration    Trial nectar x 4 oz with supersupraglottic swallow with no s/s aspiration, no expectoration    Trial applesauce by 1/4 tsp with cyclic ingestion and supraglottic swallow with no s/s aspiration   Pt. will complete pharyngeal strengthening,  "laryngeal excursion, and base of tongue strengthening exercises to improve airway protection and bolus propulsion with min cues for accurate production with 90% accuracy      Effortful \"guh\" 3 sets of 30 completed    CTAR x 3 sets of 30 completed and 2 30 second trials, 1 1 minute trial    yvonne x 3 sets of 8 completed    Pitch glides x 2 sets of 10 completed    Mendelsohn x 2 sets of 10 completed alternating between trials of nectar with supraglottic swallow and the exercise           Pt. will verbalize understanding of current swallow status and risks of aspiration/choking with min cues.     EDUCATION    Other                              "

## 2020-10-07 NOTE — OP SLP TREATMENT LOG
"Short Term Goals Current Session   ?Pt. will tolerate trace PO trials without s/s of aspiration and adequate oral management with min cues over 2 sessions.  Ice chip trials x 30 with supersupraglottic swallow - no s/s aspiration, no expectoration    Trial nectar x 4 oz with supersupraglottic swallow with no s/s aspiration, no expectoration    Trial applesauce by 1/4 tsp with cyclic ingestion and supraglottic swallow with no s/s aspiration   Pt. will complete pharyngeal strengthening, laryngeal excursion, and base of tongue strengthening exercises to improve airway protection and bolus propulsion with min cues for accurate production with 90% accuracy      Effortful \"guh\" 3 sets of 30 completed    CTAR x 3 sets of 30 completed and 2 30 second trials, 1 1 minute trial    yvonne x 3 sets of 8 completed    Pitch glides x 2 sets of 10 completed    Mendelsohn x 2 sets of 10 completed alternating between trials of nectar with supraglottic swallow and the exercise           Pt. will verbalize understanding of current swallow status and risks of aspiration/choking with min cues.     EDUCATION    Other      "

## 2020-10-14 ENCOUNTER — HOSPITAL ENCOUNTER (OUTPATIENT)
Dept: SPEECH THERAPY | Facility: REHABILITATION | Age: 85
Setting detail: THERAPIES SERIES
Discharge: HOME | End: 2020-10-14
Attending: FAMILY MEDICINE
Payer: COMMERCIAL

## 2020-10-14 DIAGNOSIS — I63.9 CEREBROVASCULAR ACCIDENT (CVA), UNSPECIFIED MECHANISM (CMS/HCC): Primary | ICD-10-CM

## 2020-10-14 DIAGNOSIS — I69.321 DYSPHASIA FOLLOWING CEREBROVASCULAR ACCIDENT (CVA): ICD-10-CM

## 2020-10-14 PROCEDURE — 92526 ORAL FUNCTION THERAPY: CPT | Mod: GN

## 2020-10-14 NOTE — PROGRESS NOTES
"SLP DAILY NOTE FOR OUTPATIENT THERAPY    Patient: Karl Park   MRN: [id number]  : 1928 89 y.o.  Referring Physician: eHrb Dailey MD  Date of Visit: 10/14/2020      Certification Dates: 20 through 20    Diagnosis:   1. Cerebrovascular accident (CVA), unspecified mechanism (CMS/HCC)    2. Dysphasia following cerebrovascular accident (CVA)        Chief Complaints:  dysphagia    Precautions:          TODAY'S VISIT:    History/Vitals/Pain/Encounter Info - 10/14/20 1821        Injury History/Precautions/Daily Required Info    Primary Therapist  Laura Kyler     Chief Complaint/Reason for Visit   dysphagia     Speech Therapy  Swallowing     Document Type  daily treatment     Patient/Family/Caregiver Comments/Observations  pt states he is tired; he didn't sleep well last night     Start Time  1700     Stop Time  1800     Time Calculation (min)  60 min     Patient reported fall since last visit  No        Pain Assessment    Currently in pain  No/Denies        Pain Interventions    Intervention  none     Post Intervention Comments  none         Daily Treatment Assessment and Plan - 10/14/20 1822        Daily Treatment Assessment and Plan    Progress toward goals  Progressing     Daily Outcome Summary  steady gains with swallow fxn     Plan and Recommendations  target swallow goals              Today's Treatment:      Short Term Goals Current Session   ?Pt. will tolerate trace PO trials without s/s of aspiration and adequate oral management with min cues over 2 sessions.  Ice chip trials x 20 with supersupraglottic swallow - no s/s aspiration, no expectoration      Trial nectar x 2 oz no s/s aspiration   Pt. will complete pharyngeal strengthening, laryngeal excursion, and base of tongue strengthening exercises to improve airway protection and bolus propulsion with min cues for accurate production with 90% accuracy      Effortful \"guh\" 2 sets of 20 completed    CTAR x 2 sets of 30 completed and 2 " 30 second trials, 1 1 minute trial    yvonne x 3 sets of 6 completed    Pitch glides x 2 sets of 5 completed    Mendelsohn x 1 sets of 10 completed (some with ice chip trials) with min cues           Pt. will verbalize understanding of current swallow status and risks of aspiration/choking with min cues.     EDUCATION    Other

## 2020-10-14 NOTE — OP SLP TREATMENT LOG
"Short Term Goals Current Session   ?Pt. will tolerate trace PO trials without s/s of aspiration and adequate oral management with min cues over 2 sessions.  Ice chip trials x 20 with supersupraglottic swallow - no s/s aspiration, no expectoration      Trial nectar x 2 oz no s/s aspiration   Pt. will complete pharyngeal strengthening, laryngeal excursion, and base of tongue strengthening exercises to improve airway protection and bolus propulsion with min cues for accurate production with 90% accuracy      Effortful \"guh\" 2 sets of 20 completed    CTAR x 2 sets of 30 completed and 2 30 second trials, 1 1 minute trial    yvonne x 3 sets of 6 completed    Pitch glides x 2 sets of 5 completed    Mendelsohn x 1 sets of 10 completed (some with ice chip trials) with min cues           Pt. will verbalize understanding of current swallow status and risks of aspiration/choking with min cues.     EDUCATION    Other      "

## 2020-10-15 ENCOUNTER — HOSPITAL ENCOUNTER (OUTPATIENT)
Dept: PHYSICAL THERAPY | Facility: REHABILITATION | Age: 85
Setting detail: THERAPIES SERIES
Discharge: HOME | End: 2020-10-15
Attending: FAMILY MEDICINE
Payer: COMMERCIAL

## 2020-10-15 ENCOUNTER — HOSPITAL ENCOUNTER (OUTPATIENT)
Dept: SPEECH THERAPY | Facility: REHABILITATION | Age: 85
Setting detail: THERAPIES SERIES
Discharge: HOME | End: 2020-10-15
Attending: FAMILY MEDICINE
Payer: COMMERCIAL

## 2020-10-15 DIAGNOSIS — I69.321 DYSPHASIA FOLLOWING CEREBROVASCULAR ACCIDENT (CVA): ICD-10-CM

## 2020-10-15 DIAGNOSIS — I63.9 CEREBROVASCULAR ACCIDENT (CVA), UNSPECIFIED MECHANISM (CMS/HCC): Primary | ICD-10-CM

## 2020-10-15 DIAGNOSIS — I69.393 ATAXIA FOLLOWING CEREBRAL INFARCTION: Primary | ICD-10-CM

## 2020-10-15 PROCEDURE — 97110 THERAPEUTIC EXERCISES: CPT | Mod: GP,CQ

## 2020-10-15 PROCEDURE — 97116 GAIT TRAINING THERAPY: CPT | Mod: GP,CQ

## 2020-10-15 PROCEDURE — 92526 ORAL FUNCTION THERAPY: CPT | Mod: GN

## 2020-10-15 PROCEDURE — 97112 NEUROMUSCULAR REEDUCATION: CPT | Mod: GP,CQ

## 2020-10-15 NOTE — OP PT TREATMENT LOG
"PT Neuro Exercises Current Session Time Performed   THER ACT  TOTAL TIME FOR SESSION 0-7 Minutes    Precautions NPO/PEG tube.  Pt is I with PEG tube feedings.   Working on swallow in speech.  L facial droop and slight L eye ptosis post stroke.  Pt educated in use of HR with max HR defined as 129bpm.  Avoiding >120bpm. Yes   PROGRESS REPORT OMAIRA JIMENEZ    Transfer training Mod I with RW, Mod I without UE support for sit to stand without AD (at 20\" height)    Patient Education Reviewed POC goals and intention for 2x/week for at least 8 weeks.     HEP - Issued and reviewed with Patient - instructed to perform at counter or with chair for support:  -LAQ  -Heel raises  -Abduction  -Hamstring curls    Reviewed need for walker and difficulty with clearing R foot consistently    Reviewed progression of dumbell weight exercises to focus on increased reps vs increased weight for now.  Education regarding need to improve endurance and return to resting HR more efficiently.  Directed pt and rena to begin walking with SPC outdoors up to approx 20 min with rest periods as needed, close S.    Patient Education regarding the use of the SPC for outdoor ambulation.  Instruction in standing HS stretches and standing gastroc stretch on wedge.  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                                                  THER EX TOTAL TIME FOR SESSION 8-22 Minutes     STRETCHING     Stretching by patient Seated HS stretch w/ ft on block,  w/ manual assist 20 sec x 3   B calf stretch on incline #3  30 sec  x 3  Runner stretch Yes    yes      N       CARDIOVASCULAR     Nu Step Level 5  UE's/LE's 8min, 1 min cool down Level 1    Stationary Bike Recumb bike 10 min Level 1 Yes   Walking warm up (check gait below)    Supine there ex Shaker exercises   Supine w/ 20x head lifts off pillow; 1x head lift and hold for 20 sec;  Cues to breathe N- " reviewed verbally   Standing Ther-Ex HR's, Abd, Hs curls, marching and mini squats 10x     March x 10 x 2, HR/TR x 10,  N    Stdg 2 x 10 for bicep curls alt 9 # ea  alt ue raises  2 x 10 9 # EA ,   mini squats  2 x 10 9# ea UE  bent rows with 9# ea 2 x 10  Deep squats with holding onto rail x 5  1/2 kneel with holding onto rail x 2 Y  N  Y  Y    N            Seated in chair:  LAQ's w/ 5# 10x , repeated w/ 3 sec hold 10x N    STS from mat without UE support x 10 x 1, 10# wts. Y   NEURO RE-ED TOTAL TIME FOR SESSION 23-37 Minutes     Juarez Balance Test Score:  49 pts  Performed by Sarahy Guzman PTA 9/23/20  10/6 JUAREZ 48/56  10/6 FGA 18/30        COORDINATION     POSTURAL RE-ED     PRE-GAIT ACTIVITIES      BALANCE TRAINING      Sitting balance     Standing balance - static Tap ups forward to 8 in block w/o UE support from floor; repeated stdg on airex 10x w/ 1 UE support; repeated 5x w/o UE support    Toe taps to maxine, single and alternating w progression towards decreased UE  support.  Therapist maintaining cl S or CGA    Static stdg on airex w/ HT's and HN's 10x w/ CS/CG    Standing with foot on small ball for rolls to increase sls x 10 ea LE     Rockerboard - A/P with head turns, head nods x 5 x 2 ea and cga/intermittent touches Y                  N      N        N   Standing balance - dynamic Gait around slalom course of cones (w and w/o AD/SPC) and over hurdles    Gait over 6 in hurdles - step to pattern forward, side stepping.   reciprocal.  No UE support except intermittent touch.      Walking with head turns and nods w/o AD  Walking with base of support within 12 in tiles  Walking with pivot turn  Walking backwards  Stepping over shoebox forwards   N                                 Standing balance - varied surface On Airex:  Ball toss/bounce/catch  FT EO with HT/HN, UE's at sides   Airex, Semi-Tandem with HT    Blue tread rockerboard A-P gentle rocking for ankle strategies   Y  Y  Y      Y  Y           GAIT  "TRAINING TOTAL TIME FOR SESSION 8-22 Minutes    TUG W/ RW  13.12 sec  W/ SPC 12.75 sec  W/O AD 10.63 sec  Average of the three:  12.16 seconds    Gait with/ without AD  Gait during  session w/o AD w/ CS  Gait in hallway w/ HT's and HN's    Complete 2 MWT w/  ft  w/o RW  368 ft 3/10 RPE after second 2 MWT  Complete FGA:  14/30  (1,2,1,2,3,1,0,0,2,2) - completed by Ana Rosa Winston PT (primary PT) - will update goals next visit with primary.      Arrived to session ambulating with RW; Gait training w SPC(RUE) x 250 ft x 3 w/  cl S, including uneven grass terrain and uneven macadam.   Focus on cues for heel strike/appropriate knee extension RLE.   HR increased from 82 at rest to 116 with ambulation episodes. No gross LOB, min instability, minimal loss of R LE appropriate step length and no  catch of R toe. Ambulates quicker with SPC than no AD, and has min. deviation from midline.    Short distance (75 ft) amb without using SPC, but carrying while walking over uneven grass terrain including incline.      GT  600-800ft over outdoor uneven terrain including grassy terrain and macadam.  Flagstones, brick, gravel, step over edges.  Moderate incline/decline with cl S - HR to 120bpm and with slow recovery to resting rate.  Cued to use proper pattern with cane without difficulty. 1 seated rest.    GT without AD within department - note increased unsteadiness today,  Educated regarding when to use AD.    Goal:  Return to amb without AD  N                                                                    Yes   Dynamic gait      Stair negotiation Up/down FF w/ B HR's and step over step pattern  Ascend/descend 6\" curb with SPC, CS   Up/down ramp with SPC and cues to slow down for safety          Curb negotiation     Ramp negotiation     Outdoor ambulation Amb outdoors over asphalt, concrete, slate and grass, up/down hills w/ SPC and CS  Attention to surface obstacles such as acorns, leaves, branches Yes   BWS/vector training   "   MANUAL TOTAL TIME FOR SESSION Not performed    Stretching by therapist/PROM     Mobilization      MODALITIES TOTAL TIME FOR SESSION Not performed    Ice     Heat

## 2020-10-15 NOTE — PROGRESS NOTES
"SLP DAILY NOTE FOR OUTPATIENT THERAPY    Patient: Karl Park   MRN: [id number]  : 1928 89 y.o.  Referring Physician: Herb Dailey MD  Date of Visit: 10/15/2020      Certification Dates: 20 through 20    Diagnosis:   1. Cerebrovascular accident (CVA), unspecified mechanism (CMS/HCC)    2. Dysphasia following cerebrovascular accident (CVA)        Chief Complaints:  dysphagia    Precautions:          TODAY'S VISIT:    History/Vitals/Pain/Encounter Info - 10/15/20 1634        Injury History/Precautions/Daily Required Info    Primary Therapist  Laura Kyler     Chief Complaint/Reason for Visit   dysphagia     Speech Therapy  Swallowing     Document Type  daily treatment     Patient/Family/Caregiver Comments/Observations  pt is alert and cooperative; no new issues to report     Start Time  1300     Stop Time  1400     Time Calculation (min)  60 min     Patient reported fall since last visit  No        Pain Assessment    Currently in pain  No/Denies        Pain Interventions    Intervention  none     Post Intervention Comments  none         Daily Treatment Assessment and Plan - 10/15/20 1636        Daily Treatment Assessment and Plan    Progress toward goals  Progressing     Daily Outcome Summary  steady gains with swallow fxn     Plan and Recommendations  target swallow goals              Today's Treatment:      Short Term Goals Current Session   ?Pt. will tolerate trace PO trials without s/s of aspiration and adequate oral management with min cues over 2 sessions.  Ice chip trials x 20 with supersupraglottic swallow - no s/s aspiration, no expectoration      Trial nectar x 2 oz no s/s aspiration   Pt. will complete pharyngeal strengthening, laryngeal excursion, and base of tongue strengthening exercises to improve airway protection and bolus propulsion with min cues for accurate production with 90% accuracy      Effortful \"guh\" 2 sets of 40 completed    CTAR x 1 set of 60, 2 sets of 30 " completed and 2 30 second trials, 1 1 minute trial    yvonne x 1 sets of 8 completed    Pitch glides x 2 sets of 10 completed    Mendelsohn x 1 sets of 10 completed (some with ice chip trials) with min cues           Pt. will verbalize understanding of current swallow status and risks of aspiration/choking with min cues.     EDUCATION    Other

## 2020-10-15 NOTE — PROGRESS NOTES
PT DAILY NOTE FOR OUTPATIENT THERAPY    Patient: Karl Park   MRN: [id number]  : 1928 89 y.o.  Referring Physician: Herb Dailey MD  Date of Visit: 10/15/2020    Certification Dates: 20 through 10/29/20    Diagnosis:   1. Ataxia following cerebral infarction        Chief Complaints:  gait, balance, strength, endurance    Precautions:   Precautions comments: PEG, NPO  Existing Precautions/Restrictions: fall, other (see comments)     TODAY'S VISIT    History/Vitals/Pain/Encounter Info - 10/15/20 1355        Injury History/Precautions/Daily Required Info    Primary Therapist  Aretha Ferrara PT     Chief Complaint/Reason for Visit   gait, balance, strength, endurance     Onset of Illness/Injury or Date of Surgery  20     Referring Physician  Asim     Existing Precautions/Restrictions  fall;other (see comments)     Precautions comments  PEG, NPO     Limitations/Impairments  swallowing     OP Specialty  Neuro     Document Type  daily treatment     Patient/Family/Caregiver Comments/Observations  Pt reports he doesn't feel 100% due to a new food through his PEG tube.  Pt unsure of name of new food.  No falls, no pain reported.     Start Time  1400     Stop Time  1500     Time Calculation (min)  60 min     Patient reported fall since last visit  No        Pain Assessment    Currently in pain  No/Denies        Pain Intervention    Intervention   na     Post Intervention Comments  na        Pre Activity Vital Signs    Pulse  89     SpO2  99 %     BP  110/56     BP Location  Left upper arm     BP Method  Manual     Patient Position  Sitting         Daily Treatment Assessment and Plan - 10/15/20 1452        Daily Treatment Assessment and Plan    Progress toward goals  Progressing     Daily Outcome Summary  Pt did well w/ balance activities, willing to release UE support after initial trial to perform w/o UE support (CG).  Pt reports he and his wife have been practicing walking a half block to local  [place]  Pt reports the [place] is their polling place and they would like to walk over to vote in November. Pt reports sometimes he uses RW and sometimes he uses the SPC.     Plan and Recommendations  Cont w/ POC as directed by primary PT for gait, balance, strength, endurance.                 Today's Treatment:      PT Neuro Exercises Current Session Time Performed   THER ACT  TOTAL TIME FOR SESSION 0-7 Minutes    Precautions NPO/PEG tube.  Pt is I with PEG tube feedings.   Working on swallow in speech.  L facial droop and slight L eye ptosis post stroke.  Pt educated in use of HR with max HR defined as 129bpm.  Avoiding >120bpm. Yes   PROGRESS REPORT OMAIRA JIMENEZ    Transfer training Mod I with RW, Mod I without UE support for sit to stand without AD (at 20\" height)    Patient Education Reviewed POC goals and intention for 2x/week for at least 8 weeks.     HEP - Issued and reviewed with Patient - instructed to perform at counter or with chair for support:  -LAQ  -Heel raises  -Abduction  -Hamstring curls    Reviewed need for walker and difficulty with clearing R foot consistently    Reviewed progression of dumbell weight exercises to focus on increased reps vs increased weight for now.  Education regarding need to improve endurance and return to resting HR more efficiently.  Directed pt and rena to begin walking with SPC outdoors up to approx 20 min with rest periods as needed, close S.    Patient Education regarding the use of the SPC for outdoor ambulation.  Instruction in standing HS stretches and standing gastroc stretch on wedge.  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                                                  THER EX TOTAL TIME FOR SESSION 8-22 Minutes     STRETCHING     Stretching by patient Seated HS stretch w/ ft on block,  w/ manual assist 20 sec x 3   B calf stretch on incline #3  30 sec  x 3  Runner stretch " Yes    yes      N       CARDIOVASCULAR     Nu Step Level 5  UE's/LE's 8min, 1 min cool down Level 1    Stationary Bike Recumb bike 10 min Level 1 Yes   Walking warm up (check gait below)    Supine there ex Shaker exercises   Supine w/ 20x head lifts off pillow; 1x head lift and hold for 20 sec;  Cues to breathe N- reviewed verbally   Standing Ther-Ex HR's, Abd, Hs curls, marching and mini squats 10x     March x 10 x 2, HR/TR x 10,  N    Stdg 2 x 10 for bicep curls alt 9 # ea  alt ue raises  2 x 10 9 # EA ,   mini squats  2 x 10 9# ea UE  bent rows with 9# ea 2 x 10  Deep squats with holding onto rail x 5  1/2 kneel with holding onto rail x 2 Y  N  Y  Y    N            Seated in chair:  LAQ's w/ 5# 10x , repeated w/ 3 sec hold 10x N    STS from mat without UE support x 10 x 1, 10# wts. Y   NEURO RE-ED TOTAL TIME FOR SESSION 23-37 Minutes     Juarez Balance Test Score:  49 pts  Performed by Sarahy Guzman PTA 9/23/20  10/6 JUAREZ 48/56  10/6 FGA 18/30        COORDINATION     POSTURAL RE-ED     PRE-GAIT ACTIVITIES      BALANCE TRAINING      Sitting balance     Standing balance - static Tap ups forward to 8 in block w/o UE support from floor; repeated stdg on airex 10x w/ 1 UE support; repeated 5x w/o UE support    Toe taps to maxine, single and alternating w progression towards decreased UE  support.  Therapist maintaining cl S or CGA    Static stdg on airex w/ HT's and HN's 10x w/ CS/CG    Standing with foot on small ball for rolls to increase sls x 10 ea LE     Rockerboard - A/P with head turns, head nods x 5 x 2 ea and cga/intermittent touches Y                  N      N        N   Standing balance - dynamic Gait around slalom course of cones (w and w/o AD/SPC) and over hurdles    Gait over 6 in hurdles - step to pattern forward, side stepping.   reciprocal.  No UE support except intermittent touch.      Walking with head turns and nods w/o AD  Walking with base of support within 12 in tiles  Walking with pivot  turn  Walking backwards  Stepping over shoebox forwards   N                                 Standing balance - varied surface On Airex:  Ball toss/bounce/catch  FT EO with HT/HN, UE's at sides   Airex, Semi-Tandem with HT    Blue tread rockerboard A-P gentle rocking for ankle strategies   Y  Y  Y      Y  Y           GAIT TRAINING TOTAL TIME FOR SESSION 8-22 Minutes    TUG W/ RW  13.12 sec  W/ SPC 12.75 sec  W/O AD 10.63 sec  Average of the three:  12.16 seconds    Gait with/ without AD  Gait during  session w/o AD w/ CS  Gait in hallway w/ HT's and HN's    Complete 2 MWT w/  ft  w/o RW  368 ft 3/10 RPE after second 2 MWT  Complete FGA:  14/30  (1,2,1,2,3,1,0,0,2,2) - completed by Ana Rosa Winston PT (primary PT) - will update goals next visit with primary.      Arrived to session ambulating with RW; Gait training w SPC(RUE) x 250 ft x 3 w/  cl S, including uneven grass terrain and uneven macadam.   Focus on cues for heel strike/appropriate knee extension RLE.   HR increased from 82 at rest to 116 with ambulation episodes. No gross LOB, min instability, minimal loss of R LE appropriate step length and no  catch of R toe. Ambulates quicker with SPC than no AD, and has min. deviation from midline.    Short distance (75 ft) amb without using SPC, but carrying while walking over uneven grass terrain including incline.      GT  600-800ft over outdoor uneven terrain including grassy terrain and macadam.  Flagstones, brick, gravel, step over edges.  Moderate incline/decline with cl S - HR to 120bpm and with slow recovery to resting rate.  Cued to use proper pattern with cane without difficulty. 1 seated rest.    GT without AD within department - note increased unsteadiness today,  Educated regarding when to use AD.    Goal:  Return to amb without AD  N                                                                    Yes   Dynamic gait      Stair negotiation Up/down FF w/ B HR's and step over step pattern  Ascend/descend  "6\" curb with SPC, CS   Up/down ramp with SPC and cues to slow down for safety          Curb negotiation     Ramp negotiation     Outdoor ambulation Amb outdoors over asphalt, concrete, slate and grass, up/down hills w/ SPC and CS  Attention to surface obstacles such as acorns, leaves, branches Yes   BWS/vector training     MANUAL TOTAL TIME FOR SESSION Not performed    Stretching by therapist/PROM     Mobilization      MODALITIES TOTAL TIME FOR SESSION Not performed    Ice     Heat                      "

## 2020-10-15 NOTE — OP SLP TREATMENT LOG
"Short Term Goals Current Session   ?Pt. will tolerate trace PO trials without s/s of aspiration and adequate oral management with min cues over 2 sessions.  Ice chip trials x 20 with supersupraglottic swallow - no s/s aspiration, no expectoration      Trial nectar x 2 oz no s/s aspiration   Pt. will complete pharyngeal strengthening, laryngeal excursion, and base of tongue strengthening exercises to improve airway protection and bolus propulsion with min cues for accurate production with 90% accuracy      Effortful \"guh\" 2 sets of 40 completed    CTAR x 1 set of 60, 2 sets of 30 completed and 2 30 second trials, 1 1 minute trial    yvonne x 1 sets of 8 completed    Pitch glides x 2 sets of 10 completed    Mendelsohn x 1 sets of 10 completed (some with ice chip trials) with min cues           Pt. will verbalize understanding of current swallow status and risks of aspiration/choking with min cues.     EDUCATION    Other      "

## 2020-10-16 ENCOUNTER — HOSPITAL ENCOUNTER (OUTPATIENT)
Dept: PHYSICAL THERAPY | Facility: REHABILITATION | Age: 85
Setting detail: THERAPIES SERIES
Discharge: HOME | End: 2020-10-16
Attending: FAMILY MEDICINE
Payer: COMMERCIAL

## 2020-10-16 ENCOUNTER — HOSPITAL ENCOUNTER (OUTPATIENT)
Dept: SPEECH THERAPY | Facility: REHABILITATION | Age: 85
Setting detail: THERAPIES SERIES
Discharge: HOME | End: 2020-10-16
Attending: FAMILY MEDICINE
Payer: COMMERCIAL

## 2020-10-16 DIAGNOSIS — I69.393 ATAXIA FOLLOWING CEREBRAL INFARCTION: Primary | ICD-10-CM

## 2020-10-16 DIAGNOSIS — I63.9 CEREBROVASCULAR ACCIDENT (CVA), UNSPECIFIED MECHANISM (CMS/HCC): Primary | ICD-10-CM

## 2020-10-16 DIAGNOSIS — I69.321 DYSPHASIA FOLLOWING CEREBROVASCULAR ACCIDENT (CVA): ICD-10-CM

## 2020-10-16 PROCEDURE — 92526 ORAL FUNCTION THERAPY: CPT | Mod: GN

## 2020-10-16 PROCEDURE — 97112 NEUROMUSCULAR REEDUCATION: CPT | Mod: GP

## 2020-10-16 PROCEDURE — 97530 THERAPEUTIC ACTIVITIES: CPT | Mod: GP

## 2020-10-16 PROCEDURE — 97110 THERAPEUTIC EXERCISES: CPT | Mod: GP

## 2020-10-16 NOTE — OP SLP TREATMENT LOG
Relevant Problems   No relevant active problems       Physical Exam    Airway   Mallampati: II  TM distance: >3 FB  Neck ROM: full       Cardiovascular - normal exam  Rhythm: regular  Rate: normal  (-) murmur     Dental - normal exam         Pulmonary - normal exam  Breath sounds clear to auscultation     Abdominal    Neurological - normal exam                 Anesthesia Plan    ASA 2       Plan - general       Airway plan will be LMA        Induction: intravenous    Postoperative Plan: Postoperative administration of opioids is intended.    Pertinent diagnostic labs and testing reviewed    Informed Consent:    Anesthetic plan and risks discussed with patient.    Use of blood products discussed with: patient whom consented to blood products.          "Short Term Goals Current Session   ?Pt. will tolerate trace PO trials without s/s of aspiration and adequate oral management with min cues over 2 sessions.  Ice chip trials x 20 with supersupraglottic swallow - no s/s aspiration, no expectoration      Trial nectar x 2 oz no s/s aspiration    Trial ice pop no s/s aspiration   Pt. will complete pharyngeal strengthening, laryngeal excursion, and base of tongue strengthening exercises to improve airway protection and bolus propulsion with min cues for accurate production with 90% accuracy      Effortful \"guh\" 2 sets of 40 completed    CTAR x 1 set of 60, 2 sets of 30 completed and 2 30 second trials, 1 1 minute trial    yvonne x 1 sets of 8 completed    Pitch glides x 2 sets of 10 completed    Mendelsohn x 1 sets of 10 completed (some with ice chip trials) with min cues           Pt. will verbalize understanding of current swallow status and risks of aspiration/choking with min cues.     EDUCATION    Other      "

## 2020-10-16 NOTE — OP PT TREATMENT LOG
"PT Neuro Exercises Current Session Time Performed   THER ACT  TOTAL TIME FOR SESSION 8-22 Minutes    Precautions NPO/PEG tube.  Pt is I with PEG tube feedings.   Working on swallow in speech.  L facial droop and slight L eye ptosis post stroke.  Pt educated in use of HR with max HR defined as 129bpm.  Avoiding >120bpm. Yes   PROGRESS REPORT OMAIRA JIMENEZ    Transfer training Mod I with RW, Mod I without UE support for sit to stand without AD (at 20\" height)    Patient Education Reviewed plan for discharge - dtr thought extra visits were approved.  Reviewed the status of his BP today (hypotensive but assymptomatic and dtr alerted).  Educated patient on need to maintain hydration, monitor for any dizziness on position change.  Recommended contact MD if this continues to avoid orthostatic hypotensive situations.  Goal to provide HEP including balance exercises and reiterate weight exercises. Y   Patient Education Reviewed POC goals and intention for 2x/week for at least 8 weeks.     HEP - Issued and reviewed with Patient - instructed to perform at counter or with chair for support:  -LAQ  -Heel raises  -Abduction  -Hamstring curls    Reviewed need for walker and difficulty with clearing R foot consistently    Reviewed progression of dumbell weight exercises to focus on increased reps vs increased weight for now.  Education regarding need to improve endurance and return to resting HR more efficiently.  Directed pt and rena to begin walking with SPC outdoors up to approx 20 min with rest periods as needed, close S.    Patient Education regarding the use of the SPC for outdoor ambulation.  Instruction in standing HS stretches and standing gastroc stretch on wedge.  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                                                  THER EX TOTAL TIME FOR SESSION 8-22 Minutes     STRETCHING     Stretching by patient " Seated HS stretch w/ ft on block,  w/ manual assist 20 sec x 3   B calf stretch on incline #3  30 sec  x 3  Runner stretch Yes    no      N       CARDIOVASCULAR     Nu Step Level 5  UE's/LE's 8min, 1 min cool down Level 1    Stationary Bike Recumb bike 10 min Level 1 no   Walking warm up (check gait below)    Supine there ex Shaker exercises   Supine w/ 20x head lifts off pillow; 1x head lift and hold for 20 sec;  Cues to breathe N- reviewed verbally   Standing Ther-Ex On airex:  March x 20, hip ext x 20, hip abd x 20 Y    8 in step ups and over x 8 ea LE Y    Stdg 2 x 10 for bicep curls alt 9 # ea  alt ue raises  2 x 10 9 # EA ,   mini squats  2 x 10 20# kettlebell  bent rows with 9# ea 2 x 10 (performed bilat simultaneously)  Deep squats with holding onto rail x 5  1/2 kneel with holding onto rail x 2   Y  N  Y  Y    N  N            Seated in chair:  LAQ's w/ 5# 10x , repeated w/ 3 sec hold 10x N   NEURO RE-ED TOTAL TIME FOR SESSION 23-37 Minutes     Jimenez Balance Test Score:  49 pts   PTA 9/23/20  10/6 JIMENEZ 48/56  10/6 FGA 18/30        COORDINATION     POSTURAL RE-ED     PRE-GAIT ACTIVITIES      BALANCE TRAINING      Sitting balance     Standing balance - static Tap ups forward to 8 in block w/o UE support from floor; repeated stdg on airex 10x w/ 1 UE support; repeated 5x w/o UE support  Static stdg on airex w/ HT's and HN's 10x w/ CS/CG    Standing with foot on small ball for rolls to increase sls x 10 ea LE     Rockerboard - A/P with head turns, head nods x 5 x 2 ea and cga/intermittent touches Y   Standing balance - dynamic Gait around slalom course of cones  and over hurdles - no AD, Supervision  Gait with obstacles combined with leaning down to  cones - no AD, Supervision    Gait over 6 in hurdles - step to pattern forward, side stepping.   reciprocal.  No UE support except intermittent touch.      Walking with head turns and nods w/o AD  Walking with base of support within 12 in tiles  Walking with  "pivot turn  Walking backwards  Stepping over shoebox forwards   Y    Y                             Standing balance - varied surface On Airex:  Ball toss/bounce/catch  FT EO with HT/HN, UE's at sides   Airex, Semi-Tandem with HT    Blue tread rockerboard A-P gentle rocking for ankle strategies   N                 GAIT TRAINING TOTAL TIME FOR SESSION 0-7 Minutes    TUG W/ RW  13.12 sec  W/ SPC 12.75 sec  W/O AD 10.63 sec  Average of the three:  12.16 seconds    Gait with/ without AD  Gait during  session w/o AD w/ CS  Gait in hallway w/ HT's and HN's    Complete 2 MWT w/  ft  w/o RW  368 ft 3/10 RPE after second 2 MWT  Complete FGA:  14/30  (1,2,1,2,3,1,0,0,2,2) - completed by Ana Rosa Winston PT (primary PT) - will update goals next visit with primary.      Arrived to session ambulating with RW; Gait training w SPC(RUE) x 250 ft x 3 w/  cl S, including uneven grass terrain and uneven macadam.   Focus on cues for heel strike/appropriate knee extension RLE.   HR increased from 82 at rest to 116 with ambulation episodes. No gross LOB, min instability, minimal loss of R LE appropriate step length and no  catch of R toe. Ambulates quicker with SPC than no AD, and has min. deviation from midline.      GT  600-800ft over outdoor uneven terrain including grassy terrain and macadam.  Flagstones, brick, gravel, step over edges.  Moderate incline/decline with cl S - HR to 120bpm and with slow recovery to resting rate.  Cued to use proper pattern with cane without difficulty. 1 seated rest.    GT without AD within department - note increased unsteadiness today,  Educated regarding when to use AD.    Goal:  Return to amb without AD  Y                                                                       Dynamic gait      Stair negotiation Up/down FF w/ B HR's and step over step pattern  Ascend/descend 6\" curb with SPC, CS   Up/down ramp with SPC and cues to slow down for safety          MANUAL TOTAL TIME FOR SESSION Not " performed    Stretching by therapist/PROM     Mobilization      MODALITIES TOTAL TIME FOR SESSION Not performed    Ice     Heat

## 2020-10-16 NOTE — PROGRESS NOTES
PT DAILY NOTE FOR OUTPATIENT THERAPY    Patient: Karl Park   MRN: [id number]  : 1928 89 y.o.  Referring Physician: Herb Dailey MD  Date of Visit: 10/16/2020    Certification Dates: 20 through 10/29/20    Diagnosis:   1. Ataxia following cerebral infarction        Chief Complaints:  gait, balance, strength, endurance    Precautions:   Precautions comments: PEG  Existing Precautions/Restrictions: fall, other (see comments)     TODAY'S VISIT    History/Vitals/Pain/Encounter Info - 10/16/20 1404        Injury History/Precautions/Daily Required Info    Primary Therapist  Aretha Ferrara PT     Chief Complaint/Reason for Visit   gait, balance, strength, endurance     Onset of Illness/Injury or Date of Surgery  20     Referring Physician  Asim     Existing Precautions/Restrictions  fall;other (see comments)     Precautions comments  PEG     Limitations/Impairments  swallowing     OP Specialty  Neuro     Document Type  daily treatment     Patient/Family/Caregiver Comments/Observations  Pt reports he has a new food and is getting used to it.  He is a little tired today - has a regimen with meds/food that keeps him busy in the am.  Dtr has questions regarding length of therapy.     Start Time  1404     Stop Time  1500     Time Calculation (min)  56 min     Patient reported fall since last visit  No        Pain Assessment    Currently in pain  No/Denies        Pain Intervention    Intervention   ther ex, gait train, nmre     Post Intervention Comments  no increase in pain after ex        Pre Activity Vital Signs    Pulse  95     SpO2  99 %     BP  98/50     BP Location  Left upper arm     BP Method  Automatic     Patient Position  Sitting        Activity Vital Signs    Activity Pulse  110         Daily Treatment Assessment and Plan - 10/16/20 1719        Daily Treatment Assessment and Plan    Progress toward goals  Progressing     Daily Outcome Summary  Continues to demonstrate improvement in  "strength and general balance.  Able to ambulate HHD without AD/with SPC, but rec continue with RW for community distances unless with family member and using SPC.  Concerned about lower BP today and dtr was alerted.  Monitor next visit - dtr to notify MD if continues to be low in order to avoid orthostatic hypotension.  He was asymptomatic for dizzinessm, but did have fatigue.     Plan and Recommendations  Progress as tolerated with discharge planning and provide balance HEP/ upgrade to include weight training exercises (pt already able to demo indep.)           OBJECTIVE DATA TAKEN TODAY:    None taken    Today's Treatment:      PT Neuro Exercises Current Session Time Performed   THER ACT  TOTAL TIME FOR SESSION 8-22 Minutes    Precautions NPO/PEG tube.  Pt is I with PEG tube feedings.   Working on swallow in speech.  L facial droop and slight L eye ptosis post stroke.  Pt educated in use of HR with max HR defined as 129bpm.  Avoiding >120bpm. Yes   PROGRESS REPORT OMAIRA JIMENEZ    Transfer training Mod I with RW, Mod I without UE support for sit to stand without AD (at 20\" height)    Patient Education Reviewed plan for discharge - dtr thought extra visits were approved.  Reviewed the status of his BP today (hypotensive but assymptomatic and dtr alerted).  Educated patient on need to maintain hydration, monitor for any dizziness on position change.  Recommended contact MD if this continues to avoid orthostatic hypotensive situations.  Goal to provide HEP including balance exercises and reiterate weight exercises. Y   Patient Education Reviewed POC goals and intention for 2x/week for at least 8 weeks.     HEP - Issued and reviewed with Patient - instructed to perform at counter or with chair for support:  -LAQ  -Heel raises  -Abduction  -Hamstring curls    Reviewed need for walker and difficulty with clearing R foot consistently    Reviewed progression of dumbell weight exercises to focus on increased reps vs increased " weight for now.  Education regarding need to improve endurance and return to resting HR more efficiently.  Directed pt and rena to begin walking with SPC outdoors up to approx 20 min with rest periods as needed, close S.    Patient Education regarding the use of the SPC for outdoor ambulation.  Instruction in standing HS stretches and standing gastroc stretch on wedge.  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                                                  THER EX TOTAL TIME FOR SESSION 8-22 Minutes     STRETCHING     Stretching by patient Seated HS stretch w/ ft on block,  w/ manual assist 20 sec x 3   B calf stretch on incline #3  30 sec  x 3  Runner stretch Yes    no      N       CARDIOVASCULAR     Nu Step Level 5  UE's/LE's 8min, 1 min cool down Level 1    Stationary Bike Recumb bike 10 min Level 1 no   Walking warm up (check gait below)    Supine there ex Shaker exercises   Supine w/ 20x head lifts off pillow; 1x head lift and hold for 20 sec;  Cues to breathe N- reviewed verbally   Standing Ther-Ex On airex:  March x 20, hip ext x 20, hip abd x 20 Y    8 in step ups and over x 8 ea LE Y    Stdg 2 x 10 for bicep curls alt 9 # ea  alt ue raises  2 x 10 9 # EA ,   mini squats  2 x 10 20# kettlebell  bent rows with 9# ea 2 x 10 (performed bilat simultaneously)  Deep squats with holding onto rail x 5  1/2 kneel with holding onto rail x 2   Y  N  Y  Y    N  N            Seated in chair:  LAQ's w/ 5# 10x , repeated w/ 3 sec hold 10x N   NEURO RE-ED TOTAL TIME FOR SESSION 23-37 Minutes     Jimenez Balance Test Score:  49 pts   PTA 9/23/20  10/6 JIMENEZ 48/56  10/6 FGA 18/30        COORDINATION     POSTURAL RE-ED     PRE-GAIT ACTIVITIES      BALANCE TRAINING      Sitting balance     Standing balance - static Tap ups forward to 8 in block w/o UE support from floor; repeated stdg on airex 10x w/ 1 UE support; repeated 5x w/o UE  support  Static stdg on airex w/ HT's and HN's 10x w/ CS/CG    Standing with foot on small ball for rolls to increase sls x 10 ea LE     Rockerboard - A/P with head turns, head nods x 5 x 2 ea and cga/intermittent touches Y   Standing balance - dynamic Gait around slalom course of cones  and over hurdles - no AD, Supervision  Gait with obstacles combined with leaning down to  cones - no AD, Supervision    Gait over 6 in hurdles - step to pattern forward, side stepping.   reciprocal.  No UE support except intermittent touch.      Walking with head turns and nods w/o AD  Walking with base of support within 12 in tiles  Walking with pivot turn  Walking backwards  Stepping over shoebox forwards   Y    Y                             Standing balance - varied surface On Airex:  Ball toss/bounce/catch  FT EO with HT/HN, UE's at sides   Airex, Semi-Tandem with HT    Blue tread rockerboard A-P gentle rocking for ankle strategies   N                 GAIT TRAINING TOTAL TIME FOR SESSION 0-7 Minutes    TUG W/ RW  13.12 sec  W/ SPC 12.75 sec  W/O AD 10.63 sec  Average of the three:  12.16 seconds    Gait with/ without AD  Gait during  session w/o AD w/ CS  Gait in hallway w/ HT's and HN's    Complete 2 MWT w/  ft  w/o RW  368 ft 3/10 RPE after second 2 MWT  Complete FGA:  14/30  (1,2,1,2,3,1,0,0,2,2) - completed by Ana Rosa Winston PT (primary PT) - will update goals next visit with primary.      Arrived to session ambulating with RW; Gait training w SPC(RUE) x 250 ft x 3 w/  cl S, including uneven grass terrain and uneven macadam.   Focus on cues for heel strike/appropriate knee extension RLE.   HR increased from 82 at rest to 116 with ambulation episodes. No gross LOB, min instability, minimal loss of R LE appropriate step length and no  catch of R toe. Ambulates quicker with SPC than no AD, and has min. deviation from midline.      GT  600-800ft over outdoor uneven terrain including grassy terrain and macadam.   "Flagstones, brick, gravel, step over edges.  Moderate incline/decline with cl S - HR to 120bpm and with slow recovery to resting rate.  Cued to use proper pattern with cane without difficulty. 1 seated rest.    GT without AD within department - note increased unsteadiness today,  Educated regarding when to use AD.    Goal:  Return to amb without AD  Y                                                                       Dynamic gait      Stair negotiation Up/down FF w/ B HR's and step over step pattern  Ascend/descend 6\" curb with SPC, CS   Up/down ramp with SPC and cues to slow down for safety          MANUAL TOTAL TIME FOR SESSION Not performed    Stretching by therapist/PROM     Mobilization      MODALITIES TOTAL TIME FOR SESSION Not performed    Ice     Heat                      "

## 2020-10-16 NOTE — PROGRESS NOTES
"SLP PROGRESS NOTE FOR OUTPATIENT THERAPY    Patient: Karl Park   MRN: [id number]  : 1928 89 y.o.  Referring Physician: Herb Dailey MD  Date of Visit: 10/16/2020      Certification Dates: 20 through 20    Recommended Frequency & Duration:  Other(2-3x week) for up to 3 months     Diagnosis:   1. Cerebrovascular accident (CVA), unspecified mechanism (CMS/HCC)    2. Dysphasia following cerebrovascular accident (CVA)        Chief Complaints:  No chief complaint on file.      Precautions:        TODAY'S VISIT:          Daily Treatment Assessment and Plan - 10/16/20 1802        Daily Treatment Assessment and Plan    Progress toward goals  Progressing     Daily Outcome Summary  steady gains with swallow fxn     Plan and Recommendations  target swallow goals             Today's Treatment::      Short Term Goals Current Session   ?Pt. will tolerate trace PO trials without s/s of aspiration and adequate oral management with min cues over 2 sessions.  Ice chip trials x 20 with supersupraglottic swallow - no s/s aspiration, no expectoration      Trial nectar x 2 oz no s/s aspiration    Trial ice pop no s/s aspiration   Pt. will complete pharyngeal strengthening, laryngeal excursion, and base of tongue strengthening exercises to improve airway protection and bolus propulsion with min cues for accurate production with 90% accuracy      Effortful \"guh\" 2 sets of 40 completed    CTAR x 1 set of 60, 2 sets of 30 completed and 2 30 second trials, 1 1 minute trial    yvonne x 1 sets of 8 completed    Pitch glides x 2 sets of 10 completed    Mendelsohn x 1 sets of 10 completed (some with ice chip trials) with min cues           Pt. will verbalize understanding of current swallow status and risks of aspiration/choking with min cues.     EDUCATION    Other        Goals Addressed                 This Visit's Progress    • swallowing        Short Term Goals Time Frame  Result  Comment/Progress    ?Pt. will " "tolerate trace PO trials without s/s of aspiration and adequate oral management with min cues over 2 sessions.  4 weeks   Ongoing  10/17/2020: Trialing nectar and thin with super supraglottic swallow with min cues; average need to expectorate 3 x per 4 oz; trial up to 1 oz applesauce.   Pt. will complete pharyngeal strengthening, laryngeal excursion, and base of tongue strengthening exercises to improve airway protection and bolus propulsion with min cues for accurate production with 90% accuracy  4 weeks   Ongoing  10/17/2020: Pt is able to complete effortful swallow (up to 60/session), effortful \"guh\" (up to 120/session) and CTAR (up to 80/session) exercises with min cues. Pt is also able to complete yvonne x 15 per session. Pt is completing standard shaker exercises at home, which he was unable to do previously.    Pt. will verbalize understanding of current swallow status and risks of aspiration/choking with min cues.  2 weeks  MET  8/27/2020: Pt is able to verbalize NPO status and risks of aspiration/choking without cues.      Long Term Goals   Time Frame  Result  Comment/Progress    Pt. will tolerate the least restrictive diet level without complications or respiratory compromise with min cues/supervision.    NOMS swallowing goal: 4  8 weeks   Ongoing    Baseline swallowing NOMS: 1       CLINICAL IMPRESSION: Pt is making improvements with swallow function as evidenced by increased tolerance of PO trials within sessions as well as increased ability to perform more repetitions and exercises of increased difficlty. VFSS scheduled for 2 week in November. Pt is able to complete ice chips via FWP at home independently. Pt needs rare min cues to complete super-supraglottic swallow maneuver. Recommend continuing swallow exercise program and conservative trials of nectar as well as ice chip trials. Recommend 2-3x week for up to 8 weeks.                 "

## 2020-10-19 ENCOUNTER — HOSPITAL ENCOUNTER (OUTPATIENT)
Dept: SPEECH THERAPY | Facility: REHABILITATION | Age: 85
Setting detail: THERAPIES SERIES
Discharge: HOME | End: 2020-10-19
Attending: FAMILY MEDICINE
Payer: COMMERCIAL

## 2020-10-19 ENCOUNTER — HOSPITAL ENCOUNTER (OUTPATIENT)
Dept: PHYSICAL THERAPY | Facility: REHABILITATION | Age: 85
Setting detail: THERAPIES SERIES
Discharge: HOME | End: 2020-10-19
Attending: FAMILY MEDICINE
Payer: COMMERCIAL

## 2020-10-19 DIAGNOSIS — I69.393 ATAXIA FOLLOWING CEREBRAL INFARCTION: Primary | ICD-10-CM

## 2020-10-19 DIAGNOSIS — I63.9 CEREBROVASCULAR ACCIDENT (CVA), UNSPECIFIED MECHANISM (CMS/HCC): Primary | ICD-10-CM

## 2020-10-19 DIAGNOSIS — I69.321 DYSPHASIA FOLLOWING CEREBROVASCULAR ACCIDENT (CVA): ICD-10-CM

## 2020-10-19 PROCEDURE — 97112 NEUROMUSCULAR REEDUCATION: CPT | Mod: GP

## 2020-10-19 PROCEDURE — 97110 THERAPEUTIC EXERCISES: CPT | Mod: GP

## 2020-10-19 PROCEDURE — 92526 ORAL FUNCTION THERAPY: CPT | Mod: GN

## 2020-10-19 NOTE — PROGRESS NOTES
PT DAILY NOTE FOR OUTPATIENT THERAPY    Patient: Karl Park   MRN: [id number]  : 1928 89 y.o.  Referring Physician: Herb Dailey MD  Date of Visit: 10/19/2020    Certification Dates: 20 through 10/31/20    Diagnosis:   1. Ataxia following cerebral infarction        Chief Complaints:  balance, strength, endurance    Precautions:   Precautions comments: NPO/PEG  Existing Precautions/Restrictions: fall, other (see comments)     TODAY'S VISIT    History/Vitals/Pain/Encounter Info - 10/19/20 1248        Injury History/Precautions/Daily Required Info    Primary Therapist  Aretha Ferrara     Chief Complaint/Reason for Visit   balance, strength, endurance     Onset of Illness/Injury or Date of Surgery  20     Referring Physician  Candace     Existing Precautions/Restrictions  fall;other (see comments)     Precautions comments  NPO/PEG     Limitations/Impairments  swallowing     OP Specialty  Neuro     Document Type  daily treatment     Start Time  1100     Stop Time  1200     Time Calculation (min)  60 min     Patient reported fall since last visit  No        Pain Assessment    Currently in pain  No/Denies        Pain Intervention    Intervention   none     Post Intervention Comments  none        Activity Vital Signs    Patient activity  Therapeutic Exercise     Activity Pulse  80     Activity BP  115/62     Activity BP Location  Left upper arm     Activity BP Method  Automatic     Patient Position  Sitting         Daily Treatment Assessment and Plan - 10/19/20 1254        Daily Treatment Assessment and Plan    Progress toward goals  Progressing     Daily Outcome Summary  Karl did well with HEP.  Seated vs standing options for UE RROM.  Educated on safety using sturdy counter or chair for balance activities. Educated on self monitoring HR and will practice at home ves daughter interested in maybe purchasing [employer].     Plan and Recommendations  DC           OBJECTIVE DATA TAKEN TODAY:    None  "taken    Today's Treatment:      PT Neuro Exercises Current Session Time Performed   THER ACT  TOTAL TIME FOR SESSION 0-7 Minutes    Precautions NPO/PEG tube.  Pt is I with PEG tube feedings.   Working on swallow in speech.  L facial droop and slight L eye ptosis post stroke.  Pt educated in use of HR with max HR defined as 129bpm.  Avoiding >120bpm. Yes   PROGRESS REPORT OMAIRA JIMENEZ    Transfer training Mod I with RW, Mod I without UE support for sit to stand without AD (at 20\" height)    Patient Education Reviewed plan for discharge - dtr thought extra visits were approved.  Reviewed the status of his BP today (hypotensive but assymptomatic and dtr alerted).  Educated patient on need to maintain hydration, monitor for any dizziness on position change.  Recommended contact MD if this continues to avoid orthostatic hypotensive situations.  Goal to provide HEP including balance exercises and reiterate weight exercises. Y   Patient Education Reviewed POC goals and intention for 2x/week for at least 8 weeks.     HEP - Issued and reviewed with Patient - instructed to perform at counter or with chair for support:  -LAQ  -Heel raises  -Abduction  -Hamstring curls    Reviewed need for walker and difficulty with clearing R foot consistently    Reviewed progression of dumbell weight exercises to focus on increased reps vs increased weight for now.  Education regarding need to improve endurance and return to resting HR more efficiently.  Directed pt and rena to begin walking with SPC outdoors up to approx 20 min with rest periods as needed, close S.    Patient Education regarding the use of the SPC for outdoor ambulation.  Instruction in standing HS stretches and standing gastroc stretch on wedge.  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    Reviewed HEP 10/19/20                                                                 Y   THER EX TOTAL " TIME FOR SESSION 38-52 [id number]     STRETCHING     Stretching by patient Seated HS stretch w/ ft on block,  w/ manual assist 20 sec x 3   B calf stretch on incline #3  30 sec  x 3  Runner stretch Yes       CARDIOVASCULAR     Nu Step Level 1 UE's/LE's 6min, 1 min cool down  Y   Stationary Bike Recumb bike 10 min Level 1 no   Walking warm up (check gait below)    Supine there ex Shaker exercises   Supine w/ 20x head lifts off pillow; 1x head lift and hold for 20 sec;  Cues to breathe    Standing Ther-Ex On airex:  March x 20, hip ext x 20, hip abd x 20     8 in step ups and over x 8 ea LE     seated 2 x 10 for bicep curls alt 9 # ea  alt ue raises  2 x 10 9 # EA seated   mini squats  2 x 10 9#   bent rows with 9# ea 2 x 10 (performed bilat simultaneously)  Deep squats with holding onto rail x 5  1/2 kneel with holding onto rail x 2   Y  Y  Y  Y        Seated in chair:  LAQ's w/ 5# 10x , repeated w/ 3 sec hold 10x N   NEURO RE-ED TOTAL TIME FOR SESSION 8-22 [id number]     Jimenez Balance Test Score:  49 pts   PTA 9/23/20  10/6 JIMENEZ 48/56  10/6 FGA 18/30        COORDINATION     POSTURAL RE-ED     PRE-GAIT ACTIVITIES      BALANCE TRAINING      Sitting balance     Standing balance - static Tap ups forward to 8 in block w/o UE support from floor 10x2  Static stdg on airex w/ HT's and HN's 10x w/ CS/CG    Standing with foot on small ball for rolls to increase sls x 10 ea LE     Rockerboard - A/P with head turns, head nods x 5 x 2 ea and cga/intermittent touches Y   Standing balance - dynamic Gait around slalom course of cones  and over hurdles - no AD, Supervision  Gait with obstacles combined with leaning down to  cones - no AD, Supervision    Gait over 6 in hurdles - step to pattern forward, side stepping.   reciprocal.  No UE support except intermittent touch.      Walking with head turns and nods w/o AD  Walking with base of support within 12 in tiles  Walking with pivot turn  Walking backwards  Stepping over  "shoebox forwards                            Standing balance - varied surface On Airex:  Ball toss/bounce/catch  FT EO with HT/HN, UE's at sides   Airex, Semi-Tandem with HT    Blue tread rockerboard A-P gentle rocking for ankle strategies   N                 GAIT TRAINING TOTAL TIME FOR SESSION 0-7 Minutes    TUG W/ RW  13.12 sec  W/ SPC 12.75 sec  W/O AD 10.63 sec  Average of the three:  12.16 seconds    Gait with/ without AD  Gait with RW in and out of PT    Gait during  session w/o AD w/ CS  Gait in hallway w/ HT's and HN's    Complete 2 MWT w/  ft  w/o RW  368 ft 3/10 RPE after second 2 MWT  Complete FGA:  14/30  (1,2,1,2,3,1,0,0,2,2) - completed by Ana Rosa Winston PT (primary PT) - will update goals next visit with primary.      Arrived to session ambulating with RW; Gait training w SPC(RUE) x 250 ft x 3 w/  cl S, including uneven grass terrain and uneven macadam.   Focus on cues for heel strike/appropriate knee extension RLE.   HR increased from 82 at rest to 116 with ambulation episodes. No gross LOB, min instability, minimal loss of R LE appropriate step length and no  catch of R toe. Ambulates quicker with SPC than no AD, and has min. deviation from midline.      GT  600-800ft over outdoor uneven terrain including grassy terrain and macadam.  Flagstones, brick, gravel, step over edges.  Moderate incline/decline with cl S - HR to 120bpm and with slow recovery to resting rate.  Cued to use proper pattern with cane without difficulty. 1 seated rest.    GT without AD within department - note increased unsteadiness today,  Educated regarding when to use AD.    Goal:  Return to amb without AD  Y                                                                       Dynamic gait      Stair negotiation Up/down FF w/ B HR's and step over step pattern  Ascend/descend 6\" curb with SPC, CS   Up/down ramp with SPC and cues to slow down for safety          MANUAL TOTAL TIME FOR SESSION Not performed    Stretching by " therapist/PROM     Mobilization      MODALITIES TOTAL TIME FOR SESSION Not performed    Ice     Heat

## 2020-10-19 NOTE — PROGRESS NOTES
SLP DAILY NOTE FOR OUTPATIENT THERAPY    Patient: Karl Park   MRN: [id number]  : 1928 89 y.o.  Referring Physician: Herb Dailey MD  Date of Visit: 10/19/2020      Certification Dates: 20 through 20    Diagnosis:   1. Cerebrovascular accident (CVA), unspecified mechanism (CMS/HCC)    2. Dysphasia following cerebrovascular accident (CVA)        Chief Complaints:  dysphagia    Precautions:  Existing Precautions/Restrictions: fall, NPO, aspiration       TODAY'S VISIT:    History/Vitals/Pain/Encounter Info - 10/19/20 1029        Injury History/Precautions/Daily Required Info    Primary Therapist  Laura Chávez     Chief Complaint/Reason for Visit   dysphagia     Existing Precautions/Restrictions  fall;NPO;aspiration     Speech Therapy  Swallowing     Document Type  daily treatment     Patient/Family/Caregiver Comments/Observations  pt is alert and cooperative     Start Time  1000     Stop Time  1100     Time Calculation (min)  60 min     Patient reported fall since last visit  No        Pain Assessment    Currently in pain  No/Denies        Pain Interventions    Intervention  none     Post Intervention Comments  none         Daily Treatment Assessment and Plan - 10/19/20 1037        Daily Treatment Assessment and Plan    Progress toward goals  Progressing     Daily Outcome Summary  pt is making progress with swallow function     Plan and Recommendations  continue to target swallow goals              Today's Treatment:      Short Term Goals Current Session   ?Pt. will tolerate trace PO trials without s/s of aspiration and adequate oral management with min cues over 2 sessions.  Ice chip trials x 20 with supersupraglottic swallow - no s/s aspiration, no expectoration      Trial lemon ice x 4 oz with frequent throat clearing and some expectoration of thick mucous    Trial    Pt. will complete pharyngeal strengthening, laryngeal excursion, and base of tongue strengthening exercises to improve  "airway protection and bolus propulsion with min cues for accurate production with 90% accuracy      Effortful \"guh\" 2 sets of 40 completed    CTAR x 1 set of 60, 2 sets of 30 completed and 2 30 second trials, 1 1 minute trial    yvonne x 2 sets of 6 completed    Pitch glides x 2 sets of 10 completed    Mendelsohn x 4 sets of 10 completed (some with cold flavored spoon) with min cues           Pt. will verbalize understanding of current swallow status and risks of aspiration/choking with min cues.     EDUCATION    Other                              "

## 2020-10-19 NOTE — OP SLP TREATMENT LOG
"Short Term Goals Current Session   ?Pt. will tolerate trace PO trials without s/s of aspiration and adequate oral management with min cues over 2 sessions.  Ice chip trials x 20 with supersupraglottic swallow - no s/s aspiration, no expectoration      Trial lemon ice x 4 oz with frequent throat clearing and some expectoration of thick mucous    Trial    Pt. will complete pharyngeal strengthening, laryngeal excursion, and base of tongue strengthening exercises to improve airway protection and bolus propulsion with min cues for accurate production with 90% accuracy      Effortful \"guh\" 2 sets of 40 completed    CTAR x 1 set of 60, 2 sets of 30 completed and 2 30 second trials, 1 1 minute trial    yvonne x 2 sets of 6 completed    Pitch glides x 2 sets of 10 completed    Mendelsohn x 4 sets of 10 completed (some with cold flavored spoon) with min cues           Pt. will verbalize understanding of current swallow status and risks of aspiration/choking with min cues.     EDUCATION    Other      "

## 2020-10-19 NOTE — ADDENDUM NOTE
Encounter addended by: Mirlande Sood, PT on: 10/19/2020 12:56 PM   Actions taken: Charge Capture section accepted

## 2020-10-19 NOTE — OP PT TREATMENT LOG
"PT Neuro Exercises Current Session Time Performed   THER ACT  TOTAL TIME FOR SESSION 0-7 Minutes    Precautions NPO/PEG tube.  Pt is I with PEG tube feedings.   Working on swallow in speech.  L facial droop and slight L eye ptosis post stroke.  Pt educated in use of HR with max HR defined as 129bpm.  Avoiding >120bpm. Yes   PROGRESS REPORT OMAIRA JIMENEZ    Transfer training Mod I with RW, Mod I without UE support for sit to stand without AD (at 20\" height)    Patient Education Reviewed plan for discharge - dtr thought extra visits were approved.  Reviewed the status of his BP today (hypotensive but assymptomatic and dtr alerted).  Educated patient on need to maintain hydration, monitor for any dizziness on position change.  Recommended contact MD if this continues to avoid orthostatic hypotensive situations.  Goal to provide HEP including balance exercises and reiterate weight exercises. Y   Patient Education Reviewed POC goals and intention for 2x/week for at least 8 weeks.     HEP - Issued and reviewed with Patient - instructed to perform at counter or with chair for support:  -LAQ  -Heel raises  -Abduction  -Hamstring curls    Reviewed need for walker and difficulty with clearing R foot consistently    Reviewed progression of dumbell weight exercises to focus on increased reps vs increased weight for now.  Education regarding need to improve endurance and return to resting HR more efficiently.  Directed pt and rena to begin walking with SPC outdoors up to approx 20 min with rest periods as needed, close S.    Patient Education regarding the use of the SPC for outdoor ambulation.  Instruction in standing HS stretches and standing gastroc stretch on wedge.  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    Reviewed HEP 10/19/20                                                                 Y   THER EX TOTAL TIME FOR SESSION 38-52 [id number] "     STRETCHING     Stretching by patient Seated HS stretch w/ ft on block,  w/ manual assist 20 sec x 3   B calf stretch on incline #3  30 sec  x 3  Runner stretch Yes       CARDIOVASCULAR     Nu Step Level 1 UE's/LE's 6min, 1 min cool down  Y   Stationary Bike Recumb bike 10 min Level 1 no   Walking warm up (check gait below)    Supine there ex Shaker exercises   Supine w/ 20x head lifts off pillow; 1x head lift and hold for 20 sec;  Cues to breathe    Standing Ther-Ex On airex:  March x 20, hip ext x 20, hip abd x 20     8 in step ups and over x 8 ea LE     seated 2 x 10 for bicep curls alt 9 # ea  alt ue raises  2 x 10 9 # EA seated   mini squats  2 x 10 9#   bent rows with 9# ea 2 x 10 (performed bilat simultaneously)  Deep squats with holding onto rail x 5  1/2 kneel with holding onto rail x 2   Y  Y  Y  Y        Seated in chair:  LAQ's w/ 5# 10x , repeated w/ 3 sec hold 10x N   NEURO RE-ED TOTAL TIME FOR SESSION 8-22 [id number]     Jimenez Balance Test Score:  49 pts   PTA 9/23/20  10/6 JIMENEZ 48/56  10/6 FGA 18/30        COORDINATION     POSTURAL RE-ED     PRE-GAIT ACTIVITIES      BALANCE TRAINING      Sitting balance     Standing balance - static Tap ups forward to 8 in block w/o UE support from floor 10x2  Static stdg on airex w/ HT's and HN's 10x w/ CS/CG    Standing with foot on small ball for rolls to increase sls x 10 ea LE     Rockerboard - A/P with head turns, head nods x 5 x 2 ea and cga/intermittent touches Y   Standing balance - dynamic Gait around slalom course of cones  and over hurdles - no AD, Supervision  Gait with obstacles combined with leaning down to  cones - no AD, Supervision    Gait over 6 in hurdles - step to pattern forward, side stepping.   reciprocal.  No UE support except intermittent touch.      Walking with head turns and nods w/o AD  Walking with base of support within 12 in tiles  Walking with pivot turn  Walking backwards  Stepping over shoebox forwards    "                         Standing balance - varied surface On Airex:  Ball toss/bounce/catch  FT EO with HT/HN, UE's at sides   Airex, Semi-Tandem with HT    Blue tread rockerboard A-P gentle rocking for ankle strategies   N                 GAIT TRAINING TOTAL TIME FOR SESSION 0-7 Minutes    TUG W/ RW  13.12 sec  W/ SPC 12.75 sec  W/O AD 10.63 sec  Average of the three:  12.16 seconds    Gait with/ without AD  Gait with RW in and out of PT    Gait during  session w/o AD w/ CS  Gait in hallway w/ HT's and HN's    Complete 2 MWT w/  ft  w/o RW  368 ft 3/10 RPE after second 2 MWT  Complete FGA:  14/30  (1,2,1,2,3,1,0,0,2,2) - completed by Ana Rosa Winston PT (primary PT) - will update goals next visit with primary.      Arrived to session ambulating with RW; Gait training w SPC(RUE) x 250 ft x 3 w/  cl S, including uneven grass terrain and uneven macadam.   Focus on cues for heel strike/appropriate knee extension RLE.   HR increased from 82 at rest to 116 with ambulation episodes. No gross LOB, min instability, minimal loss of R LE appropriate step length and no  catch of R toe. Ambulates quicker with SPC than no AD, and has min. deviation from midline.      GT  600-800ft over outdoor uneven terrain including grassy terrain and macadam.  Flagstones, brick, gravel, step over edges.  Moderate incline/decline with cl S - HR to 120bpm and with slow recovery to resting rate.  Cued to use proper pattern with cane without difficulty. 1 seated rest.    GT without AD within department - note increased unsteadiness today,  Educated regarding when to use AD.    Goal:  Return to amb without AD  Y                                                                       Dynamic gait      Stair negotiation Up/down FF w/ B HR's and step over step pattern  Ascend/descend 6\" curb with SPC, CS   Up/down ramp with SPC and cues to slow down for safety          MANUAL TOTAL TIME FOR SESSION Not performed    Stretching by therapist/PROM   "   Mobilization      MODALITIES TOTAL TIME FOR SESSION Not performed    Ice     Heat

## 2020-10-20 ENCOUNTER — HOSPITAL ENCOUNTER (OUTPATIENT)
Dept: PHYSICAL THERAPY | Facility: REHABILITATION | Age: 85
Setting detail: THERAPIES SERIES
Discharge: HOME | End: 2020-10-20
Attending: FAMILY MEDICINE
Payer: COMMERCIAL

## 2020-10-20 ENCOUNTER — HOSPITAL ENCOUNTER (OUTPATIENT)
Dept: SPEECH THERAPY | Facility: REHABILITATION | Age: 85
Setting detail: THERAPIES SERIES
Discharge: HOME | End: 2020-10-20
Attending: FAMILY MEDICINE
Payer: COMMERCIAL

## 2020-10-20 DIAGNOSIS — I69.393 ATAXIA FOLLOWING CEREBRAL INFARCTION: Primary | ICD-10-CM

## 2020-10-20 DIAGNOSIS — I63.9 CEREBROVASCULAR ACCIDENT (CVA), UNSPECIFIED MECHANISM (CMS/HCC): Primary | ICD-10-CM

## 2020-10-20 DIAGNOSIS — I69.321 DYSPHASIA FOLLOWING CEREBROVASCULAR ACCIDENT (CVA): ICD-10-CM

## 2020-10-20 PROCEDURE — 97112 NEUROMUSCULAR REEDUCATION: CPT | Mod: GP,CQ

## 2020-10-20 PROCEDURE — 92526 ORAL FUNCTION THERAPY: CPT | Mod: GN

## 2020-10-20 PROCEDURE — 97110 THERAPEUTIC EXERCISES: CPT | Mod: GP,CQ

## 2020-10-20 NOTE — OP SLP TREATMENT LOG
"Short Term Goals Current Session   ?Pt. will tolerate trace PO trials without s/s of aspiration and adequate oral management with min cues over 2 sessions.  Trial applesauce x 4 oz and nectar x 4 oz with 1:1 cyclic ingestion with no s/s aspiration, but with occassional minimal expectoration with a globus feeling, probably secondary to limited UES opening    Pt with expectoration of mucous x 1 oz total at beginning of session, suspect from buildup of mucous/dryness from NPO status   Pt. will complete pharyngeal strengthening, laryngeal excursion, and base of tongue strengthening exercises to improve airway protection and bolus propulsion with min cues for accurate production with 90% accuracy      Effortful \"guh\" 2 sets of 40 completed    CTAR x 1 set of 60, 2 sets of 30 completed and 2 30 second trials, 1 1 minute trial    yvonne x 2 sets of 6 completed    Pitch glides x 2 sets of 10 completed    Mendelsohn x 2 sets of 10 completed (some with cold flavored spoon) with min cues           Pt. will verbalize understanding of current swallow status and risks of aspiration/choking with min cues.     EDUCATION    Other      "

## 2020-10-20 NOTE — PROGRESS NOTES
SLP DAILY NOTE FOR OUTPATIENT THERAPY    Patient: Karl Park   MRN: [id number]  : 1928 89 y.o.  Referring Physician: Herb Dailey MD  Date of Visit: 10/20/2020      Certification Dates: 20 through 20    Diagnosis:   1. Cerebrovascular accident (CVA), unspecified mechanism (CMS/HCC)    2. Dysphasia following cerebrovascular accident (CVA)        Chief Complaints:  dysphagia    Precautions:          TODAY'S VISIT:    History/Vitals/Pain/Encounter Info - 10/20/20 1442        Injury History/Precautions/Daily Required Info    Primary Therapist  Laura Kyler     Chief Complaint/Reason for Visit   dysphagia     Speech Therapy  Swallowing     Document Type  daily treatment     Patient/Family/Caregiver Comments/Observations  pt is alert and cooperative     Start Time  1300     Stop Time  1400     Time Calculation (min)  60 min     Patient reported fall since last visit  No        Pain Assessment    Currently in pain  No/Denies        Pain Interventions    Intervention  none     Post Intervention Comments  none         Daily Treatment Assessment and Plan - 10/20/20 1445        Daily Treatment Assessment and Plan    Progress toward goals  Progressing     Daily Outcome Summary  improving swallow fxn, pt tolerated puree with cyclic ingestion     Plan and Recommendations  target ongoing PO trials and continuation of pharyngeal exercises                Today's Treatment:      Short Term Goals Current Session   ?Pt. will tolerate trace PO trials without s/s of aspiration and adequate oral management with min cues over 2 sessions.  Trial applesauce x 4 oz and nectar x 4 oz with 1:1 cyclic ingestion with no s/s aspiration, but with occassional minimal expectoration with a globus feeling, probably secondary to limited UES opening    Pt with expectoration of mucous x 1 oz total at beginning of session, suspect from buildup of mucous/dryness from NPO status   Pt. will complete pharyngeal strengthening,  "laryngeal excursion, and base of tongue strengthening exercises to improve airway protection and bolus propulsion with min cues for accurate production with 90% accuracy      Effortful \"guh\" 2 sets of 40 completed    CTAR x 1 set of 60, 2 sets of 30 completed and 2 30 second trials, 1 1 minute trial    yvonne x 2 sets of 6 completed    Pitch glides x 2 sets of 10 completed    Mendelsohn x 2 sets of 10 completed (some with cold flavored spoon) with min cues           Pt. will verbalize understanding of current swallow status and risks of aspiration/choking with min cues.     EDUCATION    Other                              "

## 2020-10-20 NOTE — OP PT TREATMENT LOG
"PT Neuro Exercises Current Session Time Performed   THER ACT  TOTAL TIME FOR SESSION 0-7 Minutes    Precautions NPO/PEG tube.  Pt is I with PEG tube feedings.   Working on swallow in speech.  L facial droop and slight L eye ptosis post stroke.  Pt educated in use of HR with max HR defined as 129bpm.  Avoiding >120bpm. Yes          yes   PROGRESS REPORT OMAIRA JIMENEZ    Transfer training Mod I with RW, Mod I without UE support for sit to stand without AD (at 20\" height)    Patient Education Reviewed plan for discharge - dtr thought extra visits were approved.  Reviewed the status of his BP today (hypotensive but assymptomatic and dtr alerted).  Educated patient on need to maintain hydration, monitor for any dizziness on position change.  Recommended contact MD if this continues to avoid orthostatic hypotensive situations.  Goal to provide HEP including balance exercises and reiterate weight exercises.    Patient Education Reviewed POC goals and intention for 2x/week for at least 8 weeks.     HEP - Issued and reviewed with Patient - instructed to perform at counter or with chair for support:  -LAQ  -Heel raises  -Abduction  -Hamstring curls    Reviewed need for walker and difficulty with clearing R foot consistently    Reviewed progression of dumbell weight exercises to focus on increased reps vs increased weight for now.  Education regarding need to improve endurance and return to resting HR more efficiently.  Directed pt and rena to begin walking with SPC outdoors up to approx 20 min with rest periods as needed, close S.    Patient Education regarding the use of the SPC for outdoor ambulation.  Instruction in standing HS stretches and standing gastroc stretch on wedge.  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    Reviewed HEP 10/19/20                                                                    THER EX TOTAL TIME FOR SESSION " 23-37 Minutes     STRETCHING     Stretching by patient Seated HS stretch w/ ft on block,  w/ manual assist 20 sec x 3   B calf stretch on incline #3  30 sec  x 3  Runner stretch Yes    Yes   CARDIOVASCULAR     Nu Step Level 1 UE's/LE's 6min, 1 min cool down  n   Stationary Bike Recumb bike 10 min Level 1 yes   Walking warm up (check gait below)    Supine there ex Shaker exercises   Supine w/ 20x head lifts off pillow; 1x head lift and hold for 20 sec;  Cues to breathe    Standing Ther-Ex On airex w/ 3# B LE's:  March x 20,   hip ext x 20, hip abd x 20, HR's/TR's 20x Yes for all    8 in step ups and over x 8 ea LE     seated 2 x 10 for bicep curls alt 9 # ea  alt ue raises  2 x 10 9 # EA seated   mini squats  2 x 10 9#   bent rows with 9# ea 2 x 10 (performed bilat simultaneously)  Deep squats with holding onto rail x 5  1/2 kneel with holding onto rail x 2   Y  Y  Y  Y        Seated in chair:  LAQ's w/ 5# 10x , repeated w/ 3 sec hold 10x N   NEURO RE-ED TOTAL TIME FOR SESSION 23-37 Minutes    Jimenez Balance Test Score:  49 pts   PTA 9/23/20  10/6 JIMENEZ 48/56  10/6 FGA 18/30        COORDINATION     POSTURAL RE-ED     PRE-GAIT ACTIVITIES      BALANCE TRAINING      Sitting balance     Standing balance - static Tap ups forward to 8 in block w/o UE support from floor 10x2  Static stdg on airex w/ HT's and HN's 10x w/ CS/CG    Standing with foot on small ball for rolls to increase sls x 10 ea LE     Rockerboard - A/P with head turns, head nods x 5 x 2 ea and cga/intermittent touches yes         Standing balance - dynamic Gait around slalom course of cones  and over hurdles - no AD, Supervision  Gait with obstacles combined with leaning down to  cones - no AD, Supervision    Gait over 6 in hurdles - step to pattern forward, side stepping.   reciprocal.  No UE support except intermittent touch.      Walking with head turns and nods w/o AD  Walking with base of support within 12 in tiles  Walking with pivot turn  Walking  "backwards  Stepping over shoebox forwards   yes          yes          Yes  Yes    Yes  yes   Standing balance - varied surface On Airex:  Ball toss/bounce/catch  FT EO with HT/HN, UE's at sides   Airex, Semi-Tandem with HT    Blue tread rockerboard A-P gentle rocking for ankle strategies   N          yes       GAIT TRAINING TOTAL TIME FOR SESSION 0-7 Minutes    TUG W/ RW  13.12 sec  W/ SPC 12.75 sec  W/O AD 10.63 sec  Average of the three:  12.16 seconds    Gait with/ without AD  Gait with RW in and out of PT    Gait during  session w/o AD w/ CS  Gait in hallway w/ HT's and HN's (included in Neuro section)    Complete 2 MWT w/  ft  w/o RW  368 ft 3/10 RPE after second 2 MWT  Complete FGA:  14/30  (1,2,1,2,3,1,0,0,2,2) - completed by Ana Rosa Winston PT (primary PT) - will update goals next visit with primary.      Arrived to session ambulating with RW; Gait training w SPC(RUE) x 250 ft x 3 w/  cl S, including uneven grass terrain and uneven macadam.   Focus on cues for heel strike/appropriate knee extension RLE.   HR increased from 82 at rest to 116 with ambulation episodes. No gross LOB, min instability, minimal loss of R LE appropriate step length and no  catch of R toe. Ambulates quicker with SPC than no AD, and has min. deviation from midline.      GT  600-800ft over outdoor uneven terrain including grassy terrain and macadam.  Flagstones, brick, gravel, step over edges.  Moderate incline/decline with cl S - HR to 120bpm and with slow recovery to resting rate.  Cued to use proper pattern with cane without difficulty. 1 seated rest.    GT without AD within department - note increased unsteadiness today,  Educated regarding when to use AD.    Goal:  Return to amb without AD  Y    Y  Y                                                                     Dynamic gait      Stair negotiation Up/down FF w/ B HR's and step over step pattern  Ascend/descend 6\" curb with SPC, CS   Up/down ramp with SPC and cues to slow " down for safety          MANUAL TOTAL TIME FOR SESSION Not performed    Stretching by therapist/PROM     Mobilization      MODALITIES TOTAL TIME FOR SESSION Not performed    Ice     Heat

## 2020-10-20 NOTE — PROGRESS NOTES
PT DAILY NOTE FOR OUTPATIENT THERAPY    Patient: Karl Park   MRN: [id number]  : 1928 89 y.o.  Referring Physician: Herb Dailey MD  Date of Visit: 10/20/2020    Certification Dates: 20 through 10/31/20    Diagnosis:   1. Ataxia following cerebral infarction        Chief Complaints:  balance, strength, endurance    Precautions:   Precautions comments: NPO/PEG  Existing Precautions/Restrictions: fall, other (see comments)     TODAY'S VISIT    History/Vitals/Pain/Encounter Info - 10/20/20 1356        Injury History/Precautions/Daily Required Info    Primary Therapist  Aretha Ferrara     Chief Complaint/Reason for Visit   balance, strength, endurance     Onset of Illness/Injury or Date of Surgery  20     Referring Physician  Candace     Existing Precautions/Restrictions  fall;other (see comments)     Precautions comments  NPO/PEG     Limitations/Impairments  swallowing     OP Specialty  Neuro     Document Type  daily treatment     Patient/Family/Caregiver Comments/Observations  No falls, no changes in meds, no pain reported by pt.     Start Time  1400     Stop Time  1500     Time Calculation (min)  60 min     Patient reported fall since last visit  No        Pain Assessment    Currently in pain  No/Denies        Pain Intervention    Intervention   na     Post Intervention Comments  na        Pre Activity Vital Signs    Pulse  76     SpO2  95 %     BP  98/50     BP Location  Left upper arm     BP Method  Manual     Patient Position  Sitting         Daily Treatment Assessment and Plan - 10/20/20 1450        Daily Treatment Assessment and Plan    Progress toward goals  Progressing     Daily Outcome Summary  Pt completed all B UE/LE strengthening ther ex seated and standing w/o difficulty.  Slight side stepping occasionally when ambulating w/ HT's, self corrected.  HR on recumb bike steady in high 90's bpm.     Plan and Recommendations  Cont w/ POC as directed by primary PT for gait, balance,  "coordination, strength and endurance.                   Today's Treatment:      PT Neuro Exercises Current Session Time Performed   THER ACT  TOTAL TIME FOR SESSION 0-7 Minutes    Precautions NPO/PEG tube.  Pt is I with PEG tube feedings.   Working on swallow in speech.  L facial droop and slight L eye ptosis post stroke.  Pt educated in use of HR with max HR defined as 129bpm.  Avoiding >120bpm. Yes          yes   PROGRESS REPORT OMAIRA JIMENEZ    Transfer training Mod I with RW, Mod I without UE support for sit to stand without AD (at 20\" height)    Patient Education Reviewed plan for discharge - dtr thought extra visits were approved.  Reviewed the status of his BP today (hypotensive but assymptomatic and dtr alerted).  Educated patient on need to maintain hydration, monitor for any dizziness on position change.  Recommended contact MD if this continues to avoid orthostatic hypotensive situations.  Goal to provide HEP including balance exercises and reiterate weight exercises.    Patient Education Reviewed POC goals and intention for 2x/week for at least 8 weeks.     HEP - Issued and reviewed with Patient - instructed to perform at counter or with chair for support:  -LAQ  -Heel raises  -Abduction  -Hamstring curls    Reviewed need for walker and difficulty with clearing R foot consistently    Reviewed progression of dumbell weight exercises to focus on increased reps vs increased weight for now.  Education regarding need to improve endurance and return to resting HR more efficiently.  Directed pt and rena to begin walking with SPC outdoors up to approx 20 min with rest periods as needed, close S.    Patient Education regarding the use of the SPC for outdoor ambulation.  Instruction in standing HS stretches and standing gastroc stretch on wedge.  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    Reviewed HEP 10/19/20 "                                                                    THER EX TOTAL TIME FOR SESSION 23-37 Minutes     STRETCHING     Stretching by patient Seated HS stretch w/ ft on block,  w/ manual assist 20 sec x 3   B calf stretch on incline #3  30 sec  x 3  Runner stretch Yes    Yes   CARDIOVASCULAR     Nu Step Level 1 UE's/LE's 6min, 1 min cool down  n   Stationary Bike Recumb bike 10 min Level 1 yes   Walking warm up (check gait below)    Supine there ex Shaker exercises   Supine w/ 20x head lifts off pillow; 1x head lift and hold for 20 sec;  Cues to breathe    Standing Ther-Ex On airex w/ 3# B LE's:  March x 20,   hip ext x 20, hip abd x 20, HR's/TR's 20x Yes for all    8 in step ups and over x 8 ea LE     seated 2 x 10 for bicep curls alt 9 # ea  alt ue raises  2 x 10 9 # EA seated   mini squats  2 x 10 9#   bent rows with 9# ea 2 x 10 (performed bilat simultaneously)  Deep squats with holding onto rail x 5  1/2 kneel with holding onto rail x 2   Y  Y  Y  Y        Seated in chair:  LAQ's w/ 5# 10x , repeated w/ 3 sec hold 10x N   NEURO RE-ED TOTAL TIME FOR SESSION 23-37 Minutes    Jimenez Balance Test Score:  49 pts   PTA 9/23/20  10/6 JIMENEZ 48/56  10/6 FGA 18/30        COORDINATION     POSTURAL RE-ED     PRE-GAIT ACTIVITIES      BALANCE TRAINING      Sitting balance     Standing balance - static Tap ups forward to 8 in block w/o UE support from floor 10x2  Static stdg on airex w/ HT's and HN's 10x w/ CS/CG    Standing with foot on small ball for rolls to increase sls x 10 ea LE     Rockerboard - A/P with head turns, head nods x 5 x 2 ea and cga/intermittent touches yes         Standing balance - dynamic Gait around slalom course of cones  and over hurdles - no AD, Supervision  Gait with obstacles combined with leaning down to  cones - no AD, Supervision    Gait over 6 in hurdles - step to pattern forward, side stepping.   reciprocal.  No UE support except intermittent touch.      Walking with head turns  and nods w/o AD  Walking with base of support within 12 in tiles  Walking with pivot turn  Walking backwards  Stepping over shoebox forwards   yes          yes          Yes  Yes    Yes  yes   Standing balance - varied surface On Airex:  Ball toss/bounce/catch  FT EO with HT/HN, UE's at sides   Airex, Semi-Tandem with HT    Blue tread rockerboard A-P gentle rocking for ankle strategies   N          yes       GAIT TRAINING TOTAL TIME FOR SESSION 0-7 Minutes    TUG W/ RW  13.12 sec  W/ SPC 12.75 sec  W/O AD 10.63 sec  Average of the three:  12.16 seconds    Gait with/ without AD  Gait with RW in and out of PT    Gait during  session w/o AD w/ CS  Gait in hallway w/ HT's and HN's (included in Neuro section)    Complete 2 MWT w/  ft  w/o RW  368 ft 3/10 RPE after second 2 MWT  Complete FGA:  14/30  (1,2,1,2,3,1,0,0,2,2) - completed by Ana Rosa Winston PT (primary PT) - will update goals next visit with primary.      Arrived to session ambulating with RW; Gait training w SPC(RUE) x 250 ft x 3 w/  cl S, including uneven grass terrain and uneven macadam.   Focus on cues for heel strike/appropriate knee extension RLE.   HR increased from 82 at rest to 116 with ambulation episodes. No gross LOB, min instability, minimal loss of R LE appropriate step length and no  catch of R toe. Ambulates quicker with SPC than no AD, and has min. deviation from midline.      GT  600-800ft over outdoor uneven terrain including grassy terrain and macadam.  Flagstones, brick, gravel, step over edges.  Moderate incline/decline with cl S - HR to 120bpm and with slow recovery to resting rate.  Cued to use proper pattern with cane without difficulty. 1 seated rest.    GT without AD within department - note increased unsteadiness today,  Educated regarding when to use AD.    Goal:  Return to amb without AD  Y    Y  Y                                                                     Dynamic gait      Stair negotiation Up/down FF w/ B HR's and step  "over step pattern  Ascend/descend 6\" curb with SPC, CS   Up/down ramp with SPC and cues to slow down for safety          MANUAL TOTAL TIME FOR SESSION Not performed    Stretching by therapist/PROM     Mobilization      MODALITIES TOTAL TIME FOR SESSION Not performed    Ice     Heat                      "

## 2020-10-21 ENCOUNTER — HOSPITAL ENCOUNTER (OUTPATIENT)
Dept: SPEECH THERAPY | Facility: REHABILITATION | Age: 85
Setting detail: THERAPIES SERIES
Discharge: HOME | End: 2020-10-21
Attending: FAMILY MEDICINE
Payer: COMMERCIAL

## 2020-10-21 DIAGNOSIS — I69.321 DYSPHASIA FOLLOWING CEREBROVASCULAR ACCIDENT (CVA): ICD-10-CM

## 2020-10-21 DIAGNOSIS — I63.9 CEREBROVASCULAR ACCIDENT (CVA), UNSPECIFIED MECHANISM (CMS/HCC): Primary | ICD-10-CM

## 2020-10-21 PROCEDURE — 92507 TX SP LANG VOICE COMM INDIV: CPT | Mod: GN

## 2020-10-21 NOTE — PROGRESS NOTES
SLP DAILY NOTE FOR OUTPATIENT THERAPY    Patient: Karl Park   MRN: [id number]  : 1928 89 y.o.  Referring Physician: Herb Dailey MD  Date of Visit: 10/21/2020      Certification Dates: 20 through 20    Diagnosis:   1. Cerebrovascular accident (CVA), unspecified mechanism (CMS/HCC)    2. Dysphasia following cerebrovascular accident (CVA)        Chief Complaints:  dysphagia    Precautions:          TODAY'S VISIT:    History/Vitals/Pain/Encounter Info - 10/21/20 1104        Injury History/Precautions/Daily Required Info    Primary Therapist  Laura Kyler     Chief Complaint/Reason for Visit   dysphagia     Speech Therapy  Swallowing     Document Type  daily treatment     Patient/Family/Caregiver Comments/Observations  pt is alert and cooperative     Start Time  1000     Stop Time  1100     Time Calculation (min)  60 min     Patient reported fall since last visit  No        Pain Assessment    Currently in pain  No/Denies        Pain Interventions    Intervention  none     Post Intervention Comments  none         Daily Treatment Assessment and Plan - 10/21/20 1108        Daily Treatment Assessment and Plan    Progress toward goals  Progressing     Daily Outcome Summary  pt continues to demonstrate clinical gains in swallow fxn     Plan and Recommendations  trial 4 oz of water using strategies at home after thoroughly cleaning mouth; continue to target PO trials and swallow exercises              Today's Treatment:      Short Term Goals Current Session   ?Pt. will tolerate trace PO trials without s/s of aspiration and adequate oral management with min cues over 2 sessions.  Trial applesauce x 4 oz and nectar x 4 oz with 1:1 cyclic ingestion with no s/s aspiration, but with rare minimal expectoration of mucous with a globus feeling, probably secondary to limited UES opening    Pt with expectoration of mucous x .5 oz total at beginning of session, suspect from buildup of mucous/dryness from  "NPO status    Pt trialed 4 oz thin water with no s/s aspiration    Pt instructed to take 4 oz water using strategies and after thoroughly cleaning mouth at home, discontinue with any discomfort; verbal and written instructions provided; pt able to return demonstration and answer 100% of comprehension/retention questions regarding this practice   Pt. will complete pharyngeal strengthening, laryngeal excursion, and base of tongue strengthening exercises to improve airway protection and bolus propulsion with min cues for accurate production with 90% accuracy      Effortful \"guh\" 2 sets of 40 completed    CTAR x 1 set of 60, 2 sets of 30 completed and 2 30 second trials, 1 1 minute trial    yvonne x 2 sets of 7 completed, 1 set of 5    Pitch glides x 2 sets of 10 completed    Mendelsohn x 2 sets of 10 completed (some with cold flavored spoon) with min cues           Pt. will verbalize understanding of current swallow status and risks of aspiration/choking with min cues.     EDUCATION    Other                              "

## 2020-10-21 NOTE — OP SLP TREATMENT LOG
"Short Term Goals Current Session   ?Pt. will tolerate trace PO trials without s/s of aspiration and adequate oral management with min cues over 2 sessions.  Trial applesauce x 4 oz and nectar x 4 oz with 1:1 cyclic ingestion with no s/s aspiration, but with rare minimal expectoration of mucous with a globus feeling, probably secondary to limited UES opening    Pt with expectoration of mucous x .5 oz total at beginning of session, suspect from buildup of mucous/dryness from NPO status    Pt trialed 4 oz thin water with no s/s aspiration    Pt instructed to take 4 oz water using strategies and after thoroughly cleaning mouth at home, discontinue with any discomfort; verbal and written instructions provided; pt able to return demonstration and answer 100% of comprehension/retention questions regarding this practice   Pt. will complete pharyngeal strengthening, laryngeal excursion, and base of tongue strengthening exercises to improve airway protection and bolus propulsion with min cues for accurate production with 90% accuracy      Effortful \"guh\" 2 sets of 40 completed    CTAR x 1 set of 60, 2 sets of 30 completed and 2 30 second trials, 1 1 minute trial    yvonne x 2 sets of 7 completed, 1 set of 5    Pitch glides x 2 sets of 10 completed    Mendelsohn x 2 sets of 10 completed (some with cold flavored spoon) with min cues           Pt. will verbalize understanding of current swallow status and risks of aspiration/choking with min cues.     EDUCATION    Other      "

## 2020-10-26 ENCOUNTER — HOSPITAL ENCOUNTER (OUTPATIENT)
Dept: PHYSICAL THERAPY | Facility: REHABILITATION | Age: 85
Setting detail: THERAPIES SERIES
Discharge: HOME | End: 2020-10-26
Attending: FAMILY MEDICINE
Payer: COMMERCIAL

## 2020-10-26 ENCOUNTER — HOSPITAL ENCOUNTER (OUTPATIENT)
Dept: SPEECH THERAPY | Facility: REHABILITATION | Age: 85
Setting detail: THERAPIES SERIES
Discharge: HOME | End: 2020-10-26
Attending: FAMILY MEDICINE
Payer: COMMERCIAL

## 2020-10-26 DIAGNOSIS — I69.321 DYSPHASIA FOLLOWING CEREBROVASCULAR ACCIDENT (CVA): ICD-10-CM

## 2020-10-26 DIAGNOSIS — I69.393 ATAXIA FOLLOWING CEREBRAL INFARCTION: Primary | ICD-10-CM

## 2020-10-26 DIAGNOSIS — I63.9 CEREBROVASCULAR ACCIDENT (CVA), UNSPECIFIED MECHANISM (CMS/HCC): Primary | ICD-10-CM

## 2020-10-26 PROCEDURE — 97112 NEUROMUSCULAR REEDUCATION: CPT | Mod: GP,CQ

## 2020-10-26 PROCEDURE — 92526 ORAL FUNCTION THERAPY: CPT | Mod: GN

## 2020-10-26 PROCEDURE — 97110 THERAPEUTIC EXERCISES: CPT | Mod: GP,CQ,59

## 2020-10-26 NOTE — PROGRESS NOTES
PT DAILY NOTE FOR OUTPATIENT THERAPY    Patient: Karl Park   MRN: [id number]  : 1928 89 y.o.  Referring Physician: Herb Dailey MD  Date of Visit: 10/26/2020    Certification Dates: 20 through 10/31/20    Diagnosis:   1. Ataxia following cerebral infarction        Chief Complaints:  balance, strength, endurance    Precautions:   Precautions comments: NPO/PEG  Existing Precautions/Restrictions: fall, other (see comments)     TODAY'S VISIT    History/Vitals/Pain/Encounter Info - 10/26/20 1503        Injury History/Precautions/Daily Required Info    Primary Therapist  Aretha Ferrara     Chief Complaint/Reason for Visit   balance, strength, endurance     Onset of Illness/Injury or Date of Surgery  20     Referring Physician  Candace     Existing Precautions/Restrictions  fall;other (see comments)     Precautions comments  NPO/PEG     Limitations/Impairments  swallowing     OP Specialty  Neuro     Document Type  daily treatment     Patient/Family/Caregiver Comments/Observations  No pain, no falls, no changes in meds.  Pt reports his daughter bought a new liquid for flushing his PEG tube.     Start Time  1500     Stop Time  1600     Time Calculation (min)  60 min     Patient reported fall since last visit  No        Pain Assessment    Currently in pain  No/Denies        Pain Intervention    Intervention   na     Post Intervention Comments  na        Pre Activity Vital Signs    Pulse  89     SpO2  98 %     BP  104/56     BP Location  Left upper arm     BP Method  Manual     Patient Position  Sitting         Daily Treatment Assessment and Plan - 10/26/20 1550        Daily Treatment Assessment and Plan    Progress toward goals  Progressing     Daily Outcome Summary  Pt showing good improvement w/ strength and endurance as he completes UE/LE weighted exercises.  Pt does well on airex balance activities w/ CS./CG.  Pt challenged w/ HN's stdg on rockerboard w/ LOB 2x.  Pt also challenged w/ SLS.      "Plan and Recommendations  Cont w/ POC as directed by primary PT for gait, balance, coordination, strength and endurance.                   Today's Treatment:      PT Neuro Exercises Current Session Time Performed   THER ACT  TOTAL TIME FOR SESSION 0-7 Minutes    Precautions NPO/PEG tube.  Pt is I with PEG tube feedings.   Working on swallow in speech.  L facial droop and slight L eye ptosis post stroke.  Pt educated in use of HR with max HR defined as 129bpm.  Avoiding >120bpm. Yes          yes   PROGRESS REPORT OMAIRA JIMENEZ    Transfer training Mod I with RW, Mod I without UE support for sit to stand without AD (at 20\" height)    Patient Education Reviewed plan for discharge - dtr thought extra visits were approved.  Reviewed the status of his BP today (hypotensive but assymptomatic and dtr alerted).  Educated patient on need to maintain hydration, monitor for any dizziness on position change.  Recommended contact MD if this continues to avoid orthostatic hypotensive situations.  Goal to provide HEP including balance exercises and reiterate weight exercises.    Patient Education Reviewed POC goals and intention for 2x/week for at least 8 weeks.     HEP - Issued and reviewed with Patient - instructed to perform at counter or with chair for support:  -LAQ  -Heel raises  -Abduction  -Hamstring curls    Reviewed need for walker and difficulty with clearing R foot consistently    Reviewed progression of dumbell weight exercises to focus on increased reps vs increased weight for now.  Education regarding need to improve endurance and return to resting HR more efficiently.  Directed pt and rena to begin walking with SPC outdoors up to approx 20 min with rest periods as needed, close S.    Patient Education regarding the use of the SPC for outdoor ambulation.  Instruction in standing HS stretches and standing gastroc stretch on wedge.  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    Reviewed HEP 10/19/20                                                                    THER EX TOTAL TIME FOR SESSION 23-37 Minutes     STRETCHING     Stretching by patient Seated HS stretch w/ ft on block,  w/ manual assist 20 sec x 3   B calf stretch on incline #3  30 sec  x 3  Runner stretch Yes    Yes   CARDIOVASCULAR     Nu Step Level 1 UE's/LE's 6min, 1 min cool down  n   Stationary Bike Recumb bike 10 min Level 2 yes   Walking warm up (check gait below)    Supine there ex Shaker exercises   Supine w/ 20x head lifts off pillow; 1x head lift and hold for 20 sec;  Cues to breathe    Standing Ther-Ex On airex w/ 3# B LE's:  March x 20,   hip ext x 20, hip abd x 20, HR's/TR's 20x     8 in step ups and over x 8 ea LE     seated 2 x 10 for bicep curls alt 9 # ea  alt ue raises  2 x 10 9 # EA seated   mini squats  2 x 10 9# B  bent rows with 9# ea 2 x 10 (performed bilat simultaneously)  Deep squats with holding onto rail x 5  1/2 kneel with holding onto rail x 2   Y  Y  Y  Y        Seated in chair:  LAQ's w/ 5#  2 x10 y   NEURO RE-ED TOTAL TIME FOR SESSION 23-37 Minutes    Jimenez Balance Test Score:  49 pts   PTA 9/23/20  10/6 JIMENEZ 48/56  10/6 FGA 18/30   N     COORDINATION     POSTURAL RE-ED     PRE-GAIT ACTIVITIES      BALANCE TRAINING      Sitting balance     Standing balance - static Tap ups forward to 8 in block w/o UE support from floor 10x2  Static stdg on airex w/ HT's and HN's 10x w/ CS/CG  Single Limb alternating 3 sec stance 10x w/ light to no UE support  Standing with foot on small ball for rolls to increase sls x 10 ea LE                                                                          Rockerboard - A/P with head turns, head  nods x 5 x 2 ea and cga/intermittent touches           yes        yes  yes   Standing balance - dynamic Gait around slalom course of cones  and over hurdles - no AD, Supervision  Gait with obstacles combined with leaning down to   cones - no AD, Supervision    Gait over 6 in hurdles - step to pattern forward, side stepping.   reciprocal.  No UE support except intermittent touch.      Walking with head turns and nods w/o AD  Walking with base of support within 12 in tiles  Walking with pivot turn  Walking backwards  Stepping over shoebox forwards   yes    yes                Yes  Yes    Yes  yes   Standing balance - varied surface On Airex:  Ball toss/bounce/catch  FT EO with HT/HN, UE's at sides   Airex, Semi-Tandem with HT    Blue tread rockerboard A-P gentle rocking for ankle strategies   N          yes       GAIT TRAINING TOTAL TIME FOR SESSION 0-7 Minutes    TUG W/ RW  13.12 sec  W/ SPC 12.75 sec  W/O AD 10.63 sec  Average of the three:  12.16 seconds    Gait with/ without AD  Gait with RW in and out of PT    Gait during  session w/o AD w/ CS  Gait in hallway w/ HT's and HN's (included in Neuro section)    Complete 2 MWT w/  ft  w/o RW  368 ft 3/10 RPE after second 2 MWT  Complete FGA:  14/30  (1,2,1,2,3,1,0,0,2,2) - completed by Ana Rosa Winston PT (primary PT) - will update goals next visit with primary.      Arrived to session ambulating with RW; Gait training w SPC(RUE) x 250 ft x 3 w/  cl S, including uneven grass terrain and uneven macadam.   Focus on cues for heel strike/appropriate knee extension RLE.   HR increased from 82 at rest to 116 with ambulation episodes. No gross LOB, min instability, minimal loss of R LE appropriate step length and no  catch of R toe. Ambulates quicker with SPC than no AD, and has min. deviation from midline.      GT  600-800ft over outdoor uneven terrain including grassy terrain and macadam.  Flagstones, brick, gravel, step over edges.  Moderate incline/decline with cl S - HR to 120bpm and with slow recovery to resting rate.  Cued to use proper pattern with cane without difficulty. 1 seated rest.    GT without AD within department - note increased unsteadiness today,  Educated regarding when to  "use AD.    Goal:  Return to amb without AD  Y    Y  Y                                                                     Dynamic gait      Stair negotiation Up/down FF w/ B HR's and step over step pattern  Ascend/descend 6\" curb with SPC, CS   Up/down ramp with SPC and cues to slow down for safety          MANUAL TOTAL TIME FOR SESSION Not performed    Stretching by therapist/PROM     Mobilization      MODALITIES TOTAL TIME FOR SESSION Not performed    Ice     Heat                      "

## 2020-10-26 NOTE — OP PT TREATMENT LOG
"PT Neuro Exercises Current Session Time Performed   THER ACT  TOTAL TIME FOR SESSION 0-7 Minutes    Precautions NPO/PEG tube.  Pt is I with PEG tube feedings.   Working on swallow in speech.  L facial droop and slight L eye ptosis post stroke.  Pt educated in use of HR with max HR defined as 129bpm.  Avoiding >120bpm. Yes          yes   PROGRESS REPORT OMAIRA JIMENEZ    Transfer training Mod I with RW, Mod I without UE support for sit to stand without AD (at 20\" height)    Patient Education Reviewed plan for discharge - dtr thought extra visits were approved.  Reviewed the status of his BP today (hypotensive but assymptomatic and dtr alerted).  Educated patient on need to maintain hydration, monitor for any dizziness on position change.  Recommended contact MD if this continues to avoid orthostatic hypotensive situations.  Goal to provide HEP including balance exercises and reiterate weight exercises.    Patient Education Reviewed POC goals and intention for 2x/week for at least 8 weeks.     HEP - Issued and reviewed with Patient - instructed to perform at counter or with chair for support:  -LAQ  -Heel raises  -Abduction  -Hamstring curls    Reviewed need for walker and difficulty with clearing R foot consistently    Reviewed progression of dumbell weight exercises to focus on increased reps vs increased weight for now.  Education regarding need to improve endurance and return to resting HR more efficiently.  Directed pt and rena to begin walking with SPC outdoors up to approx 20 min with rest periods as needed, close S.    Patient Education regarding the use of the SPC for outdoor ambulation.  Instruction in standing HS stretches and standing gastroc stretch on wedge.  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    Reviewed HEP 10/19/20                                                                    THER EX TOTAL TIME FOR SESSION " 23-37 Minutes     STRETCHING     Stretching by patient Seated HS stretch w/ ft on block,  w/ manual assist 20 sec x 3   B calf stretch on incline #3  30 sec  x 3  Runner stretch Yes    Yes   CARDIOVASCULAR     Nu Step Level 1 UE's/LE's 6min, 1 min cool down  n   Stationary Bike Recumb bike 10 min Level 2 yes   Walking warm up (check gait below)    Supine there ex Shaker exercises   Supine w/ 20x head lifts off pillow; 1x head lift and hold for 20 sec;  Cues to breathe    Standing Ther-Ex On airex w/ 3# B LE's:  March x 20,   hip ext x 20, hip abd x 20, HR's/TR's 20x     8 in step ups and over x 8 ea LE     seated 2 x 10 for bicep curls alt 9 # ea  alt ue raises  2 x 10 9 # EA seated   mini squats  2 x 10 9# B  bent rows with 9# ea 2 x 10 (performed bilat simultaneously)  Deep squats with holding onto rail x 5  1/2 kneel with holding onto rail x 2   Y  Y  Y  Y        Seated in chair:  LAQ's w/ 5#  2 x10 y   NEURO RE-ED TOTAL TIME FOR SESSION 23-37 Minutes    Jimenez Balance Test Score:  49 pts   PTA 9/23/20  10/6 JIMENEZ 48/56  10/6 FGA 18/30   N     COORDINATION     POSTURAL RE-ED     PRE-GAIT ACTIVITIES      BALANCE TRAINING      Sitting balance     Standing balance - static Tap ups forward to 8 in block w/o UE support from floor 10x2  Static stdg on airex w/ HT's and HN's 10x w/ CS/CG  Single Limb alternating 3 sec stance 10x w/ light to no UE support  Standing with foot on small ball for rolls to increase sls x 10 ea LE                                                                          Rockerboard - A/P with head turns, head  nods x 5 x 2 ea and cga/intermittent touches           yes        yes  yes   Standing balance - dynamic Gait around slalom course of cones  and over hurdles - no AD, Supervision  Gait with obstacles combined with leaning down to  cones - no AD, Supervision    Gait over 6 in hurdles - step to pattern forward, side stepping.   reciprocal.  No UE support except intermittent  touch.      Walking with head turns and nods w/o AD  Walking with base of support within 12 in tiles  Walking with pivot turn  Walking backwards  Stepping over shoebox forwards   yes    yes                Yes  Yes    Yes  yes   Standing balance - varied surface On Airex:  Ball toss/bounce/catch  FT EO with HT/HN, UE's at sides   Airex, Semi-Tandem with HT    Blue tread rockerboard A-P gentle rocking for ankle strategies   N          yes       GAIT TRAINING TOTAL TIME FOR SESSION 0-7 Minutes    TUG W/ RW  13.12 sec  W/ SPC 12.75 sec  W/O AD 10.63 sec  Average of the three:  12.16 seconds    Gait with/ without AD  Gait with RW in and out of PT    Gait during  session w/o AD w/ CS  Gait in hallway w/ HT's and HN's (included in Neuro section)    Complete 2 MWT w/  ft  w/o RW  368 ft 3/10 RPE after second 2 MWT  Complete FGA:  14/30  (1,2,1,2,3,1,0,0,2,2) - completed by Ana Rosa Winston PT (primary PT) - will update goals next visit with primary.      Arrived to session ambulating with RW; Gait training w SPC(RUE) x 250 ft x 3 w/  cl S, including uneven grass terrain and uneven macadam.   Focus on cues for heel strike/appropriate knee extension RLE.   HR increased from 82 at rest to 116 with ambulation episodes. No gross LOB, min instability, minimal loss of R LE appropriate step length and no  catch of R toe. Ambulates quicker with SPC than no AD, and has min. deviation from midline.      GT  600-800ft over outdoor uneven terrain including grassy terrain and macadam.  Flagstones, brick, gravel, step over edges.  Moderate incline/decline with cl S - HR to 120bpm and with slow recovery to resting rate.  Cued to use proper pattern with cane without difficulty. 1 seated rest.    GT without AD within department - note increased unsteadiness today,  Educated regarding when to use AD.    Goal:  Return to amb without AD  Y    Y  Y                                                                     Dynamic gait      Stair  "negotiation Up/down FF w/ B HR's and step over step pattern  Ascend/descend 6\" curb with SPC, CS   Up/down ramp with SPC and cues to slow down for safety          MANUAL TOTAL TIME FOR SESSION Not performed    Stretching by therapist/PROM     Mobilization      MODALITIES TOTAL TIME FOR SESSION Not performed    Ice     Heat       "

## 2020-10-26 NOTE — PROGRESS NOTES
[facility] DAILY NOTE FOR OUTPATIENT THERAPY    Patient: Karl Park   MRN: [id number]  : 1928 89 y.o.  Referring Physician: Herb Dailey MD  Date of Visit: 10/26/2020      Certification Dates: 20 through 20    Diagnosis:   1. Cerebrovascular accident (CVA), unspecified mechanism (CMS/HCC)    2. Dysphasia following cerebrovascular accident (CVA)        Chief Complaints:  dysphagia    Precautions:          TODAY'S VISIT:    History/Vitals/Pain/Encounter Info - 10/26/20 1549        Injury History/Precautions/Daily Required Info    Primary Therapist  Laura Kyler     Chief Complaint/Reason for Visit   dysphagia     Speech Therapy  Swallowing     Document Type  daily treatment     Patient/Family/Caregiver Comments/Observations  pt is alert and cooperative     Start Time  1600     Stop Time  1700     Time Calculation (min)  60 min     Patient reported fall since last visit  No        Pain Assessment    Currently in pain  No/Denies        Pain Interventions    Intervention  none     Post Intervention Comments  none         Daily Treatment Assessment and Plan - 10/26/20 1711        Daily Treatment Assessment and Plan    Progress toward goals  Progressing     Daily Outcome Summary  improving swallow fxn, but continued expectoration of mucous     Plan and Recommendations  target swallow goals              Today's Treatment:      Short Term Goals Current Session   ?Pt. will tolerate trace PO trials without s/s of aspiration and adequate oral management with min cues over 2 sessions.  Trial applesauce x 4 oz and nectar x 4 oz with 1:1 cyclic ingestion with no s/s aspiration, but with rare minimal expectoration of mucous with a globus feeling, probably secondary to limited UES opening    Pt with expectoration of mucous x .5 oz total at beginning of session, suspect from buildup of mucous/dryness from NPO status    Pt trialed 4 oz thin water with no s/s aspiration    Pt instructed to take 4 oz water using  "strategies and after thoroughly cleaning mouth at home, discontinue with any discomfort; verbal and written instructions provided; pt able to return demonstration and answer 100% of comprehension/retention questions regarding this practice   Pt. will complete pharyngeal strengthening, laryngeal excursion, and base of tongue strengthening exercises to improve airway protection and bolus propulsion with min cues for accurate production with 90% accuracy      Effortful \"guh\" 2 sets of 40 completed    CTAR x 1 set of 60, 2 sets of 30 completed and 2 30 second trials, 1 1 minute trial    yvonne x 2 sets of 7 completed, 1 set of 5    Pitch glides x 2 sets of 10 completed    Mendelsohn x 2 sets of 10 completed (some with cold flavored spoon) with min cues           Pt. will verbalize understanding of current swallow status and risks of aspiration/choking with min cues.     EDUCATION    Other                              "

## 2020-10-27 ENCOUNTER — HOSPITAL ENCOUNTER (OUTPATIENT)
Dept: PHYSICAL THERAPY | Facility: REHABILITATION | Age: 85
Setting detail: THERAPIES SERIES
Discharge: HOME | End: 2020-10-27
Attending: FAMILY MEDICINE
Payer: COMMERCIAL

## 2020-10-27 DIAGNOSIS — I69.393 ATAXIA FOLLOWING CEREBRAL INFARCTION: Primary | ICD-10-CM

## 2020-10-27 PROCEDURE — 97530 THERAPEUTIC ACTIVITIES: CPT | Mod: GP,59

## 2020-10-27 PROCEDURE — 97116 GAIT TRAINING THERAPY: CPT | Mod: GP

## 2020-10-27 PROCEDURE — 97112 NEUROMUSCULAR REEDUCATION: CPT | Mod: GP

## 2020-10-27 ASSESSMENT — BALANCE ASSESSMENTS: TOTAL SCORE: 48

## 2020-10-27 NOTE — LETTER
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 Dailey,    Thank you for the referral of your patient Karl Park for physical therapy. I am writing to you today to make you aware that your patient is being discharged from physical therapy. Please review the latest progress note below and the goals that have been addressed during this plan of care.     If you have any questions or concerns, please feel free to contact me. Once again, thank you for your referral and the opportunity to work with your patient.     Sincerely,  Aretha Ferrara PT    PT DISCHARGE NOTE FOR OUTPATIENT THERAPY    Patient: Karl Park   MRN: [id number]  : 1928 89 y.o.  Referring Physician: Herb Dailey MD  Date of Visit: 10/27/2020      Certification Dates: 20 through 10/31/20    Total Visit Count: 25    Chief Complaints:  Chief Complaint   Patient presents with   • Difficulty Walking   • Balance Deficits   • Dec Strength   • Decreased Endurance       Precautions:  Precautions comments: NPO/PEG  Existing Precautions/Restrictions: fall, other (see comments)     TODAY'S VISIT:    General Information - 10/27/20 8044        Session Details    Document Type  discharge evaluation     Mode of Treatment  individual therapy;physical therapy     Patient/Family/Caregiver Comments/Observations  Pt reports ready for discharge, no falls, no med changes, no pain.  Ready to review HEP.     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 HPI: Pt's daughter stated that the onset of the stroke may have been 7/1 or 7/2. Pt went to the ER on 7/3 and he was diagnosed with pneumonia and bells palsy. Pt was given medication for that, which he couldn't swallow. Pt went to see his PCP on 7/7/20 who referred him to a neurologist. Pt saw the PA and he was tested for myasthenia gravis. Pt would cough throughout the whole week every time he ate or drank. Pt was seen by neurologist 7/16 for the MRI and swallow study. MRI showed that he had a brainstem stroke. Pt had a swallow study which showed asp on all consistencies and absent epiglottic inversion. Pt had a peg tube placed on 7/20. Pt was also admitted overnight for IV fluids.     Limitations/Impairments  visual     Existing Precautions/Restrictions  fall;other (see comments)     Precautions comments  NPO/PEG         Pain/Vitals - 10/27/20 1505        Pain Assessment    Currently in pain  No/Denies        Pre Activity Vital Signs    Pulse  74     SpO2  100 %     BP  102/56     BP Location  Left upper arm     BP Method  Manual     Patient Position  Sitting        Pain Intervention    Intervention   na     Post Intervention Comments  na         Daily Falls Screen - 10/27/20 1734        Daily Falls Assessment    Patient reported fall since last visit  No         Daily Treatment Assessment and Plan - 10/27/20 2029        Daily Treatment Assessment and Plan    Progress toward goals  Progressing     Daily Outcome Summary  Patient has progressed more slowly over this past month.  His JIMENEZ and FGA tests remain unchanged at this time.  He has improved his TUG without AD.  He is indep in his HEP and still uses his RW for community ambulation unless he is supervised with his SPC.  He is mod I on stairs with reciprocal pattern and use  of the handrail.  S to mod I for an 8 inch curb, using his SPC.    He is indep without device in his home.  Level of progress appears to have plateaued at this time.  He does have slight decrease in range and strength of the RLE with what appears to be an arthritic knee, which he denies. His RLE is not as stable as his LLE. A remaining concern is that his blood pressure does vary, occasionally going more hypotensive but he only complains of being more fatigued at that time, not dizzy or lightheaded.  Family and patient have been alerted to the need to monitor for BP fluctuations.     Plan and Recommendations  Discharged as per plan, with good progress towards goals.  Patient and family aware.           OBJECTIVE MEASUREMENTS/DATA:    ROM   Range of Motion - 10/27/20 1500        RIGHT: Lower Extremity PROM Assessment    Knee Extension Deficit  -15         Balance/Posture   Balance and Posture - 10/27/20 1500        Juarez Balance Scale    Sitting to Standing  Able to stand without using hands and stabilize independently     Standing Unsupported  Able to stand safely for 2 minutes     Sitting with Back Unsupported but Feet Supported on Floor or on a Stool  Able to sit safely and securely for 2 minutes     Standing to Sitting  Sits safely with minimal use of hands     Transfers  Able to transfer safely with minor use of hands     Standing Unsupported with Eyes Closed  Able to stand 10 seconds safely     Standing Unsupported with Feet Together  Able to place feet together independently and stand 1 minute safely     Reach Forward with Outstretched Arm While Standing  Can reach forward confidently 25 cm (10 inches)      Object from Floor from a Standing Position  Able to  slipper safely and easily     Turning to Look Behind Over Left and Right Shoulders While Standing  Looks behind one side only other side shows less weight shift     Turn 360 Degrees  Able to turn 360 degrees safely but slowly     Place  "Alternate Foot on Step or Stool While Standing Unsupported  Able to stand independently and complete 8 steps in greater than 20 seconds     Standing Unsupported One Foot in Front  Able to place foot ahead independently and hold 30 seconds     Standing on One Leg  Tries to lift leg unable to hold 3 seconds but remains standing independently     Juarez Balance Score  48        FGA     FGA Score (out of 30 total)  18        Timed Up and Go Test    Comment, Timed Up and Go Test  10.78 sec    no AD       [id number] Balance Interventions    Results, Timed Up and Go Test (Balance)  10.78sec    no AD        Gait and Mobility   Mod Indep with RW for community, including ramps and uneven terrain.  S with SPC for community ambulation, but Mod I for shorter distances with SPC.  Indep HHD without use of AD  Mod I on stairs with use of a railing, using a reciprocal pattern.    Today's Treatment:      PT Neuro Exercises Current Session Time Performed   THER ACT  TOTAL TIME FOR SESSION 8-22 Minutes    Precautions NPO/PEG tube.  Pt is I with PEG tube feedings.   Working on swallow in speech.  L facial droop and slight L eye ptosis post stroke.  Pt educated in use of HR with max HR defined as 129bpm.  Avoiding >120bpm. Yes          yes   PROGRESS REPORT BARBARA, CARLINEA yes   Transfer training Mod I with RW, Mod I without UE support for sit to stand without AD (at 20\" height) yes   Patient Education Reviewed plan for discharge - dtr thought extra visits were approved.  Reviewed the status of his BP today (hypotensive but assymptomatic and dtr alerted).  Educated patient on need to maintain hydration, monitor for any dizziness on position change.  Recommended contact MD if this continues to avoid orthostatic hypotensive situations.  Goal to provide HEP including balance exercises and reiterate weight exercises.    Patient Education Reviewed POC goals and intention for 2x/week for at least 8 weeks.     HEP - Issued and reviewed with Patient - " instructed to perform at counter or with chair for support:  -LAQ  -Heel raises  -Abduction  -Hamstring curls    Reviewed need for walker and difficulty with clearing R foot consistently    Reviewed progression of dumbell weight exercises to focus on increased reps vs increased weight for now.  Education regarding need to improve endurance and return to resting HR more efficiently.  Directed pt and rena to begin walking with SPC outdoors up to approx 20 min with rest periods as needed, close S.    Patient Education regarding the use of the SPC for outdoor ambulation ( only with supervision if distances) .  Instruction in standing HS stretches and standing gastroc stretch on wedge.  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  Discussed progress towards goals and need to continue HEP.    Reviewed HEP 10/27/20                                               Yes                     THER EX TOTAL TIME FOR SESSION 0-7 Minutes     STRETCHING     Stretching by patient, supervised by Aretha Ferrara PT Seated HS stretch w/ ft on block,  w/ manual assist 20 sec x 3   B calf stretch on incline #3  30 sec  x 3  Runner stretch Yes    No   CARDIOVASCULAR     Nu Step Level 1 UE's/LE's 6min, 1 min cool down  n   Stationary Bike Recumb bike 10 min Level 2 N   Walking warm up (check gait below)    Supine there ex Shaker exercises   Supine w/ 20x head lifts off pillow; 1x head lift and hold for 20 sec;  Cues to breathe    Standing Ther-Ex On airex w/ 3# B LE's:  March x 20,   hip ext x 20, hip abd x 20, HR's/TR's 20x     8 in step ups and over x 8 ea LE     seated 2 x 10 for bicep curls alt 9 # ea  alt ue raises  2 x 10 9 # EA seated   mini squats  2 x 10 9# B  bent rows with 9# ea 2 x 10 (performed bilat simultaneously)  Deep squats with holding onto rail x 5  1/2 kneel with holding onto rail x 2   N  N  N  N        Seated in chair:  LAQ's w/ 5#  2 x10 N    NEURO RE-ED TOTAL TIME FOR SESSION 8-22 Minutes    Jimenez Balance Test Score:  49 pts   PTA 9/23/20  10/6 JIMENEZ 48/56  10/27  JIMENEZ  48/56 by Aretha Ferrara PT  10/6 FGA 18/30    10/27 FGA 16/30 by Aretha Ferrara PT   N     COORDINATION     POSTURAL RE-ED     PRE-GAIT ACTIVITIES      BALANCE TRAINING      Sitting balance     Standing balance - static Tap ups forward to 8 in block w/o UE support from floor 10x2  Static stdg on airex w/ HT's and HN's 10x w/ CS/CG  Single Limb alternating 3 sec stance 10x w/ light to no UE support  Standing with foot on small ball for rolls to increase sls x 10 ea LE                                                                          Rockerboard - A/P with head turns, head  nods x 5 x 2 ea and cga/intermittent touches           N        N  N   Standing balance - dynamic Gait around slalom course of cones  and over hurdles - no AD, Supervision  Gait with obstacles combined with leaning down to  cones - no AD, Supervision     Gait over 6 in hurdles - step to pattern forward, side stepping.   Intermittent UE support.      Walking with head turns and nods w/o AD  Walking with base of support within 12 in tiles  Walking with pivot turn  Walking backwards  Stepping over shoebox forwards   N    N      Yes          Yes  Yes    Yes  yes   Standing balance - varied surface On Airex:  Ball toss/bounce/catch  FT EO with HT/HN, UE's at sides   Airex, Semi-Tandem with HT    Blue tread rockerboard A-P gentle rocking for ankle strategies   N          yes       GAIT TRAINING TOTAL TIME FOR SESSION 23-37 Minutes    TUG W/ RW  13.12 sec  W/ SPC 12.75 sec  W/O AD 10.63 sec  Average of the three:  12.16 seconds    10/27 w/o AD  TUG = 10.78sec  Completed by Sarahy Guzman PTA           Y   Gait with/ without AD  Gait with RW in and out of PT    Gait during  session w/o AD w/ CS  Gait in hallway w/ HT's and HN's (included in Neuro section)    Complete 2 MWT w/  ft  w/o RW  368 ft 3/10 RPE after second 2  "MWT  Complete FGA:  14/30  (1,2,1,2,3,1,0,0,2,2) - completed by Ana Rosa Winston PT (primary PT) - will update goals next visit with primary.      Arrived to session ambulating with RW; Gait training w SPC(RUE) x 250 ft x 3 w/  cl S, including uneven grass terrain and uneven macadam.   Focus on cues for heel strike/appropriate knee extension RLE.   HR increased from 82 at rest to 116 with ambulation episodes. No gross LOB, min instability, minimal loss of R LE appropriate step length and no  catch of R toe. Ambulates quicker with SPC than no AD, and has min. deviation from midline.      GT  600-800ft over outdoor uneven terrain including grassy terrain and macadam.  Flagstones, brick, gravel, step over edges.  Moderate incline/decline with cl S - HR to 120bpm and with slow recovery to resting rate.  Cued to use proper pattern with cane without difficulty. 1 seated rest.    GT without AD within department - note increased unsteadiness today,  Educated regarding when to use AD.    Goal:  Return to amb without AD  Y    Y  Y                                                                     Dynamic gait      Stair negotiation -  Completed by Sarahy Guzman PTA Up/down FF w/ 1HR's and step over step pattern  Ascend/descend 6\" curb with SPC, CS   Up/down ramp with SPC and cues to slow down for safety Y    Y  Y       Goals Addressed                 This Visit's Progress    • COMPLETED: PT goals        Short Term Goals Time Frame Result Comment/Progress   Pt will amb mod I in household setting with SPC or no AD, including 1 step up/down in his in-law suite at daughter's home 4 weeks MET    Improve JUAREZ score by at least 3 points (45/56) reflecting dec falls risk 4 weeks MET Juarez = 45/56    *FGA = 18/30   TUG </= 14 sec reflecting lower falls risk 4 weeks MET TUG = 14s w/ SPC  TUG = 12.5 w/o with S  12.16 on 9/23   Improve CGS by at least .1 m/s without decline in gait quality 4 weeks  Not tested on 8/28   FGA >/= 15/30 with least " restrictive AD 4 weeks  18/30   Consistent STS and SPT transfers without AD mod I 4 weeks         Long Term Goals Time Frame Result Comment/Progress   Pt will amb I at home without AD, in community without AD or with SPC as needed for more challenging terrain/more distracting environments 8-12 weeks MET for HHD, ongoing for SPC Amb CD with RW     JIMENEZ >/= 50/56 reflecting lower falls risk 8-12 weeks ongoing 49/56  10/6 = 48/56  10/27= 48/56   TUG completed safely in </= 12 sec with no decline in gait quality, balance with pivot turns 8-12 weeks MET 12.16  10/27: 10.26sec w/ AD   Gait speed 1.0 m/s maintaining gait quality 8-12 weeks MET If using RW   FGA >/= 20/30 8-12 weeks Not met 8/28 FGA = 18/30  10/6 = 18/30  10/27 = 18/30   Mod I reciprocal stairs with 1 HR, amb up/down curb step and ADA ramps without AD 8-12 weeks MET Requires AD pr supervision for safety with curb, especially descent  Ramps are mod I    I finalized HEP 8-12 weeks MET To finalize program with balance exercises; just completed HEP for weight training                   Education provided this session. Final HEP review.    Clinical Impression: 89y/o male s/p brainstem CVA demonstrates improvement in overall balance and strength since initiation of therapy.  He remains at risk for falls as determined by his FGA of 18/30 and JIMENEZ 48/56, but ameliorated by the use of the assistive device.  He did demonstrate a significant improvement in his TUG score without an assistive device, indicating he has been able to functionally decrease the risk for falls in the home.  He and family have been educated in the recommendation for an assistive device to prevent falls when ambulating in the community, but with household ambulation he is able to ambulate without an AD.   Patient is ready for discharge to his HEP  Discharge information for [facility]:    Reason for D/C: Maximum potential met  Hospitalization during treatment: No  Increased independence: Atkinson  in HEP, Increased functional activity, Increased functional independence  Increased production assessment: Return to household activities, Increased community independence  Interrupted for medical reason: No  Skin integrity: Intact

## 2020-10-27 NOTE — OP PT TREATMENT LOG
"PT Neuro Exercises Current Session Time Performed   THER ACT  TOTAL TIME FOR SESSION 8-22 Minutes    Precautions NPO/PEG tube.  Pt is I with PEG tube feedings.   Working on swallow in speech.  L facial droop and slight L eye ptosis post stroke.  Pt educated in use of HR with max HR defined as 129bpm.  Avoiding >120bpm. Yes          yes   PROGRESS REPORT OMAIRA JIMENEZ yes   Transfer training Mod I with RW, Mod I without UE support for sit to stand without AD (at 20\" height) yes   Patient Education Reviewed plan for discharge - dtr thought extra visits were approved.  Reviewed the status of his BP today (hypotensive but assymptomatic and dtr alerted).  Educated patient on need to maintain hydration, monitor for any dizziness on position change.  Recommended contact MD if this continues to avoid orthostatic hypotensive situations.  Goal to provide HEP including balance exercises and reiterate weight exercises.    Patient Education Reviewed POC goals and intention for 2x/week for at least 8 weeks.     HEP - Issued and reviewed with Patient - instructed to perform at counter or with chair for support:  -LAQ  -Heel raises  -Abduction  -Hamstring curls    Reviewed need for walker and difficulty with clearing R foot consistently    Reviewed progression of dumbell weight exercises to focus on increased reps vs increased weight for now.  Education regarding need to improve endurance and return to resting HR more efficiently.  Directed pt and rena to begin walking with SPC outdoors up to approx 20 min with rest periods as needed, close S.    Patient Education regarding the use of the SPC for outdoor ambulation ( only with supervision if distances) .  Instruction in standing HS stretches and standing gastroc stretch on wedge.  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  Discussed progress towards goals and need to continue HEP.    Reviewed HEP " 10/27/20                                               Yes                     THER EX TOTAL TIME FOR SESSION 0-7 Minutes     STRETCHING     Stretching by patient, supervised by Aretha Ferrara PT Seated HS stretch w/ ft on block,  w/ manual assist 20 sec x 3   B calf stretch on incline #3  30 sec  x 3  Runner stretch Yes    No   CARDIOVASCULAR     Nu Step Level 1 UE's/LE's 6min, 1 min cool down  n   Stationary Bike Recumb bike 10 min Level 2 N   Walking warm up (check gait below)    Supine there ex Shaker exercises   Supine w/ 20x head lifts off pillow; 1x head lift and hold for 20 sec;  Cues to breathe    Standing Ther-Ex On airex w/ 3# B LE's:  March x 20,   hip ext x 20, hip abd x 20, HR's/TR's 20x     8 in step ups and over x 8 ea LE     seated 2 x 10 for bicep curls alt 9 # ea  alt ue raises  2 x 10 9 # EA seated   mini squats  2 x 10 9# B  bent rows with 9# ea 2 x 10 (performed bilat simultaneously)  Deep squats with holding onto rail x 5  1/2 kneel with holding onto rail x 2   N  N  N  N        Seated in chair:  LAQ's w/ 5#  2 x10 N   NEURO RE-ED TOTAL TIME FOR SESSION 8-22 Minutes    Jimenez Balance Test Score:  49 pts   PTA 9/23/20  10/6 JIMENEZ 48/56  10/27  JIMENEZ  48/56 by Aretha Ferrara PT  10/6 FGA 18/30    10/27 FGA 16/30 by Aretha Ferrara PT   N     COORDINATION     POSTURAL RE-ED     PRE-GAIT ACTIVITIES      BALANCE TRAINING      Sitting balance     Standing balance - static Tap ups forward to 8 in block w/o UE support from floor 10x2  Static stdg on airex w/ HT's and HN's 10x w/ CS/CG  Single Limb alternating 3 sec stance 10x w/ light to no UE support  Standing with foot on small ball for rolls to increase sls x 10 ea LE                                                                          Rockerboard - A/P with head turns, head  nods x 5 x 2 ea and cga/intermittent touches           N        N  N   Standing balance - dynamic Gait around slalom course of cones  and over hurdles - no AD, Supervision  Gait with  obstacles combined with leaning down to  cones - no AD, Supervision     Gait over 6 in hurdles - step to pattern forward, side stepping.   Intermittent UE support.      Walking with head turns and nods w/o AD  Walking with base of support within 12 in tiles  Walking with pivot turn  Walking backwards  Stepping over shoebox forwards   N    N      Yes          Yes  Yes    Yes  yes   Standing balance - varied surface On Airex:  Ball toss/bounce/catch  FT EO with HT/HN, UE's at sides   Airex, Semi-Tandem with HT    Blue tread rockerboard A-P gentle rocking for ankle strategies   N          yes       GAIT TRAINING TOTAL TIME FOR SESSION 23-37 Minutes    TUG W/ RW  13.12 sec  W/ SPC 12.75 sec  W/O AD 10.63 sec  Average of the three:  12.16 seconds    10/27 w/o AD  TUG = 10.78sec  Completed by Sarahy Guzman PTA           Y   Gait with/ without AD  Gait with RW in and out of PT    Gait during  session w/o AD w/ CS  Gait in hallway w/ HT's and HN's (included in Neuro section)    Complete 2 MWT w/  ft  w/o RW  368 ft 3/10 RPE after second 2 MWT  Complete FGA:  14/30  (1,2,1,2,3,1,0,0,2,2) - completed by Ana Rosa Winston PT (primary PT) - will update goals next visit with primary.      Arrived to session ambulating with RW; Gait training w SPC(RUE) x 250 ft x 3 w/  cl S, including uneven grass terrain and uneven macadam.   Focus on cues for heel strike/appropriate knee extension RLE.   HR increased from 82 at rest to 116 with ambulation episodes. No gross LOB, min instability, minimal loss of R LE appropriate step length and no  catch of R toe. Ambulates quicker with SPC than no AD, and has min. deviation from midline.      GT  600-800ft over outdoor uneven terrain including grassy terrain and macadam.  Flagstones, brick, gravel, step over edges.  Moderate incline/decline with cl S - HR to 120bpm and with slow recovery to resting rate.  Cued to use proper pattern with cane without difficulty. 1 seated rest.    GT without  "AD within department - note increased unsteadiness today,  Educated regarding when to use AD.    Goal:  Return to amb without AD  Y    Y  Y                                                                     Dynamic gait      Stair negotiation -  Completed by Sarahy Guzman PTA Up/down FF w/ 1HR's and step over step pattern  Ascend/descend 6\" curb with SPC, CS   Up/down ramp with SPC and cues to slow down for safety Y    Y  Y     "

## 2020-10-28 ENCOUNTER — HOSPITAL ENCOUNTER (OUTPATIENT)
Dept: SPEECH THERAPY | Facility: REHABILITATION | Age: 85
Setting detail: THERAPIES SERIES
Discharge: HOME | End: 2020-10-28
Attending: FAMILY MEDICINE
Payer: COMMERCIAL

## 2020-10-28 DIAGNOSIS — I63.9 CEREBROVASCULAR ACCIDENT (CVA), UNSPECIFIED MECHANISM (CMS/HCC): Primary | ICD-10-CM

## 2020-10-28 DIAGNOSIS — I69.321 DYSPHASIA FOLLOWING CEREBROVASCULAR ACCIDENT (CVA): ICD-10-CM

## 2020-10-28 PROCEDURE — 92526 ORAL FUNCTION THERAPY: CPT | Mod: GN

## 2020-10-28 NOTE — OP SLP TREATMENT LOG
"Short Term Goals Current Session   ?Pt. will tolerate trace PO trials without s/s of aspiration and adequate oral management with min cues over 2 sessions.  Trial 4 oz nectar water, a grape popsicle, and ice chips x 30; mild cough x 1 with nectar     Pt with expectoration of mucous x .5 oz total throughout course of session, suspect from buildup of mucous/dryness from NPO status    Pt instructed to take 4 oz water 2x a day using strategies and after thoroughly cleaning mouth at home, discontinue with any discomfort; verbal and written instructions provided; pt able to return demonstration and answer 100% of comprehension/retention questions regarding this practice - pt reports doing well with 4 oz of water at home and has been following instructions to clean mouth prior to trials and use swallow strategies (small sip, hold breath, swallow strong, clear throat, swallow again)   Pt. will complete pharyngeal strengthening, laryngeal excursion, and base of tongue strengthening exercises to improve airway protection and bolus propulsion with min cues for accurate production with 90% accuracy      Effortful \"guh\" 2 sets of 40 completed    CTAR x 1 set of 60, 2 sets of 30     yvonne x 2 sets of 7 completed, 1 set of 5    Pitch glides x 2 sets of 10 completed    Mendelsohn x 2 sets of 10 completed (some with cold flavored spoon) with min cues           Pt. will verbalize understanding of current swallow status and risks of aspiration/choking with min cues.     EDUCATION Discussed plan to scehdule [facility] for 2 week of November Other      "

## 2020-10-28 NOTE — PROGRESS NOTES
PT DISCHARGE NOTE FOR OUTPATIENT THERAPY    Patient: Karl Park   MRN: [id number]  : 1928 89 y.o.  Referring Physician: Herb Dailey MD  Date of Visit: 10/27/2020      Certification Dates: 20 through 10/31/20    Total Visit Count: 25    Chief Complaints:  Chief Complaint   Patient presents with   • Difficulty Walking   • Balance Deficits   • Dec Strength   • Decreased Endurance       Precautions:  Precautions comments: NPO/PEG  Existing Precautions/Restrictions: fall, other (see comments)     TODAY'S VISIT:    General Information - 10/27/20 1852        Session Details    Document Type  discharge evaluation     Mode of Treatment  individual therapy;physical therapy     Patient/Family/Caregiver Comments/Observations  Pt reports ready for discharge, no falls, no med changes, no pain.  Ready to review HEP.     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 HPI: Pt's daughter stated that the onset of the stroke may have been  or . Pt went to the ER on 7/3 and he was diagnosed with pneumonia and bells palsy. Pt was given medication for that, which he couldn't swallow. Pt went to see his PCP on 20 who referred him to a neurologist. Pt saw the PA and he was tested for myasthenia gravis. Pt would cough throughout the whole week every time he ate or drank. Pt was seen by neurologist  for the MRI and swallow study. MRI showed that he had a brainstem stroke. Pt had a swallow study which showed asp on all consistencies and absent epiglottic inversion. Pt had a peg tube placed on . Pt was also admitted overnight for IV fluids.     Limitations/Impairments   visual     Existing Precautions/Restrictions  fall;other (see comments)     Precautions comments  NPO/PEG         Pain/Vitals - 10/27/20 1505        Pain Assessment    Currently in pain  No/Denies        Pre Activity Vital Signs    Pulse  74     SpO2  100 %     BP  102/56     BP Location  Left upper arm     BP Method  Manual     Patient Position  Sitting        Pain Intervention    Intervention   na     Post Intervention Comments  na         Daily Falls Screen - 10/27/20 1734        Daily Falls Assessment    Patient reported fall since last visit  No         Daily Treatment Assessment and Plan - 10/27/20 2029        Daily Treatment Assessment and Plan    Progress toward goals  Progressing     Daily Outcome Summary  Patient has progressed more slowly over this past month.  His JIMENEZ and FGA tests remain unchanged at this time.  He has improved his TUG without AD.  He is indep in his HEP and still uses his RW for community ambulation unless he is supervised with his SPC.  He is mod I on stairs with reciprocal pattern and use of the handrail.  S to mod I for an 8 inch curb, using his SPC.    He is indep without device in his home.  Level of progress appears to have plateaued at this time.  He does have slight decrease in range and strength of the RLE with what appears to be an arthritic knee, which he denies. His RLE is not as stable as his LLE. A remaining concern is that his blood pressure does vary, occasionally going more hypotensive but he only complains of being more fatigued at that time, not dizzy or lightheaded.  Family and patient have been alerted to the need to monitor for BP fluctuations.     Plan and Recommendations  Discharged as per plan, with good progress towards goals.  Patient and family aware.           OBJECTIVE MEASUREMENTS/DATA:    ROM   Range of Motion - 10/27/20 1500        RIGHT: Lower Extremity PROM Assessment    Knee Extension Deficit  -15         Balance/Posture   Balance and Posture -  10/27/20 1500        Juarez Balance Scale    Sitting to Standing  Able to stand without using hands and stabilize independently     Standing Unsupported  Able to stand safely for 2 minutes     Sitting with Back Unsupported but Feet Supported on Floor or on a Stool  Able to sit safely and securely for 2 minutes     Standing to Sitting  Sits safely with minimal use of hands     Transfers  Able to transfer safely with minor use of hands     Standing Unsupported with Eyes Closed  Able to stand 10 seconds safely     Standing Unsupported with Feet Together  Able to place feet together independently and stand 1 minute safely     Reach Forward with Outstretched Arm While Standing  Can reach forward confidently 25 cm (10 inches)      Object from Floor from a Standing Position  Able to  slipper safely and easily     Turning to Look Behind Over Left and Right Shoulders While Standing  Looks behind one side only other side shows less weight shift     Turn 360 Degrees  Able to turn 360 degrees safely but slowly     Place Alternate Foot on Step or Stool While Standing Unsupported  Able to stand independently and complete 8 steps in greater than 20 seconds     Standing Unsupported One Foot in Front  Able to place foot ahead independently and hold 30 seconds     Standing on One Leg  Tries to lift leg unable to hold 3 seconds but remains standing independently     Juarez Balance Score  48        FGA     FGA Score (out of 30 total)  18        Timed Up and Go Test    Comment, Timed Up and Go Test  10.78 sec    no AD       [id number] Balance Interventions    Results, Timed Up and Go Test (Balance)  10.78sec    no AD        Gait and Mobility   Mod Indep with RW for community, including ramps and uneven terrain.  S with SPC for community ambulation, but Mod I for shorter distances with SPC.  Indep HHD without use of AD  Mod I on stairs with use of a railing, using a reciprocal pattern.    Today's Treatment:      PT Neuro Exercises  "Current Session Time Performed   THER ACT  TOTAL TIME FOR SESSION 8-22 Minutes    Precautions NPO/PEG tube.  Pt is I with PEG tube feedings.   Working on swallow in speech.  L facial droop and slight L eye ptosis post stroke.  Pt educated in use of HR with max HR defined as 129bpm.  Avoiding >120bpm. Yes          yes   PROGRESS REPORT OMAIRA JIMENEZ yes   Transfer training Mod I with RW, Mod I without UE support for sit to stand without AD (at 20\" height) yes   Patient Education Reviewed plan for discharge - dtr thought extra visits were approved.  Reviewed the status of his BP today (hypotensive but assymptomatic and dtr alerted).  Educated patient on need to maintain hydration, monitor for any dizziness on position change.  Recommended contact MD if this continues to avoid orthostatic hypotensive situations.  Goal to provide HEP including balance exercises and reiterate weight exercises.    Patient Education Reviewed POC goals and intention for 2x/week for at least 8 weeks.     HEP - Issued and reviewed with Patient - instructed to perform at counter or with chair for support:  -LAQ  -Heel raises  -Abduction  -Hamstring curls    Reviewed need for walker and difficulty with clearing R foot consistently    Reviewed progression of dumbell weight exercises to focus on increased reps vs increased weight for now.  Education regarding need to improve endurance and return to resting HR more efficiently.  Directed pt and rena to begin walking with SPC outdoors up to approx 20 min with rest periods as needed, close S.    Patient Education regarding the use of the SPC for outdoor ambulation ( only with supervision if distances) .  Instruction in standing HS stretches and standing gastroc stretch on wedge.  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  Discussed progress towards goals and need to continue HEP.    Reviewed HEP 10/27/20 "                                               Yes                     THER EX TOTAL TIME FOR SESSION 0-7 Minutes     STRETCHING     Stretching by patient, supervised by Aretha Ferrara PT Seated HS stretch w/ ft on block,  w/ manual assist 20 sec x 3   B calf stretch on incline #3  30 sec  x 3  Runner stretch Yes    No   CARDIOVASCULAR     Nu Step Level 1 UE's/LE's 6min, 1 min cool down  n   Stationary Bike Recumb bike 10 min Level 2 N   Walking warm up (check gait below)    Supine there ex Shaker exercises   Supine w/ 20x head lifts off pillow; 1x head lift and hold for 20 sec;  Cues to breathe    Standing Ther-Ex On airex w/ 3# B LE's:  March x 20,   hip ext x 20, hip abd x 20, HR's/TR's 20x     8 in step ups and over x 8 ea LE     seated 2 x 10 for bicep curls alt 9 # ea  alt ue raises  2 x 10 9 # EA seated   mini squats  2 x 10 9# B  bent rows with 9# ea 2 x 10 (performed bilat simultaneously)  Deep squats with holding onto rail x 5  1/2 kneel with holding onto rail x 2   N  N  N  N        Seated in chair:  LAQ's w/ 5#  2 x10 N   NEURO RE-ED TOTAL TIME FOR SESSION 8-22 Minutes    Jimenez Balance Test Score:  49 pts   PTA 9/23/20  10/6 JIMENEZ 48/56  10/27  JIMENEZ  48/56 by Aretha Ferrara PT  10/6 FGA 18/30    10/27 FGA 16/30 by Aretha Ferrara PT   N     COORDINATION     POSTURAL RE-ED     PRE-GAIT ACTIVITIES      BALANCE TRAINING      Sitting balance     Standing balance - static Tap ups forward to 8 in block w/o UE support from floor 10x2  Static stdg on airex w/ HT's and HN's 10x w/ CS/CG  Single Limb alternating 3 sec stance 10x w/ light to no UE support  Standing with foot on small ball for rolls to increase sls x 10 ea LE                                                                          Rockerboard - A/P with head turns, head  nods x 5 x 2 ea and cga/intermittent touches           N        N  N   Standing balance - dynamic Gait around slalom course of cones  and over hurdles - no AD, Supervision  Gait with obstacles  combined with leaning down to  cones - no AD, Supervision     Gait over 6 in hurdles - step to pattern forward, side stepping.   Intermittent UE support.      Walking with head turns and nods w/o AD  Walking with base of support within 12 in tiles  Walking with pivot turn  Walking backwards  Stepping over shoebox forwards   N    N      Yes          Yes  Yes    Yes  yes   Standing balance - varied surface On Airex:  Ball toss/bounce/catch  FT EO with HT/HN, UE's at sides   Airex, Semi-Tandem with HT    Blue tread rockerboard A-P gentle rocking for ankle strategies   N          yes       GAIT TRAINING TOTAL TIME FOR SESSION 23-37 Minutes    TUG W/ RW  13.12 sec  W/ SPC 12.75 sec  W/O AD 10.63 sec  Average of the three:  12.16 seconds    10/27 w/o AD  TUG = 10.78sec  Completed by Sarahy Guzman PTA           Y   Gait with/ without AD  Gait with RW in and out of PT    Gait during  session w/o AD w/ CS  Gait in hallway w/ HT's and HN's (included in Neuro section)    Complete 2 MWT w/  ft  w/o RW  368 ft 3/10 RPE after second 2 MWT  Complete FGA:  14/30  (1,2,1,2,3,1,0,0,2,2) - completed by Ana Rosa Winston PT (primary PT) - will update goals next visit with primary.      Arrived to session ambulating with RW; Gait training w SPC(RUE) x 250 ft x 3 w/  cl S, including uneven grass terrain and uneven macadam.   Focus on cues for heel strike/appropriate knee extension RLE.   HR increased from 82 at rest to 116 with ambulation episodes. No gross LOB, min instability, minimal loss of R LE appropriate step length and no  catch of R toe. Ambulates quicker with SPC than no AD, and has min. deviation from midline.      GT  600-800ft over outdoor uneven terrain including grassy terrain and macadam.  Flagstones, brick, gravel, step over edges.  Moderate incline/decline with cl S - HR to 120bpm and with slow recovery to resting rate.  Cued to use proper pattern with cane without difficulty. 1 seated rest.    GT without AD within  "department - note increased unsteadiness today,  Educated regarding when to use AD.    Goal:  Return to amb without AD  Y    Y  Y                                                                     Dynamic gait      Stair negotiation -  Completed by Sarahy Guzman PTA Up/down FF w/ 1HR's and step over step pattern  Ascend/descend 6\" curb with SPC, CS   Up/down ramp with SPC and cues to slow down for safety Y    Y  Y       Goals Addressed                 This Visit's Progress    • COMPLETED: PT goals        Short Term Goals Time Frame Result Comment/Progress   Pt will amb mod I in household setting with SPC or no AD, including 1 step up/down in his in-law suite at daughter's home 4 weeks MET    Improve JUAREZ score by at least 3 points (45/56) reflecting dec falls risk 4 weeks MET Juarez = 45/56    *FGA = 18/30   TUG </= 14 sec reflecting lower falls risk 4 weeks MET TUG = 14s w/ SPC  TUG = 12.5 w/o with S  12.16 on 9/23   Improve CGS by at least .1 m/s without decline in gait quality 4 weeks  Not tested on 8/28   FGA >/= 15/30 with least restrictive AD 4 weeks  18/30   Consistent STS and SPT transfers without AD mod I 4 weeks         Long Term Goals Time Frame Result Comment/Progress   Pt will amb I at home without AD, in community without AD or with SPC as needed for more challenging terrain/more distracting environments 8-12 weeks MET for HHD, ongoing for SPC Amb CD with RW     JUAREZ >/= 50/56 reflecting lower falls risk 8-12 weeks ongoing 49/56  10/6 = 48/56  10/27= 48/56   TUG completed safely in </= 12 sec with no decline in gait quality, balance with pivot turns 8-12 weeks MET 12.16  10/27: 10.26sec w/ AD   Gait speed 1.0 m/s maintaining gait quality 8-12 weeks MET If using RW   FGA >/= 20/30 8-12 weeks Not met 8/28 FGA = 18/30  10/6 = 18/30  10/27 = 18/30   Mod I reciprocal stairs with 1 HR, amb up/down curb step and ADA ramps without AD 8-12 weeks MET Requires AD pr supervision for safety with curb, especially " descent  Ramps are mod I    I finalized HEP 8-12 weeks MET To finalize program with balance exercises; just completed HEP for weight training                   Education provided this session. Final HEP review.    Clinical Impression: 89y/o male s/p brainstem CVA demonstrates improvement in overall balance and strength since initiation of therapy.  He remains at risk for falls as determined by his FGA of 18/30 and JIMENEZ 48/56, but ameliorated by the use of the assistive device.  He did demonstrate a significant improvement in his TUG score without an assistive device, indicating he has been able to functionally decrease the risk for falls in the home.  He and family have been educated in the recommendation for an assistive device to prevent falls when ambulating in the community, but with household ambulation he is able to ambulate without an AD.   Patient is ready for discharge to his HEP  Discharge information for [facility]:    Reason for D/C: Maximum potential met  Hospitalization during treatment: No  Increased independence: [place] in HEP, Increased functional activity, Increased functional independence  Increased production assessment: Return to household activities, Increased community independence  Interrupted for medical reason: No  Skin integrity: Intact

## 2020-10-28 NOTE — PROGRESS NOTES
SLP DAILY NOTE FOR OUTPATIENT THERAPY    Patient: Karl Park   MRN: [id number]  : 1928 89 y.o.  Referring Physician: Herb Dailey MD  Date of Visit: 10/28/2020      Certification Dates: 20 through 20    Diagnosis:   1. Cerebrovascular accident (CVA), unspecified mechanism (CMS/HCC)    2. Dysphasia following cerebrovascular accident (CVA)        Chief Complaints:  dysphagia    Precautions:          TODAY'S VISIT:    History/Vitals/Pain/Encounter Info - 10/28/20 1218        Injury History/Precautions/Daily Required Info    Primary Therapist  Laura Kyler     Chief Complaint/Reason for Visit   dysphagia     Speech Therapy  Swallowing     Document Type  daily treatment     Patient/Family/Caregiver Comments/Observations  pt is alert and cooperative     Start Time  1000     Stop Time  1100     Time Calculation (min)  60 min     Patient reported fall since last visit  No        Pain Assessment    Currently in pain  No/Denies        Pain Interventions    Intervention  none     Post Intervention Comments  none         Daily Treatment Assessment and Plan - 10/28/20 1224        Daily Treatment Assessment and Plan    Progress toward goals  Progressing     Daily Outcome Summary  improving swallow fxn, but continued expectoration of mucous     Plan and Recommendations  target swallow goals              Today's Treatment:      Short Term Goals Current Session   ?Pt. will tolerate trace PO trials without s/s of aspiration and adequate oral management with min cues over 2 sessions.  Trial 4 oz nectar water, a grape popsicle, and ice chips x 30; mild cough x 1 with nectar     Pt with expectoration of mucous x .5 oz total throughout course of session, suspect from buildup of mucous/dryness from NPO status    Pt instructed to take 4 oz water 2x a day using strategies and after thoroughly cleaning mouth at home, discontinue with any discomfort; verbal and written instructions provided; pt able to return  "demonstration and answer 100% of comprehension/retention questions regarding this practice - pt reports doing well with 4 oz of water at home and has been following instructions to clean mouth prior to trials and use swallow strategies (small sip, hold breath, swallow strong, clear throat, swallow again)   Pt. will complete pharyngeal strengthening, laryngeal excursion, and base of tongue strengthening exercises to improve airway protection and bolus propulsion with min cues for accurate production with 90% accuracy      Effortful \"guh\" 2 sets of 40 completed    CTAR x 1 set of 60, 2 sets of 30     yvonne x 2 sets of 7 completed, 1 set of 5    Pitch glides x 2 sets of 10 completed    Mendelsohn x 2 sets of 10 completed (some with cold flavored spoon) with min cues           Pt. will verbalize understanding of current swallow status and risks of aspiration/choking with min cues.     EDUCATION Discussed plan to scehdule VFSS for 2 week of November Other                              "

## 2020-11-04 ENCOUNTER — TRANSCRIBE ORDERS (OUTPATIENT)
Dept: SCHEDULING | Age: 85
End: 2020-11-04

## 2020-11-04 ENCOUNTER — HOSPITAL ENCOUNTER (OUTPATIENT)
Dept: SPEECH THERAPY | Facility: REHABILITATION | Age: 85
Setting detail: THERAPIES SERIES
Discharge: HOME | End: 2020-11-04
Attending: FAMILY MEDICINE
Payer: COMMERCIAL

## 2020-11-04 DIAGNOSIS — I63.9 CEREBROVASCULAR ACCIDENT (CVA), UNSPECIFIED MECHANISM (CMS/HCC): Primary | ICD-10-CM

## 2020-11-04 DIAGNOSIS — I69.391 DYSPHAGIA FOLLOWING CEREBRAL INFARCTION: Primary | ICD-10-CM

## 2020-11-04 DIAGNOSIS — I69.321 DYSPHASIA FOLLOWING CEREBROVASCULAR ACCIDENT (CVA): ICD-10-CM

## 2020-11-04 PROCEDURE — 92526 ORAL FUNCTION THERAPY: CPT | Mod: GN

## 2020-11-04 NOTE — PROGRESS NOTES
SLP DAILY NOTE FOR OUTPATIENT THERAPY    Patient: Karl Park   MRN: [id number]  : 1928 89 y.o.  Referring Physician: Herb Dailey MD  Date of Visit: 2020      Certification Dates: 20 through 20    Diagnosis:   1. Cerebrovascular accident (CVA), unspecified mechanism (CMS/HCC)    2. Dysphasia following cerebrovascular accident (CVA)        Chief Complaints:  dysphagia    Precautions:          TODAY'S VISIT:    History/Vitals/Pain/Encounter Info - 20        Injury History/Precautions/Daily Required Info    Primary Therapist  Laura Kyler     Chief Complaint/Reason for Visit   dysphagia     Speech Therapy  Swallowing     Document Type  daily treatment     Patient/Family/Caregiver Comments/Observations  pt is alert and cooperatve     Start Time  1700     Stop Time  1800     Time Calculation (min)  60 min     Patient reported fall since last visit  No        Pain Assessment    Currently in pain  No/Denies        Pain Interventions    Intervention  none     Post Intervention Comments  none         Daily Treatment Assessment and Plan - 20 180        Daily Treatment Assessment and Plan    Progress toward goals  Progressing     Daily Outcome Summary  improving swallow fxn     Plan and Recommendations  target swallow goals              Today's Treatment:      Short Term Goals Current Session   ?Pt. will tolerate trace PO trials without s/s of aspiration and adequate oral management with min cues over 2 sessions.  Trial 4 oz nectar water, a grape popsicle, and ice chips x 30; mild cough x 1 with nectar     Pt with expectoration of mucous x .5 oz total throughout course of session, suspect from buildup of mucous/dryness from NPO status    Pt instructed to take 4 oz water 2x a day using strategies and after thoroughly cleaning mouth at home, discontinue with any discomfort; verbal and written instructions provided; pt able to return demonstration and answer 100% of  "comprehension/retention questions regarding this practice - pt reports doing well with 4 oz of water at home and has been following instructions to clean mouth prior to trials and use swallow strategies (small sip, hold breath, swallow strong, clear throat, swallow again)   Pt. will complete pharyngeal strengthening, laryngeal excursion, and base of tongue strengthening exercises to improve airway protection and bolus propulsion with min cues for accurate production with 90% accuracy      Effortful \"guh\" 2 sets of 40 completed    CTAR x 1 set of 60, 2 sets of 30     yvonne x 2 sets of 7 completed, 1 set of 5    Pitch glides x 2 sets of 10 completed    Mendelsohn x 2 sets of 10 completed (some with cold flavored spoon) with min cues           Pt. will verbalize understanding of current swallow status and risks of aspiration/choking with min cues.     EDUCATION Discussed plan to scehdule VFSS for 2 week of November Other                              "

## 2020-11-05 ENCOUNTER — HOSPITAL ENCOUNTER (OUTPATIENT)
Dept: SPEECH THERAPY | Facility: REHABILITATION | Age: 85
Setting detail: THERAPIES SERIES
Discharge: HOME | End: 2020-11-05
Attending: FAMILY MEDICINE
Payer: COMMERCIAL

## 2020-11-05 DIAGNOSIS — I63.9 CEREBROVASCULAR ACCIDENT (CVA), UNSPECIFIED MECHANISM (CMS/HCC): Primary | ICD-10-CM

## 2020-11-05 DIAGNOSIS — I69.321 DYSPHASIA FOLLOWING CEREBROVASCULAR ACCIDENT (CVA): ICD-10-CM

## 2020-11-05 PROCEDURE — 92526 ORAL FUNCTION THERAPY: CPT | Mod: GN

## 2020-11-05 NOTE — OP SLP TREATMENT LOG
"Short Term Goals Current Session   ?Pt. will tolerate trace PO trials without s/s of aspiration and adequate oral management with min cues over 2 sessions.  Trial 4 oz nectar, 4 oz thin water, 2 oz applesauce; no overt s/s aspiration; pt used breath hold, swallow strong x 2 and throat clear PRN    Pt continues to expectorate mucous throughout session   Pt. will complete pharyngeal strengthening, laryngeal excursion, and base of tongue strengthening exercises to improve airway protection and bolus propulsion with min cues for accurate production with 90% accuracy      Effortful \"guh\" 2 sets of 40 completed    CTAR x 1 set of 60, 2 sets of 30, 1 minute hold    yvonne x 3 sets of 5 completed    Pitch glides x 2 sets of 10 completed    Mendelsohn x 2 sets of 10 completed (with ice chips) with min cues           Pt. will verbalize understanding of current swallow status and risks of aspiration/choking with min cues.     EDUCATION    Other      "

## 2020-11-05 NOTE — PROGRESS NOTES
SLP DAILY NOTE FOR OUTPATIENT THERAPY    Patient: Karl Park   MRN: [id number]  : 1928 89 y.o.  Referring Physician: Herb Dailey MD  Date of Visit: 2020      Certification Dates: 20 through 20    Diagnosis:   1. Cerebrovascular accident (CVA), unspecified mechanism (CMS/HCC)    2. Dysphasia following cerebrovascular accident (CVA)        Chief Complaints:  dysphagia    TODAY'S VISIT:    History/Vitals/Pain/Encounter Info - 20 1341        Injury History/Precautions/Daily Required Info    Primary Therapist  Laura Kyler     Chief Complaint/Reason for Visit   dysphagia     Speech Therapy  Swallowing     Document Type  daily treatment     Patient/Family/Caregiver Comments/Observations  pt is alert and cooperative     Start Time  1100     Stop Time  1200     Time Calculation (min)  60 min     Patient reported fall since last visit  No        Pain Assessment    Currently in pain  No/Denies        Pain Interventions    Intervention  none     Post Intervention Comments  none         Daily Treatment Assessment and Plan - 20 1353        Daily Treatment Assessment and Plan    Progress toward goals  Progressing     Daily Outcome Summary  improving swallow function, improved toelrance of puree trials today     Plan and Recommendations  target conservative trilas puree, nectar, thin water; complete swallow exercises              Today's Treatment:      Short Term Goals Current Session   ?Pt. will tolerate trace PO trials without s/s of aspiration and adequate oral management with min cues over 2 sessions.  Trial 4 oz nectar, 4 oz thin water, 2 oz applesauce; no overt s/s aspiration; pt used breath hold, swallow strong x 2 and throat clear PRN    Pt continues to expectorate mucous throughout session   Pt. will complete pharyngeal strengthening, laryngeal excursion, and base of tongue strengthening exercises to improve airway protection and bolus propulsion with min cues for accurate  "production with 90% accuracy      Effortful \"guh\" 2 sets of 40 completed    CTAR x 1 set of 60, 2 sets of 30, 1 minute hold    yvonne x 3 sets of 5 completed    Pitch glides x 2 sets of 10 completed    Mendelsohn x 2 sets of 10 completed (with ice chips) with min cues           Pt. will verbalize understanding of current swallow status and risks of aspiration/choking with min cues.     EDUCATION    Other                              "

## 2020-11-06 ENCOUNTER — HOSPITAL ENCOUNTER (OUTPATIENT)
Dept: SPEECH THERAPY | Facility: REHABILITATION | Age: 85
Setting detail: THERAPIES SERIES
Discharge: HOME | End: 2020-11-06
Attending: FAMILY MEDICINE
Payer: COMMERCIAL

## 2020-11-06 DIAGNOSIS — I63.9 CEREBROVASCULAR ACCIDENT (CVA), UNSPECIFIED MECHANISM (CMS/HCC): Primary | ICD-10-CM

## 2020-11-06 DIAGNOSIS — I69.321 DYSPHASIA FOLLOWING CEREBROVASCULAR ACCIDENT (CVA): ICD-10-CM

## 2020-11-06 PROCEDURE — 92526 ORAL FUNCTION THERAPY: CPT | Mod: GN

## 2020-11-06 NOTE — PROGRESS NOTES
"SLP DAILY NOTE FOR OUTPATIENT THERAPY    Patient: Karl Park   MRN: [id number]  : 1928 89 y.o.  Referring Physician: Herb Dailey MD  Date of Visit: 2020      Certification Dates: 20 through 20    Diagnosis:   1. Cerebrovascular accident (CVA), unspecified mechanism (CMS/HCC)    2. Dysphasia following cerebrovascular accident (CVA)        Chief Complaints:  dysphagia      TODAY'S VISIT:    History/Vitals/Pain/Encounter Info - 20 1328        Injury History/Precautions/Daily Required Info    Primary Therapist  Laura Kyler     Chief Complaint/Reason for Visit   dysphagia     Speech Therapy  Swallowing     Document Type  daily treatment     Patient/Family/Caregiver Comments/Observations  pt is alert and cooperative     Start Time  1000     Stop Time  1100     Time Calculation (min)  60 min     Patient reported fall since last visit  No        Pain Assessment    Currently in pain  No/Denies        Pain Interventions    Intervention  none     Post Intervention Comments  none         Daily Treatment Assessment and Plan - 20 1330        Daily Treatment Assessment and Plan    Progress toward goals  Progressing     Daily Outcome Summary  large improvements with speed and number of repetitions for swallow to TG stim     Plan and Recommendations  target swallow function, conservative trials of PO, swallow strategies            Today's Treatment:      Short Term Goals Current Session   ?Pt. will tolerate trace PO trials without s/s of aspiration and adequate oral management with min cues over 2 sessions.    Pt continues to expectorate mucous throughout session   Pt. will complete pharyngeal strengthening, laryngeal excursion, and base of tongue strengthening exercises to improve airway protection and bolus propulsion with min cues for accurate production with 90% accuracy  Swallow to TG/TT stim x 75 with 2 breaks; all swallows completed with 3 seconds of stim    Effortful \"guh\" 2 " sets of 30 completed    CTAR x 1 set of 60, 2 sets of 30, 1 minute hold    yvonne x 3 sets of 5 completed    Pitch glides x 2 sets of 10 completed    Mendelsohn x 2 sets of 10 completed (with ice chips) with min cues           Pt. will verbalize understanding of current swallow status and risks of aspiration/choking with min cues.     EDUCATION    Other

## 2020-11-06 NOTE — OP SLP TREATMENT LOG
"Short Term Goals Current Session   ?Pt. will tolerate trace PO trials without s/s of aspiration and adequate oral management with min cues over 2 sessions.    Pt continues to expectorate mucous throughout session   Pt. will complete pharyngeal strengthening, laryngeal excursion, and base of tongue strengthening exercises to improve airway protection and bolus propulsion with min cues for accurate production with 90% accuracy  Swallow to TG/TT stim x 75 with 2 breaks; all swallows completed with 3 seconds of stim    Effortful \"guh\" 2 sets of 30 completed    CTAR x 1 set of 60, 2 sets of 30, 1 minute hold    yvonne x 3 sets of 5 completed    Pitch glides x 2 sets of 10 completed    Mendelsohn x 2 sets of 10 completed (with ice chips) with min cues           Pt. will verbalize understanding of current swallow status and risks of aspiration/choking with min cues.     EDUCATION    Other      "

## 2020-11-09 ENCOUNTER — HOSPITAL ENCOUNTER (OUTPATIENT)
Dept: SPEECH THERAPY | Facility: REHABILITATION | Age: 85
Setting detail: THERAPIES SERIES
Discharge: HOME | End: 2020-11-09
Attending: FAMILY MEDICINE
Payer: COMMERCIAL

## 2020-11-09 DIAGNOSIS — I63.9 CEREBROVASCULAR ACCIDENT (CVA), UNSPECIFIED MECHANISM (CMS/HCC): Primary | ICD-10-CM

## 2020-11-09 DIAGNOSIS — I69.321 DYSPHASIA FOLLOWING CEREBROVASCULAR ACCIDENT (CVA): ICD-10-CM

## 2020-11-09 PROCEDURE — 92526 ORAL FUNCTION THERAPY: CPT | Mod: GN

## 2020-11-09 NOTE — PROGRESS NOTES
SLP DAILY NOTE FOR OUTPATIENT THERAPY    Patient: Karl Park   MRN: [id number]  : 1928 89 y.o.  Referring Physician: Herb Dailey MD  Date of Visit: 2020      Certification Dates: 20 through 20    Diagnosis:   1. Cerebrovascular accident (CVA), unspecified mechanism (CMS/HCC)    2. Dysphasia following cerebrovascular accident (CVA)        Chief Complaints:  dysphagia    Precautions:          TODAY'S VISIT:    History/Vitals/Pain/Encounter Info - 20        Injury History/Precautions/Daily Required Info    Primary Therapist  Laura Chávez     Chief Complaint/Reason for Visit   dysphagia     Speech Therapy  Swallowing     Document Type  daily treatment     Patient/Family/Caregiver Comments/Observations  pt is alert and cooperative     Start Time  1100     Stop Time  1200     Time Calculation (min)  60 min     Patient reported fall since last visit  No        Pain Assessment    Currently in pain  No/Denies        Pain Interventions    Intervention  none     Post Intervention Comments  none         Daily Treatment Assessment and Plan - 20        Daily Treatment Assessment and Plan    Progress toward goals  Progressing     Daily Outcome Summary  improving swallow function     Plan and Recommendations  plan for VFSS this week                Today's Treatment:      Short Term Goals Current Session   ?Pt. will tolerate trace PO trials without s/s of aspiration and adequate oral management with min cues over 2 sessions.    Minimal mucous expectorated this session    Puree x 4 oz, nectar x 4 oz with cyclic ingestion and superglottic swallow; no s/s aspiration, vocal quality clear throughout   Pt. will complete pharyngeal strengthening, laryngeal excursion, and base of tongue strengthening exercises to improve airway protection and bolus propulsion with min cues for accurate production with 90% accuracy  Swallow to TG/TT stim x 75 with 2 breaks; all swallows completed  "with 3 seconds of stim    Effortful \"guh\" 2 sets of 30 completed    CTAR x 1 set of 60, 2 sets of 30, 1 minute hold    yvonne x 3 sets of 5 completed    Pitch glides x 2 sets of 10 completed    Mendelsohn x 2 sets of 10 completed (with ice chips) with min cues           Pt. will verbalize understanding of current swallow status and risks of aspiration/choking with min cues.     EDUCATION    Other                              "

## 2020-11-09 NOTE — OP SLP TREATMENT LOG
"Short Term Goals Current Session   ?Pt. will tolerate trace PO trials without s/s of aspiration and adequate oral management with min cues over 2 sessions.    Minimal mucous expectorated this session    Puree x 4 oz, nectar x 4 oz with cyclic ingestion and superglottic swallow; no s/s aspiration, vocal quality clear throughout   Pt. will complete pharyngeal strengthening, laryngeal excursion, and base of tongue strengthening exercises to improve airway protection and bolus propulsion with min cues for accurate production with 90% accuracy  Swallow to TG/TT stim x 75 with 2 breaks; all swallows completed with 3 seconds of stim    Effortful \"guh\" 2 sets of 30 completed    CTAR x 1 set of 60, 2 sets of 30, 1 minute hold    yvonne x 3 sets of 5 completed    Pitch glides x 2 sets of 10 completed    Mendelsohn x 2 sets of 10 completed (with ice chips) with min cues           Pt. will verbalize understanding of current swallow status and risks of aspiration/choking with min cues.     EDUCATION    Other      "

## 2020-11-10 ENCOUNTER — HOSPITAL ENCOUNTER (OUTPATIENT)
Dept: RADIOLOGY | Facility: HOSPITAL | Age: 85
Discharge: HOME | End: 2020-11-10
Attending: FAMILY MEDICINE
Payer: COMMERCIAL

## 2020-11-10 ENCOUNTER — HOSPITAL ENCOUNTER (OUTPATIENT)
Dept: SPEECH THERAPY | Facility: REHABILITATION | Age: 85
Setting detail: THERAPIES SERIES
Discharge: HOME | End: 2020-11-10
Attending: FAMILY MEDICINE
Payer: COMMERCIAL

## 2020-11-10 DIAGNOSIS — I69.391 DYSPHAGIA FOLLOWING CEREBRAL INFARCTION: ICD-10-CM

## 2020-11-10 DIAGNOSIS — I63.9 CEREBROVASCULAR ACCIDENT (CVA), UNSPECIFIED MECHANISM (CMS/HCC): Primary | ICD-10-CM

## 2020-11-10 DIAGNOSIS — I69.321 DYSPHASIA FOLLOWING CEREBROVASCULAR ACCIDENT (CVA): ICD-10-CM

## 2020-11-10 PROCEDURE — 92526 ORAL FUNCTION THERAPY: CPT | Mod: GN

## 2020-11-10 PROCEDURE — 74230 X-RAY XM SWLNG FUNCJ C+: CPT

## 2020-11-10 PROCEDURE — 92611 MOTION FLUOROSCOPY/SWALLOW: CPT | Mod: GN

## 2020-11-10 NOTE — OP SLP TREATMENT LOG
"Short Term Goals Current Session   ?Pt. will tolerate trace PO trials without s/s of aspiration and adequate oral management with min cues over 2 sessions.    Minimal mucous expectorated this session    nectar x 4 oz with cyclic ingestion and superglottic swallow; no s/s aspiration, vocal quality clear throughout   Pt. will complete pharyngeal strengthening, laryngeal excursion, and base of tongue strengthening exercises to improve airway protection and bolus propulsion with min cues for accurate production with 90% accuracy  Swallow to TG/TT stim x 75 with 2 breaks; all swallows completed with 3 seconds of stim    Effortful \"guh\" 2 sets of 30 completed    CTAR x 1 set of 60, 2 sets of 30, 1 minute hold    yvonne x 3 sets of 5 completed    Pitch glides x 2 sets of 10 completed    Mendelsohn x 2 sets of 10 completed (with ice chips) with min cues           Pt. will verbalize understanding of current swallow status and risks of aspiration/choking with min cues.     EDUCATION    Other      "

## 2020-11-10 NOTE — PROCEDURES
"General Information  Start Time: 1345  Stop Time: 1434  Date of Evaluation: 11/10/20  Type of Study: Initial VFSS    Video swallow study completed. Please refer to report under \"imaging\" section for full details (ex findings and updated recommendations).     "

## 2020-11-10 NOTE — PROGRESS NOTES
SLP DAILY NOTE FOR OUTPATIENT THERAPY    Patient: Karl Park   MRN: [id number]  : 1928 89 y.o.  Referring Physician: Herb Dailey MD  Date of Visit: 11/10/2020      Certification Dates: 20 through 20    Diagnosis:   1. Cerebrovascular accident (CVA), unspecified mechanism (CMS/HCC)    2. Dysphasia following cerebrovascular accident (CVA)        Chief Complaints:  dysphagia      TODAY'S VISIT:    History/Vitals/Pain/Encounter Info - 11/10/20 1251        Injury History/Precautions/Daily Required Info    Primary Therapist  Laura Kyler     Chief Complaint/Reason for Visit   dysphagia     Speech Therapy  Swallowing     Document Type  daily treatment     Patient/Family/Caregiver Comments/Observations  pt is alert and cooperative     Start Time  1100     Stop Time  1200     Time Calculation (min)  60 min     Patient reported fall since last visit  No        Pain Assessment    Currently in pain  No/Denies        Pain Interventions    Intervention  none     Post Intervention Comments  none         Daily Treatment Assessment and Plan - 11/10/20 1254        Daily Treatment Assessment and Plan    Progress toward goals  Progressing     Daily Outcome Summary  improving swallow fxn     Plan and Recommendations  VFSS scheduled for this afternoon            Today's Treatment:      Short Term Goals Current Session   ?Pt. will tolerate trace PO trials without s/s of aspiration and adequate oral management with min cues over 2 sessions.    Minimal mucous expectorated this session    nectar x 4 oz with cyclic ingestion and superglottic swallow; no s/s aspiration, vocal quality clear throughout   Pt. will complete pharyngeal strengthening, laryngeal excursion, and base of tongue strengthening exercises to improve airway protection and bolus propulsion with min cues for accurate production with 90% accuracy  Swallow to TG/TT stim x 75 with 2 breaks; all swallows completed with 3 seconds of stim    Effortful  "\"guh\" 2 sets of 30 completed    CTAR x 1 set of 60, 2 sets of 30, 1 minute hold    yvonne x 3 sets of 5 completed    Pitch glides x 2 sets of 10 completed    Mendelsohn x 2 sets of 10 completed (with ice chips) with min cues           Pt. will verbalize understanding of current swallow status and risks of aspiration/choking with min cues.     EDUCATION    Other                              "

## 2020-11-11 ENCOUNTER — HOSPITAL ENCOUNTER (OUTPATIENT)
Dept: SPEECH THERAPY | Facility: REHABILITATION | Age: 85
Setting detail: THERAPIES SERIES
Discharge: HOME | End: 2020-11-11
Attending: FAMILY MEDICINE
Payer: COMMERCIAL

## 2020-11-11 DIAGNOSIS — I63.9 CEREBROVASCULAR ACCIDENT (CVA), UNSPECIFIED MECHANISM (CMS/HCC): Primary | ICD-10-CM

## 2020-11-11 DIAGNOSIS — I69.321 DYSPHASIA FOLLOWING CEREBROVASCULAR ACCIDENT (CVA): ICD-10-CM

## 2020-11-11 PROCEDURE — 92526 ORAL FUNCTION THERAPY: CPT | Mod: GN

## 2020-11-11 NOTE — OP SLP TREATMENT LOG
Short Term Goals Current Session   ?Pt. will tolerate trace PO trials without s/s of aspiration and adequate oral management with min cues over 2 sessions.    PO trials of puree/nectar with breath hold - strong swallow - cough/strong throat clear with expectoration - dry swallow sequence; mod cues for throat clear with expectoration; min cues for dry swallow   Pt. will complete pharyngeal strengthening, laryngeal excursion, and base of tongue strengthening exercises to improve airway protection and bolus propulsion with min cues for accurate production with 90% accuracy           Pt. will verbalize understanding of current swallow status and risks of aspiration/choking with min cues.  Extensive education provided to patient re: results of VFSS completed yesterday; possibility of pursuing pleasure feeds of puree/nectar with swallow sequence named above; training provided on risks of any PO intake and that he is at a high risk for aspiration and pt understands the risk factors as well as the importance of the use of the full swallow sequence; pt and daughter verbalized understanding and verballized they would like to try pleasure feeds with the swallow sequence over the next few sessions in speech therapy    This therapist also called the patient's doctor and faxed him the VFSS. This therapist spoke with a nurse at the doctor's practice to request a script for pleasure feeds of puree and nectar and the free water protocol pending the doctor's approval/recommendations given the fact that the patient is at a high risk for aspiration. The nurse verablized understanding and said she would forward the message to the doctor. The patient has been educated not to do pleasure feeds at home until script is obtained and he has had the opportunity to practice the swallow sequence. Pt and daughter verbalized understanding.   EDUCATION    Other

## 2020-11-11 NOTE — PROGRESS NOTES
SLP DAILY NOTE FOR OUTPATIENT THERAPY    Patient: Karl Park   MRN: [id number]  : 1928 89 y.o.  Referring Physician: Herb Dailey MD  Date of Visit: 2020      Certification Dates: 20 through 20    Diagnosis:   1. Cerebrovascular accident (CVA), unspecified mechanism (CMS/HCC)    2. Dysphasia following cerebrovascular accident (CVA)        Chief Complaints:  dysphagia    Precautions:          TODAY'S VISIT:    History/Vitals/Pain/Encounter Info - 20 1756        Injury History/Precautions/Daily Required Info    Primary Therapist  Laura Kyler     Chief Complaint/Reason for Visit   dysphagia     Speech Therapy  Swallowing     Document Type  daily treatment     Patient/Family/Caregiver Comments/Observations  pt is alert and cooperative     Start Time  1000     Stop Time  1100     Time Calculation (min)  60 min     Patient reported fall since last visit  No        Pain Assessment    Currently in pain  No/Denies        Pain Interventions    Intervention  none     Post Intervention Comments  none         Daily Treatment Assessment and Plan - 20 1803        Daily Treatment Assessment and Plan    Progress toward goals  Progressing     Daily Outcome Summary  focus of session was on education and training the patient and his daughter re: the results of the VFSS yesterday     Plan and Recommendations  target conservative trials of puree and nectar with the strict use of swallow sequence/strategies                Today's Treatment:      Short Term Goals Current Session   ?Pt. will tolerate trace PO trials without s/s of aspiration and adequate oral management with min cues over 2 sessions.    PO trials of puree/nectar with breath hold - strong swallow - cough/strong throat clear with expectoration - dry swallow sequence; mod cues for throat clear with expectoration; min cues for dry swallow   Pt. will complete pharyngeal strengthening, laryngeal excursion, and base of tongue  strengthening exercises to improve airway protection and bolus propulsion with min cues for accurate production with 90% accuracy           Pt. will verbalize understanding of current swallow status and risks of aspiration/choking with min cues.  Extensive education provided to patient re: results of VFSS completed yesterday; possibility of pursuing pleasure feeds of puree/nectar with swallow sequence named above; training provided on risks of any PO intake and that he is at a high risk for aspiration and pt understands the risk factors as well as the importance of the use of the full swallow sequence; pt and daughter verbalized understanding and verballized they would like to try pleasure feeds with the swallow sequence over the next few sessions in speech therapy    This therapist also called the patient's doctor and faxed him the VFSS. This therapist spoke with a nurse at the doctor's practice to request a script for pleasure feeds of puree and nectar and the free water protocol pending the doctor's approval/recommendations given the fact that the patient is at a high risk for aspiration. The nurse verablized understanding and said she would forward the message to the doctor. The patient has been educated not to do pleasure feeds at home until script is obtained and he has had the opportunity to practice the swallow sequence. Pt and daughter verbalized understanding.   EDUCATION    Other

## 2020-11-18 ENCOUNTER — HOSPITAL ENCOUNTER (OUTPATIENT)
Dept: SPEECH THERAPY | Facility: REHABILITATION | Age: 85
Setting detail: THERAPIES SERIES
Discharge: HOME | End: 2020-11-18
Attending: FAMILY MEDICINE
Payer: COMMERCIAL

## 2020-11-18 DIAGNOSIS — I63.9 CEREBROVASCULAR ACCIDENT (CVA), UNSPECIFIED MECHANISM (CMS/HCC): Primary | ICD-10-CM

## 2020-11-18 DIAGNOSIS — I69.393 ATAXIA FOLLOWING CEREBRAL INFARCTION: ICD-10-CM

## 2020-11-18 DIAGNOSIS — I69.321 DYSPHASIA FOLLOWING CEREBROVASCULAR ACCIDENT (CVA): ICD-10-CM

## 2020-11-18 DIAGNOSIS — I69.391 DYSPHAGIA AS LATE EFFECT OF CEREBROVASCULAR ACCIDENT (CVA): ICD-10-CM

## 2020-11-18 PROCEDURE — 92526 ORAL FUNCTION THERAPY: CPT | Mod: GN

## 2020-11-18 NOTE — PROGRESS NOTES
SLP DAILY NOTE FOR OUTPATIENT THERAPY    Patient: Karl Park   MRN: [id number]  : 1928 89 y.o.  Referring Physician: Herb Dailey MD  Date of Visit: 2020      Certification Dates: 20 through 20    Diagnosis:   1. Cerebrovascular accident (CVA), unspecified mechanism (CMS/HCC)    2. Dysphasia following cerebrovascular accident (CVA)        Chief Complaints:  dysphagia      TODAY'S VISIT:    History/Vitals/Pain/Encounter Info - 20 1806        Injury History/Precautions/Daily Required Info    Primary Therapist  Laura Kyler     Chief Complaint/Reason for Visit   dysphagia     Speech Therapy  Swallowing     Document Type  daily treatment     Patient/Family/Caregiver Comments/Observations  pt is alert and cooperative     Start Time  1700     Stop Time  1800     Time Calculation (min)  60 min     Patient reported fall since last visit  No        Pain Assessment    Currently in pain  No/Denies        Pain Interventions    Intervention  none     Post Intervention Comments  none         Daily Treatment Assessment and Plan - 20 1810        Daily Treatment Assessment and Plan    Progress toward goals  Progressing     Daily Outcome Summary  training for strategies for puree and nectar completed     Plan and Recommendations  continue training for puree and nectar pleasure feeds as well as FWP with patient and his family members (wife and daughter)              Today's Treatment:      Short Term Goals Current Session   ?Pt. will tolerate trace PO trials without s/s of aspiration and adequate oral management with min cues over 2 sessions.    PO trials of puree/nectar with breath hold - strong swallow - cough/strong throat clear with expectoration - dry swallow sequence; mod cues for throat clear with expectoration; min cues for dry swallow    Pt trialed a total of 3-4 oz puree/nectar   Pt. will complete pharyngeal strengthening, laryngeal excursion, and base of tongue strengthening  exercises to improve airway protection and bolus propulsion with min cues for accurate production with 90% accuracy           Pt. will verbalize understanding of current swallow status and risks of aspiration/choking with min cues.   Received order from PCP to release to pleasure feeds of puree/nectar as well as for free water protocol; education and training ongoing for patient for HEP and safe swallow strategies   EDUCATION    Other

## 2020-11-18 NOTE — OP SLP TREATMENT LOG
Short Term Goals Current Session   ?Pt. will tolerate trace PO trials without s/s of aspiration and adequate oral management with min cues over 2 sessions.    PO trials of puree/nectar with breath hold - strong swallow - cough/strong throat clear with expectoration - dry swallow sequence; mod cues for throat clear with expectoration; min cues for dry swallow    Pt trialed a total of 3-4 oz puree/nectar   Pt. will complete pharyngeal strengthening, laryngeal excursion, and base of tongue strengthening exercises to improve airway protection and bolus propulsion with min cues for accurate production with 90% accuracy           Pt. will verbalize understanding of current swallow status and risks of aspiration/choking with min cues.   Received order from PCP to release to pleasure feeds of puree/nectar as well as for free water protocol; education and training ongoing for patient for HEP and safe swallow strategies   EDUCATION    Other

## 2020-11-19 ENCOUNTER — HOSPITAL ENCOUNTER (OUTPATIENT)
Dept: SPEECH THERAPY | Facility: REHABILITATION | Age: 85
Setting detail: THERAPIES SERIES
Discharge: HOME | End: 2020-11-19
Attending: FAMILY MEDICINE
Payer: COMMERCIAL

## 2020-11-19 DIAGNOSIS — I69.391 DYSPHAGIA AS LATE EFFECT OF CEREBROVASCULAR ACCIDENT (CVA): ICD-10-CM

## 2020-11-19 DIAGNOSIS — I63.9 CEREBROVASCULAR ACCIDENT (CVA), UNSPECIFIED MECHANISM (CMS/HCC): Primary | ICD-10-CM

## 2020-11-19 DIAGNOSIS — I69.321 DYSPHASIA FOLLOWING CEREBROVASCULAR ACCIDENT (CVA): ICD-10-CM

## 2020-11-19 DIAGNOSIS — I69.393 ATAXIA FOLLOWING CEREBRAL INFARCTION: ICD-10-CM

## 2020-11-19 PROCEDURE — 92526 ORAL FUNCTION THERAPY: CPT | Mod: GN

## 2020-11-19 NOTE — PROGRESS NOTES
SLP DAILY NOTE FOR OUTPATIENT THERAPY    Patient: Karl Park   MRN: [id number]  : 1928 89 y.o.  Referring Physician: Herb Dailey MD  Date of Visit: 2020      Certification Dates: 20 through 20    Diagnosis:   1. Cerebrovascular accident (CVA), unspecified mechanism (CMS/HCC)    2. Dysphasia following cerebrovascular accident (CVA)    3. Ataxia following cerebral infarction        Chief Complaints:  dysphagia    Precautions:          TODAY'S VISIT:    History/Vitals/Pain/Encounter Info - 20 1630        Injury History/Precautions/Daily Required Info    Primary Therapist  Laura Chávez     Chief Complaint/Reason for Visit   dysphagia     Speech Therapy  Swallowing     Document Type  daily treatment     Patient/Family/Caregiver Comments/Observations  pt is alert and cooperative; daughter is present for training     Start Time  1300     Stop Time  1400     Time Calculation (min)  60 min     Patient reported fall since last visit  No        Pain Assessment    Currently in pain  No/Denies        Pain Interventions    Intervention  none     Post Intervention Comments  none         Daily Treatment Assessment and Plan - 20 1633        Daily Treatment Assessment and Plan    Progress toward goals  Progressing     Daily Outcome Summary  focus of session is to train patient and daughter     Plan and Recommendations  continue training to prepare for DC next week            Today's Treatment:      Short Term Goals Current Session   ?Pt. will tolerate trace PO trials without s/s of aspiration and adequate oral management with min cues over 2 sessions.    PO trials of puree/nectar with breath hold - strong swallow - cough/strong throat clear with expectoration - dry swallow sequence; mod cues for throat clear with expectoration; min cues for dry swallow    Pt trialed a total of 3-4 oz puree/nectar   Pt. will complete pharyngeal strengthening, laryngeal excursion, and base of tongue  strengthening exercises to improve airway protection and bolus propulsion with min cues for accurate production with 90% accuracy           Pt. will verbalize understanding of current swallow status and risks of aspiration/choking with min cues.     EDUCATION Training provided re: pleasure feeds, free water protocol, and HEP to patient and his daughter; verbal, written and demonstration provided; pt and daughter Corrina verbalized understanding   Other

## 2020-11-19 NOTE — OP SLP TREATMENT LOG
Short Term Goals Current Session   ?Pt. will tolerate trace PO trials without s/s of aspiration and adequate oral management with min cues over 2 sessions.    PO trials of puree/nectar with breath hold - strong swallow - cough/strong throat clear with expectoration - dry swallow sequence; mod cues for throat clear with expectoration; min cues for dry swallow    Pt trialed a total of 3-4 oz puree/nectar   Pt. will complete pharyngeal strengthening, laryngeal excursion, and base of tongue strengthening exercises to improve airway protection and bolus propulsion with min cues for accurate production with 90% accuracy           Pt. will verbalize understanding of current swallow status and risks of aspiration/choking with min cues.     EDUCATION Training provided re: pleasure feeds, free water protocol, and HEP to patient and his daughter; verbal, written and demonstration provided; pt and daughter Corrina verbalized understanding   Other

## 2020-11-20 ENCOUNTER — HOSPITAL ENCOUNTER (OUTPATIENT)
Dept: SPEECH THERAPY | Facility: REHABILITATION | Age: 85
Setting detail: THERAPIES SERIES
Discharge: HOME | End: 2020-11-20
Attending: FAMILY MEDICINE
Payer: COMMERCIAL

## 2020-11-20 DIAGNOSIS — I63.9 CEREBROVASCULAR ACCIDENT (CVA), UNSPECIFIED MECHANISM (CMS/HCC): Primary | ICD-10-CM

## 2020-11-20 DIAGNOSIS — I69.393 ATAXIA FOLLOWING CEREBRAL INFARCTION: ICD-10-CM

## 2020-11-20 DIAGNOSIS — I69.321 DYSPHASIA FOLLOWING CEREBROVASCULAR ACCIDENT (CVA): ICD-10-CM

## 2020-11-20 PROCEDURE — 92526 ORAL FUNCTION THERAPY: CPT | Mod: GN

## 2020-11-20 NOTE — OP SLP TREATMENT LOG
Short Term Goals Current Session   ?Pt. will tolerate trace PO trials without s/s of aspiration and adequate oral management with min cues over 2 sessions.    PO trials of puree/nectar with breath hold - strong swallow - cough/strong throat clear with expectoration - dry swallow sequence; min cues for throat clear with expectoration; min cues for dry swallow    Pt trialed a total of 3-4 oz puree/nectar   Pt. will complete pharyngeal strengthening, laryngeal excursion, and base of tongue strengthening exercises to improve airway protection and bolus propulsion with min cues for accurate production with 90% accuracy           Pt. will verbalize understanding of current swallow status and risks of aspiration/choking with min cues.     EDUCATION Training provided re: pleasure feeds, free water protocol, and HEP to patient and his wife; verbal, written and demonstration provided; pt and wife Pat verbalized understanding   Other

## 2020-11-20 NOTE — PROGRESS NOTES
SLP DAILY NOTE FOR OUTPATIENT THERAPY    Patient: Karl Park   MRN: [id number]  : 1928 89 y.o.  Referring Physician: Herb Dailey MD  Date of Visit: 2020      Certification Dates: 20 through 20    Diagnosis:   1. Cerebrovascular accident (CVA), unspecified mechanism (CMS/HCC)    2. Dysphasia following cerebrovascular accident (CVA)    3. Ataxia following cerebral infarction        Chief Complaints:  dysphagia    Precautions:          TODAY'S VISIT:    History/Vitals/Pain/Encounter Info - 20 1237        Injury History/Precautions/Daily Required Info    Primary Therapist  Laura Chávez     Chief Complaint/Reason for Visit   dysphagia     Speech Therapy  Swallowing     Document Type  daily treatment     Patient/Family/Caregiver Comments/Observations  pt is alert and cooperative; wife present for session for training     Start Time  1100     Stop Time  1200     Time Calculation (min)  60 min     Patient reported fall since last visit  No        Pain Assessment    Currently in pain  No/Denies        Pain Interventions    Intervention  none     Post Intervention Comments  none         Daily Treatment Assessment and Plan - 20 1239        Daily Treatment Assessment and Plan    Progress toward goals  Progressing     Daily Outcome Summary  focus of session was to train wife     Plan and Recommendations  plan to DC next week, continue to prep for DC              Today's Treatment:      Short Term Goals Current Session   ?Pt. will tolerate trace PO trials without s/s of aspiration and adequate oral management with min cues over 2 sessions.    PO trials of puree/nectar with breath hold - strong swallow - cough/strong throat clear with expectoration - dry swallow sequence; min cues for throat clear with expectoration; min cues for dry swallow    Pt trialed a total of 3-4 oz puree/nectar   Pt. will complete pharyngeal strengthening, laryngeal excursion, and base of tongue  strengthening exercises to improve airway protection and bolus propulsion with min cues for accurate production with 90% accuracy           Pt. will verbalize understanding of current swallow status and risks of aspiration/choking with min cues.     EDUCATION Training provided re: pleasure feeds, free water protocol, and HEP to patient and his wife; verbal, written and demonstration provided; pt and wife Pat verbalized understanding   Other

## 2020-11-23 ENCOUNTER — HOSPITAL ENCOUNTER (OUTPATIENT)
Dept: SPEECH THERAPY | Facility: REHABILITATION | Age: 85
Setting detail: THERAPIES SERIES
Discharge: HOME | End: 2020-11-23
Attending: FAMILY MEDICINE
Payer: COMMERCIAL

## 2020-11-23 DIAGNOSIS — I69.393 ATAXIA FOLLOWING CEREBRAL INFARCTION: ICD-10-CM

## 2020-11-23 DIAGNOSIS — I69.321 DYSPHASIA FOLLOWING CEREBROVASCULAR ACCIDENT (CVA): ICD-10-CM

## 2020-11-23 DIAGNOSIS — I63.9 CEREBROVASCULAR ACCIDENT (CVA), UNSPECIFIED MECHANISM (CMS/HCC): Primary | ICD-10-CM

## 2020-11-23 PROCEDURE — 92526 ORAL FUNCTION THERAPY: CPT | Mod: GN

## 2020-11-23 NOTE — PROGRESS NOTES
SLP DAILY NOTE FOR OUTPATIENT THERAPY    Patient: Karl Park   MRN: [id number]  : 1928 89 y.o.  Referring Physician: Herb Dailey MD  Date of Visit: 2020      Certification Dates: 20 through 20    Diagnosis:   1. Cerebrovascular accident (CVA), unspecified mechanism (CMS/HCC)    2. Dysphasia following cerebrovascular accident (CVA)    3. Ataxia following cerebral infarction        Chief Complaints:  dysphagia      TODAY'S VISIT:    History/Vitals/Pain/Encounter Info - 20 1230        Injury History/Precautions/Daily Required Info    Primary Therapist  Laura Chávez     Chief Complaint/Reason for Visit   dysphagia     Speech Therapy  Swallowing     Document Type  daily treatment     Patient/Family/Caregiver Comments/Observations  pt is alert and cooperative     Start Time  1100     Stop Time  1200     Time Calculation (min)  60 min     Patient reported fall since last visit  No        Pain Assessment    Currently in pain  No/Denies        Pain Interventions    Intervention  none     Post Intervention Comments  none         Daily Treatment Assessment and Plan - 20 1233        Daily Treatment Assessment and Plan    Progress toward goals  Progressing     Daily Outcome Summary  pt is indepependent with swallow sequence     Plan and Recommendations  continue to prep for DC; plan to DC on Wednesday              Today's Treatment:      Short Term Goals Current Session   ?Pt. will tolerate trace PO trials without s/s of aspiration and adequate oral management with min cues over 2 sessions.    PO trials of puree/nectar with breath hold - strong swallow - cough/strong throat clear with expectoration - dry swallow sequence; pt independent with swallow sequence except for 1 time when he got distracted by a drip of soup on his pants; education provided to complete the swallow sequence no matter what (even with unexpected distractions); pt verbalized understanding    Pt trialed a  total of 3-4 oz each of puree/nectar with no overt s/s aspiration and no change in vitals   Pt. will complete pharyngeal strengthening, laryngeal excursion, and base of tongue strengthening exercises to improve airway protection and bolus propulsion with min cues for accurate production with 90% accuracy           Pt. will verbalize understanding of current swallow status and risks of aspiration/choking with min cues.  Pt is able to verbalize his current swallow status and risks of aspiration/choking I'ly   EDUCATION    Other

## 2020-11-23 NOTE — OP SLP TREATMENT LOG
Short Term Goals Current Session   ?Pt. will tolerate trace PO trials without s/s of aspiration and adequate oral management with min cues over 2 sessions.    PO trials of puree/nectar with breath hold - strong swallow - cough/strong throat clear with expectoration - dry swallow sequence; pt independent with swallow sequence except for 1 time when he got distracted by a drip of soup on his pants; education provided to complete the swallow sequence no matter what (even with unexpected distractions); pt verbalized understanding    Pt trialed a total of 3-4 oz each of puree/nectar with no overt s/s aspiration and no change in vitals   Pt. will complete pharyngeal strengthening, laryngeal excursion, and base of tongue strengthening exercises to improve airway protection and bolus propulsion with min cues for accurate production with 90% accuracy           Pt. will verbalize understanding of current swallow status and risks of aspiration/choking with min cues.  Pt is able to verbalize his current swallow status and risks of aspiration/choking I'ly   EDUCATION    Other

## 2020-11-24 ENCOUNTER — HOSPITAL ENCOUNTER (OUTPATIENT)
Dept: SPEECH THERAPY | Facility: REHABILITATION | Age: 85
Setting detail: THERAPIES SERIES
Discharge: HOME | End: 2020-11-24
Attending: FAMILY MEDICINE
Payer: COMMERCIAL

## 2020-11-24 DIAGNOSIS — I69.321 DYSPHASIA FOLLOWING CEREBROVASCULAR ACCIDENT (CVA): ICD-10-CM

## 2020-11-24 DIAGNOSIS — I69.393 ATAXIA FOLLOWING CEREBRAL INFARCTION: ICD-10-CM

## 2020-11-24 DIAGNOSIS — I69.391 DYSPHAGIA AS LATE EFFECT OF CEREBROVASCULAR ACCIDENT (CVA): ICD-10-CM

## 2020-11-24 DIAGNOSIS — I63.9 CEREBROVASCULAR ACCIDENT (CVA), UNSPECIFIED MECHANISM (CMS/HCC): Primary | ICD-10-CM

## 2020-11-24 PROCEDURE — 92526 ORAL FUNCTION THERAPY: CPT | Mod: GN

## 2020-11-24 NOTE — OP SLP TREATMENT LOG
Short Term Goals Current Session   ?Pt. will tolerate trace PO trials without s/s of aspiration and adequate oral management with min cues over 2 sessions.    PO trials of puree/nectar with breath hold - strong swallow - cough/strong throat clear with expectoration - dry swallow sequence; pt independent with swallow sequence without any cues from the therapist (a written card was placed in front of patient with the swallow strategies listed on it)    Pt trialed a total of 3-4 oz each of puree/nectar with no overt s/s aspiration and no change in vitals   Pt. will complete pharyngeal strengthening, laryngeal excursion, and base of tongue strengthening exercises to improve airway protection and bolus propulsion with min cues for accurate production with 90% accuracy        Pt was able to I'ly verbalize swallow exercise program   Pt. will verbalize understanding of current swallow status and risks of aspiration/choking with min cues.  Pt is able to verbalize his current swallow status and risks of aspiration/choking I'ly   EDUCATION Training provided to patient and son in law Edmond re: swallow status, pleasure feeds, free water protocol, swallow strategies and HEP; oth verbalized and demonstrated understanding   Other

## 2020-11-24 NOTE — PROGRESS NOTES
SLP DAILY NOTE FOR OUTPATIENT THERAPY    Patient: Karl Park   MRN: [id number]  : 1928 89 y.o.  Referring Physician: Herb Dailey MD  Date of Visit: 2020      Diagnosis:   1. Cerebrovascular accident (CVA), unspecified mechanism (CMS/HCC)    2. Dysphasia following cerebrovascular accident (CVA)    3. Ataxia following cerebral infarction        Chief Complaints:  dysphagia        TODAY'S VISIT:    History/Vitals/Pain/Encounter Info - 20 1321        Injury History/Precautions/Daily Required Info    Primary Therapist  Laura Chávez     Chief Complaint/Reason for Visit   dysphagia     Speech Therapy  Swallowing     Document Type  daily treatment     Patient/Family/Caregiver Comments/Observations  pt is alert and cooperative; accompanied by son in law Pruitt for training today     Start Time  1100     Stop Time  1200     Time Calculation (min)  60 min     Patient reported fall since last visit  No        Pain Assessment    Currently in pain  No/Denies        Pain Interventions    Intervention  none     Post Intervention Comments  none         Daily Treatment Assessment and Plan - 20 1329        Daily Treatment Assessment and Plan    Progress toward goals  Progressing     Daily Outcome Summary  pt has made large gains in swallow fxn     Plan and Recommendations  plan to DC tomorrow              Today's Treatment:      Short Term Goals Current Session   ?Pt. will tolerate trace PO trials without s/s of aspiration and adequate oral management with min cues over 2 sessions.    PO trials of puree/nectar with breath hold - strong swallow - cough/strong throat clear with expectoration - dry swallow sequence; pt independent with swallow sequence without any cues from the therapist (a written card was placed in front of patient with the swallow strategies listed on it)    Pt trialed a total of 3-4 oz each of puree/nectar with no overt s/s aspiration and no change in vitals   Pt. will complete  pharyngeal strengthening, laryngeal excursion, and base of tongue strengthening exercises to improve airway protection and bolus propulsion with min cues for accurate production with 90% accuracy        Pt was able to I'ly verbalize swallow exercise program   Pt. will verbalize understanding of current swallow status and risks of aspiration/choking with min cues.  Pt is able to verbalize his current swallow status and risks of aspiration/choking I'ly   EDUCATION Training provided to patient and son in law Edmond re: swallow status, pleasure feeds, free water protocol, swallow strategies and HEP; oth verbalized and demonstrated understanding   Other

## 2020-11-25 ENCOUNTER — HOSPITAL ENCOUNTER (OUTPATIENT)
Dept: SPEECH THERAPY | Facility: REHABILITATION | Age: 85
Setting detail: THERAPIES SERIES
Discharge: HOME | End: 2020-11-25
Attending: FAMILY MEDICINE
Payer: COMMERCIAL

## 2020-11-25 DIAGNOSIS — I69.391 DYSPHAGIA AS LATE EFFECT OF CEREBROVASCULAR ACCIDENT (CVA): ICD-10-CM

## 2020-11-25 DIAGNOSIS — I63.9 CEREBROVASCULAR ACCIDENT (CVA), UNSPECIFIED MECHANISM (CMS/HCC): Primary | ICD-10-CM

## 2020-11-25 DIAGNOSIS — I69.393 ATAXIA FOLLOWING CEREBRAL INFARCTION: ICD-10-CM

## 2020-11-25 DIAGNOSIS — I69.321 DYSPHASIA FOLLOWING CEREBROVASCULAR ACCIDENT (CVA): ICD-10-CM

## 2020-11-25 PROCEDURE — 92526 ORAL FUNCTION THERAPY: CPT | Mod: GN

## 2020-11-25 NOTE — OP SLP TREATMENT LOG
Short Term Goals Current Session   ?Pt. will tolerate trace PO trials without s/s of aspiration and adequate oral management with min cues over 2 sessions.    PO trials of puree/nectar with breath hold - strong swallow - cough/strong throat clear with expectoration - dry swallow sequence; pt independent with swallow sequence without any cues from the therapist (a written card was placed in front of patient with the swallow strategies listed on it)    Pt trialed a total of 3-4 oz each of puree/nectar with no overt s/s aspiration and no change in vitals   Pt. will complete pharyngeal strengthening, laryngeal excursion, and base of tongue strengthening exercises to improve airway protection and bolus propulsion with min cues for accurate production with 90% accuracy        Pt was able to I'ly verbalize swallow exercise program   Pt. will verbalize understanding of current swallow status and risks of aspiration/choking with min cues.  Pt is able to verbalize his current swallow status and risks of aspiration/choking I'ly   EDUCATION Training provided to patient and daughter Corrina re: swallow status, pleasure feeds, free water protocol, swallow strategies and HEP; oth verbalized and demonstrated understanding   Other

## 2020-11-25 NOTE — PROGRESS NOTES
SLP DISCHARGE NOTE FOR OUTPATIENT THERAPY    Patient: Karl Park   MRN: [id number]  : 1928 89 y.o.  Referring Physician: Herb Dailey MD  Date of Visit: 2020      Certification Dates: 20 through 20    Total Visit Count: 32    Chief Complaints:   No chief complaint on file.      Today's Treatment::      Short Term Goals Current Session   ?Pt. will tolerate trace PO trials without s/s of aspiration and adequate oral management with min cues over 2 sessions.    PO trials of puree/nectar with breath hold - strong swallow - cough/strong throat clear with expectoration - dry swallow sequence; pt independent with swallow sequence without any cues from the therapist (a written card was placed in front of patient with the swallow strategies listed on it)    Pt trialed a total of 3-4 oz each of puree/nectar with no overt s/s aspiration and no change in vitals   Pt. will complete pharyngeal strengthening, laryngeal excursion, and base of tongue strengthening exercises to improve airway protection and bolus propulsion with min cues for accurate production with 90% accuracy        Pt was able to I'ly verbalize swallow exercise program   Pt. will verbalize understanding of current swallow status and risks of aspiration/choking with min cues.  Pt is able to verbalize his current swallow status and risks of aspiration/choking I'ly   EDUCATION Training provided to patient and daughter Corrina re: swallow status, pleasure feeds, free water protocol, swallow strategies and HEP; oth verbalized and demonstrated understanding   Other        Goals:    Goals Addressed                 This Visit's Progress    • swallowing        Short Term Goals Time Frame  Result  Comment/Progress    ?Pt. will tolerate trace PO trials without s/s of aspiration and adequate oral management with min cues over 2 sessions.  4 weeks   MET  2020: Pt discharged on puree and nectar pleasure feeds with independent use of  "swallow strategies.   Pt. will complete pharyngeal strengthening, laryngeal excursion, and base of tongue strengthening exercises to improve airway protection and bolus propulsion with min cues for accurate production with 90% accuracy  4 weeks  MET 11/25/2020: Pt is able to complete effortful swallow (up to 80/session), effortful \"guh\" (up to 120/session) and CTAR (up to 100/session) exercises with min cues. Pt is also able to complete yvonne x 15 per session. Pt is completing standard shaker exercises at home.   Pt. will verbalize understanding of current swallow status and risks of aspiration/choking with min cues.  2 weeks  MET  8/27/2020: Pt is able to verbalize NPO status and risks of aspiration/choking without cues.      Long Term Goals   Time Frame  Result  Comment/Progress    Pt. will tolerate the least restrictive diet level without complications or respiratory compromise with min cues/supervision.    NOMS swallowing goal: 4  8 weeks   Partially Met    Baseline swallowing NOMS: 1    DC NOMS swallowing: 3       CLINICAL IMPRESSION: Pt has made large gains with swallow function. As per his recent VFSS recommendations, and as cleared via a written order from his PCP, he is on pleasure feeds of puree and nectar with breath hold - throat clear - expectoration - dry swallow sequence. Pt is independent with HEP and swallow strategies. Recommend VFSS in another 10 weeks to see if further improvements have been made. DC from  at this time.              Discharge information for [facility]:    Reason for D/C: Goals met  Hospitalization during treatment: No  Increased production assessment: Increased community independence, Return to household activities, Return to work/school/volunteer activities, Return to participation in hobbies/leisure pursuits  Interrupted for medical reason: No  Skin integrity: Intact  "

## 2021-01-20 ENCOUNTER — HOSPITAL ENCOUNTER (OUTPATIENT)
Dept: RADIOLOGY | Facility: HOSPITAL | Age: 86
Discharge: HOME | End: 2021-01-20
Attending: FAMILY MEDICINE
Payer: COMMERCIAL

## 2021-01-20 DIAGNOSIS — I69.391 DYSPHAGIA FOLLOWING CEREBRAL INFARCTION: ICD-10-CM

## 2021-01-20 PROCEDURE — 74230 X-RAY XM SWLNG FUNCJ C+: CPT

## 2021-01-20 PROCEDURE — 92611 MOTION FLUOROSCOPY/SWALLOW: CPT | Mod: GN

## 2021-01-20 NOTE — PROCEDURES
General Information  Start Time: 1325  Stop Time: 1350  Date of Evaluation: 01/20/21  Type of Study: Initial VFSS    Video swallow study completed; see full report in Imaging section.

## 2021-03-11 ENCOUNTER — TRANSCRIBE ORDERS (OUTPATIENT)
Dept: SCHEDULING | Age: 86
End: 2021-03-11

## 2021-03-11 DIAGNOSIS — R13.12 DYSPHAGIA, OROPHARYNGEAL PHASE: Primary | ICD-10-CM

## 2021-04-07 ENCOUNTER — IMMUNIZATION (OUTPATIENT)
Dept: IMMUNIZATION | Facility: CLINIC | Age: 86
End: 2021-04-07

## 2021-04-07 ENCOUNTER — HOSPITAL ENCOUNTER (OUTPATIENT)
Dept: RADIOLOGY | Facility: HOSPITAL | Age: 86
Discharge: HOME | End: 2021-04-07
Attending: INTERNAL MEDICINE
Payer: COMMERCIAL

## 2021-04-07 DIAGNOSIS — R13.12 DYSPHAGIA, OROPHARYNGEAL PHASE: ICD-10-CM

## 2021-04-07 PROCEDURE — 92611 MOTION FLUOROSCOPY/SWALLOW: CPT | Mod: GN

## 2021-04-07 PROCEDURE — 74230 X-RAY XM SWLNG FUNCJ C+: CPT

## 2021-04-07 NOTE — PROCEDURES
General Information  Start Time: 1403  Stop Time: 4287  Date of Evaluation: 04/08/21  Type of Study: Repeat VFSS    Video swallow study completed with full report in Imaging section.

## 2021-09-21 ENCOUNTER — DOCUMENTATION (OUTPATIENT)
Dept: SPEECH THERAPY | Facility: REHABILITATION | Age: 86
End: 2021-09-21

## 2022-07-28 NOTE — PROGRESS NOTES
Patient: Lisa Vogt Date: 2022   : 1953    68 year old female      OUTPATIENT WOUND CARE PROGRESS NOTE    Supervising Wound Care / Hyperbaric Medicine Physician: Not Applicable  Consulting Provider:  ROGER Henry  Date of Consultation/Last Comprehensive Exam:  2022  Referring  Provider:  Steffen Rowley MD    SUBJECTIVE:    Chief Complaint:  Left leg wound      Wound/Ulcer Present:    Traumatic ulcer    Additional Wound Category:  None     Maximum Baseline Ambulatory Status:  Ambulates unassisted    History of Present Illness:  This is a 68 year old female with complex PMH, see below. Reports she scraped her left shin on a stool in the middle of the night in February and sustained a laceration. She had been following with her PCP and general surgeon with improvement. She was applying Duoderm. Completed oral antibiotics late May with no issues. No concerns for infection. Reports skin flap was necrotic, but this lifted off with cleaning and Duoderm. She is a retired PT and has worked with patients with wounds in the past so has knowledge of how to care for wounds. She reports she now has minimal swelling and wound is getting smaller with minimal drainage. Denies pain. Has been elevating her leg when able and eating well.    2022  Here in follow-up. Thought the xeroform gauze held too much moisture to the wound so she went back to using Duoderm and adds a thin gauze under if there is drainage. Feels wound continues to improve. Denies any new concerns.    Current Treatment Regimen:  Dressing:  Hydrocolloid   Frequency:  Every other day   Changed by:  Patient    Review of Systems: Denies fevers, chills, nausea, vomiting, chest pain, or shortness of breath. No appetite changes or diarrhea. Pertinent items are noted in HPI (History of Present Illness).      Past Medical History:   Diagnosis Date   • Allergic rhinitis due to pollen    • Anemia    • Anxiety    • APC (atrial premature  Patient: Karl Park  Location: [facility] 3A 3025  MRN: [id number]  Today's date: 7/22/2020  Speech Pathology: Therapy session    SLP Diagnosis: mild oral dysphagia and severe pharyngeal dysphagia per VFSS on 7/17 (at OSH). High aspiration risk and impaired secretion management    Recommendations:  1. Npo w/ peg; SLP to follow for therex; if pt d/c'd please provide script for OP SLP tx (swallow evaluation and treat w/ appropriate ICD code related to dysphagia and one for primary dx eg.stroke) and separate VFSS scripts w/ same ICD code  2.  3.      Summary/Handoff:  Daily Outcome Statement (SLP): Pt seen for followup. No spitting of secretions today and pt notes he doesnt recall waking over night w/ secretions as well. Swallow tx focused on education, pharyngeal strengthening, use of head positions and strategies that may be beneficial when vfss repeated. Pt instructed on independent use of exercises w/ damp swaba and not to drink the contents of cup. Mouth moisturizer provided. Pt w/ progress from yesterday noted in generation of voluntary swallows and improved secretion management. Will follow for therex and will need script for OP when d/cd; may be ready for VFSS soon to reeval for therapeutic po trials (not full po diet) and head positioning/strategies. Will request VFSS if ready. If d/cd please provide script for OP VFSS for pt to use in future      Session Notes:            Dietary Orders   (From admission, onward)             Start     Ordered    07/21/20 0928  Tube Feed with NPO Diabetisource AC; Continuous; 20; 73; after 1st 24 hrs, advance 20-25ml/hr q 4 hours to goal 73ml/hr x 24 hours.; Water; 120; Every 4 hours; RD/LDN may adjust order; AM Snack  (Tube Feed with NPO Diet Orders with insertion and maintenance panels)  Diet effective now     Question Answer Comment   Tube Feeding Formula (PH): Diabetisource AC    Administration Type Continuous    Tube Feeding Start rate (mL/hr): 20    Tube  Feeding Goal rate (mL/hr): 73    Nursing Instruction after 1st 24 hrs, advance 20-25ml/hr q 4 hours to goal 73ml/hr x 24 hours.    Flush type: Water    Flush amount (mL): 120    Flush frequency: Every 4 hours    Delegation of Authority. Diet orders written by PA/CRNPs may not be adjusted by RD/LDNs. RD/LDN may adjust order    Meal period (AM Snack required for CBORD, do not remove: Not clinically relevant) AM Snack        07/21/20 0929                Results from last 7 days   Lab Units 07/22/20  0631 07/21/20  0421 07/19/20  0348   WBC K/uL 6.96 7.02 8.02   HEMOGLOBIN g/dL 13.3* 13.1* 14.7   HEMATOCRIT % 40.4 39.4* 43.9   PLATELETS K/uL 189 186 213          Patient left with call bell in reach and alarms as found.      Karl is a 89 y.o. male admitted on 7/17/2020 with Cerebrovascular accident (CVA), unspecified mechanism (CMS/[facility]). Principal problem is Cerebrovascular accident (CVA) (CMS/HCC).    Past Medical History  Karl has a past medical history of CVA (cerebral vascular accident) (CMS/HCC) and Type 2 diabetes mellitus (CMS/[facility]).    History of Present Illness  Pt presents for PEG placement and completion of CVA work-up.  MRI was done 7/16 which showed punctate focus of diffusion restriction in the posterior lateral aspect of the left medulla with associated enhancement indicative of a small, recent/acute lacunar infarction.  Patient failed swallow study and is aspirating at home.      Pt underwent PEG placement 7/20/2020, received new PT orders 7/21/2020    SLP Vitals    Date/Time Pulse HR Source Resp SpO2 Pt Activity O2 Therapy BP BP Location BP Method Pt Position [facility]   07/22/20 1117 71 Right Brachial 16 97 % At rest None (Room air) 112/55 Right upper arm Automatic Lying JMM      SLP Pain    Date/Time Pain Type Pref Pain Scale Rating: Rest [facility]   07/22/20 1114 Pain Assessment word (verbal rating pain scale) 0 - no pain EVM          Prior Living Environment      Most Recent Value   Living Arrangements  house  contractions) 01/16/2018   • Asthma    • Calculus of kidney 1996   • Cardiac dysrhythmia, unspecified     svt dr katty caraballo   • Diabetic eye exam (CMS/[facility]) 12/08/2016    cataracts, h/o meningioma, myopia ou   • Diverticulosis 03/24/2021   • Essential hypertension, benign    • Family history of malignant neoplasm of gastrointestinal tract 08/03/2007    Mother and Maternal Grandmother   • Heart palpitations 09/30/2015    A 60-year-old female here to discuss treatment for some type of arrhythmia. She has been on Flecainide since prior to 1997. She has a history of asthma. Was placed on Raymond-Dur. Was having what she believes was called SVT. A Holter monitor was done. She was told she had SVT. She may have started Flecainide at that point. None of these records are available. By 1997 she was having palpitations. An e   • Hemorrhoids 03/24/2021    internal and external   • MRSA (methicillin resistant staph aureus) culture positive 12/26/2013    Lt axilla,  brochure mailed to home   • Obesity, unspecified    • Osteomyelitis (CMS/[facility])    • Other and unspecified hyperlipidemia    • Personal history of colonic polyps 03/24/2021    x2 Tubular adenoma rmeoved   • PONV (postoperative nausea and vomiting)     Difficulty waking.   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   • Staghorn renal calculus 04/24/2002   • SVT (supraventricular tachycardia) (CMS/[facility])     dr caraballo   • Tachycardia 04/1994   • Type 2 diabetes mellitus without complication (CMS/[facility]) 08/28/2015   • Umbilical hernia 01/29/2013   • Unspecified asthma(493.90)    • Vitreous degeneration: PVD, OD    Living Environment Comment  Pt lives with his wife in a 1 story home 1+1 DAYA without rail through the front door, full flight from garage with BHR          Prior Level of Function      Most Recent Value   Dominant Hand  right   Ambulation  independent   Transferring  independent   Toileting  independent   Bathing  independent   Dressing  independent   Communication  understands/communicates without difficulty   Swallowing  other (see comments)   Baseline Diet/Method of Nutritional Intake  regular solids, thin liquids   Past History of Dysphagia  difficulty swallowing 2-3 weeks s/p medulla CVA. VFSS from OSH on 7/17 with severe pharyngeal dysphagia and recs for PEG   Prior Level of Function Comment  Pt reports being independent with mobility PTA. Pt states his daughter bought him a SPC but has not used it. Independent with ADLs, (+) driving   Equipment Currently Used at Home  none [owns SPC, RW at home]          SLP Evaluation and Treatment - 07/22/20 1114        Time Calculation    Start Time  1114     Stop Time  1137     Time Calculation (min)  23 min        Session Details    Document Type  daily treatment/progress note     Mode of Treatment  individual therapy;speech language pathology        General Information    Patient Profile Reviewed?  yes     Onset of Illness/Injury or Date of Surgery  07/17/20     General Observations of Patient  in bed     Existing Precautions/Restrictions  aspiration;fall     Limitations/Impairments  swallowing        Functional Communication Measures    FCM: Swallowing  2-->Level 2        General Swallowing Observations    Current Diet/Method of Nutritional Intake (General Swallowing Observations, NIS)  NPO;gastrostomy tube (PEG)     Signs/Symptoms of Aspiration (Current Diet)  none     Comment, Secretions/Suctioning  no spitting of secretions t/o session     Comment, General Swallowing Observations  cont w/ slight dec R pharyg/palatal constriction as able to visualize; L face droop  2010 12/19/2010   • Wears glasses      Past Surgical History:   Procedure Laterality Date   • Arthrotomy/explore/treat knee joint  1981.1982    osteochonfritis dissecans   • Colonoscopy  1/22/2016    Dr. mclaughlin normal   • Colonoscopy diagnostic  03/24/2021    Dr. Mclaughlin x2 TA removed 5yr recall due 3/2026   • Colorec canc scrn,colonoscopy not hi risk  8/3/07    Dr. Mclaughlin/Colorectal Screening/recall 8/2012 w/MAC   • Cystoscopy w/ laser lithotripsy  1/15/01   • Cystourethroscopy  2/28/02    left ureteral calculus - Dr. aJm Mejia - left ureteral stent placed   • Cystourethroscopy Left 4/29/02    left ureteral stent removal   • Esophagogastroduodenoscopy transoral flex diag  1992    gastritis, Dr. GEOFF Castaneda   • Exc skin benig >4cm trunk,arm,leg  12/19/13    excision lt axilla lypoma, Dr. Dahl   • Extracorporeal shock wave lithotripsy Left 11/01/95, 5/24/93   • Flexible sigmoidoscopy diagnostic include specimens  1995 1998    internal and external hemorrhoids   • Hernia repair  01/29/2013    umbilical   • Joint replacement Right 11/01/2009    knee   • Joint replacement Left 09/18/2012    knee   • Knee scope,diagnostic  1993   • Lithotripsy - gen'l class  1983 1996   • Loop recorder implant  11/19/2021   • Percutaneous nephrostolithotomy Left 4/25/02    with renal dilation and stone basket extraction   • Percutaneous nephrostomy  4/24/02   • Stress test  2014    NM EF 65% normal bo     Social History     Socioeconomic History   • Marital status: /Civil Union     Spouse name: Not on file   • Number of children: 5   • Years of education: 16   • Highest education level: Not on file   Occupational History   • Occupation: Physical Therapist - Disability     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 Worked w/ tiny ice chips w/ double swallow. Pt only w/ throat clearing \"it's sticking\" w/ L head turn. none w/ Chin tuck, and pt conts to note that R HT slight chin lift feels the easiest.         Swallowing Recommendations    Diet Consistency Recommendations  NPO     Medication Administration Recommendations  via peg        Swallowing Intervention    Dysphagia/Swallowing Interventions  pharyngeal therapeutic exercise program;monitor tolerance of;advanced diet/liquid texture trials        AM-PAC (TM) - Cognition (Current Function)    Following/understanding a 10-15 minute speech or presentation?  4 - None     Understanding familiar people during ordinary conversations?  4 - None     Remembering to take medications at the appropriate time?  4 - None     Remembering where things were placed or put away?  4 - None     Remembering a list of 3 or 4 errands without writing it down?  4 - None     Taking care of complicated tasks?  4 - None     AM-PAC (TM) Cognition Score  24        Therapy Assessment/Plan (SLP)    SLP Diagnosis  mild oral dysphagia and severe pharyngeal dysphagia per VFSS on 7/17 (at OSH). High aspiration risk and impaired secretion management     Rehab Potential (SLP Eval)  good, to achieve stated therapy goals     Therapy Frequency (SLP)  5 times/wk     Criteria for Skilled Therapeutic Interventions Met (SLP Eval)  skilled criteria for speech language intervention met     Functional Level at Time of Evaluation (SLP)  alert, in bed     Planned Therapy Interventions (SLP)  education, therex, po trials, t/c repeat vfss        Daily Progress Summary (SLP)    Daily Outcome Statement (SLP)  Pt seen for followup. No spitting of secretions today and pt notes he doesnt recall waking over " Comment: rarely   • Drug use: No   • Sexual activity: Yes     Partners: Male   Other Topics Concern   •  Service No   • Blood Transfusions Not Asked   • Caffeine Concern No   • Occupational Exposure No   • Hobby Hazards No   • Sleep Concern No   • Stress Concern No   • Weight Concern Yes   • Special Diet No   • Back Care Not Asked   • Exercise No   • Bike Helmet Not Asked   • Seat Belt Yes   • Self-Exams Not Asked   Social History Narrative        Her md retired     , colleen has 5 kids    They are very involved with grandkids and now great grandkids    PT         2018    25 year old grandson shot by accident by cousin    Paralyzed      Social Determinants of Health     Financial Resource Strain: Not on file   Food Insecurity: Not on file   Transportation Needs: Not on file   Physical Activity: Not on file   Stress: Not on file   Social Connections: Not on file   Intimate Partner Violence: Not At Risk   • Social Determinants: Intimate Partner Violence Past Fear: No   • Social Determinants: Intimate Partner Violence Current Fear: No     Family History   Problem Relation Age of Onset   • Cancer Mother         COLON CANCER  AT AGE 48   • Other Father          at age 65 of sudden death   • Heart disease Father    • Cancer Maternal Grandmother          at age 48 from colon cancer   • Cancer Maternal Grandfather         lung cancer   • Diabetes Paternal Grandmother    • Cancer Paternal Grandfather         prostate cancer   • Alcohol Abuse Brother         alcoholic   • Rheumatoid Arthritis Brother    • Stroke Brother    • Diabetes Brother    • Heart disease Brother        Current Outpatient Medications   Medication Sig   • dilTIAZem (CARDIZEM CD) 120 MG 24 hr capsule Take 1 capsule by mouth daily. Please call [phone] to schedule for future refills.   • traMADol (ULTRAM) 50 MG tablet Take 1 tablet by mouth every 8 hours as needed for Pain. Do not take with narcotic cough syrup do not mix  night w/ secretions as well. Swallow tx focused on education, pharyngeal strengthening, use of head positions and strategies that may be beneficial when vfss repeated. Pt instructed on independent use of exercises w/ damp swaba and not to drink the contents of cup. Mouth moisturizer provided. Pt w/ progress from yesterday noted in generation of voluntary swallows and improved secretion management. Will follow for therex and will need script for OP when d/cd; may be ready for VFSS soon to reeval for therapeutic po trials (not full po diet) and head positioning/strategies. Will request VFSS if ready. If d/cd please provide script for OP VFSS for pt to use in future     Symptoms Noted During/After Treatment  none     Barriers to Overall Progress (SLP)  na        Therapy Plan Review/Discharge Plan (SLP)    Therapy Plan Review (SLP)  care plan/treatment goals reviewed;participants included;patient                  Education provided this session. See the Patient Education summary report for full details.    SLP Goals      Most Recent Value   Pharyngeal Exercise Goal 1   Activity  laryngeal elevation/vocal fold adduction, 5-10 times per session at 07/18/2020 0933   [place]  independently (over 90% accuracy) at 07/18/2020 0933   Time Frame  by discharge at 07/18/2020 0933   Time Frame  goal ongoing at 07/22/2020 1114         with oxycodone Hold while on zyvox   • venlafaxine XR (Effexor XR) 37.5 MG 24 hr capsule Take 1 capsule by mouth daily.   • metformin (GLUCOPHAGE) 1000 MG tablet TAKE 1 TABLET BY MOUTH TWICE DAILY WITH MEALS   • montelukast (SINGULAIR) 10 MG tablet Take 1 tablet by mouth nightly.   • ferrous gluconate (FERGON) 324 (38 Fe) MG tablet Take 1 tablet by mouth 2 times daily.   • cyclobenzaprine (FLEXERIL) 10 MG tablet Take 1 tablet by mouth 3 times daily as needed for Muscle spasms.   • fluticasone propionate (FLOVENT HFA) 220 MCG/ACT inhaler Inhale 2 puffs into the lungs 2 times daily. RINSE MOUTH AND THROAT AFTER EACH USE   • lisinopril (ZESTRIL) 10 MG tablet Take 1 tablet by mouth 2 times daily.   • potassium CHLORIDE (Klor-Con M) 20 MEQ josé miguel ER tablet Take 1 tablet by mouth 2 times daily.   • hydrOXYzine (ATARAX) 10 MG tablet Take 1 tablet by mouth every 4 hours as needed for Itching.   • mexiletine (MEXITIL) 150 MG capsule Take 1 capsule by mouth 2 times daily.   • naLOXone (NARCAN) 4 MG/0.1ML nasal spray Spray the content of 1 device into 1 nostril. Call 911. May repeat with 2nd device in alternate nostril if no response in 2-3 minutes.   • Cholecalciferol (vitamin D) 50 mcg (2,000 units) capsule Take 50 mcg by mouth daily.   • azithromycin (ZITHROMAX) 500 MG tablet Take 1 tablet by mouth 3 days a week. Ordered by Dr. Chacon   • ipratropium (Atrovent HFA) 17 MCG/ACT inhaler Inhale 2 puffs into the lungs 2 times daily.   • levalbuterol (XOPENEX HFA) 45 MCG/ACT inhaler Inhale 2 puffs into the lungs every 6 hours as needed for Wheezing or Shortness of Breath.   • arformoterol (BROVANA) 15 MCG/2ML nebulizer solution Take 2 mLs by nebulization 2 times daily. (Patient taking differently: Take 15 mcg by nebulization as needed.)   • ipratropium (ATROVENT) 0.02 % nebulizer solution Take 2.5 mLs by nebulization 2 times daily.   • fluconazole (DIFLUCAN) 100 MG tablet Take 1 tablet by mouth 1 time as needed (yeast infection).    • triamcinolone (NASACORT) 55 MCG/ACT nasal inhaler Spray 1 spray in each nostril 2 times daily.    • Magnesium 400 MG Tab Take 400 mg by mouth daily.    • furosemide (LASIX) 20 MG tablet Take 1 tablet by mouth every morning. Stop taking this medication until further instructed by your primary care provider (Patient taking differently: Take 20 mg by mouth as needed. Stop taking this medication until further instructed by your primary care provider)   • budesonide (PULMICORT) 0.5 MG/2ML nebulizer suspension Take 2 mLs by nebulization 2 times daily. Rinse mouth and throat after each use (Patient taking differently: Take 0.5 mg by nebulization 2 times daily as needed. Rinse mouth and throat after each use)   • budesonide (PULMICORT FLEXHALER) 180 MCG/ACT inhaler USE 4 PUFFS twice a day (Patient taking differently: Inhale 4 puffs into the lungs 2 times daily as needed.)     No current facility-administered medications for this encounter.        ALLERGIES:  Acetaminophen, Albuterol, Asmanex, Atorvastatin, Cefaclor, Clindamycin, Corticosteroids, Ephedrine, Guaifenesin-codeine, Phenazopyridine hcl, Qvar [beclomethasone dipropionate], Sulfa antibiotics, Theophyllines, Vancomycin, Advair diskus, Hibiclens, Morphine hcl, and Dermabond    OBJECTIVE:  Vital Signs:    Visit Vitals  /76 (BP Location: LUE - Left upper extremity, Patient Position: Sitting)   Pulse 76   Temp 97.9 °F (36.6 °C) (Temporal)   Resp 18   Ht 5' 3\" (1.6 m)   LMP 03/27/2007   BMI 45.42 kg/m²         Physical Exam:  General appearance: Appears stated age, Alert, oriented to person, place, time and situation, in no distress and cooperative   HEENT: Head is normocephalic, atraumatic.  Eyes: PERRL, EOM intact.  Ears: Bilateral canals are clear.    Pedal pulses palpable bilaterally.    Left shin with a superficial ulceration with granulation and sonido-epithelium to margins. Mild serous drainage, no purulence or malodor. Radha wound intact with no erythema,  warmth, induration or ischemia. Minimal pain.        Wound Bed Quality:  Granulation tissue and Idris-epithelialization      Radha-wound Quality:    Intact    Additional Descriptors:  none    Wound Measurements Per Flowsheet:       Wound Back Left Incision (Active)   Number of days: 633       Wound Chest Left Anterior;Midline/Middle (Active)   Number of days: 251       Wound Leg Left Anterior Traumatic (Active)   Wound Care Team Consult Date 07/14/22 07/14/22 1013   Wound Length (cm) 1.6 cm 07/28/22 1030   Wound Width (cm) 0.8 cm 07/28/22 1030   Wound Depth (cm) 0.1 cm 07/28/22 1030   Wound Surface Area (cm^2) 1.28 cm^2 07/28/22 1030   Wound Volume (cm^3) 0.128 cm^3 07/28/22 1030   Wound Volume Change (Initial) -0.05 cm3 07/28/22 1030   Wound Volume % Change (Initial) -28.89 % 07/28/22 1030   Number of days: 14       PROCEDURE:  Not indicated    Procedure was Performed by:  Not applicable    Laboratory assessments reviewed:  No results found for: PAB   Albumin (g/dL)   Date Value   06/15/2021 3.5 (L)   08/23/2020 3.1 (L)   08/22/2020 3.2 (L)      No results available in last 24 hours    Lab Results   Component Value Date    WBC 5.9 05/06/2022    GLUCOSE 134 (H) 05/06/2022    HGBA1C 6.7 (H) 05/06/2022    CRP 0.3 08/13/2020    RESR 12 08/13/2020    CREATININE 0.96 (H) 05/06/2022    GFRA 62 01/27/2020    GFRNA 54 01/27/2020        Culture results:  Specimen Description (no units)   Date Value   07/27/2017 BRONCHIAL BRUSH LUNG, LEFT UPPER LOBE   07/27/2017 BRONCHIAL BRUSH LUNG, LEFT UPPER LOBE   07/27/2017 BRONCHOALVEOLAR LAVAGE  LUNG, RIGHT UPPER LOBE   07/27/2017 BRONCHOALVEOLAR LAVAGE  LUNG, RIGHT UPPER LOBE   07/27/2017 BRONCHOALVEOLAR LAVAGE  LUNG, RIGHT UPPER LOBE   07/27/2017 BRONCHIAL BRUSH LUNG, LEFT UPPER LOBE   07/27/2017 BRONCHOALVEOLAR LAVAGE  LUNG, RIGHT UPPER LOBE    CULTURE (no units)   Date Value   07/27/2017 NO GROWTH 43 DAYS.   07/27/2017 NO GROWTH 29 DAYS.   07/27/2017 NO GROWTH 29 DAYS.   07/27/2017 NO  GROWTH 43 DAYS.   2017 NO ACTINOMYCES ISOLATED   2017 RARE MIXED GRAM POSITIVE CHRISTIANO   2017 NO ACTINOMYCES ISOLATED        Diagnostic Assessments Reviewed:  No new update    Nutritional Assessment:  Prealbumin and/or Albumin reviewed    Wound treatment goals are palliative:  No    DIAGNOSES:  Traumatic wound left shin    Medical Decision Makin year old seen in follow-up for slow healing ulcer to her left lower leg s/p traumatic injury with scraping her leg on a stool in the middle of the night in February. She was treated with antibiotics and has been applying Duoderm with improvement. No concerns for infection. In comparison to photos shown by patient and last visit, wound continues to improve with increased viability and sonido-epithelium.    Thin hydrocolloid to LLE ulcer. Ok to add thin gauze if drainage. Change every other day and PRN.    Elevate leg as often as able to aid in edema control. Consider compression if swelling or wound does not continue to improve.    Discussed increased oral protein and proper nutrition to promote wound healing.    Follow-up in 3 weeks if not healed.    Plan of Care:  Advanced Wound Care Recommendations:  See above  Percent Wound Closure from consult:  See measurements  Care plan to augment wound closure:  Not applicable.  See above    ROGER Henry

## 2022-11-25 ENCOUNTER — TELEPHONE (OUTPATIENT)
Dept: CARDIOLOGY CLINIC | Facility: CLINIC | Age: 88
End: 2022-11-25

## 2022-11-25 ENCOUNTER — TRANSCRIBE ORDERS (OUTPATIENT)
Dept: CARDIOLOGY CLINIC | Facility: CLINIC | Age: 88
End: 2022-11-25

## 2022-11-25 DIAGNOSIS — R97.20 RAISED PROSTATE SPECIFIC ANTIGEN: Primary | ICD-10-CM

## 2022-12-02 ENCOUNTER — TELEPHONE (OUTPATIENT)
Dept: CARDIOLOGY CLINIC | Facility: CLINIC | Age: 88
End: 2022-12-02

## 2023-01-12 NOTE — PROGRESS NOTES
PT PROGRESS NOTE FOR OUTPATIENT THERAPY    Patient: Karl Park   MRN: [id number]  : 1928 89 y.o.  Referring Physician: Herb Dailey MD  Date of Visit: 2020      Certification Dates: 20 through 10/29/20    Recommended Frequency & Duration:  2 times/week for up to 3 months     Diagnosis:   1. 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 HPI: Pt's daughter stated that the onset of the stroke may have been  or . Pt went to the ER on 7/3 and he was diagnosed with pneumonia and bells palsy. Pt was given medication for that, which he couldn't swallow. Pt went to see his PCP on 20 who referred him to a neurologist. Pt saw the PA and he was tested for myasthenia gravis. Pt would cough throughout the whole week every time he ate or drank. Pt was seen by neurologist  for the MRI and swallow study. MRI showed that he had a brainstem stroke. Pt had a swallow study which showed asp on all consistencies and absent epiglottic inversion. Pt had a peg tube placed on . Pt was also admitted overnight for IV fluids.              Daily Treatment Assessment and Plan - 20 1302        Daily Treatment Assessment and Plan    Progress toward goals  Progressing     Daily Outcome Summary  Testing completed today using Juarez Balance test.  TUG also completed using RW and SPC as  "well as w/o AD. Pt continues to show good safety awareness outdoors ambulating w/ SPC, however, with occasional, slight imbalance evident on uneven surfaces.  He is interested in continuing to develop strength with a return to weight lifting of dumbells and gradual progression has started.  Goal is to provide upgraded HEP with the weighted dumbells.     Plan and Recommendations  Cont w/ POC as directed by primary PT for gait, balance, coordination, strength and endurance.  HEP upgrade for weight training.           OBJECTIVE MEASUREMENTS/DATA:    Balance/Posture   Balance and Posture - 09/23/20 1204        Timed Up and Go Test    Comment, Timed Up and Go Test  12.16 avg         Gait and Mobility       Today's Treatment::      PT Neuro Exercises Current Session Time Performed   THER ACT  TOTAL TIME FOR SESSION 0-7 Minutes    Precautions NPO/PEG tube.  Pt is I with PEG tube feedings.   Working on swallow in speech.  L facial droop and slight L eye ptosis post stroke. Yes   Transfer training Mod I with RW, Mod I without UE support for sit to stand without AD (at 20\" height)    Patient Education Reviewed POC goals and intention for 2x/week for at least 8 weeks.     HEP - Issued and reviewed with Patient - instructed to perform at counter or with chair for support:  -LAQ  -Heel raises  -Abduction  -Hamstring curls    Reviewed need for walker and difficulty with clearing R foot consistently                          THER EX TOTAL TIME FOR SESSION 38-52 Minutes     STRETCHING TE performed by Sarahy Guzman PTA on 9/23    Stretching by patient Seated HS stretch w/ ft on block,  w/ manual assist 20 sec x 3 and added to HEP via foot prop (pt demos indep and understanding)  B calf stretch on incline #2  30 sec  x 2  Runner stretch Yes        yes       CARDIOVASCULAR     Nu Step Level 1  UE's/LE's 10 min, 1 min cool down yes   Stationary Bike Virtual RB (nustep not available) redwood dash, gear 6-7, 10 mins N   Treadmill     Supine " there ex Shaker exercises   Supine w/ 20x head lifts off pillow; 1x head lift and hold for 20 sec;  Cues to breathe yes   Standing Ther-Ex HR's, Abd, Hs curls, marching and mini squats 10x     March x 10 x 2, HR/TR x 10,  N    Stdg 3 x 10 for bicep curls 8 #,   alt ue raises  2 x 10 5 # EA ,   mini squats 2 x 10 8#,   bent rows with 8# ea 2 x 10 Yes for all              Seated in chair:  LAQ's w/ 5# 10x , repeated w/ 3 sec hold 10x yes    STS from mat without UE support x 10 x 1, 10# wts.    NEURO RE-ED TOTAL TIME FOR SESSION 8-22 Minutes     Juarez Balance Test Score:  49 pts  Performed by Sarahy Guzman PTA 9/23/20    COORDINATION     POSTURAL RE-ED     PRE-GAIT ACTIVITIES      BALANCE TRAINING      Sitting balance     Standing balance - static Tap ups forward to 8 in block w/wo UE/ cga support  2 x 10 floor.  First 10x w/ UE support, second 10x w/o UE support (w/ CG)    Toe taps to maxine, single and alternating w progression towards decreased UE  support.  Therapist maintaining cl S or CGA    Static stdg on airex w/ HT's and HN's 10x w/ CS/CG        yes                  yes   Standing balance - dynamic Gait around slalom course of cones (w and w/o AD/SPC) and over hurdles    Gait over 6 in hurdles - step to pattern forward, side stepping.   reciprocal.  No UE support except intermittent touch.      Walking with head turns and nods w/o AD  Walking with base of support within 12 in tiles  Walking with pivot turn  Walking backwards  Stepping over shoebox forwards   N                                 Standing balance - varied surface On Airex:  Ball toss/bounce/catch  FT EO with HT/HN, UE's at sides   Airex, Semi-Tandem with HT    Blue tread rockerboard A-P gentle rocking for ankle strategies    Rockerboard - maintain balance with head turns and head nods with 1 finger support and w/o UE support (CG for HT's, Min A for HN's) N    N    N  yes           GAIT TRAINING TOTAL TIME FOR SESSION 0-7 Minutes    TUG W/ RW  13.12  "sec  W/ SPC 12.75 sec  W/O AD 10.63 sec  Average of the three:  12.16 seconds    Gait with/ without AD  Gait during  session w/o AD w/ CS  Gait in hallway w/ HT's and HN's    Complete 2 MWT w/  ft  w/o RW  368 ft 3/10 RPE after second 2 MWT  Complete FGA:  14/30  (1,2,1,2,3,1,0,0,2,2) - completed by Ana Rosa Winston PT (primary PT) - will update goals next visit with primary.      Arrived to session ambulating with RW; Gait training w SPC(RUE) x 250 ft x 1 w/  cl S.  Focus on cues for heel strike/appropriate knee extension and hip flexion on RLE.  HR increased from 82 at rest to 104 with ambulation episodes. No gross LOB, min instability, minimal loss of R LE appropriate step length and no overt catch of R toe. Ambulates quicker with SPC than no AD, and has less deviation from midline.    GT  250ft x 2 over outdoor uneven terrain and slight incline with cl S/cga - HR to 120bpm (carried SPC but did not use)      Goal:  Return to amb without AD  yes                                             Dynamic gait      Stair negotiation Up/down FF w/ B HR's and step over step pattern  Ascend/descend 6\" curb with SPC, CS   Up/down ramp with SPC and cues to slow down for safety          Curb negotiation     Ramp negotiation     Outdoor ambulation Amb outdoors over asphalt, concrete, slate and grass, up/down hills w/ SPC and CS    BWS/vector training     MANUAL TOTAL TIME FOR SESSION Not performed    Stretching by therapist/PROM     Mobilization      MODALITIES TOTAL TIME FOR SESSION Not performed    Ice     Travis Guzman PTA performed treatment plan.  Interpretation by Aretha Ferrara PT.    Goals Addressed                 This Visit's Progress    • PT goals        Short Term Goals Time Frame Result Comment/Progress   Pt will amb mod I in household setting with SPC or no AD, including 1 step up/down in his in-law suite at daughter's home 4 weeks MET    Improve JIMENEZ score by at least 3 points (45/56) reflecting dec falls risk " 4 weeks MET Jimenez = 45/56    *FGA = 18/30   TUG </= 14 sec reflecting lower falls risk 4 weeks MET TUG = 14s w/ SPC  TUG = 12.5 w/o with S  12.16 on 9/23   Improve CGS by at least .1 m/s without decline in gait quality 4 weeks  Not tested on 8/28   FGA >/= 15/30 with least restrictive AD 4 weeks  18/30   Consistent STS and SPT transfers without AD mod I 4 weeks         Long Term Goals Time Frame Result Comment/Progress   Pt will amb I at home without AD, in community without AD or with SPC as needed for more challenging terrain/more distracting environments 8-12 weeks MET for HHD, ongoing for SPC Amb CD with RW     JIMENEZ >/= 50/56 reflecting lower falls risk 8-12 weeks ongoing 49/56   TUG completed safely in </= 12 sec with no decline in gait quality, balance with pivot turns 8-12 weeks ongoing 12.16   Gait speed 1.0 m/s maintaining gait quality 8-12 weeks     FGA >/= 20/30 8-12 weeks  8/28 FGA = 18/30   Mod I reciprocal stairs with 1 HR, amb up/down curb step and ADA ramps without AD 8-12 weeks     I finalized HEP 8-12 weeks                 Patient has been educated in safe ambulation using a RW for community, but is able to be I without AD in home    Clinical Impression: Pt demonstrates improving gait and balance skills, as well as endurance.  He is appropriate to begin weight training program which will facilitate improving his balance.  Plan is for discharge by end of month.         Vermilion Border Text: The closure involved the vermilion border.

## 2024-04-28 ENCOUNTER — HOSPITAL ENCOUNTER (EMERGENCY)
Facility: HOSPITAL | Age: 89
Discharge: HOME | End: 2024-04-28
Attending: EMERGENCY MEDICINE
Payer: COMMERCIAL

## 2024-04-28 VITALS
DIASTOLIC BLOOD PRESSURE: 71 MMHG | SYSTOLIC BLOOD PRESSURE: 160 MMHG | OXYGEN SATURATION: 99 % | RESPIRATION RATE: 16 BRPM | HEART RATE: 77 BPM | TEMPERATURE: 97 F | WEIGHT: 179.01 LBS

## 2024-04-28 DIAGNOSIS — S91.119A TOE LACERATION: Primary | ICD-10-CM

## 2024-04-28 PROCEDURE — 99284 EMERGENCY DEPT VISIT MOD MDM: CPT | Performed by: PHYSICIAN ASSISTANT

## 2024-04-28 PROCEDURE — 12001 RPR S/N/AX/GEN/TRNK 2.5CM/<: CPT | Performed by: PHYSICIAN ASSISTANT

## 2024-04-28 PROCEDURE — 99282 EMERGENCY DEPT VISIT SF MDM: CPT

## 2024-04-28 RX ORDER — CEPHALEXIN 250 MG/1
500 CAPSULE ORAL ONCE
Status: COMPLETED | OUTPATIENT
Start: 2024-04-28 | End: 2024-04-28

## 2024-04-28 RX ORDER — CEPHALEXIN 500 MG/1
500 CAPSULE ORAL EVERY 12 HOURS SCHEDULED
Qty: 10 CAPSULE | Refills: 0 | Status: SHIPPED | OUTPATIENT
Start: 2024-04-28 | End: 2024-05-03

## 2024-04-28 RX ADMIN — CEPHALEXIN 500 MG: 250 CAPSULE ORAL at 09:42

## 2024-04-28 NOTE — DISCHARGE INSTRUCTIONS
Keep wound clean and dry.  Take antibiotics as prescribed to prevent infection.  Please follow-up with our foot specialist, Dr. Aruna Drummond  Return to the emergency department with any new or worsening symptoms.

## 2024-04-28 NOTE — ED PROVIDER NOTES
History  Chief Complaint   Patient presents with    Toe Laceration     Patient states he clipped his toe while attempting to clip toenails last night. Takes 81 mg ASA and plavix daily.      89 year old male presents to the ED for evaluation of toe laceration. States he was was attempted to clip his toenails last night and accidentally cut his toe.  Denies any pain.  States he just cannot get the bleeding to stop.  Takes Plavix and asprin daily.  He reports a history of diabetes, states he is no longer taking metformin.  Unknown last tetanus shot per chart review updated in 2019.         Prior to Admission Medications   Prescriptions Last Dose Informant Patient Reported? Taking?   Multiple Vitamin (MULTIVITAMINS PO)   Yes Yes   Sig: Take by mouth   aspirin 81 mg chewable tablet 4/27/2024  Yes Yes   Sig: Chew 81 mg daily   clopidogrel (PLAVIX) 75 mg tablet 4/27/2024  Yes Yes   Sig: Take 75 mg by mouth daily   metFORMIN (GLUCOPHAGE) 500 mg tablet Not Taking  Yes No   Sig: TAKE 1 TABLET DAILY   Patient not taking: Reported on 4/28/2024      Facility-Administered Medications: None       Past Medical History:   Diagnosis Date    Diabetes mellitus (HCC)     Difficulty swallowing     Hypertension     Ptosis of left eyelid     Renal disorder     Stroke (HCC)        No past surgical history on file.    No family history on file.  I have reviewed and agree with the history as documented.    E-Cigarette/Vaping    E-Cigarette Use Never User      E-Cigarette/Vaping Substances     Social History     Tobacco Use    Smoking status: Former    Smokeless tobacco: Never   Vaping Use    Vaping status: Never Used   Substance Use Topics    Alcohol use: Never    Drug use: Never       Review of Systems   Skin:  Positive for wound.   All other systems reviewed and are negative.      Physical Exam  Physical Exam  Vitals and nursing note reviewed.   Constitutional:       General: He is not in acute distress.     Appearance: Normal appearance.  "He is not ill-appearing, toxic-appearing or diaphoretic.   HENT:      Head: Normocephalic.   Eyes:      Conjunctiva/sclera: Conjunctivae normal.   Pulmonary:      Effort: Pulmonary effort is normal.   Musculoskeletal:         General: Normal range of motion.   Skin:     General: Skin is warm and dry.      Comments: Small superficial laceration to the distal aspect of the right fifth toe.  No nail involvement.  Active bleeding.   Neurological:      General: No focal deficit present.      Mental Status: He is alert.         Vital Signs  ED Triage Vitals [04/28/24 0918]   Temperature Pulse Respirations Blood Pressure SpO2   (!) 97 °F (36.1 °C) 77 16 160/71 99 %      Temp Source Heart Rate Source Patient Position - Orthostatic VS BP Location FiO2 (%)   Temporal Monitor Lying Right arm --      Pain Score       No Pain           Vitals:    04/28/24 0918   BP: 160/71   Pulse: 77   Patient Position - Orthostatic VS: Lying         Visual Acuity      ED Medications  Medications   cephalexin (KEFLEX) capsule 500 mg (has no administration in time range)       Diagnostic Studies  Results Reviewed       None                   No orders to display              Procedures  Universal Protocol:  Consent: Verbal consent obtained.  Risks and benefits: risks, benefits and alternatives were discussed  Consent given by: patient  Time out: Immediately prior to procedure a \"time out\" was called to verify the correct patient, procedure, equipment, support staff and site/side marked as required.  Timeout called at: 4/28/2024 9:23 AM.  Patient understanding: patient states understanding of the procedure being performed  Patient consent: the patient's understanding of the procedure matches consent given  Patient identity confirmed: verbally with patient and arm band  Laceration repair    Date/Time: 4/28/2024 9:23 AM    Performed by: Cari Walls PA-C  Authorized by: Cari Walls PA-C  Body area: lower extremity  Location details: right " little toe  Laceration length: 0.5 cm  Tendon involvement: none  Nerve involvement: none    Wound Dehiscence:  Superficial Wound Dehiscence: simple closure      Procedure Details:  Irrigation solution: saline  Irrigation method: syringe  Amount of cleaning: standard  Skin closure: glue  Patient tolerance: patient tolerated the procedure well with no immediate complications               ED Course             Medical Decision Making  89-year-old male presented to the emergency department for evaluation of wound.  Vitals and medical record reviewed.  Wound was thoroughly cleaned.  Skin glue used to approximate the wound and control bleeding.  Tolerated well.  Tetanus up-to-date.  Started on prophylactic antibiotics.  Will follow-up with podiatry.  Patient is clinically and hemodynamically stable for discharge    Problems Addressed:  Toe laceration: acute illness or injury    Risk  Prescription drug management.             Disposition  Final diagnoses:   Toe laceration     Time reflects when diagnosis was documented in both MDM as applicable and the Disposition within this note       Time User Action Codes Description Comment    4/28/2024  9:29 AM Cari Walls Add [S91.119A] Toe laceration           ED Disposition       ED Disposition   Discharge    Condition   Stable    Date/Time   Sun Apr 28, 2024  9:29 AM    Comment   Ant Dalton discharge to home/self care.                   Follow-up Information       Follow up With Specialties Details Why Contact Info    Abram Monterroso MD Family Medicine   [address] [place] [address]  [place] [address]  [phone]      Aruna Drummond DPM Podiatry, Wound Care, General Surgery   [address]  [place] [id number] [phone]              Patient's Medications   Discharge Prescriptions    CEPHALEXIN (KEFLEX) 500 MG CAPSULE    Take 1 capsule (500 mg total) by mouth every 12 (twelve) hours for 5 days       Start Date: 4/28/2024 End Date: 5/3/2024       Order  Dose: 500 mg       Quantity: 10 capsule    Refills: 0           PDMP Review       None            ED Provider  Electronically Signed by             Cari Walls PA-C  04/28/24 0937

## 2025-05-27 ENCOUNTER — TELEPHONE (OUTPATIENT)
Age: OVER 89
End: 2025-05-27

## 2025-05-27 NOTE — TELEPHONE ENCOUNTER
New Patient      Insurance   Current Insurance?[employer] medicare   Insurance E-verified? yes     History   Reason for appointment/active symptoms?  Patient's daughter called stating  psa level is 6.65. patient had it done at [facility].  He does have some urinary retention . He prefers a male provider .  Results are in [facility]. Patient is a facility please call [facility].      Has the patient had any previous Urologist(s)? DR. Nails in 2023     Was the patient seen in the ED?      Labs/Imaging(Including Out Of Network)?      Records Requested?   Records Visible in EPIC? yes     Personal history of cancer? no      Appointment   Office location preference:  [place]  ?   Appointment Details   Date:  07/03/25  Time:  11 am   Location: [place]  Provider:  Dr. Gerard   Does the appointment need further review? please review and see if appointment needs to be sooner.     Call patient's daughter Esperanza at [phone].

## 2025-05-27 NOTE — TELEPHONE ENCOUNTER
Spoke with daughter Esperanza, she is requesting a sooner appt. Booked with Dr. Mclaughlin as new pt for elevated psa.

## 2025-06-01 PROBLEM — R97.20 ELEVATED PSA: Status: ACTIVE | Noted: 2025-06-01

## 2025-06-02 ENCOUNTER — TELEPHONE (OUTPATIENT)
Dept: UROLOGY | Facility: CLINIC | Age: OVER 89
End: 2025-06-02

## 2025-06-02 NOTE — TELEPHONE ENCOUNTER
----- Message from Tiburcio Mclaughlin MD sent at 6/1/2025  6:08 PM EDT -----  Could you check with the daughter Esperanza if there is any older PSAs on her father?  And are there any notes from her previous urologist that we could review prior to seeing him that would be helpful.  Thanks

## 2025-06-02 NOTE — TELEPHONE ENCOUNTER
Patient's daughter Esperanza called stating she is returning the call from the office. Message relayed as per encounter below    Message from Tiburcio Mclaughlin MD sent at 6/1/2025  6:08 PM EDT -----  Could you check with the daughter Esperanza if there is any older PSAs on her father?  And are there any notes from her previous urologist that we could review prior to seeing him that would be helpful.  Thanks    She stated she will call his previous urologist and have them fax over the records.  Fax number provided for the office to receive the records.

## 2025-06-06 ENCOUNTER — HOSPITAL ENCOUNTER (OUTPATIENT)
Dept: RADIOLOGY | Facility: CLINIC | Age: OVER 89
End: 2025-06-06
Attending: STUDENT IN AN ORGANIZED HEALTH CARE EDUCATION/TRAINING PROGRAM
Payer: COMMERCIAL

## 2025-06-06 ENCOUNTER — OFFICE VISIT (OUTPATIENT)
Dept: OBGYN CLINIC | Facility: CLINIC | Age: OVER 89
End: 2025-06-06
Payer: COMMERCIAL

## 2025-06-06 VITALS — HEIGHT: 72 IN | BODY MASS INDEX: 24.24 KG/M2 | WEIGHT: 179 LBS

## 2025-06-06 DIAGNOSIS — M25.561 ACUTE PAIN OF BOTH KNEES: ICD-10-CM

## 2025-06-06 DIAGNOSIS — M25.561 ACUTE PAIN OF BOTH KNEES: Primary | ICD-10-CM

## 2025-06-06 DIAGNOSIS — Z86.73 HISTORY OF CVA (CEREBROVASCULAR ACCIDENT): ICD-10-CM

## 2025-06-06 DIAGNOSIS — M79.672 PAIN IN LEFT FOOT: ICD-10-CM

## 2025-06-06 DIAGNOSIS — M17.0 PRIMARY OSTEOARTHRITIS OF BOTH KNEES: ICD-10-CM

## 2025-06-06 DIAGNOSIS — M25.562 ACUTE PAIN OF BOTH KNEES: ICD-10-CM

## 2025-06-06 DIAGNOSIS — M25.562 ACUTE PAIN OF BOTH KNEES: Primary | ICD-10-CM

## 2025-06-06 DIAGNOSIS — R26.9 GAIT DIFFICULTY: ICD-10-CM

## 2025-06-06 DIAGNOSIS — Z99.89 DEPENDENT ON WALKER FOR AMBULATION: ICD-10-CM

## 2025-06-06 DIAGNOSIS — M79.671 PAIN IN RIGHT FOOT: ICD-10-CM

## 2025-06-06 PROBLEM — N18.31 HYPERTENSIVE KIDNEY DISEASE WITH STAGE 3A CHRONIC KIDNEY DISEASE (HCC): Status: ACTIVE | Noted: 2021-07-12

## 2025-06-06 PROBLEM — I69.391 DYSPHAGIA AS LATE EFFECT OF CEREBROVASCULAR ACCIDENT (CVA): Status: ACTIVE | Noted: 2020-07-17

## 2025-06-06 PROBLEM — I63.9 CEREBROVASCULAR ACCIDENT (CVA) (HCC): Status: ACTIVE | Noted: 2020-07-17

## 2025-06-06 PROBLEM — I69.393 ATAXIA FOLLOWING CEREBRAL INFARCTION: Status: ACTIVE | Noted: 2020-07-31

## 2025-06-06 PROBLEM — N18.31 TYPE 2 DIABETES MELLITUS WITH STAGE 3A CHRONIC KIDNEY DISEASE (HCC): Chronic | Status: ACTIVE | Noted: 2021-06-15

## 2025-06-06 PROBLEM — N18.31 CHRONIC KIDNEY DISEASE, STAGE 3A (HCC): Status: ACTIVE | Noted: 2021-07-12

## 2025-06-06 PROBLEM — I12.9 HYPERTENSIVE KIDNEY DISEASE WITH STAGE 3A CHRONIC KIDNEY DISEASE (HCC): Status: ACTIVE | Noted: 2021-07-12

## 2025-06-06 PROBLEM — E11.22 TYPE 2 DIABETES MELLITUS WITH STAGE 3A CHRONIC KIDNEY DISEASE (HCC): Chronic | Status: ACTIVE | Noted: 2021-06-15

## 2025-06-06 PROCEDURE — 99203 OFFICE O/P NEW LOW 30 MIN: CPT | Performed by: STUDENT IN AN ORGANIZED HEALTH CARE EDUCATION/TRAINING PROGRAM

## 2025-06-06 PROCEDURE — 73562 X-RAY EXAM OF KNEE 3: CPT

## 2025-06-06 PROCEDURE — 73630 X-RAY EXAM OF FOOT: CPT

## 2025-06-06 RX ORDER — ATORVASTATIN CALCIUM 40 MG/1
40 TABLET, FILM COATED ORAL DAILY
COMMUNITY

## 2025-06-06 RX ORDER — SILODOSIN 4 MG/1
CAPSULE ORAL
COMMUNITY

## 2025-06-06 RX ORDER — LANOLIN ALCOHOL/MO/W.PET/CERES
CREAM (GRAM) TOPICAL DAILY
COMMUNITY

## 2025-06-06 NOTE — TELEPHONE ENCOUNTER
Caller: Patient     Doctor: Dr. Mclaughlin    Reason for call: Patient called to cancel this appointment // states he didn't know about it // advised him to speak with his daughter Esperanza // told me he doesn't have to speak to her he just wants to cancel and hung up

## 2025-06-06 NOTE — PROGRESS NOTES
Name: Ant Dalton      : 1928      MRN: [id number]  Encounter Provider: Archie Strong MD  Encounter Date: 2025   Encounter department: [facility] [place]      Assessment & Plan  Acute pain of both knees    Discussed with patient and his daughter (daughter on phone) in regards to symptoms today.  Patient was unaware but daughter notes that patient has been complaining of knee/inner thigh pain when transitioning from sitting to standing at his nursing home.  At baseline uses walker for mobility.  Patient notes that he will have intermittent pain along his 2nd and 3rd toes.  After further discussion with patient/patient's daughter, he was agreeable to obtain imaging of his bilateral knees and bilateral feet today as noted below.  Interpretation as per below and I will follow-up official radiology interpretation.  I counseled patient/daughter that symptoms seem to be moreso consistent with deconditioning/weakness of his lower extremities/thighs and knees given his pain and difficulty seems more so when transitioning from sitting to standing.  Clinically has mild tenderness along the medial thigh.  Patient does have decreased strength on hip abduction, knee flexion/extension.  Clinically nontender over joint lines of the knees.  I counseled going forward to try formal physical therapy for at least 6 weeks, 2-3 times a week.  Referral placed.  I did offer intra-articular diagnostic/therapeutic knee injection, intermetatarsal head of the second third toe today but patient feels that the pain is not severe enough in his knees to warrant.  He will continue as the use of acetaminophen, heat/ice therapy for knee pain.    Orders:    XR knee 3 vw right non injury; Future    XR knee 3 vw left non injury; Future    Ambulatory Referral to Physical Therapy; Future    Pain in left foot  Or localized along the base of the 2nd and 3rd toe  DDx: Osteoarthritic pain versus, intermetatarsal  head bursitis   Orders:    XR foot 3+ vw left; Future    Ambulatory Referral to Physical Therapy; Future    Primary osteoarthritis of both knees    Orders:    Ambulatory Referral to Physical Therapy; Future    Dependent on walker for ambulation    Orders:    Ambulatory Referral to Physical Therapy; Future    History of CVA (cerebrovascular accident)    Orders:    Ambulatory Referral to Physical Therapy; Future    Gait difficulty    Orders:    Ambulatory Referral to Physical Therapy; Future    Pain in right foot  Or localized along the base of the 2nd and 3rd toe  DDx: Osteoarthritic pain versus, intermetatarsal head bursitis   Orders:    XR foot 3+ vw right; Future    Ambulatory Referral to Physical Therapy; Future         No follow-ups on file.    History of Present Illness       Chief Complaint   Patient presents with    Left Knee - Pain    Right Knee - Pain       HPI:  Ant Dalton is a 89 y.o. male  who presents for     Visit 6/6/2025:  Initial evaluation of bilateral knee pain:  Patient here today from [facility]  Patient states that he is unaware of why he is here today.  I reached out to patient's daughter who left her phone number on the appointment list today to discuss symptoms going forward.    Patient has pertinent history of use of rollator for mobility.  History of CVA, CKD 3, type 2 diabetes, ataxia following CVA.  Patient's daughter reports that patient would complain of medial sided thigh and knee pain when transitioning from sitting to standing.  Patient states that he does not feel that his knee pain is severe but does notice pain when transitioning from sitting to standing and with prolonged walking of his walker.  He does feel intermittently feeling a sense of instability without his walker.  Patient states that he was more interested in his foot pain.  He states that he will have intermittent pains along the base of his 2nd and 3rd toes.  He denies any injury he can recall.   He is unsure how long it has been going on.    He has no recent imaging of his knees or his feet.    Daughter notes that patient has seen formal physical therapy in the past and would be interested in trying again to help facilitate further strengthening of his lower extremities.      Past Medical History[1]    Past Surgical History[2]    Medications Ordered Prior to Encounter[3]     Social History     Objective     Ht 6' (1.829 m)   Wt 81.2 kg (179 lb)   BMI 24.28 kg/m²     Constitutional:  see vital signs  Gen: well-developed, normocephalic/atraumatic, well-groomed  Pulmonary/Chest: Effort normal. No respiratory distress.      Archie Strong MD     Ortho Exam  Gait: Patient with use of rollator for mobility.  Patient seen using assistance of rollator to pull himself up from sitting to standing and does report a sense of medial sided knee and thigh pain when performing.  Right knee Exam:  Inspection: No edema, erythema, ecchymosis, open wounds  Increased Warmth: no  Tenderness: none  ROM: 0-130  Knee flexion strength: 4+/5  Knee extension strength: 5-/5  Patellar Grind: negative  Lachman's: negative  Anterior Drawer: negative  Varus laxity: negative  Valgus laxity: negative  Priya: negative     Left knee Exam:  Inspection: No edema, erythema, ecchymosis, open wounds  Increased Warmth: no  Tenderness: none  ROM: 0-130  Knee flexion strength: 4+/5  Knee extension strength: 5-/5  Patellar Grind: negative  Lachman's: negative  Anterior Drawer: negative  Varus laxity: negative  Valgus laxity: negative  [facility]: negative     No calf tenderness to palpation b/l    Left foot exam:  Inspection: No edema, erythema, ecchymosis, open wound or drainage  Palpation: Reports positive TTP between base of 2nd and 3rd toe  ROM: Intact ROM of lesser toes and great toe without any reported pain   Foot plantarflexion/dorsiflexion 5 -/5    Right foot exam:  Inspection: No edema, erythema, ecchymosis, open wound or  "drainage  Palpation: Reports positive TTP between base of 2nd and 3rd toe  ROM: Intact ROM of lesser toes and great toe without any reported pain   Foot plantarflexion/dorsiflexion 5 -/5        Imaging Studies (I personally reviewed images in PACS and report):  X-ray left knee 6/6/2025: No acute osseous abnormalities.  Mild medial tibiofemoral joint space narrowing.  Tiny patellar enthesophyte.  Soft tissue unremarkable.  Trace joint effusion.    X-ray right knee 6/6/2025: No acute osseous abnormalities.  Mild medial tibiofemoral joint space narrowing.  Tiny patellar enthesophyte.  Soft tissue unremarkable.  Trace joint effusion.    X-ray left foot 6/6/2025: No acute osseous abnormalities.  Scattered degenerative changes throughout left foot most prominent first MTP joint.  Tight plantar and Achilles calcaneal heel spurs.  Soft tissue is unremarkable.    X-ray right foot 6/6/2025: No acute osseous abnormalities.  Degenerative changes of first MTP joint.  Soft tissue is unremarkable.    Procedures    -----------------------------------------------------------------  Portions of the record may have been created with voice recognition software. Occasional wrong word or \"sound a like\" substitutions may have occurred due to the inherent limitations of voice recognition software. Read the chart carefully and recognize, using context, where substitutions have occurred.          [1]   Past Medical History:  Diagnosis Date    Diabetes mellitus (HCC)     Difficulty swallowing     Hypertension     Ptosis of left eyelid     Renal disorder     Stroke (HCC)    [2] No past surgical history on file.  [3]   Current Outpatient Medications on File Prior to Visit   Medication Sig Dispense Refill    aspirin 81 mg chewable tablet Chew 81 mg in the morning.      atorvastatin (LIPITOR) 40 mg tablet Take 40 mg by mouth daily      Multiple Vitamins-Minerals (PRESERVISION AREDS PO) Take by mouth      Silodosin 4 MG CAPS Take by mouth Rapaflo  "     vitamin B-12 (VITAMIN B-12) 1,000 mcg tablet Take by mouth daily      clopidogrel (PLAVIX) 75 mg tablet Take 75 mg by mouth daily (Patient not taking: Reported on 6/6/2025)      metFORMIN (GLUCOPHAGE) 500 mg tablet TAKE 1 TABLET DAILY (Patient not taking: Reported on 6/6/2025)      Multiple Vitamin (MULTIVITAMINS PO) Take by mouth (Patient not taking: Reported on 6/6/2025)       No current facility-administered medications on file prior to visit.

## 2025-06-06 NOTE — TELEPHONE ENCOUNTER
Patient's daughter called and requested that she be called from patient's phone so that she could join the visit because of his age.

## 2025-06-09 NOTE — TELEPHONE ENCOUNTER
Larry Mata called in and rescheduled this appointment for 7/16. She is concerned for her father and doesn't understand why he cancelled the appointment. She will try to speak with him and voice her concerns.    Daughter call back [phone]

## 2025-07-16 ENCOUNTER — OFFICE VISIT (OUTPATIENT)
Dept: UROLOGY | Facility: CLINIC | Age: OVER 89
End: 2025-07-16
Payer: COMMERCIAL

## 2025-07-16 ENCOUNTER — TELEPHONE (OUTPATIENT)
Dept: UROLOGY | Facility: CLINIC | Age: OVER 89
End: 2025-07-16

## 2025-07-16 VITALS
SYSTOLIC BLOOD PRESSURE: 142 MMHG | HEART RATE: 76 BPM | HEIGHT: 72 IN | DIASTOLIC BLOOD PRESSURE: 68 MMHG | OXYGEN SATURATION: 97 % | BODY MASS INDEX: 24.35 KG/M2 | TEMPERATURE: 97.8 F | WEIGHT: 179.8 LBS

## 2025-07-16 DIAGNOSIS — R97.20 ELEVATED PSA: Primary | ICD-10-CM

## 2025-07-16 LAB — POST-VOID RESIDUAL VOLUME, ML POC: 18 ML

## 2025-07-16 PROCEDURE — 99203 OFFICE O/P NEW LOW 30 MIN: CPT | Performed by: UROLOGY

## 2025-07-16 PROCEDURE — 51798 US URINE CAPACITY MEASURE: CPT | Performed by: UROLOGY

## 2025-07-16 NOTE — PROGRESS NOTES
UROLOGY PROGRESS NOTE         NAME: Ant Dalton  AGE: 89 y.o. SEX: male  : 1928   MRN: [id number]    DATE: 2025  TIME: 3:41 PM    Assessment and Plan      Impression:   1. Elevated PSA       Plan: Patient's PSA is actually normal for age.  He does have some firmness on prostate exam but I did explain to him that the overwhelming majority of gentleman at age 96 are going to have prostate cancer with most of those being low risk or clinically insignificant.  They are not interested in any invasive type of testing.  They do want to recheck a PSA in 6 months with an exam and that is reasonable.  He is on silodosin and seems to be doing well with this.  Denies any side effects.  We will see him back in 6 months for recheck.  Granddaughter and wife with patient today.  Questions answered.  Patient now at [facility] and PSA was done due to their initial screening exams.  Patient has seen Dr. Canas years ago.  Will try to obtain any old PSAs.  Postvoid residual was 18 cc and urinalysis was completely normal today.      Chief Complaint     Chief Complaint   Patient presents with    Elevated PSA     PSA 05/15/2025 6.65     History of Present Illness     HPI: Ant Dalton is a 89 y.o. year old male who presents with elevated PSA.  Here for evaluation.  Most recent PSA 6.65.  Patient on silodosin 4 mg daily.              The following portions of the patient's history were reviewed and updated as appropriate: allergies, current medications, past family history, past medical history, past social history, past surgical history and problem list.  Past Medical History[1]  Past Surgical History[2]  shoulder  Review of Systems     Const: Denies chills, fever and weight loss.  CV: Denies chest pain.  Resp: Denies SOB.  GI: Denies abdominal pain, nausea and vomiting.  : Denies symptoms other than stated above.  Musculo: Denies back pain.    Objective   /68   Pulse 76   Temp 97.8 °F (36.6 °C)    Ht 6' (1.829 m)   Wt 81.6 kg (179 lb 12.8 oz)   SpO2 97%   BMI 24.39 kg/m²     Physical Exam  Const: Appears healthy and well developed. No signs of acute distress present.  Resp: Respirations are regular and unlabored.   CV: Rate is regular. Rhythm is regular.  Abdomen: Abdomen is soft, nontender, and nondistended. Kidneys are not palpable.  : External genitalia normal circumcised no lesions meatus normal testes descended.  No gross hernia.  Prostate is 2+ some firmness at the base bilaterally but no gross nodules or gross extension of any disease.  Psych: Patient's attitude is cooperative. Mood is normal. Affect is normal.    Procedure   Procedures     Current Medications   Current Medications[3]        Martínez Gerard MD             [1]   Past Medical History:  Diagnosis Date    Diabetes mellitus (HCC)     Difficulty swallowing     Hypertension     Ptosis of left eyelid     Renal disorder     Stroke (HCC)    [2] No past surgical history on file.  [3]   Current Outpatient Medications:     aspirin 81 mg chewable tablet, Chew 81 mg in the morning., Disp: , Rfl:     atorvastatin (LIPITOR) 40 mg tablet, Take 40 mg by mouth daily, Disp: , Rfl:     Multiple Vitamins-Minerals (PRESERVISION AREDS PO), Take by mouth, Disp: , Rfl:     Silodosin 4 MG CAPS, Take by mouth Rapaflo, Disp: , Rfl:     vitamin B-12 (VITAMIN B-12) 1,000 mcg tablet, Take by mouth daily, Disp: , Rfl:     clopidogrel (PLAVIX) 75 mg tablet, Take 75 mg by mouth daily (Patient not taking: Reported on 7/16/2025), Disp: , Rfl:     metFORMIN (GLUCOPHAGE) 500 mg tablet, TAKE 1 TABLET DAILY (Patient not taking: Reported on 4/28/2024), Disp: , Rfl:     Multiple Vitamin (MULTIVITAMINS PO), Take by mouth (Patient not taking: Reported on 7/16/2025), Disp: , Rfl:

## 2025-07-16 NOTE — TELEPHONE ENCOUNTER
Spoke with Daughter Tianna, she lives away but Niece Jessica will be with pt at [facility] with . has elevated psa and needs medical eval since moving into [facility].  If any aggressive treatments need to be completed, Tianna asks that she be contacted, does not want much done to pt since he is 89 years old.   aware and in agreement.

## (undated) DEVICE — MOUTHPIECE 60FR ENDO BITEBLOCK

## (undated) DEVICE — FORCEP COLD RADIAL JAW